# Patient Record
Sex: MALE | Race: WHITE | NOT HISPANIC OR LATINO | Employment: OTHER | URBAN - METROPOLITAN AREA
[De-identification: names, ages, dates, MRNs, and addresses within clinical notes are randomized per-mention and may not be internally consistent; named-entity substitution may affect disease eponyms.]

---

## 2017-01-04 ENCOUNTER — TELEPHONE (OUTPATIENT)
Dept: NEUROLOGY | Facility: HOSPITAL | Age: 47
End: 2017-01-04

## 2017-01-04 DIAGNOSIS — C7B.8 SECONDARY NEUROENDOCRINE TUMOR OF LIVER: Primary | ICD-10-CM

## 2017-01-04 DIAGNOSIS — C7B.8 SECONDARY NEUROENDOCRINE TUMOR OF DISTANT LYMPH NODES: ICD-10-CM

## 2017-01-04 RX ORDER — HYDROCODONE BITARTRATE AND ACETAMINOPHEN 5; 325 MG/1; MG/1
1 TABLET ORAL EVERY 6 HOURS PRN
Status: ON HOLD | COMMUNITY
End: 2017-04-12 | Stop reason: HOSPADM

## 2017-01-04 RX ORDER — MONTELUKAST SODIUM 4 MG/1
3 TABLET, CHEWABLE ORAL 2 TIMES DAILY
COMMUNITY
End: 2017-03-30

## 2017-01-04 NOTE — LETTER
January 4, 2017    Javy Thompson  [address] [facility] [address]  [address]             [facility]  Tumor Program  [address]  [address]  [place] [address]  Phone: [phone]  Fax: [phone] Dear Mr. Thompson:    Thank you for your interest in our program. It was my pleasure speaking with you today about your upcoming appointment.     You have a scheduled appointment with Dr Brandon Hadley MD on Wednesday, February 8, 2017 at 9:30 AM. Our office is located at :    [facility]  Medical Office [facility]  Neuroendocrine Tumor Clinic  [address], [address]  [place], [address]    You are scheduled for a consultation only with the physicians unless otherwise planned. Plan to be here for your visit for 2-3 hrs. If flight arrangements are made, plan to make the return flight after 6 pm if possible. The [place] 10 minutes from [facility].    In preparation for your appointment, we ask that you gather the information below and fax them to us ASAP. This will enable us to review all pertinent information at the time of your visit so that recommendations can be made and a plan of care developed for you.     Please return forms along with all paper records via fax asap.    Please fax  1. Insurance cards (front and back)-enlarged if possible  2. Drivers licenses  3. Current medicine list  4. Name, address & phone # of your physician for correspondence  5. Authorization for Release of Information-complete and return to clinic  6. Medical Records-send as soon as you have them together (see guidelines below)    Lab Work: If requested, the special lab markers do require special tubes that have to be ordered by the ordering facility. The lab work should be within 3 months.. The labs take 2-3 weeks to get results so please get labs drawn asap. The name and phone # of the send out lab that does the special labs tests is on the order. You can have the  labs drawn at [facility], [facility], a local hospital, or your doctors office (whichever works). If these lab tests need to be done, I will attach an Outpatient Order form. If you are doing your lab work at a facility other than [facility], call our office to notify us the date you have them drawn and the location and phone number of the lab for easy follow up.    Scans: Please mail copies of CD's of your last scans to the office asap. I would recommend sending them overnight with a tracking number in case of any problems. If you need updated scans, I will attach an Outpatient Order form.    Tissue Biopsy/Pathology: If you had a tissue biopsy or surgery, we will request to have your slides and/or tissue blocks sent to us to perform some special testing on them. Please provide us with a pathology report asa. This testing will be billed to your insurance company.     Operative or Procedure reports: All surgery or procedure reports related to your neuroendocrine tumor should be sent to us.    Insurance Company: You should contact your insurance company to inquire about your insurance coverage and benefits. Ask about co-payments and deductibles when seeing a specialist. Ask if this visit will be in Network or Out of Network. We may be able to work with you if this is out of network for you.    Lodging: Attached is lodging information from the [facility] and a list of local hotels. The [facility] is run by the [employer] and is free of charge. [facility], you must call my office and talk to [facility]. You will need to complete the application and send it to my office for a physician signature. We will forward it to the [facility] and they will check availability. If you wish to stay at the [facility], apply EARLY, they fill up quickly. You may contact the [facility] a week later to confirm your reservation and ask any questions regarding the facility. You  May only stay at the [facility] 24 hrs prior to  your appointment and up to 24 hrs after your appointment. (THIS CAN ONLY BE USED IF YOU LIVE MORE THAN 40 MILES FROM OUR FACILITY).    Call me if you have any questions, email is not the best way to communicate with our office.    We are looking forward to meeting and taking care of you. If you have any questions or concerns, please don't hesitate to call.     Sincerely,        Mariama Islas, RN, BSN  Nurse Manager, Neuroendocrine Tumor Program

## 2017-01-04 NOTE — TELEPHONE ENCOUNTER
----- Message from Iman Kennedy sent at 1/3/2017 10:22 AM CST -----  EAW/New patient - Who called: Patient's wife- Damaris                                     Referred by: Facebook and different Internet websites                Why do you need to be seen? Neuroendocrine of Liver and Lymph Nodes               Which doctor are you requesting? EAW.                                      You may contact patient back to schedule at: [phone]    Instructed patient to gather medical records, Cd's and medication list and fax or mail to us asap.

## 2017-01-04 NOTE — TELEPHONE ENCOUNTER
Ordered octreoscan, MRI, tumor markers and path re read per protocol. Appointment made with MD, info sent to patient

## 2017-01-05 NOTE — TELEPHONE ENCOUNTER
Went to the ED in December 2016 for abdominal pain and CT scan done. Has been having symptoms of flushing and diarrhea for about a year. 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  PET scan done 2 weeks ago and found nodes in mesentery, esophagus and diaphragm.  Wife found us on the internet and wants to see Dr Hadley, liver biopsy will be done 1/5/17

## 2017-01-10 ENCOUNTER — TELEPHONE (OUTPATIENT)
Dept: NEUROLOGY | Facility: HOSPITAL | Age: 47
End: 2017-01-10

## 2017-01-10 NOTE — TELEPHONE ENCOUNTER
Took call directly from patient and he is having difficuly having labs drawn.  Told him to continue calling around, go in with the orders and show them the number and ask them to call to get the tubes.  Informed him that some patients drive an hour to get their labs drawn.  He will keep trying.

## 2017-01-12 LAB
EXT 24 HR UR METANEPHRINE: ABNORMAL
EXT 24 HR UR NORMETANEPHRINE: ABNORMAL
EXT 24 HR UR NORMETANEPHRINE: ABNORMAL
EXT 25 HYDROXY VIT D2: ABNORMAL
EXT 25 HYDROXY VIT D3: ABNORMAL
EXT 5 HIAA 24 HR URINE: ABNORMAL
EXT 5 HIAA BLOOD: 137 NG/ML (ref 0–22)
EXT ACTH: ABNORMAL
EXT AFP: ABNORMAL
EXT ALBUMIN: ABNORMAL
EXT ALKALINE PHOSPHATASE: ABNORMAL
EXT ALT: ABNORMAL
EXT AMYLASE: ABNORMAL
EXT ANTI ISLET CELL AB: ABNORMAL
EXT ANTI PARIETAL CELL AB: ABNORMAL
EXT ANTI THYROID AB: ABNORMAL
EXT AST: ABNORMAL
EXT BILIRUBIN DIRECT: ABNORMAL MG/DL
EXT BILIRUBIN TOTAL: ABNORMAL
EXT BK VIRUS DNA QN PCR: ABNORMAL
EXT BUN: ABNORMAL
EXT C PEPTIDE: ABNORMAL
EXT CA 125: ABNORMAL
EXT CA 19-9: ABNORMAL
EXT CA 27-29: ABNORMAL
EXT CALCITONIN: ABNORMAL
EXT CALCIUM: ABNORMAL
EXT CEA: ABNORMAL
EXT CHLORIDE: ABNORMAL
EXT CHOLESTEROL: ABNORMAL
EXT CHROMOGRANIN A: 66 NG/ML (ref 0–15)
EXT CO2: ABNORMAL
EXT CREATININE UA: ABNORMAL
EXT CREATININE: ABNORMAL MG/DL
EXT CYCLOSPORONE LEVEL: ABNORMAL
EXT DOPAMINE: ABNORMAL
EXT EBV DNA BY PCR: ABNORMAL
EXT EPINEPHRINE: ABNORMAL
EXT FOLATE: ABNORMAL
EXT FREE T3: ABNORMAL
EXT FREE T4: ABNORMAL
EXT FSH: ABNORMAL
EXT GASTRIN RELEASING PEPTIDE: ABNORMAL
EXT GASTRIN RELEASING PEPTIDE: ABNORMAL
EXT GASTRIN: ABNORMAL
EXT GGT: ABNORMAL
EXT GHRELIN: ABNORMAL
EXT GLUCAGON: ABNORMAL
EXT GLUCOSE: ABNORMAL
EXT GROWTH HORMONE: ABNORMAL
EXT HCV RNA QUANT PCR: ABNORMAL
EXT HDL: ABNORMAL
EXT HEMATOCRIT: ABNORMAL
EXT HEMOGLOBIN A1C: ABNORMAL
EXT HEMOGLOBIN: ABNORMAL
EXT HISTAMINE 24 HR URINE: ABNORMAL
EXT HISTAMINE: ABNORMAL
EXT IGF-1: ABNORMAL
EXT IMMUNKNOW (NON-STIMULATED): ABNORMAL
EXT IMMUNKNOW (STIMULATED): ABNORMAL
EXT INR: ABNORMAL
EXT INSULIN: ABNORMAL
EXT LANREOTIDE LEVEL: ABNORMAL
EXT LDH, TOTAL: ABNORMAL
EXT LDL CHOLESTEROL: ABNORMAL
EXT LIPASE: ABNORMAL
EXT MAGNESIUM: ABNORMAL
EXT METANEPHRINE FREE PLASMA: ABNORMAL
EXT MOTILIN: ABNORMAL
EXT NEUROKININ A CAMB: ABNORMAL
EXT NEUROKININ A ISI: 65 PG/ML (ref 0–40)
EXT NEUROTENSIN: ABNORMAL
EXT NOREPINEPHRINE: ABNORMAL
EXT NORMETANEPHRINE: ABNORMAL
EXT NSE: ABNORMAL
EXT OCTREOTIDE LEVEL: ABNORMAL
EXT PANCREASTATIN CAMB: ABNORMAL
EXT PANCREASTATIN ISI: 3344 PG/ML (ref 10–135)
EXT PANCREATIC POLYPEPTIDE: ABNORMAL
EXT PHOSPHORUS: ABNORMAL
EXT PLATELETS: ABNORMAL
EXT POTASSIUM: ABNORMAL
EXT PROGRAF LEVEL: ABNORMAL
EXT PROLACTIN: ABNORMAL
EXT PROTEIN TOTAL: ABNORMAL
EXT PROTEIN UA: ABNORMAL
EXT PT: ABNORMAL
EXT PTH, INTACT: ABNORMAL
EXT PTT: ABNORMAL
EXT RAPAMUNE LEVEL: ABNORMAL
EXT SEROTONIN: 1290 NG/ML (ref 56–244)
EXT SODIUM: ABNORMAL MMOL/L
EXT SOMATOSTATIN: ABNORMAL
EXT SUBSTANCE P: ABNORMAL
EXT TRIGLYCERIDES: ABNORMAL
EXT TRYPTASE: ABNORMAL
EXT TSH: ABNORMAL
EXT URIC ACID: ABNORMAL
EXT URINE AMYLASE U/HR: ABNORMAL
EXT URINE AMYLASE U/L: ABNORMAL
EXT VASOACTIVE INTESTINAL POLYPEPTIDE: ABNORMAL
EXT VITAMIN B12: ABNORMAL
EXT VMA 24 HR URINE: ABNORMAL
EXT WBC: ABNORMAL
NEURON SPECIFIC ENOLASE: ABNORMAL

## 2017-01-14 LAB

## 2017-01-26 ENCOUNTER — TELEPHONE (OUTPATIENT)
Dept: NEUROLOGY | Facility: HOSPITAL | Age: 47
End: 2017-01-26

## 2017-01-26 NOTE — TELEPHONE ENCOUNTER
Took call directly from patient's wife and discussed the records we have.  Informed her that we have no pathology report of the inconclusive one or the one repeated on 1/5/17 and that it is imperative that we have that.  Checked the [facility] website for labs and nothing was there.  She said that she would email the path reports times 2 and I will check on the labs, she said that he had them drawn on 1/9/17.

## 2017-01-30 ENCOUNTER — TELEPHONE (OUTPATIENT)
Dept: NEUROLOGY | Facility: HOSPITAL | Age: 47
End: 2017-01-30

## 2017-01-30 NOTE — TELEPHONE ENCOUNTER
----- Message from Samreen Boyd sent at 1/30/2017 10:20 AM CST -----  JAMEY/CHRISTY Cervantes from Dr. Harp in [place] is returning your call about patients records. Please call Helen back at [phone]

## 2017-02-03 LAB

## 2017-02-06 ENCOUNTER — HOSPITAL ENCOUNTER (OUTPATIENT)
Dept: RADIOLOGY | Facility: HOSPITAL | Age: 47
Discharge: HOME | End: 2017-02-06
Attending: SURGERY
Payer: COMMERCIAL

## 2017-02-06 ENCOUNTER — HOSPITAL ENCOUNTER (OUTPATIENT)
Dept: CARDIOLOGY | Facility: HOSPITAL | Age: 47
Discharge: HOME | End: 2017-02-06
Attending: SURGERY
Payer: COMMERCIAL

## 2017-02-06 DIAGNOSIS — C7B.8 SECONDARY NEUROENDOCRINE TUMOR OF DISTANT LYMPH NODES: ICD-10-CM

## 2017-02-06 DIAGNOSIS — C7B.8 SECONDARY NEUROENDOCRINE TUMOR OF LIVER: ICD-10-CM

## 2017-02-06 PROCEDURE — 74183 MRI ABD W/O CNTR FLWD CNTR: CPT | Mod: TC

## 2017-02-06 PROCEDURE — 78803 RP LOCLZJ TUM SPECT 1 AREA: CPT | Mod: TC

## 2017-02-06 PROCEDURE — 74150 CT ABDOMEN W/O CONTRAST: CPT | Mod: 26,,, | Performed by: RADIOLOGY

## 2017-02-06 PROCEDURE — 78803 RP LOCLZJ TUM SPECT 1 AREA: CPT | Mod: 26,,, | Performed by: RADIOLOGY

## 2017-02-06 PROCEDURE — 78804 RP LOCLZJ TUM WHBDY 2+D IMG: CPT | Mod: 26,,, | Performed by: RADIOLOGY

## 2017-02-06 PROCEDURE — 93306 TTE W/DOPPLER COMPLETE: CPT

## 2017-02-06 PROCEDURE — A9585 GADOBUTROL INJECTION: HCPCS | Performed by: SURGERY

## 2017-02-06 PROCEDURE — 74183 MRI ABD W/O CNTR FLWD CNTR: CPT | Mod: 26,,, | Performed by: RADIOLOGY

## 2017-02-06 PROCEDURE — 93306 TTE W/DOPPLER COMPLETE: CPT | Mod: 26,,, | Performed by: INTERNAL MEDICINE

## 2017-02-06 PROCEDURE — 25500020 PHARM REV CODE 255: Performed by: SURGERY

## 2017-02-06 RX ORDER — GADOBUTROL 604.72 MG/ML
10 INJECTION INTRAVENOUS
Status: COMPLETED | OUTPATIENT
Start: 2017-02-06 | End: 2017-02-06

## 2017-02-06 RX ADMIN — GADOBUTROL 10 ML: 604.72 INJECTION INTRAVENOUS at 08:02

## 2017-02-07 ENCOUNTER — HOSPITAL ENCOUNTER (OUTPATIENT)
Dept: RADIOLOGY | Facility: HOSPITAL | Age: 47
Discharge: HOME | End: 2017-02-07
Attending: SURGERY
Payer: COMMERCIAL

## 2017-02-07 DIAGNOSIS — C7B.8 SECONDARY NEUROENDOCRINE TUMOR OF DISTANT LYMPH NODES: ICD-10-CM

## 2017-02-07 DIAGNOSIS — C7B.8 SECONDARY NEUROENDOCRINE TUMOR OF LIVER: ICD-10-CM

## 2017-02-07 LAB
DIASTOLIC DYSFUNCTION: NO
ESTIMATED PA SYSTOLIC PRESSURE: 18.52
MITRAL VALVE MOBILITY: NORMAL
RETIRED EF AND QEF - SEE NOTES: 55 (ref 55–65)
TRICUSPID VALVE REGURGITATION: NORMAL

## 2017-02-07 PROCEDURE — 78803 RP LOCLZJ TUM SPECT 1 AREA: CPT | Mod: TC

## 2017-02-07 PROCEDURE — 78803 RP LOCLZJ TUM SPECT 1 AREA: CPT | Mod: 26,,, | Performed by: RADIOLOGY

## 2017-02-07 PROCEDURE — A9572 INDIUM IN-111 PENTETREOTIDE: HCPCS

## 2017-02-08 ENCOUNTER — OFFICE VISIT (OUTPATIENT)
Dept: NEUROLOGY | Facility: HOSPITAL | Age: 47
End: 2017-02-08
Attending: SURGERY
Payer: COMMERCIAL

## 2017-02-08 ENCOUNTER — CLINICAL SUPPORT (OUTPATIENT)
Dept: NEUROLOGY | Facility: HOSPITAL | Age: 47
End: 2017-02-08
Payer: COMMERCIAL

## 2017-02-08 VITALS
TEMPERATURE: 98 F | DIASTOLIC BLOOD PRESSURE: 79 MMHG | BODY MASS INDEX: 26.68 KG/M2 | SYSTOLIC BLOOD PRESSURE: 128 MMHG | HEIGHT: 77 IN | WEIGHT: 226 LBS | HEART RATE: 83 BPM

## 2017-02-08 DIAGNOSIS — C7B.02 METASTATIC MALIGNANT CARCINOID TUMOR TO LIVER: ICD-10-CM

## 2017-02-08 DIAGNOSIS — C7B.8 SECONDARY NEUROENDOCRINE TUMOR OF LIVER: ICD-10-CM

## 2017-02-08 DIAGNOSIS — K63.89 MESENTERIC MASS: ICD-10-CM

## 2017-02-08 DIAGNOSIS — C7B.8 SECONDARY NEUROENDOCRINE TUMOR OF DISTANT LYMPH NODES: ICD-10-CM

## 2017-02-08 DIAGNOSIS — E34.0 CARCINOID SYNDROME: ICD-10-CM

## 2017-02-08 DIAGNOSIS — C7A.00 MALIGNANT CARCINOID TUMOR OF UNKNOWN PRIMARY SITE: ICD-10-CM

## 2017-02-08 DIAGNOSIS — C7A.012 MALIGNANT CARCINOID TUMOR OF ILEUM: Primary | ICD-10-CM

## 2017-02-08 PROCEDURE — 99212 OFFICE O/P EST SF 10 MIN: CPT | Mod: 27 | Performed by: SURGERY

## 2017-02-08 PROCEDURE — 99212 OFFICE O/P EST SF 10 MIN: CPT | Mod: 27

## 2017-02-08 PROCEDURE — 99215 OFFICE O/P EST HI 40 MIN: CPT | Performed by: SURGERY

## 2017-02-08 NOTE — LETTER
February 8, 2017        Syd Gonzales MD  [address] [place] [address]       NOLANETS: [facility]  A collaboration between [facility] and [facility] [address]  [address]  [place] [address]  Phone: [phone]  Fax: [phone]        YARIEL Oliva M.D., FACS  Ryan Coello M.D.  Yunior Mims M.D.   Heather Reyes M.D., FACS  DO Brandon Mcnally M.D., FACS   Patient: Javy Thompson   MR Number: [id number]   YOB: 1970   Date of Visit: 2/8/2017     Dear Dr. Gonzales    Thank you for the kind referral of Javy Thompson. We saw this patient on 2/8/2017, and a copy of our clinic note is enclosed. We certainly appreciate the opportunity to participate in your patient's care.     If you have any questions or wish to discuss your patient further, please do not hesitate to contact us at [phone].       Kindest personal regards,          Brandon Hadley MD, FACS  The [employer] Professor of Surgery & Neurosciences  [facility], Dept. Of Surgery  [address] [place] [address], [address]  [place] [address] [phone]

## 2017-02-08 NOTE — PATIENT INSTRUCTIONS
Patient Instructions       Take a Hibiclens shower twice a day for 3 days prior to surgery, including the morning of surgery.   Gargle with Listerine twice a day for 2 days prior to surgery, including the morning of surgery.      3/27/17    Appointment with Dr. Reyes    Pre [facility] for Hospital Admission - Go to 1st floor of the hospital-admitting desk. You will do paper work, get blood drawn,  get x-rays and see Anesthesia at this time.      __3/28/2017__   CLEAR LIQUIDS all day   Do not eat or drink anything after midnight.                 ____3/29/2017__   Hospital Admission for surgery.  Report to 2nd floor, Same Day Surgery desk at ___0700_____   Surgery is scheduled for ____0530_____        [facility] [address]  [place]  [address]  [phone]      INFORMATION REGARDING YOUR PROCEDURE      We look forward to serving you and your family and appreciate that you have chosen [facility] for your healthcare needs. Before, during, and after your surgery, you will be cared for by skilled medical professionals. Our surgeons, anesthesiologists, nurses,  and other healthcare professionals will work with you and your family to ensure a safe, smooth and comfortable surgery and recovery.    In order to best meet your pre-admission needs, your surgeon or ordering physicians office will contact our Scheduling Office at [phone] and schedule a Pre-Procedure Appointment.  This should preferably be done 72 hours or greater before your scheduled procedure date.     During your pre-procedure visit your insurance will be verified for your procedure. You will meet with a Registered Nurse and an Anesthesiologist or Nurse Anesthetist. If tests are required, they will be performed during your visit. Please allow about one and a half hours for this visit.      You will need to bring the following information or items with you to your Pre-Procedure Appointment:    1.   Picture Identification  2.  Insurance Card  3.  Current list of medications to include name and dosage  4.  List of allergies  5.  Orders and any other forms your doctor has given to you  6.  Copies of lab results performed at other facilities. This may include blood work, EKGs or chest xrays.   These results can be faxed to [phone].    The Pre-Op Center Registration Desk is located on the first floor of the hospital ([address] [place] [address]) in the [facility].  The Pre-Operative Center is located on the second floor of the hospital. Someone will walk you from the registration area to the Pre-Operative Center.    Free parking is located in the front of the hospital and medical office building and is easily accessed from [address] and [address]. When you arrive, please check in at the main information desk of the hospital.    Please call Outpatient Surgery at [phone] after 12:00pm on the day before your procedure for your arrival time and updated procedure time.      Pre-Op Bathing Instructions    Before surgery, you can play an important role in your own health.    Because skin is not sterile, we need to be sure that your skin is as free of germs as possible. By following the instructions below, you can reduce the number of germs on your skin before surgery.    IMPORTANT: You will need to shower with a special soap called Hibiclens*, available at any pharmacy.  If you are allergic to Chlorhexidine (the antiseptic in Hibiclens), use an antibacterial soap such as Dial Soap for your preoperative shower.  You will shower with Hibiclens both the night before your surgery and the morning of your surgery.  Do not use Hibiclens on the head, face or genitals to avoid injury to those areas.    STEP #1: THE NIGHT BEFORE YOUR SURGERY     1. Do not shave the area of your body where your surgery will be performed.  2. Shower and wash your hair and body as usual with your normal soap and  shampoo.  3. Rinse your hair and body thoroughly after you shower to remove all soap residue.  4. With your hand, apply one packet of Hibiclens soap to the surgical site.   5. Wash the site gently for five (5) minutes. Do not scrub your skin too hard.   6. Do not wash with your regular soap after Hibiclens is used.  7. Rinse your body thoroughly.  8. Pat yourself dry with a clean, soft towel.  9. Do not use lotion, cream, or powder.  10. Wear clean clothes.    STEP #2: THE MORNING OF YOUR SURGERY     1. Repeat Step #1.    * Not to be used by people allergic to Chlorhexidine.

## 2017-02-08 NOTE — MR AVS SNAPSHOT
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    Follow-up and Disposition History      [facility]     [facility] Care [facility] -  Assistance  Registered nurses in the [facility] provide clinical advisement, health education, appointment booking, and other advisory services.  Call for this free service at [phone].             Medications           Message regarding Medications     Verify the changes and/or additions to your medication regime listed below are the same as discussed with your clinician today.  If any of these changes or additions are incorrect, please notify your healthcare provider.             Verify that the below list of medications is an accurate representation of the medications you are currently taking.  If none reported, the list may be blank. If incorrect, please contact your healthcare provider. Carry this list with you in case of emergency.           Current Medications     colestipol (COLESTID) 1 gram Tab Take 3 g by mouth 2 (two) times daily.    hydrocodone-acetaminophen 5-325mg (NORCO) 5-325 mg per tablet Take 1 tablet by mouth  "every 6 (six) hours as needed for Pain.           Clinical Reference Information           Your Vitals Were     BP Pulse Temp Height Weight BMI    128/79 83 98.1 °F (36.7 °C) (Oral) 6' 5" (1.956 m) 102.5 kg (226 lb) 26.8 kg/m2      Blood Pressure          Most Recent Value    BP  128/79      Allergies as of 2/8/2017     Epinephrine      Immunizations Administered on Date of Encounter - 2/8/2017     None      Orders Placed During Today's Visit      Normal Orders This Visit    5-HIAA Plasma (Neuroendocrine)     CBC auto differential     Comprehensive metabolic panel     CT Abdomen Pelvis With Contrast - Triple Phase     Lanreotide Drug Levels     Neurokinin A     Pancreastatin     Serotonin (Neuroendo)     Future Labs/Procedures Expected by Expires    5-HIAA Plasma (Neuroendocrine)  2/8/2017 4/9/2018    CT Abdomen Pelvis With Contrast - Triple Phase  2/8/2017 2/8/2018    Lanreotide Drug Levels  2/8/2017 4/9/2018    MRI Abdomen W WO Contrast  2/8/2017 2/8/2018    Neurokinin A  2/8/2017 4/9/2018    NM Tumor Localization Multi Octreo W CT  2/8/2017 2/8/2018    NM Tumor Localization Spect (Day 1)  2/8/2017 2/8/2018    NM Tumor Localization Spect (Day 2)  2/8/2017 2/8/2018    Pancreastatin  2/8/2017 4/9/2018    Serotonin (Neuroendo)  2/8/2017 4/9/2018    CBC auto differential  8/7/2017 4/9/2018    Comprehensive metabolic panel  8/7/2017 4/9/2018      [facility] Sign-Up     Activating your [facility] account is as easy as 1-2-3!     1) Visit [email or web address], select Sign Up Now, enter this activation code and your date of birth, then select Next.  [email or web address]  Expires: 3/25/2017 10:35 AM      2) Create a username and password to use when you visit [facility] in the future and select a security question in case you lose your password and select Next.    3) Enter your e-mail address and click Sign Up!    Additional Information  If you have questions, please e-mail [email or web address] or call [phone] to talk to our " [facility] staff. Remember, [facility] is NOT to be used for urgent needs. For medical emergencies, dial 911.         Instructions    See Margarita Field, today- appointment scheduled    See Surgeon, Dr. NADEEN Reyes, today- appointment scheduled    **Start Lanreotide 120 mg subcutaneously every 28 days- to be prescribed and managed by patient's local MD**      Plan after surgery:    -Return to clinic to see Dr. Hadley 3 months after surgery for restaging    -Scans (CT, MRI, OScan) prior to returning to clinic (3 months after surgery)    -Labs (Tumor Markers) 2 months after surgery/ 1 month prior to returning to clinic         Language Assistance Services     ATTENTION: Language assistance services are available, free of charge. Please call [phone].      ATENCIÓN: Si gilsonla mera, tiene a john disposición servicios gratuitos de asistencia lingüística. Llame al [phone].     [facility] Ý: N?u b?n nói Ti?ng Vi?t, có các d?ch v? h? tr? ngôn ng? mi?n phí dành cho b?n. G?i s? [phone].         [facility] complies with applicable Federal civil rights laws and does not discriminate on the basis of race, color, national origin, age, disability, or sex.

## 2017-02-08 NOTE — MR AVS SNAPSHOT
[facility]  [address] [place] [address]  Phone: [phone]  Fax: [phone]                  Javy Thompson   2017 12:30 PM   Office Visit    Description:  Male : 1970   Provider:  Fidelia Reyes MD   Department:  [facility]           Diagnoses this Visit        Comments    Malignant carcinoid tumor of ileum    -  Primary     Secondary neuroendocrine tumor of liver         Secondary neuroendocrine tumor of distant lymph nodes         Carcinoid syndrome                To Do List           Future Appointments        Provider Department Dept Phone    2017 12:30 PM Fidelia Reyes MD [facility] [phone]    3/27/2017 1:00 PM Fidelia Reyes MD [facility] [phone]      Goals (5 Years of Data)     None      Follow-Up and Disposition     Return if symptoms worsen or fail to improve.    Follow-up and Disposition History      [facility]     [facility] -  Assistance  Registered nurses in the [facility] provide clinical advisement, health education, appointment booking, and other advisory services.  Call for this free service at [phone].             Medications           Message regarding Medications     Verify the changes and/or additions to your medication regime listed below are the same as discussed with your clinician today.  If any of these changes or additions are incorrect, please notify your healthcare provider.             Verify that the below list of medications is an accurate representation of the medications you are currently taking.  If none reported, the list may be blank. If incorrect, please contact your healthcare provider. Carry this list with you in case of emergency.           Current Medications     colestipol (COLESTID) 1 gram Tab Take 3 g by mouth 2 (two) times daily.    hydrocodone-acetaminophen 5-325mg (NORCO) 5-325  mg per tablet Take 1 tablet by mouth every 6 (six) hours as needed for Pain.           Clinical Reference Information           Allergies as of 2/8/2017     Epinephrine      Immunizations Administered on Date of Encounter - 2/8/2017     None      [facility] Sign-Up     Activating your [facility] account is as easy as 1-2-3!     1) Visit [email or web address], select Sign Up Now, enter this activation code and your date of birth, then select Next.  [email or web address]  Expires: 3/25/2017 10:35 AM      2) Create a username and password to use when you visit [facility] in the future and select a security question in case you lose your password and select Next.    3) Enter your e-mail address and click Sign Up!    Additional Information  If you have questions, please e-mail [email or web address] or call [phone] to talk to our [facility] staff. Remember, [facility] is NOT to be used for urgent needs. For medical emergencies, dial 911.         Instructions    Patient Instructions       Take a Hibiclens shower twice a day for 3 days prior to surgery, including the morning of surgery.   Gargle with Listerine twice a day for 2 days prior to surgery, including the morning of surgery.      3/27/17    Appointment with Dr. Reyes    Pre [place] for Hospital Admission - Go to 1st floor of the hospital-admitting desk. You will do paper work, get blood drawn,  get x-rays and see Anesthesia at this time.      __3/28/2017__   CLEAR LIQUIDS all day   Do not eat or drink anything after midnight.                 ____3/29/2017__   Hospital Admission for surgery.  Report to 2nd floor, Same Day Surgery desk at ___0700_____   Surgery is scheduled for ____0530_____        [facility] [address]  [id number] [phone]      INFORMATION REGARDING YOUR PROCEDURE      We look forward to serving you and your family and appreciate that you have chosen [facility] for your healthcare  needs. Before, during, and after your surgery, you will be cared for by skilled medical professionals. Our surgeons, anesthesiologists, nurses,  and other healthcare professionals will work with you and your family to ensure a safe, smooth and comfortable surgery and recovery.    In order to best meet your pre-admission needs, your surgeon or ordering physicians office will contact our Scheduling Office at [phone] and schedule a Pre-Procedure Appointment.  This should preferably be done 72 hours or greater before your scheduled procedure date.     During your pre-procedure visit your insurance will be verified for your procedure. You will meet with a Registered Nurse and an Anesthesiologist or Nurse Anesthetist. If tests are required, they will be performed during your visit. Please allow about one and a half hours for this visit.      You will need to bring the following information or items with you to your Pre-Procedure Appointment:    1.  Picture Identification  2.  Insurance Card  3.  Current list of medications to include name and dosage  4.  List of allergies  5.  Orders and any other forms your doctor has given to you  6.  Copies of lab results performed at other facilities. This may include blood work, EKGs or chest xrays.   These results can be faxed to [phone].    The Pre-Op Center Registration Desk is located on the first floor of the hospital ([address] [place] [address]) in the [facility].  The Pre-Operative Center is located on the second floor of the hospital. Someone will walk you from the registration area to the Pre-Operative Center.    Free parking is located in the front of the hospital and medical office building and is easily accessed from [address] and [address]. When you arrive, please check in at the main information desk of the hospital.    Please call Outpatient Surgery at [phone] after 12:00pm on the day before your procedure for your arrival time and  updated procedure time.      Pre-Op Bathing Instructions    Before surgery, you can play an important role in your own health.    Because skin is not sterile, we need to be sure that your skin is as free of germs as possible. By following the instructions below, you can reduce the number of germs on your skin before surgery.    IMPORTANT: You will need to shower with a special soap called Hibiclens*, available at any pharmacy.  If you are allergic to Chlorhexidine (the antiseptic in Hibiclens), use an antibacterial soap such as Dial Soap for your preoperative shower.  You will shower with Hibiclens both the night before your surgery and the morning of your surgery.  Do not use Hibiclens on the head, face or genitals to avoid injury to those areas.    STEP #1: THE NIGHT BEFORE YOUR SURGERY     1. Do not shave the area of your body where your surgery will be performed.  2. Shower and wash your hair and body as usual with your normal soap and shampoo.  3. Rinse your hair and body thoroughly after you shower to remove all soap residue.  4. With your hand, apply one packet of Hibiclens soap to the surgical site.   5. Wash the site gently for five (5) minutes. Do not scrub your skin too hard.   6. Do not wash with your regular soap after Hibiclens is used.  7. Rinse your body thoroughly.  8. Pat yourself dry with a clean, soft towel.  9. Do not use lotion, cream, or powder.  10. Wear clean clothes.    STEP #2: THE MORNING OF YOUR SURGERY     1. Repeat Step #1.    * Not to be used by people allergic to Chlorhexidine.         Language Assistance Services     ATTENTION: Language assistance services are available, free of charge. Please call [phone].      ATENCIÓN: Si gilsonla mera, tiene a john disposición servicios gratuitos de asistencia lingüística. Llame al [phone].     CASEY Ý: N?u b?n nói Ti?ng Vi?t, có các d?ch v? h? tr? ngôn ng? mi?n phí dành cho b?n. G?i s? [phone].         [facility] complies with applicable Federal civil rights laws and does not discriminate on the basis of race, color, national origin, age, disability, or sex.

## 2017-02-08 NOTE — PROGRESS NOTES
NOLANETS:  [facility]  A collaboration between [facility] and [facility]      PATIENT: Javy Thompson  MRN: [id number]  DATE: 2/8/2017    Subjective:      Chief Complaint: No chief complaint on file.      Vitals: There were no vitals filed for this visit.     Karnofsky Score:     Diagnosis: No diagnosis found.     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    Allergies:  Review of patient's allergies indicates:   Allergen Reactions    Epinephrine Other (See Comments)     Carcinoid patient       Medications:  Current Outpatient Prescriptions   Medication Sig Dispense Refill    colestipol (COLESTID) 1 gram Tab Take 3 g by mouth 2 (two) times daily.      hydrocodone-acetaminophen 5-325mg (NORCO) 5-325 mg per tablet Take 1 tablet by mouth every 6 (six) hours as needed for Pain.       No current facility-administered medications for this visit.        Review of Systems   Objective:      Physical Exam   Assessment:       No diagnosis found.    Laboratory Data:       Impression:  Ileal carcinoid tumour with román mets , liver mets with carcinoid syndrome  With abdominal pain, fatigue  Plan:       Reviewed the labs, scans    Had long discussion about surgery course of surgery, risks of surgery, hospitalization    Plan would be to do proceed with ex lap, bowel resection, lymphadenectomy and liver resection                  NADEEN Reyes MD, FACS   Associate Professor of Surgery, [facility]   Neuroendocrine Surgery, Hepatic/Pancreatic & General Surgery   [address] [place] [address]  [address]   [place] [address]   ph. [phone]; [phone]   fax. [phone]

## 2017-02-08 NOTE — PATIENT INSTRUCTIONS
See Margarita Field, today- appointment scheduled    See Surgeon, Dr. NADEEN Reyes, today- appointment scheduled    **Start Lanreotide 120 mg subcutaneously every 28 days- to be prescribed and managed by patient's local MD**      Plan after surgery:    -Return to clinic to see Dr. Hadley 3 months after surgery for restaging    -Scans (CT, MRI, OScan) prior to returning to clinic (3 months after surgery)    -Labs (Tumor Markers) 2 months after surgery/ 1 month prior to returning to clinic

## 2017-02-08 NOTE — PROGRESS NOTES
"NOLANETS:  [facility]  A collaboration between [facility] and [facility]    PATIENT: Javy Thompson  MRN: [id number]  DATE: 2/8/2017    Vitals:    02/08/17 0948   BP: 128/79   Pulse: 83   Temp: 98.1 °F (36.7 °C)   TempSrc: Oral   Weight: 102.5 kg (226 lb)   Height: 6' 5" (1.956 m)      Last 2 Weight Measurements:   Wt Readings from Last 2 Encounters:   02/08/17 102.5 kg (226 lb)     BMI: Body mass index is 26.8 kg/(m^2).    Karnofsky Score    Karnofsky Score:  90% - Able to Carry on Normal Activity: Minor Symptoms of Disease        Diagnosis:   1. Malignant carcinoid tumor of unknown primary site    2. Mesenteric mass    3. Metastatic malignant carcinoid tumor to liver      Interval History: Mr. Thompson  Is here as new patient with unknown primary and mesenteric mass and liver mets  Went to the ED in December 2016 for abdominal pain and CT scan done. Has been having symptoms of flushing and diarrhea for about a year. 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      hydrocodone-acetaminophen 5-325mg (NORCO) 5-325 mg per tablet Take 1 tablet by mouth every 6 (six) hours as needed for Pain.       No current facility-administered medications for " this visit.        Review of Systems     Number of Days per Week Number per Day   DIARRHEA 7 3   BRISTOL STOOL SCALE RATING Type 4 to type 7    FLUSHING 7 3-7--last for 30 seconds to a couple of minutes   WHEEZING 0      WEIGHT GAIN/LOSS Stable 226 today   ENERGY RATING (0-10) 10      Physical Exam deferred.     Pathology Data:      Laboratory Data:  Neuroendocrine Labs 2/8/2017 1/14/2017   EXT PANCREASTATIN COLE  3344   EXT NEUROKININ A COLE  65   Weight 226 lbs        Radiology Data    CONCLUSIONS     1 - Normal left ventricular systolic function (EF 55-60%).     2 - Normal left ventricular diastolic function.     3 - Normal right ventricular systolic function .     4 - The estimated PA systolic pressure is 19 mmHg.         Findings:    There is a confluent right hepatic lesion in segments VI and VII with heterogeneous T2 hyperintense signal, restricted diffusion, and heterogeneous solid enhancement.  Smaller lesion noted in hepatic segment VIII.  Additional small lesions are noted in the left hepatic lobe. Index measurements below.  Lesions corresponds to increased FDG uptake on outside examination.  Hepatic and portal veins are patent.  No biliary dilatation.  Note is made of hepatic steatosis.    Pancreas, spleen, adrenal glands, and kidneys are unremarkable.   There is nonspecific edema and small lymph nodes within the central mesentery.  There is a spiculated mass within the mesentery seen only on coronal sequences, likewise hypermetabolic on outside examination.  Visualized bowel loops are unremarkable.    RECIST 1.1 Index lesions measurements:      - Index lesion #1: Mesenteric mass, coronal postcontrast sequence image 42, measures 3.7 cm.    - Index lesion #2: Confluent right hepatic mass, arterial phase image 39, measures 13.4 cm.    - Index lesion #3: Hepatic segment VIII, arterial phase image 27, 5.3 cm.    - Non target lesions: Several small enhancing hepatic lesions are noted in the left lobe.    Impression    Central mesenteric mass with probable liver metastases.  Index measurements above.       History: Metastatic neuroendocrine tumor.    Comparison:  12/23/16.  2/6/17.  12/11/16.    Technique:  Following IV administration of 6 mCi of 111In-octreotide, delayed whole body images were acquired at 4 and 24 hours.      SPECT-CT images over the chest, abdomen and pelvis were acquired at 24 hours.  Non contrast CT images were used for attenuation correction and localization purposes.    Findings:  Physiologic activity is present in the liver, spleen, kidneys, bladder, colon, and gallbladder .      1 compare with prior CT, there is no interval worsening.  There is abnormal radiotracer condition within 2 large metastatic deposits within the liver, the largest one occupying the majority of the right hepatic lobe.  Additionally, there is abnormal radiotracer accumulation within a right lower quadrant mesenteric mass as well as within a large retrocrural lymph node.  Knees appear similar to prior PET/CT scan from December 2016.  There are no other abnormal foci of radiotracer accumulation.   Impression         1.  Abnormal study demonstrating activity within a right lower quadrant mesenteric mass, a retrocrural lymph node, and multiple metastatic lesions in the liver as described above.  The overall appearance is similar to prior PET/CT.               Impression:  1.  probable ileal Net with román mets at rlq and pancreatic duodenal junction and liver mets       Plan: see Eric today for cytoreduction and work with LAB to prevent post-op diarrhea   start lanreotide 120 mg every 28 days     Labs: Markers: 3 month intervals            Other: 12 month intervals    Scans: 6 month intervals    Return to Clinic:6 month intervals    Orders placed this visit: No orders of the defined types were placed in this encounter.       60 plus eaw plus eric and lab Minutes Face-to-Face; Counseling/Coordination of Care > 50  percent of Visit     Brandon Hadley MD, FACS  The [employer] Professor of Surgery, [facility]  [address], [address]  [place]  [address]  Cell [phone]  ph. [phone]; [phone]  fax. [phone]  [email or web address]

## 2017-02-09 NOTE — PATIENT INSTRUCTIONS
MyPlate Worksheet: 2,600 Calories  Your calorie needs are about 2,600 calories a day. Below are the U.S. Department of Agriculture (USDA) guidelines for your daily recommended amount of each food group.  Vegetables   3½ cups Fruits   2 cups Grains   9 ounces Dairy   3 cups Protein   6½ ounces   Eat a variety of vegetables each day.  Aim for these amounts each week:  2½ cups dark green vegetables  7 cups red or orange-colored vegetables  2½ cups dry beans and peas  7 cups starchy vegetables  5½ cups other vegetables Eat a variety of fruits each day.  Go easy on fruit juices.  Good choices of fruits include:  Berries  Bananas  Apples  Melon  Dried fruit  Frozen fruit  Canned fruit Choose whole grains whenever you can.  Aim to eat at least 4½ ounces of whole grains each day:  Bread  Cereal  Rice  Pasta  Potatoes  Tortillas Choose low-fat or fat-free milk, yogurt, or cheese each day.  Good choices include:  Low-fat or fat-free milk or chocolate milk  Low-fat or fat-free yogurt  Low-fat or fat-free cottage cheese or other reduced-fat cheeses  Calcium-fortified milk alternatives Choose low-fat or lean meats, poultry, fish and seafood each day.   Vary your protein, choose more:  Fish and other seafood  Lean low-fat meat and poultry  Eggs  Beans, peas  Tofu  Unsalted nuts and seeds  Choose less high-fat and red meat.   Source: USDA MyPlate, www.choosemyplate.gov  Know your limits on oils (fats) and sugars:  Your allowance for oils is 8 teaspoons a day (oil includes vegetable oil, mayonnaise, soft margarine, salad dressing, nuts, olives, avocados, and some fish).  Limit the extras (solid fats and sugars, also called empty calories) to 360 calories a day.  Cut back on salt (sodium). Stay under 2,300 mg sodium a day. If you have a health condition such as heart disease or high blood pressure, your doctor will likely tell you to limit sodium to no more than 1,500 mg a day.  Get moving and be active!  Aim for at least 30  minutes of physical activity most days of the week or 150 minutes of exercise a week.  MyPlate Servings Worksheet: 2,600 Calories  This worksheet tells you how many servings you should get each day from each food group, and tells you how much food makes a serving. Use this as a guide as you plan your meals throughout the day. Track your progress daily by writing in what you actually ate.  Food Group  Daily MyPlate Goal  What You Ate Today    Vegetables 7 Half-cups or 7 Servings   One serving is:  ½ cup cut-up raw or cooked vegetables  1 cup raw, leafy vegetables  ½ baked sweet potato  ½ cup vegetable juice  Note: At meals, fill half your plate with vegetables and fruit.    Fruits 4 Half-cups or 4 Servings   One serving is:  ½ cup fresh, frozen, or canned fruit  1 medium piece of fruit  1 cup of berries or melon  ½ cup dried fruit  ½ cup 100% fruit juice  Note: Make most choices fruit instead of juice.    Grains 9 Servings or 9 Ounces   One serving is:  1 slice bread  1 cup dry cereal  ½ cup cooked rice, pasta, or cereal  1 5-inch tortilla  Note: Choose whole grains for at least half of your servings each day.    Dairy 3 Servings or 3 Cups   One serving is:  1 cup milk  1½ ounces reduced-fat hard cheese  2 ounces processed cheese  1 cup low-fat yogurt  1/3 cup shredded cheese  Note: Choose low-fat or fat-free most often.    Protein 6½ Servings or 6½ Ounces   One serving is:  1 ounce cooked lean beef, pork, lamb, or ham  1 ounce cooked chicken or turkey (no skin)  1 ounce cooked fish or shellfish (not fried)  1 egg  ¼ cup egg substitute  ½ ounce nuts or seeds  1 tablespoon peanut or almond butter  ¼ cup cooked dry beans or peas  ½ cup tofu  2 tablespoons hummus    © 7482-5457 [facility], [address] [place] [address], [place] [address]. All rights reserved. This information is not intended as a substitute for professional medical care. Always follow your healthcare professional's instructionPreventing Diarrhea due to  Rapid Transit Time    *Symptoms of dumping syndrome include: feeling weak, dizzy and/or flushed within 30 minutes of eating. Cramping, pain, nausea, diarrhea and/or sweating may also occur.     Tips to avoid diarrhea related to rapid transit and dumping syndrome:   Eat 4-6 small meals throughout the day   Try to eat a source of protein at each meal (such as: poultry, red meat, eggs, tofu, milk, yogurt, or cheese)   Limit concentrated sugars like candies, cookies, soda, juice, and syrup   Drink fluids 30-60 minutes before and after meals and snacks, but not during meals   Choose foods with soluble fibers (such as: oats, quinoa, fruits and vegetables without peels, and legumes)   Avoid extreme hot or cold foods and beverages   Eat slowly and rest for 15 minutes after a meal with feet up    Food Group Foods Allowed Foods to Avoid   Beverages  6-8 cups daily (no ice) Decaffeinated coffee and tea. Sugar free beverages, water drinks without sugar, water Caffeinated coffee or tea. Beverages made with sugar, corn syrup, or honey. Fruit juices and fruit drinks. Carbonated drinks.    Starches/Grains  8-12 servings daily Complex carbohydrates (such as: white bread, oatmeal, quinoa, cereal, potatoes, barley, white rice, and pasta).   High soluble fiber Sweet rolls, donuts, pastries, and cakes. Sugar cereals. Whole wheat, rye, pumpernickel brown rice and whole grains.  Low insoluble fiber   Meat and Other Protein Foods  Limit red meat to 12 ounces weekly Tender, well-cooked meats, poultry, and fish. Egg whites (whole eggs if tolerated). Soy foods prepared without added fat. Smooth peanut butter allowed if tolerable of fats. Powdered peanut butter (PB2) Fried meat, poultry, or fish. Luncheon meats (i.e. Bologna or salami). Sausage, hot dogs, dao. Tough or chewy meats. Dried beans and peas. (i.e. [place]o or kidney beans). Seeds or nuts.   Fats  1-3 tsp per meal A small to moderate amount of fat as tolerated: MCT oil,  coconut oil, olive oil, canola, butter, margarine, cream, and cream cheese. High amounts of fat such as fried foods, avocados, olives, butter, vegetable oils, fried foods.   Fruits  3-6 servings daily Canned in fruit juice, light syrup and drained. Fresh fruit without the peel. Diluted fruit 1 part to 3 water Canned fruit in sugar or syrup. Fruit juice. Dried fruits including prunes and raisins.   Vegetables  3-6 servings daily Fresh, frozen or canned vegetables cooked to a soft consistency. Raw vegetables (unless finely chopped).   Milk or Milk Foods  2-3 servings daily Milk (buttermilk, skim, 1% fat, and soy milk with no added sugar). Plain yogurt with no added sugar. Lactose free products. Cheese. Low-fat sugar ice cream. Whole milk, chocolate milk, half and half, regular ice cream, or creamers.   Miscellaneous Any allowed foods made with artificial sweeteners including: saccharin (Sweet 'N Low), sucralose (Splenda), and acesulfame potassium (Sunette, SweetOne), Stevia. Sugar, honey, syrup, jelly. Any sugar alcohol (such as: sorbitol, isomalt, hydrogenated starch hydrosylat or mannitol or xylitol).  Foods that list sugar, honey, syrup, xylitol, or sorbitol as one of the first three ingredients on the food label.     Source: http://applications.makemoji.org/education/document.aspx?url=Patient+Education%9Mb80179.pdf

## 2017-02-09 NOTE — PROGRESS NOTES
Patient being seen for prevention of symptoms and Wellness after upcoming surgery.     patient eats a healthy diet at this time. His wife is here and she prepares most meals at home and they do eat out as well.   BMI 26   Calorie need > 2600 calories daily   Protein need > 100 grams  Fluid > 2400 cc's     Provided education and handouts for calorie needs and food sources and diet to use post op to prevent symptoms of gas and  Diarrhea.     Provided contact information   MyPlate Worksheet: 2,600 Calories  Your calorie needs are about 2,600 calories a day. Below are the U.S. Department of Agriculture (USDA) guidelines for your daily recommended amount of each food group.  Vegetables   3½ cups Fruits   2 cups Grains   9 ounces Dairy   3 cups Protein   6½ ounces   Eat a variety of vegetables each day.  Aim for these amounts each week:  2½ cups dark green vegetables  7 cups red or orange-colored vegetables  2½ cups dry beans and peas  7 cups starchy vegetables  5½ cups other vegetables Eat a variety of fruits each day.  Go easy on fruit juices.  Good choices of fruits include:  Berries  Bananas  Apples  Melon  Dried fruit  Frozen fruit  Canned fruit Choose whole grains whenever you can.  Aim to eat at least 4½ ounces of whole grains each day:  Bread  Cereal  Rice  Pasta  Potatoes  Tortillas Choose low-fat or fat-free milk, yogurt, or cheese each day.  Good choices include:  Low-fat or fat-free milk or chocolate milk  Low-fat or fat-free yogurt  Low-fat or fat-free cottage cheese or other reduced-fat cheeses  Calcium-fortified milk alternatives Choose low-fat or lean meats, poultry, fish and seafood each day.   Vary your protein, choose more:  Fish and other seafood  Lean low-fat meat and poultry  Eggs  Beans, peas  Tofu  Unsalted nuts and seeds  Choose less high-fat and red meat.   Source: USDA MyPlate, www.choosemyplate.gov  Know your limits on oils (fats) and sugars:  Your allowance for oils is 8 teaspoons a day (oil  includes vegetable oil, mayonnaise, soft margarine, salad dressing, nuts, olives, avocados, and some fish).  Limit the extras (solid fats and sugars, also called empty calories) to 360 calories a day.  Cut back on salt (sodium). Stay under 2,300 mg sodium a day. If you have a health condition such as heart disease or high blood pressure, your doctor will likely tell you to limit sodium to no more than 1,500 mg a day.  Get moving and be active!  Aim for at least 30 minutes of physical activity most days of the week or 150 minutes of exercise a week.  MyPlate Servings Worksheet: 2,600 Calories  This worksheet tells you how many servings you should get each day from each food group, and tells you how much food makes a serving. Use this as a guide as you plan your meals throughout the day. Track your progress daily by writing in what you actually ate.  Food Group  Daily MyPlate Goal  What You Ate Today    Vegetables 7 Half-cups or 7 Servings   One serving is:  ½ cup cut-up raw or cooked vegetables  1 cup raw, leafy vegetables  ½ baked sweet potato  ½ cup vegetable juice  Note: At meals, fill half your plate with vegetables and fruit.    Fruits 4 Half-cups or 4 Servings   One serving is:  ½ cup fresh, frozen, or canned fruit  1 medium piece of fruit  1 cup of berries or melon  ½ cup dried fruit  ½ cup 100% fruit juice  Note: Make most choices fruit instead of juice.    Grains 9 Servings or 9 Ounces   One serving is:  1 slice bread  1 cup dry cereal  ½ cup cooked rice, pasta, or cereal  1 5-inch tortilla  Note: Choose whole grains for at least half of your servings each day.    Dairy 3 Servings or 3 Cups   One serving is:  1 cup milk  1½ ounces reduced-fat hard cheese  2 ounces processed cheese  1 cup low-fat yogurt  1/3 cup shredded cheese  Note: Choose low-fat or fat-free most often.    Protein 6½ Servings or 6½ Ounces   One serving is:  1 ounce cooked lean beef, pork, lamb, or ham  1 ounce cooked chicken or turkey  (no skin)  1 ounce cooked fish or shellfish (not fried)  1 egg  ¼ cup egg substitute  ½ ounce nuts or seeds  1 tablespoon peanut or almond butter  ¼ cup cooked dry beans or peas  ½ cup tofu  2 tablespoons hummus    © 8666-1556 [facility], [address] [place] [address]. All rights reserved. This information is not intended as a substitute for professional medical care. Always follow your healthcare professional's instruction

## 2017-03-06 ENCOUNTER — TELEPHONE (OUTPATIENT)
Dept: NEUROLOGY | Facility: HOSPITAL | Age: 47
End: 2017-03-06

## 2017-03-06 ENCOUNTER — TELEPHONE (OUTPATIENT)
Dept: INFUSION THERAPY | Facility: HOSPITAL | Age: 47
End: 2017-03-06

## 2017-03-06 NOTE — TELEPHONE ENCOUNTER
----- Message from Natasha Kern sent at 3/6/2017  3:11 PM CST -----  Contact: Patient  EAW- Patient is calling in regards to his [employer] paperwork. [employer] faxed the paperwork to us on 2/21/17 and they have not received it back. Patient would like to know if you have the paperwork and are faxing it in. Patient states the paperwork has to be turned in before March 8. Patient can be reached at  [phone].

## 2017-03-06 NOTE — TELEPHONE ENCOUNTER
----- Message from Tayla Streeter sent at 3/6/2017  3:00 PM CST -----      ----- Message -----     From: Daija Ortez LPN     Sent: 2/8/2017   2:02 PM       To: Daija Ortez LPN, Tayla Landry,   Can we please get auth for this pt for 3/31/17?    DX: E34.0, C7A.012, C7B.8, C78.7  CPT: [id number], [id number], [id number], [id number]    Thanks!  Daija       3/6/17  Spoke with Keerthi CASTILLO At  [facility].  Surgery apprroved for 5 days starting 3/31/17. Auth # [id number].

## 2017-03-06 NOTE — TELEPHONE ENCOUNTER
----- Message from Iman Kennedy sent at 3/3/2017  2:45 PM CST -----  EAW- Patient called wanting to know the update on FMLA papers. Please call patient back at [phone]. Thanks

## 2017-03-06 NOTE — TELEPHONE ENCOUNTER
Returned pts call to inform that [facility] papers were sent this morning. Pt verbalizes understanding.

## 2017-03-13 ENCOUNTER — TELEPHONE (OUTPATIENT)
Dept: NEUROLOGY | Facility: HOSPITAL | Age: 47
End: 2017-03-13

## 2017-03-16 ENCOUNTER — TELEPHONE (OUTPATIENT)
Dept: NEUROLOGY | Facility: HOSPITAL | Age: 47
End: 2017-03-16

## 2017-03-16 NOTE — TELEPHONE ENCOUNTER
Called pt to inform that short term disability paperwork has been completed and faxed. Pt verbalizes understanding.

## 2017-03-30 ENCOUNTER — ANESTHESIA EVENT (OUTPATIENT)
Dept: SURGERY | Facility: HOSPITAL | Age: 47
DRG: 406 | End: 2017-03-30
Payer: COMMERCIAL

## 2017-03-30 ENCOUNTER — HOSPITAL ENCOUNTER (OUTPATIENT)
Dept: RADIOLOGY | Facility: HOSPITAL | Age: 47
Discharge: HOME | DRG: 406 | End: 2017-03-30
Attending: SURGERY
Payer: COMMERCIAL

## 2017-03-30 ENCOUNTER — OFFICE VISIT (OUTPATIENT)
Dept: NEUROLOGY | Facility: HOSPITAL | Age: 47
DRG: 406 | End: 2017-03-30
Attending: SURGERY
Payer: COMMERCIAL

## 2017-03-30 ENCOUNTER — HOSPITAL ENCOUNTER (OUTPATIENT)
Dept: PREADMISSION TESTING | Facility: HOSPITAL | Age: 47
Discharge: HOME | DRG: 406 | End: 2017-03-30
Attending: SURGERY
Payer: COMMERCIAL

## 2017-03-30 VITALS
HEART RATE: 63 BPM | TEMPERATURE: 99 F | SYSTOLIC BLOOD PRESSURE: 159 MMHG | HEIGHT: 76 IN | WEIGHT: 227.31 LBS | BODY MASS INDEX: 27.68 KG/M2 | RESPIRATION RATE: 20 BRPM | OXYGEN SATURATION: 98 % | DIASTOLIC BLOOD PRESSURE: 95 MMHG

## 2017-03-30 VITALS
TEMPERATURE: 98 F | WEIGHT: 228 LBS | HEIGHT: 76 IN | HEART RATE: 81 BPM | SYSTOLIC BLOOD PRESSURE: 132 MMHG | DIASTOLIC BLOOD PRESSURE: 88 MMHG | BODY MASS INDEX: 27.76 KG/M2

## 2017-03-30 DIAGNOSIS — C78.7 SECONDARY MALIGNANT NEOPLASM OF LIVER: ICD-10-CM

## 2017-03-30 DIAGNOSIS — Z01.810 PREOP CARDIOVASCULAR EXAM: ICD-10-CM

## 2017-03-30 DIAGNOSIS — C78.7 SECONDARY MALIGNANT NEOPLASM OF LIVER: Primary | ICD-10-CM

## 2017-03-30 PROCEDURE — 93010 ELECTROCARDIOGRAM REPORT: CPT | Mod: ,,, | Performed by: INTERNAL MEDICINE

## 2017-03-30 PROCEDURE — 71020 XR CHEST PA AND LATERAL: CPT | Mod: 26,,, | Performed by: RADIOLOGY

## 2017-03-30 RX ORDER — LIDOCAINE HYDROCHLORIDE 10 MG/ML
1 INJECTION, SOLUTION EPIDURAL; INFILTRATION; INTRACAUDAL; PERINEURAL ONCE
Status: CANCELLED | OUTPATIENT
Start: 2017-03-30 | End: 2017-03-30

## 2017-03-30 RX ORDER — FAMOTIDINE 10 MG/ML
20 INJECTION INTRAVENOUS
Status: CANCELLED | OUTPATIENT
Start: 2017-03-30

## 2017-03-30 RX ORDER — LANREOTIDE ACETATE 120 MG/.5ML
120 INJECTION SUBCUTANEOUS
COMMUNITY

## 2017-03-30 RX ORDER — SODIUM CHLORIDE, SODIUM LACTATE, POTASSIUM CHLORIDE, CALCIUM CHLORIDE 600; 310; 30; 20 MG/100ML; MG/100ML; MG/100ML; MG/100ML
INJECTION, SOLUTION INTRAVENOUS CONTINUOUS
Status: CANCELLED | OUTPATIENT
Start: 2017-03-30

## 2017-03-30 RX ORDER — KETOROLAC TROMETHAMINE 15 MG/ML
15 INJECTION, SOLUTION INTRAMUSCULAR; INTRAVENOUS
Status: CANCELLED | OUTPATIENT
Start: 2017-03-30 | End: 2017-04-02

## 2017-03-30 RX ORDER — ENOXAPARIN SODIUM 100 MG/ML
40 INJECTION SUBCUTANEOUS
Status: CANCELLED | OUTPATIENT
Start: 2017-03-30

## 2017-03-30 RX ORDER — PREGABALIN 75 MG/1
75 CAPSULE ORAL
Status: CANCELLED | OUTPATIENT
Start: 2017-03-30

## 2017-03-30 RX ORDER — ONDANSETRON 2 MG/ML
8 INJECTION INTRAMUSCULAR; INTRAVENOUS
Status: CANCELLED | OUTPATIENT
Start: 2017-03-30

## 2017-03-30 NOTE — DISCHARGE INSTRUCTIONS
Your surgery is scheduled for 3/31/2017    Please report to Outpatient Surgery Intake Office on the 2nd FLOOR at 6:00 a.m.          INSTRUCTIONS IMPORTANT!!!  ¨ Do not eat or drink after 12 midnight-including water. OK to brush teeth, no   gum, candy or mints!        ____  Please remove all jewelry, including piercings and leave at home.  ____  No money or valuables needed. Please leave at home.  ____  If going home the same day, arrange for a ride home. You will not be able to             drive if Anesthesia was used.  ____  Wear loose fitting clothing. Allow for dressings, bandages.  ____  Stop Aspirin, Ibuprofen, Motrin and Aleve at least 3-5 days before surgery, unless otherwise instructed by your doctor, or the nurse.            You MAY use Tylenol/acetaminophen until day of surgery.  ____ Stop taking any Fish Oil supplements or any Vitamins that contain Vitamin E at least 5 days prior to surgery.  ____  If you take diabetic medication, do not take am of surgery unless instructed by Doctor.  ____  Call MD for temperature above 101 degrees.  ____ Do Not wear your contact lenses the day of your procedure.  You may wear your glasses.        I have read or had read and explained to me, and understand the above information.  Additional comments or instructions:  For additional questions call [phone]     Take a Hibiclens shower twice a day for 3 days prior to surgery, including the morning of surgery.   Gargle with Listerine twice a day for 3 days prior to surgery, including the morning of surgery.   NO powders, creams, or lotions on your skin the day of surgery.            Anesthesia: General Anesthesia  Youre due to have surgery. During surgery, youll be given medication called anesthesia. (It is also called anesthetic.) This will keep you comfortable and pain-free. Your anesthesia provider will use general anesthesia. This sheet tells you more about it.  What is general anesthesia?     You are watched  continuously during your procedure by the anesthesia provider   General anesthesia puts you into a state like deep sleep. It goes into the bloodstream (IV anesthetics), into the lungs (gas anesthetics), or both. You feel nothing during the procedure. You will not remember it. During the procedure, the anesthesia provider monitors you continuously. He or she checks your heart rate and rhythm, blood pressure, breathing, and blood oxygen.  · IV Anesthetics. IV anesthetics are given through an IV line in your arm. Theyre often given first. This is so you are asleep before a gas anesthetic is started. Some kinds of IV anesthetics relieve pain. Others relax you. Your doctor will decide which kind is best in your case.  · Gas Anesthetics. Gas anesthetics are breathed into the lungs. They are often used to keep you asleep. They can be given through a facemask or a tube placed in your larynx or trachea (breathing tube).  ¨ If you have a facemask, your anesthesia provider will most likely place it over your nose and mouth while youre still awake. Youll breathe oxygen through the mask as your IV anesthetic is started. Gas anesthetic may be added through the mask.  ¨ If you have a tube in the larynx or trachea, it will be inserted into your throat after youre asleep.  Anesthesia tools and medications  You will likely have:  · IV anesthetics. These are put into an IV line into your bloodstream.  · Gas anesthetics. You breathe these anesthetics into your lungs, where they pass into your bloodstream.  · Pulse oximeter. This is a small clip that is attached to the end of your finger. This measures your blood oxygen level.  · Electrocardiography leads (electrodes). These are small sticky pads that are placed on your chest. They record your heart rate and rhythm.  · Blood pressure cuff. This reads your blood pressure.  Risks and possible complications  General anesthesia has some risks. These include:  · Breathing  problems  · Nausea and vomiting  · Sore throat or hoarseness (usually temporary)  · Allergic reaction to the anesthetic  · Irregular heartbeat (rare)  · Cardiac arrest (rare)   Anesthesia safety  · Follow all instructions you are given for how long not to eat or drink before your procedure.  · Be sure your doctor knows what medications and drugs you take. This includes over-the-counter medications, herbs, supplements, alcohol or other drugs. You will be asked when those were last taken.  · Have an adult family member or friend drive you home after the procedure.  · For the first 24 hours after your surgery:  ¨ Do not drive or use heavy equipment.  ¨ Have a trusted family member or spouse make important decisions or sign documents.  ¨ Avoid alcohol.  ¨ Have a responsible adult stay with you. He or she can watch for problems and help keep you safe.  Date Last Reviewed: 10/16/2014  © 1535-6866 [employer]. [address] [place] [address], [place] [address]. All rights reserved. This information is not intended as a substitute for professional medical care. Always follow your healthcare professional's instructions.

## 2017-03-30 NOTE — MR AVS SNAPSHOT
[facility]  [address] [place] [address]  Phone: [phone]  Fax: [phone]                  Javy Thompson   3/30/2017 11:00 AM   Office Visit    Description:  Male : 1970   Provider:  Fidelia Reyes MD   Department:  [facility]           Reason for Visit     Follow-up           Diagnoses this Visit        Comments    Secondary malignant neoplasm of liver    -  Primary            To Do List           Future Appointments        Provider Department Dept Phone    3/30/2017  3:00 PM PRE-ADMIT ONE, [facility] [phone]      Your Future Surgeries/Procedures     Mar 31, 2017   Surgery with Fidelia Reyes MD   [facility] ([facility])    [address] [place] [id number] [phone]              Goals (5 Years of Data)     None      Follow-Up and Disposition     Return in about 3 months (around 2017).    Follow-up and Disposition History      [facility]     [facility] Nurse Care Line -  Assistance  Unless otherwise directed by your provider, please contact [facility], our nurse care line that is available for  assistance.     Registered nurses in the [facility] provide: appointment scheduling, clinical advisement, health education, and other advisory services.  Call: [phone] (toll free)               Medications           Message regarding Medications     Verify the changes and/or additions to your medication regime listed below are the same as discussed with your clinician today.  If any of these changes or additions are incorrect, please notify your healthcare provider.        STOP taking these medications     colestipol (COLESTID) 1 gram Tab Take 3 g by mouth 2 (two) times daily.           Verify that the below list of medications is an accurate representation of the medications you are currently taking.  If none  "reported, the list may be blank. If incorrect, please contact your healthcare provider. Carry this list with you in case of emergency.           Current Medications     hydrocodone-acetaminophen 5-325mg (NORCO) 5-325 mg per tablet Take 1 tablet by mouth every 6 (six) hours as needed for Pain.    lanreotide (SOMATULINE DEPOT) 120 mg/0.5 mL Syrg Inject 120 mg into the skin every 28 days.           Clinical Reference Information           Your Vitals Were     BP Pulse Temp Height Weight BMI    132/88 81 98.2 °F (36.8 °C) (Oral) 6' 4" (1.93 m) 103.4 kg (228 lb) 27.75 kg/m2      Blood Pressure          Most Recent Value    BP  132/88      Allergies as of 3/30/2017     Epinephrine      Immunizations Administered on Date of Encounter - 3/30/2017     None      [facility] Sign-Up     Activating your [facility] account is as easy as 1-2-3!     1) Visit [email or web address], select Sign Up Now, enter this activation code and your date of birth, then select Next.  [email or web address]  Expires: 5/14/2017 11:59 AM      2) Create a username and password to use when you visit [facility] in the future and select a security question in case you lose your password and select Next.    3) Enter your e-mail address and click Sign Up!    Additional Information  If you have questions, please e-mail [email or web address] or call [phone] to talk to our [facility] staff. Remember, [facility] is NOT to be used for urgent needs. For medical emergencies, dial 911.         Instructions    Patient Instructions       Take a Hibiclens shower twice a day for 3 days prior to surgery, including the morning of surgery.   Gargle with Listerine twice a day for 2 days prior to surgery, including the morning of surgery.        Today at 3PM   Appointment with Anesthesia    Pre [place] for Hospital Admission - Go to 1st floor of the hospital-admitting desk. You will do paper work, get blood drawn,  get x-rays and see Anesthesia at this time.       Today "    CLEAR LIQUIDS all day   Do not eat or drink anything after midnight.                   Tomorrow    Hospital Admission for surgery.  Report to 2nd floor, Same Day Surgery desk at 6:00am   Surgery is scheduled for 8:00am        [facility] [address] [place] [address]  [phone]      INFORMATION REGARDING YOUR PROCEDURE      We look forward to serving you and your family and appreciate that you have chosen [facility] for your healthcare needs. Before, during, and after your surgery, you will be cared for by skilled medical professionals. Our surgeons, anesthesiologists, nurses,  and other healthcare professionals will work with you and your family to ensure a safe, smooth and comfortable surgery and recovery.    In order to best meet your pre-admission needs, your surgeon or ordering physicians office will contact our Scheduling Office at [phone] and schedule a Pre-Procedure Appointment.  This should preferably be done 72 hours or greater before your scheduled procedure date.     During your pre-procedure visit your insurance will be verified for your procedure. You will meet with a Registered Nurse and an Anesthesiologist or Nurse Anesthetist. If tests are required, they will be performed during your visit. Please allow about one and a half hours for this visit.      You will need to bring the following information or items with you to your Pre-Procedure Appointment:    1.  Picture Identification  2.  Insurance Card  3.  Current list of medications to include name and dosage  4.  List of allergies  5.  Orders and any other forms your doctor has given to you  6.  Copies of lab results performed at other facilities. This may include blood work, EKGs or chest xrays.   These results can be faxed to [phone].    The Pre-Op Center Registration Desk is located on the first floor of the hospital ([address] [place] [id number]) in the [facility].  The  Pre-Operative Center is located on the second floor of the hospital. Someone will walk you from the registration area to the Pre-Operative Center.    Free parking is located in the front of the hospital and medical office building and is easily accessed from Nancy [address] and [address]. When you arrive, please check in at the main information desk of the hospital.    Please call Outpatient Surgery at [phone] after 12:00pm on the day before your procedure for your arrival time and updated procedure time.      Pre-Op Bathing Instructions    Before surgery, you can play an important role in your own health.    Because skin is not sterile, we need to be sure that your skin is as free of germs as possible. By following the instructions below, you can reduce the number of germs on your skin before surgery.    IMPORTANT: You will need to shower with a special soap called Hibiclens*, available at any pharmacy.  If you are allergic to Chlorhexidine (the antiseptic in Hibiclens), use an antibacterial soap such as Dial Soap for your preoperative shower.  You will shower with Hibiclens both the night before your surgery and the morning of your surgery.  Do not use Hibiclens on the head, face or genitals to avoid injury to those areas.    STEP #1: THE NIGHT BEFORE YOUR SURGERY     1. Do not shave the area of your body where your surgery will be performed.  2. Shower and wash your hair and body as usual with your normal soap and shampoo.  3. Rinse your hair and body thoroughly after you shower to remove all soap residue.  4. With your hand, apply one packet of Hibiclens soap to the surgical site.   5. Wash the site gently for five (5) minutes. Do not scrub your skin too hard.   6. Do not wash with your regular soap after Hibiclens is used.  7. Rinse your body thoroughly.  8. Pat yourself dry with a clean, soft towel.  9. Do not use lotion, cream, or powder.  10. Wear clean clothes.    STEP #2: THE MORNING OF YOUR SURGERY      1. Repeat Step #1.    * Not to be used by people allergic to Chlorhexidine.       Language Assistance Services     ATTENTION: Language assistance services are available, free of charge. Please call [phone].      ATENCIÓN: Si ashley tesfaye, tiene a john disposición servicios gratuitos de asistencia lingüística. Llame al [phone].     [facility] Ý: N?u b?n nói Ti?ng Vi?t, có các d?ch v? h? tr? ngôn ng? mi?n phí dành cho b?n. G?i s? [phone].         [facility] complies with applicable Federal civil rights laws and does not discriminate on the basis of race, color, national origin, age, disability, or sex.

## 2017-03-30 NOTE — ANESTHESIA PREPROCEDURE EVALUATION
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    Past Surgical History:   Procedure Laterality Date    KNEE SURGERY      LIVER BIOPSY  12/2016, 1/2017    TONSILLECTOMY           [facility] Anesthesia Evaluation    I have reviewed the Patient Summary Reports.    I have reviewed the Nursing Notes.   I have reviewed the Medications.     Review of Systems  Anesthesia Hx:  Hx of Anesthetic complications  History of prior surgery of interest to airway management or planning: Previous anesthesia: General, MAC  colonoscopy/EGD with MAC.  Personal Hx of Anesthesia complications  Unintended Unpleasent Intraoperative Awareness (during colonoscopy) and during MAC / sedation only   Social:  Non-Smoker, No Alcohol Use    Hematology/Oncology:  Hematology Normal      Current/Recent Cancer. 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  Denies Dysrhythmias.   Denies Angina.        Pulmonary:   Denies Shortness of breath.    Renal/:  Renal/ Normal     Hepatic/GI:   GERD, well controlled Liver Disease, (mets to liver) Denies pain or nausea/vomiting   Neurological:  Neurology Normal    Endocrine:  Endocrine Normal        Physical Exam  General:  Well nourished    Airway/Jaw/Neck:  Airway Findings: Mouth Opening: Normal Tongue: Normal  General Airway Assessment: Adult  Mallampati: II  TM Distance: Normal, at least 6 cm        Eyes/Ears/Nose:  EYES/EARS/NOSE FINDINGS: Normal   Dental:  Dental Findings: Periodontal disease, Mild   Chest/Lungs:  Chest/Lungs Clear    Heart/Vascular:  Heart Findings: Normal Heart murmur: negative    Abdomen:  Abdomen Findings: Normal      Mental Status:  Mental  Status Findings: Normal      2D Echo w/CFD 2/07/2017  CONCLUSIONS     1 - Normal left ventricular systolic function (EF 55-60%).     2 - Normal left ventricular diastolic function.     3 - Normal right ventricular systolic function .     4 - The estimated PA systolic pressure is 19 mmHg.   This document has been electronically    SIGNED BY: Robert Barger MD On: 02/07/2017 06:51    Lab Results   Component Value Date    WBC 6.47 03/30/2017    HGB 15.5 03/30/2017    HCT 45.2 03/30/2017    MCV 91 03/30/2017     03/30/2017       Chemistry        Component Value Date/Time     03/30/2017 1626    K 3.8 03/30/2017 1626     03/30/2017 1626    CO2 28 03/30/2017 1626    BUN 13 03/30/2017 1626    CREATININE 1.0 03/30/2017 1626    GLU 88 03/30/2017 1626        Component Value Date/Time    CALCIUM 9.2 03/30/2017 1626    ALKPHOS 74 03/30/2017 1626    AST 28 03/30/2017 1626    ALT 17 03/30/2017 1626    BILITOT 0.8 03/30/2017 1626            Anesthesia Plan  Type of Anesthesia, risks & benefits discussed:  Anesthesia Type:  general  Patient's Preference:   Intra-op Monitoring Plan:   Intra-op Monitoring Plan Comments:   Post Op Pain Control Plan:   Post Op Pain Control Plan Comments:   Induction:   IV  Beta Blocker:  Patient is not currently on a Beta-Blocker (No further documentation required).       Informed Consent: Patient understands risks and agrees with Anesthesia plan.  Questions answered. Anesthesia consent signed with patient.  ASA Score: 3     Day of Surgery Review of History & Physical:        Anesthesia Plan Notes:           Ready For Surgery From Anesthesia Perspective.

## 2017-03-30 NOTE — PLAN OF CARE
Allergies, medical, surgical, family and psychosocial histories reviewed with patient.  Periop plan of care discussed. Preop instructions given, including medications to take and to hold. Time given for questions and pt voiced understanding.

## 2017-03-30 NOTE — IP AVS SNAPSHOT
[facility]  [address]  [place] [address]  Phone: [phone]           Patient Discharge Instructions  Our goal is to set you up for success. This packet includes information on your condition, medications, and your home care. It will help you care for yourself to prevent having to return to the hospital.     Please ask your nurse if you have any questions.      There are many details to remember when preparing for your surgery. Here is what you will need to do, please ask your nurse if there are more specific instructions and if you have any questions:    1. Before procedure Do not smoke or drink alcoholic beverages 24 hours prior to your procedure. Do not eat or drink anything 8 hours before your procedure - this includes gum, mints, and candy.     2. Day of procedure Please remove all jewelry for the procedure. If you wear contact lenses, dentures, hearing aids or glasses, bring a container to put them in during your surgery and give to a family member.  If your doctor has scheduled you for an overnight stay, bring a small overnight bag with any personal items that you need.      3. After procedure  Make arrangements in advance for transportation home by a responsible adult. It is not safe to drive a vehicle during the 24 hours following surgery.     PLEASE NOTE: You may be contacted the day before your surgery to confirm your surgery date and arrival time. The Surgery schedule has many variables which may affect the time of your surgery case. Family members should be available if your surgery time changes.               ** Verify the list of medication(s) below is accurate and up to date. Carry this with you in case of emergency. If your medications have changed, please notify your healthcare provider.             Medication List      TAKE these medications        Additional Info                      hydrocodone-acetaminophen 5-325mg 5-325 mg per tablet   Commonly known as:  NORCO    Refills:  0   Dose:  1 tablet    Instructions:  Take 1 tablet by mouth every 6 (six) hours as needed for Pain.     Begin Date    AM    Noon    PM    Bedtime       lanreotide 120 mg/0.5 mL Syrg   Commonly known as:  SOMATULINE DEPOT   Refills:  0   Dose:  120 mg    Instructions:  Inject 120 mg into the skin every 28 days.     Begin Date    AM    Noon    PM    Bedtime                  Please bring to all follow up appointments:    1. A copy of your discharge instructions.  2. All medicines you are currently taking in their original bottles.  3. Identification and insurance card.    Please arrive 15 minutes ahead of scheduled appointment time.    Please call 24 hours in advance if you must reschedule your appointment and/or time.        Your Future Surgeries/Procedures     Mar 31, 2017   Surgery with Fidelia Reyes MD   [facility] ([facility])    [address] [place] [id number]   [phone]                Discharge Instructions     Future Orders    CBC auto differential     Process Instructions:    Please collect a Lavender, EDTA tube or EDTA Microtainer.  If the patient is a known platelet clumper, please collect an additional citrate (blue top) tube.    Comprehensive metabolic panel     Type And Screen Preop         Discharge Instructions       Your surgery is scheduled for 3/31/2017    Please report to Outpatient Surgery Intake Office on the 2nd FLOOR at 6:00 a.m.          INSTRUCTIONS IMPORTANT!!!  ¨ Do not eat or drink after 12 midnight-including water. OK to brush teeth, no   gum, candy or mints!        ____  Please remove all jewelry, including piercings and leave at home.  ____  No money or valuables needed. Please leave at home.  ____  If going home the same day, arrange for a ride home. You will not be able to             drive if Anesthesia was used.  ____  Wear loose fitting clothing. Allow for dressings, bandages.  ____  Stop Aspirin, Ibuprofen,  Motrin and Aleve at least 3-5 days before surgery, unless otherwise instructed by your doctor, or the nurse.            You MAY use Tylenol/acetaminophen until day of surgery.  ____ Stop taking any Fish Oil supplements or any Vitamins that contain Vitamin E at least 5 days prior to surgery.  ____  If you take diabetic medication, do not take am of surgery unless instructed by Doctor.  ____  Call MD for temperature above 101 degrees.  ____ Do Not wear your contact lenses the day of your procedure.  You may wear your glasses.        I have read or had read and explained to me, and understand the above information.  Additional comments or instructions:  For additional questions call [phone]     Take a Hibiclens shower twice a day for 3 days prior to surgery, including the morning of surgery.   Gargle with Listerine twice a day for 3 days prior to surgery, including the morning of surgery.   NO powders, creams, or lotions on your skin the day of surgery.            Anesthesia: General Anesthesia  Youre due to have surgery. During surgery, youll be given medication called anesthesia. (It is also called anesthetic.) This will keep you comfortable and pain-free. Your anesthesia provider will use general anesthesia. This sheet tells you more about it.  What is general anesthesia?     You are watched continuously during your procedure by the anesthesia provider   General anesthesia puts you into a state like deep sleep. It goes into the bloodstream (IV anesthetics), into the lungs (gas anesthetics), or both. You feel nothing during the procedure. You will not remember it. During the procedure, the anesthesia provider monitors you continuously. He or she checks your heart rate and rhythm, blood pressure, breathing, and blood oxygen.  · IV Anesthetics. IV anesthetics are given through an IV line in your arm. Theyre often given first. This is so you are asleep before a gas anesthetic is started. Some kinds of IV  anesthetics relieve pain. Others relax you. Your doctor will decide which kind is best in your case.  · Gas Anesthetics. Gas anesthetics are breathed into the lungs. They are often used to keep you asleep. They can be given through a facemask or a tube placed in your larynx or trachea (breathing tube).  ¨ If you have a facemask, your anesthesia provider will most likely place it over your nose and mouth while youre still awake. Youll breathe oxygen through the mask as your IV anesthetic is started. Gas anesthetic may be added through the mask.  ¨ If you have a tube in the larynx or trachea, it will be inserted into your throat after youre asleep.  Anesthesia tools and medications  You will likely have:  · IV anesthetics. These are put into an IV line into your bloodstream.  · Gas anesthetics. You breathe these anesthetics into your lungs, where they pass into your bloodstream.  · Pulse oximeter. This is a small clip that is attached to the end of your finger. This measures your blood oxygen level.  · Electrocardiography leads (electrodes). These are small sticky pads that are placed on your chest. They record your heart rate and rhythm.  · Blood pressure cuff. This reads your blood pressure.  Risks and possible complications  General anesthesia has some risks. These include:  · Breathing problems  · Nausea and vomiting  · Sore throat or hoarseness (usually temporary)  · Allergic reaction to the anesthetic  · Irregular heartbeat (rare)  · Cardiac arrest (rare)   Anesthesia safety  · Follow all instructions you are given for how long not to eat or drink before your procedure.  · Be sure your doctor knows what medications and drugs you take. This includes over-the-counter medications, herbs, supplements, alcohol or other drugs. You will be asked when those were last taken.  · Have an adult family member or friend drive you home after the procedure.  · For the first 24 hours after your surgery:  ¨ Do not drive or use  "heavy equipment.  ¨ Have a trusted family member or spouse make important decisions or sign documents.  ¨ Avoid alcohol.  ¨ Have a responsible adult stay with you. He or she can watch for problems and help keep you safe.  Date Last Reviewed: 10/16/2014  © 1998-3437 [employer]. [address] [place] [address]. All rights reserved. This information is not intended as a substitute for professional medical care. Always follow your healthcare professional's instructions.              Admission Information     Date & Time Provider Department CSN    3/30/2017  3:00 PM Fidelia Reyes MD [facility] [id number]      Care Providers     Provider Role Specialty Primary office phone    Fidelia Reyes MD Attending Provider General Surgery [phone]      Your Vitals Were     BP Pulse Temp Resp Height Weight    159/95 (BP Location: Right arm, Patient Position: Sitting, BP Method: Automatic) 63 98.6 °F (37 °C) (Oral) 20 6' 4" (1.93 m) 103.1 kg (227 lb 4.7 oz)    SpO2 BMI             98% 27.67 kg/m2         Recent Lab Values     No lab values to display.      Allergies as of 3/30/2017        Reactions    Epinephrine Other (See Comments)    Carcinoid patient      [facility]     [facility] Care [facility] - 24/7 Assistance  Unless otherwise directed by your provider, please contact [facility], our nurse care line that is available for 24/7 assistance.     Registered nurses in the [facility] provide clinical advisement, health education, appointment booking, and other advisory services.  Call for this free service at [phone].        Advance Directives     An advance directive is a document which, in the event you are no longer able to make decisions for yourself, tells your healthcare team what kind of treatment you do or do not want to receive, or who you would like to make those decisions for you.  If you do not currently have an advance " directive, [facility] encourages you to create one.  For more information call:  [phone]), [phone] ([phone]),  or log on to [email or web address].        Language Assistance Services     ATTENTION: Language assistance services are available, free of charge. Please call [phone].      ATENCIÓN: Si habla español, tiene a john disposición servicios gratuitos de asistencia lingüística. Llame al [phone].     CHÚ Ý: N?u b?n nói Ti?ng Vi?t, có các d?ch v? h? tr? ngôn ng? mi?n phí dành cho b?n. G?i s? [phone].        [facility] Sign-Up     Activating your [facility] account is as easy as 1-2-3!     1) Visit [email or web address], select Sign Up Now, enter this activation code and your date of birth, then select Next.  [email or web address]  Expires: 5/14/2017 11:59 AM      2) Create a username and password to use when you visit [facility] in the future and select a security question in case you lose your password and select Next.    3) Enter your e-mail address and click Sign Up!    Additional Information  If you have questions, please e-mail [email or web address] or call [phone] to talk to our [facility] staff. Remember, [facility] is NOT to be used for urgent needs. For medical emergencies, dial 911.          [facility] complies with applicable Federal civil rights laws and does not discriminate on the basis of race, color, national origin, age, disability, or sex.

## 2017-03-30 NOTE — PROGRESS NOTES
"NOLANETS:  [facility]  A collaboration between [facility] and [facility]      PATIENT: Javy Thompson  MRN: [id number]  DATE: 3/30/2017    Subjective:      Chief Complaint: Follow-up (surgery eval/ sign consents)  is here for surgery    Doing well  Eating  Doing normal activities  No rivas diarrhea a fter somatuline    Vitals:   Vitals:    03/30/17 1103   BP: 132/88   Pulse: 81   Temp: 98.2 °F (36.8 °C)   TempSrc: Oral   Weight: 103.4 kg (228 lb)   Height: 6' 4" (1.93 m)        Karnofsky Score:     Diagnosis:   1. 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       Past Surgical History:  Past Surgical History:   Procedure Laterality Date    ANTERIOR CRUCIATE LIGAMENT REPAIR Right 1986    KNEE ARTHROSCOPY Right 1986    x2    LIVER BIOPSY  12/2016, 1/2017    TONSILLECTOMY         Family History:  Family History   Problem Relation Age of Onset    Cancer Mother        Allergies:  Review of patient's allergies indicates:   Allergen Reactions    Epinephrine Other (See Comments)     Carcinoid patient       Medications:  Current Outpatient Prescriptions   Medication Sig Dispense Refill    hydrocodone-acetaminophen 5-325mg (NORCO) 5-325 mg per tablet Take 1 tablet by mouth every 6 (six) hours as needed for Pain.      lanreotide (SOMATULINE DEPOT) 120 mg/0.5 mL Syrg Inject 120 mg into the skin every 28 days.       No current facility-administered medications for this visit.        Review of Systems   Constitutional: Negative for activity change, appetite change, chills, diaphoresis, fatigue, fever and unexpected weight change.   HENT: Negative for congestion, dental problem, drooling, ear discharge, ear " pain, facial swelling, hearing loss, mouth sores and nosebleeds.    Eyes: Negative for photophobia, pain, discharge, redness, itching and visual disturbance.   Respiratory: Negative for apnea, cough, choking, chest tightness, shortness of breath, wheezing and stridor.    Cardiovascular: Negative for chest pain, palpitations and leg swelling.   Gastrointestinal: Negative for abdominal distention, abdominal pain, anal bleeding, blood in stool, constipation, diarrhea, nausea, rectal pain and vomiting.   Endocrine: Negative.    Genitourinary: Negative.    Musculoskeletal: Negative.    Skin: Negative.    Allergic/Immunologic: Negative.    Neurological: Negative.    Hematological: Negative.       Objective:      Physical Exam   Constitutional: He is oriented to person, place, and time. He appears well-developed and well-nourished. No distress.   HENT:   Head: Normocephalic.   Eyes: EOM are normal.   Neck: Neck supple. No tracheal deviation present. No thyromegaly present.   Cardiovascular: Normal rate and regular rhythm.    Pulmonary/Chest: Effort normal and breath sounds normal.   Abdominal: Soft. Bowel sounds are normal. He exhibits no distension and no mass. There is no tenderness. There is no rebound and no guarding. No hernia.   Musculoskeletal: Normal range of motion.   Lymphadenopathy:     He has no cervical adenopathy.   Neurological: He is alert and oriented to person, place, and time.   Skin: He is not diaphoretic.      Assessment:       1. 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 Went ove the benefits of surgery and the risks involved such as bleeding infection DVT PE MI Stroke, reexploration for leaks and  bleeding    Jenifer over with him about his recovery time and long term follow up    After elaboratly going thru risks and benefits consnet obtained    Plan for ex lap in am                NADEEN Reyes MD, FACS   Associate Professor of Surgery, [facility]   Neuroendocrine Surgery, Hepatic/Pancreatic & General Surgery   [address], [address]   [place] [address]   ph. [phone]; [phone]   fax. [phone]

## 2017-03-31 ENCOUNTER — ANESTHESIA (OUTPATIENT)
Dept: SURGERY | Facility: HOSPITAL | Age: 47
DRG: 406 | End: 2017-03-31
Payer: COMMERCIAL

## 2017-03-31 ENCOUNTER — HOSPITAL ENCOUNTER (INPATIENT)
Facility: HOSPITAL | Age: 47
LOS: 12 days | Discharge: HOME | DRG: 406 | End: 2017-04-12
Attending: SURGERY | Admitting: SURGERY
Payer: COMMERCIAL

## 2017-03-31 DIAGNOSIS — C7B.02 METASTATIC MALIGNANT CARCINOID TUMOR TO LIVER: ICD-10-CM

## 2017-03-31 DIAGNOSIS — C78.7 SECONDARY MALIGNANT NEOPLASM OF LIVER: Primary | ICD-10-CM

## 2017-03-31 DIAGNOSIS — C7B.8 SECONDARY NEUROENDOCRINE TUMOR OF DISTANT LYMPH NODES: ICD-10-CM

## 2017-03-31 DIAGNOSIS — C7B.8 SECONDARY NEUROENDOCRINE TUMOR OF LIVER: ICD-10-CM

## 2017-03-31 DIAGNOSIS — K63.89 MESENTERIC MASS: ICD-10-CM

## 2017-03-31 DIAGNOSIS — C7A.012 MALIGNANT CARCINOID TUMOR OF ILEUM: ICD-10-CM

## 2017-03-31 DIAGNOSIS — E34.0 CARCINOID SYNDROME: ICD-10-CM

## 2017-03-31 DIAGNOSIS — C7A.00 MALIGNANT CARCINOID TUMOR OF UNKNOWN PRIMARY SITE: ICD-10-CM

## 2017-03-31 DIAGNOSIS — R33.8 ACUTE RETENTION OF URINE: ICD-10-CM

## 2017-03-31 LAB
ABO + RH BLD: NORMAL
ANION GAP SERPL CALC-SCNC: 13 MMOL/L
BASOPHILS # BLD AUTO: 0.01 K/UL
BASOPHILS NFR BLD: 0.1 %
BLD GP AB SCN CELLS X3 SERPL QL: NORMAL
BUN SERPL-MCNC: 12 MG/DL
CALCIUM SERPL-MCNC: 7.8 MG/DL
CHLORIDE SERPL-SCNC: 110 MMOL/L
CO2 SERPL-SCNC: 17 MMOL/L
CREAT SERPL-MCNC: 1 MG/DL
DIFFERENTIAL METHOD: ABNORMAL
EOSINOPHIL # BLD AUTO: 0 K/UL
EOSINOPHIL NFR BLD: 0.1 %
ERYTHROCYTE [DISTWIDTH] IN BLOOD BY AUTOMATED COUNT: 13.5 %
EST. GFR  (AFRICAN AMERICAN): >60 ML/MIN/1.73 M^2
EST. GFR  (NON AFRICAN AMERICAN): >60 ML/MIN/1.73 M^2
GLUCOSE SERPL-MCNC: 138 MG/DL (ref 70–110)
GLUCOSE SERPL-MCNC: 182 MG/DL (ref 70–110)
GLUCOSE SERPL-MCNC: 209 MG/DL
HCO3 UR-SCNC: 22.7 MMOL/L (ref 24–28)
HCO3 UR-SCNC: 23.5 MMOL/L (ref 24–28)
HCT VFR BLD AUTO: 18.4 %
HCT VFR BLD AUTO: 24.1 %
HCT VFR BLD AUTO: 25.3 %
HCT VFR BLD CALC: 35 %PCV (ref 36–54)
HCT VFR BLD CALC: 38 %PCV (ref 36–54)
HGB BLD-MCNC: 12 G/DL
HGB BLD-MCNC: 13 G/DL
HGB BLD-MCNC: 6.1 G/DL
HGB BLD-MCNC: 8.4 G/DL
HGB BLD-MCNC: 8.7 G/DL
LYMPHOCYTES # BLD AUTO: 1.3 K/UL
LYMPHOCYTES NFR BLD: 13.9 %
MCH RBC QN AUTO: 30 PG
MCHC RBC AUTO-ENTMCNC: 34.4 %
MCV RBC AUTO: 87 FL
MONOCYTES # BLD AUTO: 1.3 K/UL
MONOCYTES NFR BLD: 13.2 %
NEUTROPHILS # BLD AUTO: 7 K/UL
NEUTROPHILS NFR BLD: 72.3 %
PCO2 BLDA: 35.4 MMHG (ref 35–45)
PCO2 BLDA: 39.4 MMHG (ref 35–45)
PH SMN: 7.37 [PH] (ref 7.35–7.45)
PH SMN: 7.43 [PH] (ref 7.35–7.45)
PLATELET # BLD AUTO: 124 K/UL
PMV BLD AUTO: 11.1 FL
PO2 BLDA: 111 MMHG (ref 80–100)
PO2 BLDA: 191 MMHG (ref 80–100)
POC BE: -1 MMOL/L
POC BE: -3 MMOL/L
POC IONIZED CALCIUM: 0.98 MMOL/L (ref 1.06–1.42)
POC IONIZED CALCIUM: 1.04 MMOL/L (ref 1.06–1.42)
POC SATURATED O2: 100 % (ref 95–100)
POC SATURATED O2: 99 % (ref 95–100)
POC TCO2: 24 MMOL/L (ref 23–27)
POC TCO2: 25 MMOL/L (ref 23–27)
POTASSIUM BLD-SCNC: 3.6 MMOL/L (ref 3.5–5.1)
POTASSIUM BLD-SCNC: 3.7 MMOL/L (ref 3.5–5.1)
POTASSIUM SERPL-SCNC: 4.5 MMOL/L
RBC # BLD AUTO: 2.9 M/UL
SAMPLE: ABNORMAL
SAMPLE: ABNORMAL
SODIUM BLD-SCNC: 135 MMOL/L (ref 136–145)
SODIUM BLD-SCNC: 138 MMOL/L (ref 136–145)
SODIUM SERPL-SCNC: 140 MMOL/L
WBC # BLD AUTO: 9.62 K/UL

## 2017-03-31 PROCEDURE — 88342 IMHCHEM/IMCYTCHM 1ST ANTB: CPT | Mod: 26,59,, | Performed by: PATHOLOGY

## 2017-03-31 PROCEDURE — 37000009 HC ANESTHESIA EA ADD 15 MINS: Performed by: SURGERY

## 2017-03-31 PROCEDURE — 25000003 PHARM REV CODE 250: Performed by: NURSE ANESTHETIST, CERTIFIED REGISTERED

## 2017-03-31 PROCEDURE — 85014 HEMATOCRIT: CPT | Mod: 91

## 2017-03-31 PROCEDURE — 0F520ZZ DESTRUCTION OF LEFT LOBE LIVER, OPEN APPROACH: ICD-10-PCS | Performed by: SURGERY

## 2017-03-31 PROCEDURE — 86901 BLOOD TYPING SEROLOGIC RH(D): CPT

## 2017-03-31 PROCEDURE — 07BB0ZZ EXCISION OF MESENTERIC LYMPHATIC, OPEN APPROACH: ICD-10-PCS | Performed by: SURGERY

## 2017-03-31 PROCEDURE — P9045 ALBUMIN (HUMAN), 5%, 250 ML: HCPCS | Performed by: NURSE ANESTHETIST, CERTIFIED REGISTERED

## 2017-03-31 PROCEDURE — 63600175 PHARM REV CODE 636 W HCPCS

## 2017-03-31 PROCEDURE — 0FB10ZZ EXCISION OF RIGHT LOBE LIVER, OPEN APPROACH: ICD-10-PCS | Performed by: SURGERY

## 2017-03-31 PROCEDURE — P9045 ALBUMIN (HUMAN), 5%, 250 ML: HCPCS

## 2017-03-31 PROCEDURE — 86900 BLOOD TYPING SEROLOGIC ABO: CPT

## 2017-03-31 PROCEDURE — C1751 CATH, INF, PER/CENT/MIDLINE: HCPCS | Performed by: NURSE ANESTHETIST, CERTIFIED REGISTERED

## 2017-03-31 PROCEDURE — 37000008 HC ANESTHESIA 1ST 15 MINUTES: Performed by: SURGERY

## 2017-03-31 PROCEDURE — 0DTJ0ZZ RESECTION OF APPENDIX, OPEN APPROACH: ICD-10-PCS | Performed by: SURGERY

## 2017-03-31 PROCEDURE — 88360 TUMOR IMMUNOHISTOCHEM/MANUAL: CPT | Mod: 26,,, | Performed by: PATHOLOGY

## 2017-03-31 PROCEDURE — 88307 TISSUE EXAM BY PATHOLOGIST: CPT | Mod: 26,,, | Performed by: PATHOLOGY

## 2017-03-31 PROCEDURE — 86920 COMPATIBILITY TEST SPIN: CPT

## 2017-03-31 PROCEDURE — 36000710: Performed by: SURGERY

## 2017-03-31 PROCEDURE — 88309 TISSUE EXAM BY PATHOLOGIST: CPT | Mod: 26,,, | Performed by: PATHOLOGY

## 2017-03-31 PROCEDURE — 0FT40ZZ RESECTION OF GALLBLADDER, OPEN APPROACH: ICD-10-PCS | Performed by: SURGERY

## 2017-03-31 PROCEDURE — 63600175 PHARM REV CODE 636 W HCPCS: Performed by: SURGERY

## 2017-03-31 PROCEDURE — 36000711: Performed by: SURGERY

## 2017-03-31 PROCEDURE — 85025 COMPLETE CBC W/AUTO DIFF WBC: CPT

## 2017-03-31 PROCEDURE — 85018 HEMOGLOBIN: CPT | Mod: 91

## 2017-03-31 PROCEDURE — 88304 TISSUE EXAM BY PATHOLOGIST: CPT | Mod: 26,,, | Performed by: PATHOLOGY

## 2017-03-31 PROCEDURE — 25000003 PHARM REV CODE 250

## 2017-03-31 PROCEDURE — 63600175 PHARM REV CODE 636 W HCPCS: Performed by: NURSE ANESTHETIST, CERTIFIED REGISTERED

## 2017-03-31 PROCEDURE — 0DRT07Z: ICD-10-PCS | Performed by: SURGERY

## 2017-03-31 PROCEDURE — 88305 TISSUE EXAM BY PATHOLOGIST: CPT | Performed by: PATHOLOGY

## 2017-03-31 PROCEDURE — 88360 TUMOR IMMUNOHISTOCHEM/MANUAL: CPT | Mod: 26,59,, | Performed by: PATHOLOGY

## 2017-03-31 PROCEDURE — 36415 COLL VENOUS BLD VENIPUNCTURE: CPT

## 2017-03-31 PROCEDURE — 0F510ZZ DESTRUCTION OF RIGHT LOBE LIVER, OPEN APPROACH: ICD-10-PCS | Performed by: SURGERY

## 2017-03-31 PROCEDURE — 25000003 PHARM REV CODE 250: Performed by: SURGERY

## 2017-03-31 PROCEDURE — 25000003 PHARM REV CODE 250: Performed by: NURSE PRACTITIONER

## 2017-03-31 PROCEDURE — 0DBB0ZZ EXCISION OF ILEUM, OPEN APPROACH: ICD-10-PCS | Performed by: SURGERY

## 2017-03-31 PROCEDURE — 20000000 HC ICU ROOM

## 2017-03-31 PROCEDURE — 27200676 HC TRANSDUCER MONITOR KIT DOUBLE: Performed by: NURSE ANESTHETIST, CERTIFIED REGISTERED

## 2017-03-31 PROCEDURE — 80048 BASIC METABOLIC PNL TOTAL CA: CPT

## 2017-03-31 PROCEDURE — 88305 TISSUE EXAM BY PATHOLOGIST: CPT | Mod: 26,,, | Performed by: PATHOLOGY

## 2017-03-31 PROCEDURE — 0BBR0ZZ: ICD-10-PCS | Performed by: SURGERY

## 2017-03-31 PROCEDURE — 27201423 OPTIME MED/SURG SUP & DEVICES STERILE SUPPLY: Performed by: SURGERY

## 2017-03-31 RX ORDER — ESMOLOL HYDROCHLORIDE 10 MG/ML
INJECTION INTRAVENOUS
Status: DISCONTINUED | OUTPATIENT
Start: 2017-03-31 | End: 2017-03-31

## 2017-03-31 RX ORDER — BACITRACIN 50000 [IU]/1
INJECTION, POWDER, FOR SOLUTION INTRAMUSCULAR
Status: DISCONTINUED | OUTPATIENT
Start: 2017-03-31 | End: 2017-03-31 | Stop reason: HOSPADM

## 2017-03-31 RX ORDER — GLYCOPYRROLATE 0.2 MG/ML
INJECTION INTRAMUSCULAR; INTRAVENOUS
Status: DISCONTINUED | OUTPATIENT
Start: 2017-03-31 | End: 2017-03-31

## 2017-03-31 RX ORDER — KETOROLAC TROMETHAMINE 30 MG/ML
15 INJECTION, SOLUTION INTRAMUSCULAR; INTRAVENOUS
Status: DISCONTINUED | OUTPATIENT
Start: 2017-03-31 | End: 2017-03-31 | Stop reason: HOSPADM

## 2017-03-31 RX ORDER — NALOXONE HCL 0.4 MG/ML
0.02 VIAL (ML) INJECTION
Status: DISCONTINUED | OUTPATIENT
Start: 2017-03-31 | End: 2017-04-12 | Stop reason: HOSPADM

## 2017-03-31 RX ORDER — DEXTROSE MONOHYDRATE, SODIUM CHLORIDE, AND POTASSIUM CHLORIDE 50; 1.49; 4.5 G/1000ML; G/1000ML; G/1000ML
INJECTION, SOLUTION INTRAVENOUS CONTINUOUS
Status: DISCONTINUED | OUTPATIENT
Start: 2017-03-31 | End: 2017-04-03

## 2017-03-31 RX ORDER — HYDROCODONE BITARTRATE AND ACETAMINOPHEN 500; 5 MG/1; MG/1
TABLET ORAL
Status: DISCONTINUED | OUTPATIENT
Start: 2017-03-31 | End: 2017-03-31

## 2017-03-31 RX ORDER — OCTREOTIDE ACETATE 100 UG/ML
INJECTION, SOLUTION INTRAVENOUS; SUBCUTANEOUS
Status: DISCONTINUED | OUTPATIENT
Start: 2017-03-31 | End: 2017-03-31

## 2017-03-31 RX ORDER — ISOSULFAN BLUE 50 MG/5ML
INJECTION, SOLUTION SUBCUTANEOUS
Status: DISCONTINUED | OUTPATIENT
Start: 2017-03-31 | End: 2017-03-31 | Stop reason: HOSPADM

## 2017-03-31 RX ORDER — LABETALOL HYDROCHLORIDE 5 MG/ML
5 INJECTION, SOLUTION INTRAVENOUS
Status: DISCONTINUED | OUTPATIENT
Start: 2017-03-31 | End: 2017-04-12 | Stop reason: HOSPADM

## 2017-03-31 RX ORDER — LIDOCAINE HYDROCHLORIDE 10 MG/ML
1 INJECTION, SOLUTION EPIDURAL; INFILTRATION; INTRACAUDAL; PERINEURAL ONCE
Status: DISCONTINUED | OUTPATIENT
Start: 2017-03-31 | End: 2017-03-31 | Stop reason: HOSPADM

## 2017-03-31 RX ORDER — MAGNESIUM SULFATE HEPTAHYDRATE 40 MG/ML
2 INJECTION, SOLUTION INTRAVENOUS ONCE
Status: COMPLETED | OUTPATIENT
Start: 2017-03-31 | End: 2017-03-31

## 2017-03-31 RX ORDER — ONDANSETRON 2 MG/ML
INJECTION INTRAMUSCULAR; INTRAVENOUS
Status: DISCONTINUED | OUTPATIENT
Start: 2017-03-31 | End: 2017-03-31

## 2017-03-31 RX ORDER — PREGABALIN 75 MG/1
75 CAPSULE ORAL
Status: DISCONTINUED | OUTPATIENT
Start: 2017-03-31 | End: 2017-03-31 | Stop reason: HOSPADM

## 2017-03-31 RX ORDER — FAMOTIDINE 10 MG/ML
20 INJECTION INTRAVENOUS
Status: COMPLETED | OUTPATIENT
Start: 2017-03-31 | End: 2017-03-31

## 2017-03-31 RX ORDER — LIDOCAINE HYDROCHLORIDE 20 MG/ML
INJECTION, SOLUTION EPIDURAL; INFILTRATION; INTRACAUDAL; PERINEURAL
Status: DISCONTINUED | OUTPATIENT
Start: 2017-03-31 | End: 2017-03-31

## 2017-03-31 RX ORDER — ENOXAPARIN SODIUM 100 MG/ML
40 INJECTION SUBCUTANEOUS
Status: DISCONTINUED | OUTPATIENT
Start: 2017-03-31 | End: 2017-03-31 | Stop reason: HOSPADM

## 2017-03-31 RX ORDER — ONDANSETRON 2 MG/ML
4 INJECTION INTRAMUSCULAR; INTRAVENOUS EVERY 6 HOURS PRN
Status: DISCONTINUED | OUTPATIENT
Start: 2017-03-31 | End: 2017-04-12 | Stop reason: HOSPADM

## 2017-03-31 RX ORDER — DEXAMETHASONE SODIUM PHOSPHATE 4 MG/ML
INJECTION, SOLUTION INTRA-ARTICULAR; INTRALESIONAL; INTRAMUSCULAR; INTRAVENOUS; SOFT TISSUE
Status: DISCONTINUED | OUTPATIENT
Start: 2017-03-31 | End: 2017-03-31

## 2017-03-31 RX ORDER — FENTANYL CITRATE 50 UG/ML
INJECTION, SOLUTION INTRAMUSCULAR; INTRAVENOUS
Status: DISCONTINUED | OUTPATIENT
Start: 2017-03-31 | End: 2017-03-31

## 2017-03-31 RX ORDER — ALBUMIN HUMAN 50 G/1000ML
SOLUTION INTRAVENOUS CONTINUOUS PRN
Status: DISCONTINUED | OUTPATIENT
Start: 2017-03-31 | End: 2017-03-31

## 2017-03-31 RX ORDER — SODIUM CHLORIDE, SODIUM LACTATE, POTASSIUM CHLORIDE, CALCIUM CHLORIDE 600; 310; 30; 20 MG/100ML; MG/100ML; MG/100ML; MG/100ML
INJECTION, SOLUTION INTRAVENOUS CONTINUOUS
Status: DISCONTINUED | OUTPATIENT
Start: 2017-03-31 | End: 2017-03-31

## 2017-03-31 RX ORDER — INDOMETHACIN 25 MG/1
CAPSULE ORAL
Status: DISCONTINUED | OUTPATIENT
Start: 2017-03-31 | End: 2017-03-31

## 2017-03-31 RX ORDER — DIPHENHYDRAMINE HYDROCHLORIDE 50 MG/ML
12.5 INJECTION INTRAMUSCULAR; INTRAVENOUS EVERY 4 HOURS PRN
Status: DISCONTINUED | OUTPATIENT
Start: 2017-03-31 | End: 2017-04-12 | Stop reason: HOSPADM

## 2017-03-31 RX ORDER — PROPOFOL 10 MG/ML
VIAL (ML) INTRAVENOUS
Status: DISCONTINUED | OUTPATIENT
Start: 2017-03-31 | End: 2017-03-31

## 2017-03-31 RX ORDER — ALBUTEROL SULFATE 0.83 MG/ML
2.5 SOLUTION RESPIRATORY (INHALATION) EVERY 4 HOURS PRN
Status: DISCONTINUED | OUTPATIENT
Start: 2017-03-31 | End: 2017-04-12 | Stop reason: HOSPADM

## 2017-03-31 RX ORDER — HYDRALAZINE HYDROCHLORIDE 20 MG/ML
5 INJECTION INTRAMUSCULAR; INTRAVENOUS
Status: DISCONTINUED | OUTPATIENT
Start: 2017-03-31 | End: 2017-04-12 | Stop reason: HOSPADM

## 2017-03-31 RX ORDER — HYDROMORPHONE HCL IN 0.9% NACL 6 MG/30 ML
PATIENT CONTROLLED ANALGESIA SYRINGE INTRAVENOUS CONTINUOUS
Status: DISCONTINUED | OUTPATIENT
Start: 2017-03-31 | End: 2017-04-05

## 2017-03-31 RX ORDER — KETOROLAC TROMETHAMINE 30 MG/ML
10 INJECTION, SOLUTION INTRAMUSCULAR; INTRAVENOUS EVERY 6 HOURS
Status: DISCONTINUED | OUTPATIENT
Start: 2017-03-31 | End: 2017-04-01

## 2017-03-31 RX ORDER — ROCURONIUM BROMIDE 10 MG/ML
INJECTION, SOLUTION INTRAVENOUS
Status: DISCONTINUED | OUTPATIENT
Start: 2017-03-31 | End: 2017-03-31

## 2017-03-31 RX ORDER — VASOPRESSIN 20 [USP'U]/ML
INJECTION, SOLUTION INTRAMUSCULAR; SUBCUTANEOUS
Status: DISCONTINUED | OUTPATIENT
Start: 2017-03-31 | End: 2017-03-31

## 2017-03-31 RX ORDER — SODIUM CHLORIDE 9 MG/ML
INJECTION, SOLUTION INTRAVENOUS CONTINUOUS PRN
Status: DISCONTINUED | OUTPATIENT
Start: 2017-03-31 | End: 2017-03-31

## 2017-03-31 RX ORDER — NEOSTIGMINE METHYLSULFATE 1 MG/ML
INJECTION, SOLUTION INTRAVENOUS
Status: DISCONTINUED | OUTPATIENT
Start: 2017-03-31 | End: 2017-03-31

## 2017-03-31 RX ORDER — HYDROMORPHONE HYDROCHLORIDE 1 MG/ML
0.5 INJECTION, SOLUTION INTRAMUSCULAR; INTRAVENOUS; SUBCUTANEOUS ONCE
Status: COMPLETED | OUTPATIENT
Start: 2017-03-31 | End: 2017-03-31

## 2017-03-31 RX ORDER — ONDANSETRON 2 MG/ML
8 INJECTION INTRAMUSCULAR; INTRAVENOUS
Status: COMPLETED | OUTPATIENT
Start: 2017-03-31 | End: 2017-03-31

## 2017-03-31 RX ORDER — BUPIVACAINE HYDROCHLORIDE 2.5 MG/ML
INJECTION, SOLUTION EPIDURAL; INFILTRATION; INTRACAUDAL
Status: DISCONTINUED | OUTPATIENT
Start: 2017-03-31 | End: 2017-03-31 | Stop reason: HOSPADM

## 2017-03-31 RX ORDER — PHENYLEPHRINE HYDROCHLORIDE 10 MG/ML
INJECTION INTRAVENOUS
Status: DISCONTINUED | OUTPATIENT
Start: 2017-03-31 | End: 2017-03-31

## 2017-03-31 RX ORDER — MANNITOL 250 MG/ML
INJECTION, SOLUTION INTRAVENOUS
Status: DISCONTINUED | OUTPATIENT
Start: 2017-03-31 | End: 2017-03-31

## 2017-03-31 RX ORDER — BUMETANIDE 0.25 MG/ML
INJECTION INTRAMUSCULAR; INTRAVENOUS
Status: DISCONTINUED | OUTPATIENT
Start: 2017-03-31 | End: 2017-03-31

## 2017-03-31 RX ORDER — MIDAZOLAM HYDROCHLORIDE 1 MG/ML
INJECTION, SOLUTION INTRAMUSCULAR; INTRAVENOUS
Status: DISCONTINUED | OUTPATIENT
Start: 2017-03-31 | End: 2017-03-31

## 2017-03-31 RX ADMIN — CALCIUM CHLORIDE 1 G: 100 INJECTION, SOLUTION INTRAVENOUS at 12:03

## 2017-03-31 RX ADMIN — KETOROLAC TROMETHAMINE 10 MG: 30 INJECTION, SOLUTION INTRAMUSCULAR at 06:03

## 2017-03-31 RX ADMIN — OCTREOTIDE ACETATE 500 MCG: 100 INJECTION, SOLUTION INTRAVENOUS; SUBCUTANEOUS at 01:03

## 2017-03-31 RX ADMIN — PHENYLEPHRINE HYDROCHLORIDE 100 MCG: 10 INJECTION INTRAVENOUS at 01:03

## 2017-03-31 RX ADMIN — PHENYLEPHRINE HYDROCHLORIDE 100 MCG: 10 INJECTION INTRAVENOUS at 12:03

## 2017-03-31 RX ADMIN — NEOSTIGMINE METHYLSULFATE 5 MG: 1 INJECTION INTRAVENOUS at 03:03

## 2017-03-31 RX ADMIN — GLYCOPYRROLATE 0.6 MG: 0.2 INJECTION, SOLUTION INTRAMUSCULAR; INTRAVENOUS at 03:03

## 2017-03-31 RX ADMIN — CALCIUM GLUCONATE 1 G: 94 INJECTION, SOLUTION INTRAVENOUS at 06:03

## 2017-03-31 RX ADMIN — SODIUM CHLORIDE: 0.9 INJECTION, SOLUTION INTRAVENOUS at 08:03

## 2017-03-31 RX ADMIN — MANNITOL 12.5 G: 12.5 INJECTION, SOLUTION INTRAVENOUS at 11:03

## 2017-03-31 RX ADMIN — VASOPRESSIN 2 UNITS: 20 INJECTION, SOLUTION INTRAMUSCULAR; SUBCUTANEOUS at 02:03

## 2017-03-31 RX ADMIN — DEXTROSE MONOHYDRATE, SODIUM CHLORIDE, AND POTASSIUM CHLORIDE: 50; 4.5; 1.49 INJECTION, SOLUTION INTRAVENOUS at 05:03

## 2017-03-31 RX ADMIN — Medication: at 05:03

## 2017-03-31 RX ADMIN — FENTANYL CITRATE 100 MCG: 50 INJECTION, SOLUTION INTRAMUSCULAR; INTRAVENOUS at 09:03

## 2017-03-31 RX ADMIN — OCTREOTIDE ACETATE 500 MCG: 100 INJECTION, SOLUTION INTRAVENOUS; SUBCUTANEOUS at 12:03

## 2017-03-31 RX ADMIN — AMPICILLIN SODIUM AND SULBACTAM SODIUM 3 G: 2; 1 INJECTION, POWDER, FOR SOLUTION INTRAMUSCULAR; INTRAVENOUS at 08:03

## 2017-03-31 RX ADMIN — CALCIUM GLUCONATE 1 G: 94 INJECTION, SOLUTION INTRAVENOUS at 04:03

## 2017-03-31 RX ADMIN — LIDOCAINE HYDROCHLORIDE 100 MG: 20 INJECTION, SOLUTION EPIDURAL; INFILTRATION; INTRACAUDAL; PERINEURAL at 08:03

## 2017-03-31 RX ADMIN — ONDANSETRON 8 MG: 2 INJECTION INTRAMUSCULAR; INTRAVENOUS at 06:03

## 2017-03-31 RX ADMIN — ALBUMIN HUMAN: 0.05 INJECTION, SOLUTION INTRAVENOUS at 11:03

## 2017-03-31 RX ADMIN — FAMOTIDINE 20 MG: 10 INJECTION INTRAVENOUS at 06:03

## 2017-03-31 RX ADMIN — PHENYLEPHRINE HYDROCHLORIDE 100 MCG: 10 INJECTION INTRAVENOUS at 10:03

## 2017-03-31 RX ADMIN — SODIUM CHLORIDE, SODIUM LACTATE, POTASSIUM CHLORIDE, AND CALCIUM CHLORIDE: .6; .31; .03; .02 INJECTION, SOLUTION INTRAVENOUS at 06:03

## 2017-03-31 RX ADMIN — ESMOLOL HYDROCHLORIDE 20 MG: 10 INJECTION, SOLUTION INTRAVENOUS at 02:03

## 2017-03-31 RX ADMIN — BUMETANIDE 1 MG: 0.25 INJECTION, SOLUTION INTRAMUSCULAR; INTRAVENOUS at 11:03

## 2017-03-31 RX ADMIN — FENTANYL CITRATE 250 MCG: 50 INJECTION, SOLUTION INTRAMUSCULAR; INTRAVENOUS at 08:03

## 2017-03-31 RX ADMIN — ENOXAPARIN SODIUM 40 MG: 100 INJECTION SUBCUTANEOUS at 06:03

## 2017-03-31 RX ADMIN — OCTREOTIDE ACETATE: 500 INJECTION, SOLUTION INTRAVENOUS; SUBCUTANEOUS at 06:03

## 2017-03-31 RX ADMIN — PROPOFOL 200 MG: 10 INJECTION, EMULSION INTRAVENOUS at 08:03

## 2017-03-31 RX ADMIN — PHENYLEPHRINE HYDROCHLORIDE 100 MCG: 10 INJECTION INTRAVENOUS at 04:03

## 2017-03-31 RX ADMIN — MAGNESIUM SULFATE IN WATER 2 G: 40 INJECTION, SOLUTION INTRAVENOUS at 05:03

## 2017-03-31 RX ADMIN — ONDANSETRON 8 MG: 2 INJECTION, SOLUTION INTRAMUSCULAR; INTRAVENOUS at 03:03

## 2017-03-31 RX ADMIN — PHENYLEPHRINE HYDROCHLORIDE 100 MCG: 10 INJECTION INTRAVENOUS at 11:03

## 2017-03-31 RX ADMIN — GLYCOPYRROLATE 0.2 MG: 0.2 INJECTION, SOLUTION INTRAMUSCULAR; INTRAVENOUS at 08:03

## 2017-03-31 RX ADMIN — HYDROMORPHONE HYDROCHLORIDE 0.5 MG: 1 INJECTION, SOLUTION INTRAMUSCULAR; INTRAVENOUS; SUBCUTANEOUS at 05:03

## 2017-03-31 RX ADMIN — ESMOLOL HYDROCHLORIDE 20 MG: 10 INJECTION, SOLUTION INTRAVENOUS at 03:03

## 2017-03-31 RX ADMIN — ONDANSETRON 4 MG: 2 INJECTION INTRAMUSCULAR; INTRAVENOUS at 06:03

## 2017-03-31 RX ADMIN — OCTREOTIDE ACETATE 500 MCG/HR: 1000 INJECTION, SOLUTION INTRAVENOUS; SUBCUTANEOUS at 05:03

## 2017-03-31 RX ADMIN — ALBUMIN HUMAN: 0.05 INJECTION, SOLUTION INTRAVENOUS at 12:03

## 2017-03-31 RX ADMIN — DEXAMETHASONE SODIUM PHOSPHATE 8 MG: 4 INJECTION, SOLUTION INTRAMUSCULAR; INTRAVENOUS at 08:03

## 2017-03-31 RX ADMIN — SODIUM BICARBONATE 100 MEQ: 84 INJECTION, SOLUTION INTRAVENOUS at 12:03

## 2017-03-31 RX ADMIN — PREGABALIN 75 MG: 75 CAPSULE ORAL at 06:03

## 2017-03-31 RX ADMIN — AMPICILLIN SODIUM AND SULBACTAM SODIUM 3 G: 2; 1 INJECTION, POWDER, FOR SOLUTION INTRAMUSCULAR; INTRAVENOUS at 11:03

## 2017-03-31 RX ADMIN — ALBUMIN HUMAN: 0.05 INJECTION, SOLUTION INTRAVENOUS at 01:03

## 2017-03-31 RX ADMIN — HYDROCORTISONE SODIUM SUCCINATE 50 MG: 100 INJECTION, POWDER, FOR SOLUTION INTRAMUSCULAR; INTRAVENOUS at 06:03

## 2017-03-31 RX ADMIN — AMPICILLIN SODIUM AND SULBACTAM SODIUM 3 G: 2; 1 INJECTION, POWDER, FOR SOLUTION INTRAMUSCULAR; INTRAVENOUS at 02:03

## 2017-03-31 RX ADMIN — VASOPRESSIN 1 UNITS: 20 INJECTION, SOLUTION INTRAMUSCULAR; SUBCUTANEOUS at 01:03

## 2017-03-31 RX ADMIN — MIDAZOLAM 2 MG: 1 INJECTION INTRAMUSCULAR; INTRAVENOUS at 08:03

## 2017-03-31 RX ADMIN — FENTANYL CITRATE 100 MCG: 50 INJECTION, SOLUTION INTRAMUSCULAR; INTRAVENOUS at 10:03

## 2017-03-31 RX ADMIN — PHENYLEPHRINE HYDROCHLORIDE 100 MCG: 10 INJECTION INTRAVENOUS at 08:03

## 2017-03-31 RX ADMIN — OCTREOTIDE ACETATE 250 MCG/HR: 1000 INJECTION, SOLUTION INTRAVENOUS; SUBCUTANEOUS at 06:03

## 2017-03-31 RX ADMIN — OCTREOTIDE ACETATE: 1000 INJECTION, SOLUTION INTRAVENOUS; SUBCUTANEOUS at 03:03

## 2017-03-31 RX ADMIN — PHENYLEPHRINE HYDROCHLORIDE 100 MCG: 10 INJECTION INTRAVENOUS at 02:03

## 2017-03-31 RX ADMIN — CALCIUM CHLORIDE 1 G: 100 INJECTION, SOLUTION INTRAVENOUS at 11:03

## 2017-03-31 RX ADMIN — ROCURONIUM BROMIDE 50 MG: 10 INJECTION, SOLUTION INTRAVENOUS at 08:03

## 2017-03-31 RX ADMIN — KETOROLAC TROMETHAMINE 15 MG: 30 INJECTION, SOLUTION INTRAMUSCULAR at 06:03

## 2017-03-31 RX ADMIN — ROCURONIUM BROMIDE 25 MG: 10 INJECTION, SOLUTION INTRAVENOUS at 02:03

## 2017-03-31 RX ADMIN — OCTREOTIDE ACETATE 250 MCG/HR: 1000 INJECTION, SOLUTION INTRAVENOUS; SUBCUTANEOUS at 07:03

## 2017-03-31 RX ADMIN — FENTANYL CITRATE 50 MCG: 50 INJECTION, SOLUTION INTRAMUSCULAR; INTRAVENOUS at 04:03

## 2017-03-31 NOTE — IP AVS SNAPSHOT
[facility]  [address]  [place] [address]  Phone: [phone]           Patient Discharge Instructions   Our goal is to set you up for success. This packet includes information on your condition, medications, and your home care.  It will help you care for yourself to prevent having to return to the hospital.     Please ask your nurse if you have any questions.      There are many details to remember when preparing to leave the hospital. Here is what you will need to do:    1. Take your medicine. If you are prescribed medications, review your Medication List on the following pages. You may have new medications to  at the pharmacy and others that you'll need to stop taking. Review the instructions for how and when to take your medications. Talk with your doctor or nurses if you are unsure of what to do.     2. Go to your follow-up appointments. Specific follow-up information is listed in the following pages. Your may be contacted by a nurse or clinical provider about future appointments. Be sure we have all of the phone numbers to reach you. Please contact your provider's office if you are unable to make an appointment.     3. Watch for warning signs. Your doctor or nurse will give you detailed warning signs to watch for and when to call for assistance. These instructions may also include educational information about your condition. If you experience any of warning signs to your health, call your doctor.               ** Verify the list of medication(s) below is accurate and up to date. Carry this with you in case of emergency. If your medications have changed, please notify your healthcare provider.             Medication List      START taking these medications        Additional Info                      ertapenem 1 gram injection   Commonly known as:  INVANZ   Quantity:  5 g   Refills:  0   Dose:  1 g   Indications:  Intra-abdominal    Last time this was given:  1 g on 4/12/2017   1:23 PM   Instructions:  Inject 1 g into the vein once daily.     Begin Date    AM    Noon    PM    Bedtime       senna-docusate 8.6-50 mg 8.6-50 mg per tablet   Commonly known as:  PERICOLACE   Refills:  0   Dose:  1 tablet    Instructions:  Take 1 tablet by mouth daily as needed for Constipation.     Begin Date    AM    Noon    PM    Bedtime       tramadol 50 mg tablet   Commonly known as:  ULTRAM   Quantity:  50 tablet   Refills:  0   Dose:  100 mg    Last time this was given:  100 mg on 4/12/2017 12:13 PM   Instructions:  Take 2 tablets (100 mg total) by mouth every 4 (four) hours as needed for Pain.     Begin Date    AM    Noon    PM    Bedtime         CONTINUE taking these medications        Additional Info                      lanreotide 120 mg/0.5 mL Syrg   Commonly known as:  SOMATULINE DEPOT   Refills:  0   Dose:  120 mg    Instructions:  Inject 120 mg into the skin every 28 days.     Begin Date    AM    Noon    PM    Bedtime         STOP taking these medications     hydrocodone-acetaminophen 5-325mg 5-325 mg per tablet   Commonly known as:  NORCO            Where to Get Your Medications      You can get these medications from any pharmacy     Bring a paper prescription for each of these medications     ertapenem 1 gram injection    tramadol 50 mg tablet       You don't need a prescription for these medications     senna-docusate 8.6-50 mg 8.6-50 mg per tablet                  Please bring to all follow up appointments:    1. A copy of your discharge instructions.  2. All medicines you are currently taking in their original bottles.  3. Identification and insurance card.    Please arrive 15 minutes ahead of scheduled appointment time.    Please call 24 hours in advance if you must reschedule your appointment and/or time.        Your Scheduled Appointments     Apr 17, 2017  1:00 PM CDT   Established Patient Visit with Fidelia Reyes MD   [facility] ([facility]    [address] [place] [address]   [phone]              Follow-up Information     Follow up with Fidelia Reyes MD On 4/17/2017.    Specialty:  General Surgery    Why:  1:00pm . PLEASE CALL THE OFFICE AT 8:30 AM 4/17 -- MD WILL MAKE ARRANGEMENTS FOR YOU TO GET ABD CT BEFORE YOUR 1PM APT.   Please report to the !st floor registration desk prior to appointment (12N) -- you must have labwork drawn  before the appointment     Contact information:    [address]  [address]  [place] [address]  [phone]          Follow up with [facility].    Specialties:  Pharmacist, DME Provider, IV Infusion    Why:  Infusion    Contact information:    [address]  [facility] [address] [phone]          Discharge Instructions     Future Orders    CT Abdomen Pelvis With Contrast     Process Instructions:    No food or drink 4 hours prior to exam.    Questions:    Oral/Rectal Contrast instructions:  Routine Oral Contrast    Special CT ABD Protocol Request?:  Routine    Reason for Exam:  Rule out acute intraabdominal process    Is the patient allergic to iodine or contrast? Has a steroid / antihistamine prep been administered?:  No    Is the patient on ANY Metformin drug such as Glugophage/Glucovance?           Should be off drug 48 hours after contrast. Check renal function before restart.:  No    Age > 60 years?:  No    History of Kidney Disease - including: decreased kidney function, dialysis, kidney transplay, single kidney, kidney cancer, kidney surgery?:  None    Does the patient have high blood pressure requiring medical treatment?:  No    Diabetes?:  No    May the Radiologist modify the order per protocol to meet the clinical needs of the patient?:  Yes    Recist criteria?:  No    Will this service be billed to a Worker's Comp policy?:      Activity as tolerated     Call MD for:  difficulty breathing or increased cough     Call MD for:  increased confusion or weakness     Call MD  "for:  persistent dizziness, light-headedness, or visual disturbances     Call MD for:  persistent nausea and vomiting or diarrhea     Call MD for:  redness, tenderness, or signs of infection (pain, swelling, redness, odor or green/yellow discharge around incision site)     Call MD for:  severe persistent headache     Call MD for:  severe uncontrolled pain     Call MD for:  temperature >100.4     Call MD for:  worsening rash     Diet general     Questions:    Total calories:      Fat restriction, if any:      Protein restriction, if any:      Na restriction, if any:      Fluid restriction:      Additional restrictions:      Lifting restrictions     Comments:    No lifting >10 lbs x6 weeks    Other restrictions (specify):     Comments:    Ok to shower, no soaking/swimming/bathtubs x2 weeks      Discharge References/Attachments     ABDOMINAL INCISION, DISCHARGE INSTRUCTIONS: CARING FOR YOUR (ENGLISH)    ANEMIA (ENGLISH)    ERTAPENEM INJECTION (ENGLISH)    TRAMADOL EXTENDED RELEASE TABLETS OR CAPSULES (ENGLISH)    INFUSION THERAPY, HOME (ENGLISH)        Primary Diagnosis     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    3/31/2017  5:52 AM Fidelia Reyes MD [facility] [id number]      Care Providers     Provider Role Specialty Primary office phone    Fidelia Reyes MD Attending Provider General Surgery [phone]    Fidelia Reyes MD Surgeon  General Surgery [phone]    Anuel Fox MD Consulting Physician  Radiology [phone]    Syd Khan MD Consulting Physician  Urology [phone]      Your Vitals Were     BP Pulse Temp Resp Height Weight    120/75 (Patient Position: Sitting, BP Method: Automatic) 97 98.3 °F (36.8 °C) (Oral) 18 6' 4" (1.93 m) 95.7 kg (211 lb)    SpO2 BMI             93% 25.68 kg/m2         Recent Lab Values     No lab values to display.      Pending " Labs     Order Current Status    Specimen to Pathology - Surgery In process    AFB Culture & Smear Preliminary result    AFB Culture & Smear Preliminary result    AFB Culture & Smear Preliminary result    AFB Culture & Smear Preliminary result    Aerobic culture Preliminary result    Bilirubin, Body Fluid (Reference Lab) Abdominal Fluid Preliminary result    Bilirubin, Body Fluid (Reference Lab) Abdominal Fluid Preliminary result    Blood culture Preliminary result    Culture, Anaerobic Preliminary result    Culture, Anaerobic Preliminary result    Fungus culture Preliminary result    Fungus culture Preliminary result    Fungus culture Preliminary result    Fungus culture Preliminary result    Prepare RBC 2 Units; slava-operative Preliminary result      Allergies as of 4/12/2017        Reactions    Epinephrine Other (See Comments)    Carcinoid patient      [facility]     [facility] Nurse Care Line - 24/7 Assistance  Unless otherwise directed by your provider, please contact [facility], our nurse care line that is available for 24/7 assistance.     Registered nurses in the [facility] provide clinical advisement, health education, appointment booking, and other advisory services.  Call for this free service at [phone].        Advance Directives     An advance directive is a document which, in the event you are no longer able to make decisions for yourself, tells your healthcare team what kind of treatment you do or do not want to receive, or who you would like to make those decisions for you.  If you do not currently have an advance directive, [facility] encourages you to create one.  For more information call:  [phone]), [phone] ([phone]),  or log on to [email or web address].        Language Assistance Services     ATTENTION: Language assistance services are available, free of charge. Please call [phone].      ATENCIÓN: merry Coffman  disposición servicios gratuitos de asistencia lingüística. Sanya carbone [phone].     [facility] Ý: N?u b?n nói Ti?ng Vi?t, có các d?ch v? h? tr? ngôn ng? mi?n phí dành cho b?n. G?i s? [phone].        Blood Transfusion Reaction Signs and Symptoms     The blood you have received has been matched for you as carefully as possible. Most patients who receive a blood transfusion do not experience problems. However, there can be a delayed reaction that happens a few weeks after your blood transfusion. Contact your physician immediately if you experience any NEW SYMPTOMS listed below:     Fever greater than 100.4 degrees    Chills   Yellow color to your skin or eyes(Jaundice)   Back pain, chest pain, or pain at the infusion site   Weakness (more than usual)   Discomfort or uneasiness more than usual (Malaise)   Nausea or vomiting   Shortness of breath, wheezing, or coughing   Higher or lower blood pressure than normal   Skin rash, itching, skin redness, or localized swelling (example: hands or feet)   Urinating less than normal   Urine appears reddish or orange and is darker than normal      Remember that some these signs may already exist for you--such as having chronic back pain or high blood pressure. You only need to look for and report to your doctor any new occurrences since your blood transfusion that are of concern.        [facility] Sign-Up     Activating your [facility] account is as easy as 1-2-3!     1) Visit [email or web address], select Sign Up Now, enter this activation code and your date of birth, then select Next.  [email or web address]  Expires: 5/14/2017 11:59 AM      2) Create a username and password to use when you visit [facility] in the future and select a security question in case you lose your password and select Next.    3) Enter your e-mail address and click Sign Up!    Additional Information  If you have questions, please e-mail [email or web address] or call [phone] to talk to our [facility]  staff. Remember, [facility] is NOT to be used for urgent needs. For medical emergencies, dial 911.          [facility] complies with applicable Federal civil rights laws and does not discriminate on the basis of race, color, national origin, age, disability, or sex.

## 2017-03-31 NOTE — TRANSFER OF CARE
"Anesthesia Transfer of Care Note    Patient: Javy Thompson    Procedure(s) Performed: Procedure(s) (LRB):  EXPLORATORY-LAPAROTOMY (N/A)  LOBECTOMY-LIVER (N/A)  DISSECTION-LYMPH NODE (N/A)  CHOLECYSTECTOMY  RESECTION - SMALL BOWEL  APPENDECTOMY  MAPPING-SENTINEL NODE  BIOPSY LIVER AND ULTRASOUND    Patient location: ICU    Anesthesia Type: general    Transport from OR: Transported from OR on 6-10 L/min O2 by face mask with adequate spontaneous ventilation. Continuous ECG monitoring in transport. Continuous SpO2 monitoring in transport. Continuos invasive BP monitoring in transport    Post pain: adequate analgesia    Post assessment: tolerated procedure well and no apparent anesthetic complications    Post vital signs: stable    Level of consciousness: awake, alert and oriented    Nausea/Vomiting: no nausea/vomiting    Complications: none          Last vitals:   Visit Vitals    /85 (BP Location: Right arm, Patient Position: Lying, BP Method: Automatic)    Pulse 69    Temp 36.7 °C (98 °F) (Oral)    Resp 18    Ht 6' 4" (1.93 m)    Wt 103 kg (227 lb)    SpO2 98%    BMI 27.63 kg/m2     "

## 2017-03-31 NOTE — OP NOTE
Preop:  Carcinoid tumour ileum, mesenteric lymphadenopathy and lier disease    Postop:  1. Multiple primary ileal tumours more than 10 distal ileum  About 60cms from ileocecal vlave, all tumours concentrated in 60 cms segment of ileum  2. Mesenteric lymph román mass  3. Large liver lesion  postr segemnt, liver lesion segemnt 8, 3,  4, diaphragmatic implant rt side  5. Chronic cholecystitis    Procedure:  1. Ex lap  2. [place] lymph román mapping  3. Appendectomy  4. cholcystectomy  5. Terminal ileal resection with reanastamosis  6. Mesenteric lymphadnectomy  7. Liver resection multiple segemnys using habib RF probe  8. Liver US  9. diphrgmatic resection with complex repair  10. Intralesional 5 FU  11. Unlisted abdominal procedure    GETA  [id number]  Eric./ Martell/ Aime  EBL: [id number]      Operation Form for [facility] Database    Name __ _Javy Thompson                    Date of Birth __1970 _____    Patient Admission Date to hospital:   3/31/2017    Surgeon(s):  MD Yaritza Murray MD Thiagarajan Ramcharan, MD Thiagarajan Ramcharan, MD                     Other ___________       Length of Operation: 6 hours 50 mts    Type of Operation (check all that apply)     Procedure(s):  EXPLORATORY-LAPAROTOMY  LOBECTOMY-LIVER  DISSECTION-LYMPH NODE  CHOLECYSTECTOMY  RESECTION - SMALL BOWEL  APPENDECTOMY     Gastric Resection                   x Small bowel resection                  Colon resection   x Hepatic resection                     Pancreatic resection                    Whipple                  x Lymph node resection            x Cholecystectomy                          Adrenalectomy       Lung resection                        x  Mesenteric dissection                   Nephrectomy         Thyroidectomy                         RFA                                             Peritoneal stripping   x Explorative                               Lysis of Adhesions             x Diaphramatic  Resection   Other  _____________________________________        Type of vascular encasements (check all that apply)     x SMA        Renal        Aorta/Cava     Iliac       Germaine  Germaine Hepatis    Celiac      Was tumor collected?           xYes                 No          If yes, tumor form must be completed by Data staff.                   Neoprobe Used          Yes          x No    Was it helpful in finding the tumor?      Yes        No     Operative Findings   Vascular Encasement                                       Mesenteric Extension    x Bowel Obstruction - complete or partial    Bowel Ischemia                                                 Carcinomatosis     Location of Primary Tumor  Terminal ileum                                    Primary Resected     xYes      No   How many primary sites? Multiple, more than 10 in a segement of 60cms        % of Tumor Debulked  100%                  %of Mesenteric Tumor Debulked__100%_______  Was sentinel lymph node done?     x Yes       No    Number of Silt LN Sent ______         Number of Silt LN Positive   __        Evidence of lymph obstruction:    xYes      No    Mets to other organs:       xYes          No                                            If yes, Nodes and Metastases form must be completed  Number of tumors found: _multiple_____________   Was tumor left behind? ___no___________   How much small bowel removed (cm)?  _60cms________    Seprafilm used:    Yes      x No  Visible ovarian masses:             Yes       x No   Visible retroperitoneal nodes:   Yes        x No    Preop Sandostatin used:          x Yes        No    Intraop chemotherapy used:     x Yes         No      Gallstones present :  x Yes          No     N/A  Visible liver mets:      x Yes         No      Blood transfusion needed:   x Yes         No 2 units  Complications_________________________________________    Nodes & Metastases Form    Nodes    Resected   Mets  Tissue  Resected?      Solid organ/soft     Cervical   Yes No                     x Liver   xYes No     Supraclavicular  Yes No   Bone   Yes No     Hilar           Yes No Brain   Yes No        Mediastinal  Yes No  Lung    Yes No     Auxiliary     Yes No Colon   Yes No  x   Mesenteric xYes No Pancreas  Yes No      Periportal Yes No xAppendix  xYes No     Retroperitoneal Yes No  Thyroid     Yes No     Celiac Yes No Chest wall  Yes No     Inguinal Yes No  Kidney       Yes   No     Iliac Yes No Breast       Yes   No     Other Yes No Ovary        Yes No   ___________________ Pelvic Floor   Yes   No   Uterus         Yes No   Ureter         Yes   No   Seminal Vesicle YesNo      Spleen     Yes No   L Diaphram   Yes No   xR Diaphram   xYes No    Other             Yes No   Large liver lesions resected using Habib RF probe____________________    Radiofrequency Ablation Form      Mode: Site:      Open    Liver- R Lobe                        Percutaneous   Liver- L Lobe     Laproscopic    Kidney        Bone        Pelvis         Other  ____________________                                      #of Lesions      Tumor Sizes  ________________ _______________  ________________ _______________  ________________ _______________  ________________ _______________    Complications?   YES  NO  UNKNOWN    Complications____________________________________________________________________________________________________________________________________      Jannie Knife Form      Indication: ________________________________________________________________________________________________________________   LIVER   R lobe size   L lobe size          Hepatic christina tumor sizes  _______       _______      _______   _______      Left tumor size_______      _______       _______      _______      _______      Right tumor size_______       _______       _______      _______  _______      Middle tumor size ______     Single probe                  2 Probe            3 Probes            4 Probes   Overlaping ablation       1        2         3        4     HILUM   Central duct-- tumor sizes ________     ________     ________   R duct--     tumor sizes _______       ________      ________   L duct--  tumor sizes _______      ________       ________       MESENTERIC ROOT NODES--tumor sizes _____________      URETER-- tumor sizes _____________     PERIAORTIC-- tumor sizes _______     ________    ________     PELVIC TUMOR  Obturator node         tumor sizes ________     ________     ________   Pres  tumor sizes ________     ________     _______   Other  tumor size _____________      PANCREAS--  tumor size ______                              Head     tumor size _______                            Body      tumor size _______                                                         Tail        tumor size _______     KIDNEY-- tumor size ________   Right        Left       OTHER SITES   _________________        size_____      _________________         size_____     _____ ____________        size_____    Ablation time___________        Complications?        Yes      No   Unknown    Complications/Comments:__________________________________________________________________________________________________________________________

## 2017-03-31 NOTE — PLAN OF CARE
Problem: Patient Care Overview  Goal: Plan of Care Review  Outcome: Ongoing (interventions implemented as appropriate)  Plan of care reviewed with patient. Alert and oriented; intermittent drowsiness s/p surgical procedure. Easy to arouse; no distress noted. No SOB reported. On 2L NC  Vital signs stable; CVP 4-5 Pain managed adequately with PCA. H/H increased to 8.7/25.3 after receiving 2 units RBCs in OR. UOP adequate. MD updated on current labs and patient status. Safety measures maintained.

## 2017-03-31 NOTE — NURSING TRANSFER
Nursing Transfer Note      3/31/2017     Transfer To: 253    Transfer via bed    Transfer with simple face mask to O2 with continuous cardiac and O2  monitoring    Transported by CRNA X2    Medicines sent: octreotide infusion    Chart send with patient: Yes    Notified:     Patient reassessed at: report received prior to patient arrival from OR to receiving RN. Upon arrival patient immediately connected to continuous ECG and O2 monitoring. No signs of repiratory distress noted. Pt easy to arouse; opens eyes to voice and following commands. Awaiting orders and labs sent.     Upon arrival to floor: cardiac monitor applied, patient oriented to room, call bell in reach and bed in lowest position    Skin intact on buttocks, heels, and bony prominences. POC reviewed with patient. Safety measures maintained.

## 2017-03-31 NOTE — ANESTHESIA PROCEDURE NOTES
Arterial    Diagnosis: carcinoid  Doctor requesting consult: Eric    Patient location during procedure: done in OR  Procedure start time: 3/31/2017 8:20 AM  Timeout: 3/31/2017 8:20 AM  Procedure end time: 3/31/2017 8:25 AM  Staffing  Anesthesiologist: PHIL RABAGO  Performed by: anesthesiologist   Anesthesiologist was present at the time of the procedure.  Preanesthetic Checklist  Completed: patient identified, site marked, surgical consent, pre-op evaluation, timeout performed, IV checked, risks and benefits discussed, monitors and equipment checked and anesthesia consent given  Arterial Line  Skin Prep: chlorhexidine gluconate  Local Infiltration: none  Orientation: left  Location: radial  Catheter Size: 20 G{OHS ANESTHESIA BLOCK ART PLACEMENTInsertion Attempts: 1  Assessment  Dressing: secured with tape and tegaderm  Patient: Tolerated well

## 2017-03-31 NOTE — ANESTHESIA PROCEDURE NOTES
Central Line    Diagnosis: carcinoid  Patient location during procedure: done in OR  Procedure start time: 3/31/2017 8:25 AM  Timeout: 3/31/2017 8:20 AM  Procedure end time: 3/31/2017 8:40 AM  Staffing  Anesthesiologist: PHIL RABAGO  Resident/CRNA: JENNY EDGAR  Performed by: resident/CRNA   Anesthesiologist was present at the time of the procedure.  Preanesthetic Checklist  Completed: patient identified, site marked, surgical consent, pre-op evaluation, timeout performed, IV checked, risks and benefits discussed, monitors and equipment checked and anesthesia consent given  Indication  Indication: hemodynamic monitoring, vascular access, med administration     Anesthesia   general anesthesia    Central Line  Skin Prep: skin prepped with ChloraPrep, skin prep agent completely dried prior to procedure  maximum sterile barriers used during central venous catheter insertion  hand hygiene performed prior to central venous catheter insertion  Location: right internal jugular,   Catheter type: triple lumen  Catheter Size: 7 Fr  Inserted Catheter Length: 15 cm  Ultrasound: vascular probe with ultrasound  Vessel Caliber: medium, patent  Vascular Doppler:  not done, compressibility normal  Needle advanced into vessel with real time Ultrasound guidance.  Guidewire confirmed in vessel.  Sterile sheath used.  Manometry: none  Insertion Attempts: 1   Securement:line sutured, chlorhexidine patch, sterile dressing applied and blood return through all ports     Post-Procedure  Post-procedure assessment: to be completed post-op.  Adverse Events:none

## 2017-03-31 NOTE — H&P
[facility]  History & Physical    Subjective:      Chief Complaint/Reason for Admission: for surgery    Javy Thompson is a 47 y.o. male.    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     Past Surgical History:   Procedure Laterality Date    ANTERIOR CRUCIATE LIGAMENT REPAIR Right 1986    KNEE ARTHROSCOPY Right 1986    x2    LIVER BIOPSY  12/2016, 1/2017    TONSILLECTOMY       Family History   Problem Relation Age of Onset    Cancer Mother      Social History   Substance Use Topics    Smoking status: Never Smoker    Smokeless tobacco: Not on file    Alcohol use 1.2 oz/week     2 Glasses of wine per week       PTA Medications   Medication Sig    hydrocodone-acetaminophen 5-325mg (NORCO) 5-325 mg per tablet Take 1 tablet by mouth every 6 (six) hours as needed for Pain.    lanreotide (SOMATULINE DEPOT) 120 mg/0.5 mL Syrg Inject 120 mg into the skin every 28 days.     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       Plan:    For surgery  Risks explained  Labs ok

## 2017-04-01 LAB
ALBUMIN SERPL BCP-MCNC: 3.5 G/DL
ALP SERPL-CCNC: 37 U/L
ALT SERPL W/O P-5'-P-CCNC: 488 U/L
ANION GAP SERPL CALC-SCNC: 7 MMOL/L
AST SERPL-CCNC: 1040 U/L
BASOPHILS # BLD AUTO: 0.01 K/UL
BASOPHILS NFR BLD: 0.1 %
BILIRUB SERPL-MCNC: 1.7 MG/DL
BUN SERPL-MCNC: 17 MG/DL
CALCIUM SERPL-MCNC: 8.2 MG/DL
CHLORIDE SERPL-SCNC: 108 MMOL/L
CO2 SERPL-SCNC: 25 MMOL/L
CREAT SERPL-MCNC: 1.2 MG/DL
DIFFERENTIAL METHOD: ABNORMAL
EOSINOPHIL # BLD AUTO: 0 K/UL
EOSINOPHIL NFR BLD: 0 %
ERYTHROCYTE [DISTWIDTH] IN BLOOD BY AUTOMATED COUNT: 14.6 %
EST. GFR  (AFRICAN AMERICAN): >60 ML/MIN/1.73 M^2
EST. GFR  (NON AFRICAN AMERICAN): >60 ML/MIN/1.73 M^2
GLUCOSE SERPL-MCNC: 161 MG/DL
HCT VFR BLD AUTO: 24.5 %
HGB BLD-MCNC: 8.5 G/DL
LYMPHOCYTES # BLD AUTO: 1.5 K/UL
LYMPHOCYTES NFR BLD: 13.8 %
MAGNESIUM SERPL-MCNC: 2.1 MG/DL
MCH RBC QN AUTO: 30.1 PG
MCHC RBC AUTO-ENTMCNC: 34.7 %
MCV RBC AUTO: 87 FL
MONOCYTES # BLD AUTO: 1 K/UL
MONOCYTES NFR BLD: 9.7 %
NEUTROPHILS # BLD AUTO: 8.1 K/UL
NEUTROPHILS NFR BLD: 76.1 %
PHOSPHATE SERPL-MCNC: 3.8 MG/DL
PLATELET # BLD AUTO: 137 K/UL
PMV BLD AUTO: 10.7 FL
POTASSIUM SERPL-SCNC: 4.7 MMOL/L
PROT SERPL-MCNC: 4.8 G/DL
RBC # BLD AUTO: 2.82 M/UL
SODIUM SERPL-SCNC: 140 MMOL/L
WBC # BLD AUTO: 10.59 K/UL

## 2017-04-01 PROCEDURE — 25000003 PHARM REV CODE 250: Performed by: SURGERY

## 2017-04-01 PROCEDURE — 83735 ASSAY OF MAGNESIUM: CPT

## 2017-04-01 PROCEDURE — 94770 HC EXHALED C02 TEST: CPT

## 2017-04-01 PROCEDURE — 85025 COMPLETE CBC W/AUTO DIFF WBC: CPT

## 2017-04-01 PROCEDURE — 20000000 HC ICU ROOM

## 2017-04-01 PROCEDURE — 80053 COMPREHEN METABOLIC PANEL: CPT

## 2017-04-01 PROCEDURE — 63600175 PHARM REV CODE 636 W HCPCS: Performed by: SURGERY

## 2017-04-01 PROCEDURE — 27000221 HC OXYGEN, UP TO 24 HOURS

## 2017-04-01 PROCEDURE — 84100 ASSAY OF PHOSPHORUS: CPT

## 2017-04-01 RX ADMIN — KETOROLAC TROMETHAMINE 10 MG: 30 INJECTION, SOLUTION INTRAMUSCULAR at 12:04

## 2017-04-01 RX ADMIN — DEXTROSE MONOHYDRATE, SODIUM CHLORIDE, AND POTASSIUM CHLORIDE: 50; 4.5; 1.49 INJECTION, SOLUTION INTRAVENOUS at 08:04

## 2017-04-01 RX ADMIN — DEXTROSE MONOHYDRATE, SODIUM CHLORIDE, AND POTASSIUM CHLORIDE: 50; 4.5; 1.49 INJECTION, SOLUTION INTRAVENOUS at 01:04

## 2017-04-01 RX ADMIN — DIPHENHYDRAMINE HYDROCHLORIDE 12.5 MG: 50 INJECTION, SOLUTION INTRAMUSCULAR; INTRAVENOUS at 12:04

## 2017-04-01 RX ADMIN — Medication: at 08:04

## 2017-04-01 RX ADMIN — OCTREOTIDE ACETATE 250 MCG/HR: 1000 INJECTION, SOLUTION INTRAVENOUS; SUBCUTANEOUS at 01:04

## 2017-04-01 RX ADMIN — AMPICILLIN SODIUM AND SULBACTAM SODIUM 3 G: 2; 1 INJECTION, POWDER, FOR SOLUTION INTRAMUSCULAR; INTRAVENOUS at 07:04

## 2017-04-01 RX ADMIN — IRON SUCROSE 100 MG: 20 INJECTION, SOLUTION INTRAVENOUS at 08:04

## 2017-04-01 RX ADMIN — AMPICILLIN SODIUM AND SULBACTAM SODIUM 3 G: 2; 1 INJECTION, POWDER, FOR SOLUTION INTRAMUSCULAR; INTRAVENOUS at 01:04

## 2017-04-01 RX ADMIN — OCTREOTIDE ACETATE 500 MCG/HR: 1000 INJECTION, SOLUTION INTRAVENOUS; SUBCUTANEOUS at 03:04

## 2017-04-01 RX ADMIN — KETOROLAC TROMETHAMINE 10 MG: 30 INJECTION, SOLUTION INTRAMUSCULAR at 05:04

## 2017-04-01 RX ADMIN — Medication: at 07:04

## 2017-04-01 NOTE — PLAN OF CARE
Problem: Patient Care Overview  Goal: Plan of Care Review  Outcome: Outcome(s) achieved Date Met:  04/01/17  Mr. Thompson is AAOX4, VSS, no distress noted.  Pt states he is having adequate pain control. His wife is at the bedside and is involved in his care. He is able to shift his weight frequently to prevent skin breakdown and assistance is provided whenever needed. He is aware of plan for today being mobility, and IS. Care is ongoing, will continue to monitor.

## 2017-04-01 NOTE — PLAN OF CARE
Problem: Patient Care Overview  Goal: Plan of Care Review  Outcome: Ongoing (interventions implemented as appropriate)  Pt's SpO2 95% on 2 lpm NC. No respiratory distress noted. Will continue to monitor SpO2.

## 2017-04-01 NOTE — ANESTHESIA POSTPROCEDURE EVALUATION
"Anesthesia Post Evaluation    Patient: Javy Thompson    Procedure(s) Performed: Procedure(s) (LRB):  EXPLORATORY-LAPAROTOMY (N/A)  LOBECTOMY-LIVER (N/A)  DISSECTION-LYMPH NODE (N/A)  CHOLECYSTECTOMY  RESECTION - SMALL BOWEL  APPENDECTOMY  MAPPING-SENTINEL NODE  BIOPSY LIVER AND ULTRASOUND    Final Anesthesia Type: general  Patient location during evaluation: ICU  Patient participation: Yes- Able to Participate  Level of consciousness: awake and alert  Post-procedure vital signs: reviewed and stable  Pain management: adequate  Airway patency: patent  PONV status at discharge: No PONV  Anesthetic complications: no      Cardiovascular status: hemodynamically stable  Respiratory status: spontaneous ventilation  Hydration status: euvolemic  Follow-up not needed.        Visit Vitals    /76    Pulse 108    Temp 36.6 °C (97.8 °F) (Oral)    Resp (!) 7    Ht 6' 4" (1.93 m)    Wt 106.2 kg (234 lb 2.1 oz)    SpO2 96%    BMI 28.5 kg/m2       Pain/Hilda Score: Pain Assessment Performed: Yes (4/1/2017 11:03 AM)  Presence of Pain: denies (4/1/2017 11:03 AM)  Pain Rating Prior to Med Admin: 4 (4/1/2017  7:34 AM)  Pain Rating Post Med Admin: 0 (3/31/2017  5:47 PM)      "

## 2017-04-01 NOTE — PROGRESS NOTES
Progress Note  Surgical Intensive Care      Admit Date: 3/31/2017  Post-operative Day: 1 Day Post-Op  Hospital Day: 2    SUBJECTIVE:     Follow-up For:  Procedure(s) (LRB):  EXPLORATORY-LAPAROTOMY (N/A)  LOBECTOMY-LIVER (N/A)  DISSECTION-LYMPH NODE (N/A)  CHOLECYSTECTOMY  RESECTION - SMALL BOWEL  APPENDECTOMY  MAPPING-SENTINEL NODE  BIOPSY LIVER AND ULTRASOUND    POD1 s/p exlap, R hepatectomy, SBR, cholecystectomy, mesenteric lymphadenectomy.     Doing well in ICU. ELISSA. Pain controlled. No N/V. -Flatus/BM. Voiding per Cabrera, UOP adequate.       Review of patient's allergies indicates:   Allergen Reactions    Epinephrine Other (See Comments)     Carcinoid patient       OBJECTIVE:     Vital Signs (Most Recent)  Temp: 98.5 °F (36.9 °C) (04/01/17 0715)  Pulse: 104 (04/01/17 1004)  Resp: (!) 9 (04/01/17 1004)  BP: 98/68 (04/01/17 1004)  SpO2: 95 % (04/01/17 1007)    Vital Signs Range (Last 24H):  Temp:  [98.3 °F (36.8 °C)-99.3 °F (37.4 °C)]   Pulse:  []   Resp:  [7-37]   BP: ()/(56-73)   SpO2:  [94 %-100 %]   Arterial Line BP: ()/(48-67)     I & O (Last 24H):  Intake/Output Summary (Last 24 hours) at 04/01/17 1025  Last data filed at 04/01/17 0957   Gross per 24 hour   Intake          6225.51 ml   Output             4150 ml   Net          2075.51 ml     Physical Exam:  NAD  NGT in place, collecting ~100mL thin gastric contents  Comfortable on 2LNC  Reg rate  Abd s/nd, aTTP, incision c/d/i  Cabrera in place, collecting pale green urine    Wound/Incision:  clean, dry, intact    Laboratory (Last 24H):  CBC:    Recent Labs  Lab 04/01/17  0435   WBC 10.59   HGB 8.5*   HCT 24.5*   *     CMP:    Recent Labs  Lab 04/01/17  0435   CALCIUM 8.2*   ALBUMIN 3.5   PROT 4.8*      K 4.7   CO2 25      BUN 17   CREATININE 1.2   ALKPHOS 37*   *   AST 1040*   BILITOT 1.7*     BMP:   Recent Labs  Lab 04/01/17  0435   *      K 4.7      CO2 25   BUN 17   CREATININE 1.2   CALCIUM  8.2*   MG 2.1       Chest X-Ray: I personally reviewed the films and findings are:, NGT in place. No PTX. CVC in place.     Diagnostic Results:  No Further    ASSESSMENT/PLAN:     POD1 s/p exlap, R hepatectomy, SBR, cholecystectomy, mesenteric lymphadenectomy.     Neuro: Cont PCA  CV: Stable, monitor  Resp: Wean nasal cannula as able  FEN/GI: NPO. Trend labs, continue Octreotide gtt. Lyte repletion PRN.  : Stable, monitor  Heme/ID: Stable, trend H/H. Cont Unasyn  MSK: Encourage OOB  Ppx: Will discuss with staff re: DVT ppx.    Nick English MD  4/1/2017  10:33 AM

## 2017-04-01 NOTE — PLAN OF CARE
The Sw met with the pt,his wife had just stepped out for lunch. The pt states at d/c his wife will be driving him home. The pt has a very supportive wife.        04/01/17 1135   Discharge Assessment   Assessment Type Discharge Planning Assessment   Confirmed/corrected address and phone number on facesheet? Yes   Assessment information obtained from? Patient   Prior to hospitilization cognitive status: Alert/Oriented   Prior to hospitalization functional status: Independent   Current cognitive status: Alert/Oriented   Current Functional Status: Independent   Arrived From home or self-care   Lives With spouse   Able to Return to Prior Arrangements yes   Is patient able to care for self after discharge? Yes   How many people do you have in your home that can help with your care after discharge? 1   Who are your caregiver(s) and their phone number(s)? Damaris Thompson(wife)[phone]   Patient's perception of discharge disposition home or selfcare   Readmission Within The Last 30 Days no previous admission in last 30 days   Patient currently being followed by outpatient case management? No   Patient currently receives home health services? No   Does the patient currently use HME? No   Patient currently receives private duty nursing? No   Patient currently receives any other outside agency services? No   Equipment Currently Used at Home none   Do you have any problems affording any of your prescribed medications? No   Is the patient taking medications as prescribed? yes   Do you have any financial concerns preventing you from receiving the healthcare you need? No   Does the patient have transportation to healthcare appointments? Yes   Transportation Available family or friend will provide;car   On Dialysis? No   Does the patient receive services at the Coumadin Clinic? No   Are there any open cases? No   Discharge Plan A Home with family   Discharge Plan B Home Health   Patient/Family In Agreement With Plan yes

## 2017-04-01 NOTE — PLAN OF CARE
Problem: Patient Care Overview  Goal: Plan of Care Review  Outcome: Ongoing (interventions implemented as appropriate)  No distress noted; will cont to monitor.

## 2017-04-02 LAB
ALBUMIN SERPL BCP-MCNC: 2.9 G/DL
ALP SERPL-CCNC: 56 U/L
ALT SERPL W/O P-5'-P-CCNC: 375 U/L
ANION GAP SERPL CALC-SCNC: 5 MMOL/L
AST SERPL-CCNC: 416 U/L
BASOPHILS # BLD AUTO: 0.02 K/UL
BASOPHILS NFR BLD: 0.2 %
BILIRUB SERPL-MCNC: 1.1 MG/DL
BUN SERPL-MCNC: 19 MG/DL
CALCIUM SERPL-MCNC: 8 MG/DL
CHLORIDE SERPL-SCNC: 106 MMOL/L
CO2 SERPL-SCNC: 28 MMOL/L
CREAT SERPL-MCNC: 0.9 MG/DL
DIFFERENTIAL METHOD: ABNORMAL
EOSINOPHIL # BLD AUTO: 0 K/UL
EOSINOPHIL NFR BLD: 0.1 %
ERYTHROCYTE [DISTWIDTH] IN BLOOD BY AUTOMATED COUNT: 14.8 %
EST. GFR  (AFRICAN AMERICAN): >60 ML/MIN/1.73 M^2
EST. GFR  (NON AFRICAN AMERICAN): >60 ML/MIN/1.73 M^2
GLUCOSE SERPL-MCNC: 170 MG/DL
HCT VFR BLD AUTO: 20.8 %
HGB BLD-MCNC: 7.1 G/DL
HYPOCHROMIA BLD QL SMEAR: ABNORMAL
LYMPHOCYTES # BLD AUTO: 1.4 K/UL
LYMPHOCYTES NFR BLD: 14.4 %
MAGNESIUM SERPL-MCNC: 2 MG/DL
MCH RBC QN AUTO: 30.3 PG
MCHC RBC AUTO-ENTMCNC: 34.1 %
MCV RBC AUTO: 89 FL
MONOCYTES # BLD AUTO: 1 K/UL
MONOCYTES NFR BLD: 10.2 %
NEUTROPHILS # BLD AUTO: 7.3 K/UL
NEUTROPHILS NFR BLD: 75.1 %
PHOSPHATE SERPL-MCNC: 1.4 MG/DL
PLATELET # BLD AUTO: 114 K/UL
PLATELET BLD QL SMEAR: ABNORMAL
PMV BLD AUTO: 10 FL
POLYCHROMASIA BLD QL SMEAR: ABNORMAL
POTASSIUM SERPL-SCNC: 4.2 MMOL/L
PROT SERPL-MCNC: 4.8 G/DL
RBC # BLD AUTO: 2.34 M/UL
SODIUM SERPL-SCNC: 139 MMOL/L
WBC # BLD AUTO: 9.79 K/UL

## 2017-04-02 PROCEDURE — 25000003 PHARM REV CODE 250: Performed by: SURGERY

## 2017-04-02 PROCEDURE — 63600175 PHARM REV CODE 636 W HCPCS: Performed by: SURGERY

## 2017-04-02 PROCEDURE — P9047 ALBUMIN (HUMAN), 25%, 50ML: HCPCS | Performed by: SURGERY

## 2017-04-02 PROCEDURE — 25000003 PHARM REV CODE 250: Performed by: STUDENT IN AN ORGANIZED HEALTH CARE EDUCATION/TRAINING PROGRAM

## 2017-04-02 PROCEDURE — 20000000 HC ICU ROOM

## 2017-04-02 PROCEDURE — 94770 HC EXHALED C02 TEST: CPT

## 2017-04-02 PROCEDURE — 63600175 PHARM REV CODE 636 W HCPCS: Performed by: STUDENT IN AN ORGANIZED HEALTH CARE EDUCATION/TRAINING PROGRAM

## 2017-04-02 PROCEDURE — 97161 PT EVAL LOW COMPLEX 20 MIN: CPT

## 2017-04-02 PROCEDURE — 27000221 HC OXYGEN, UP TO 24 HOURS

## 2017-04-02 PROCEDURE — 97165 OT EVAL LOW COMPLEX 30 MIN: CPT

## 2017-04-02 PROCEDURE — 94761 N-INVAS EAR/PLS OXIMETRY MLT: CPT

## 2017-04-02 PROCEDURE — 80053 COMPREHEN METABOLIC PANEL: CPT

## 2017-04-02 PROCEDURE — 83735 ASSAY OF MAGNESIUM: CPT

## 2017-04-02 PROCEDURE — 93010 ELECTROCARDIOGRAM REPORT: CPT | Mod: ,,, | Performed by: INTERNAL MEDICINE

## 2017-04-02 PROCEDURE — C9113 INJ PANTOPRAZOLE SODIUM, VIA: HCPCS | Performed by: STUDENT IN AN ORGANIZED HEALTH CARE EDUCATION/TRAINING PROGRAM

## 2017-04-02 PROCEDURE — 93005 ELECTROCARDIOGRAM TRACING: CPT

## 2017-04-02 PROCEDURE — 84100 ASSAY OF PHOSPHORUS: CPT

## 2017-04-02 PROCEDURE — 85025 COMPLETE CBC W/AUTO DIFF WBC: CPT

## 2017-04-02 RX ORDER — ALBUMIN HUMAN 250 G/1000ML
12.5 SOLUTION INTRAVENOUS CONTINUOUS
Status: DISCONTINUED | OUTPATIENT
Start: 2017-04-02 | End: 2017-04-05

## 2017-04-02 RX ORDER — PANTOPRAZOLE SODIUM 40 MG/10ML
40 INJECTION, POWDER, LYOPHILIZED, FOR SOLUTION INTRAVENOUS DAILY
Status: DISCONTINUED | OUTPATIENT
Start: 2017-04-02 | End: 2017-04-07

## 2017-04-02 RX ORDER — CYANOCOBALAMIN 1000 UG/ML
1000 INJECTION, SOLUTION INTRAMUSCULAR; SUBCUTANEOUS DAILY
Status: DISCONTINUED | OUTPATIENT
Start: 2017-04-02 | End: 2017-04-12 | Stop reason: HOSPADM

## 2017-04-02 RX ADMIN — ALBUMIN (HUMAN) 12.5 G: 12.5 SOLUTION INTRAVENOUS at 03:04

## 2017-04-02 RX ADMIN — ALBUMIN (HUMAN) 12.5 G: 12.5 SOLUTION INTRAVENOUS at 11:04

## 2017-04-02 RX ADMIN — ALBUMIN (HUMAN) 12.5 G: 12.5 SOLUTION INTRAVENOUS at 10:04

## 2017-04-02 RX ADMIN — CYANOCOBALAMIN 1000 MCG: 1000 INJECTION, SOLUTION INTRAMUSCULAR; SUBCUTANEOUS at 10:04

## 2017-04-02 RX ADMIN — SODIUM CHLORIDE 500 ML: 0.9 INJECTION, SOLUTION INTRAVENOUS at 04:04

## 2017-04-02 RX ADMIN — DEXTROSE MONOHYDRATE, SODIUM CHLORIDE, AND POTASSIUM CHLORIDE: 50; 4.5; 1.49 INJECTION, SOLUTION INTRAVENOUS at 07:04

## 2017-04-02 RX ADMIN — OCTREOTIDE ACETATE 250 MCG/HR: 1000 INJECTION, SOLUTION INTRAVENOUS; SUBCUTANEOUS at 07:04

## 2017-04-02 RX ADMIN — ALBUMIN (HUMAN) 12.5 G: 12.5 SOLUTION INTRAVENOUS at 06:04

## 2017-04-02 RX ADMIN — PANTOPRAZOLE SODIUM 40 MG: 40 INJECTION, POWDER, FOR SOLUTION INTRAVENOUS at 10:04

## 2017-04-02 RX ADMIN — Medication: at 10:04

## 2017-04-02 RX ADMIN — IRON SUCROSE 100 MG: 20 INJECTION, SOLUTION INTRAVENOUS at 07:04

## 2017-04-02 RX ADMIN — ALBUMIN (HUMAN) 12.5 G: 12.5 SOLUTION INTRAVENOUS at 02:04

## 2017-04-02 NOTE — PLAN OF CARE
Notified physician of   Vtach run and EKG results  Patient's inability to void, bladder scan >100mls  HR remains >100.     Bolus and new orders received. Will monitor closely.

## 2017-04-02 NOTE — NURSING
Patient had a run of V tach, patient stated that he feels his heart racing, patient stated that he was trying to get back in bed when heart rate increased, Latha from [facility] team called but no answer, will notify nurse Angela SZYMANSKI, will try to contact Latha again, will continue to monitor.

## 2017-04-02 NOTE — PLAN OF CARE
Problem: Occupational Therapy Goal  Goal: Occupational Therapy Goal  Goals to be met by: 5/2/17     Patient will increase functional independence with ADLs by performing:    LE Dressing with Modified [place].  Grooming while standing with Modified [place].  Toileting from toilet with Modified [place] for hygiene and clothing management.   Supine to sit with Modified [place].  Stand pivot transfers with Modified [place].  Toilet transfer to toilet with Modified [place].  Upper extremity exercise program x10 reps per handout, with independence.  Outcome: Ongoing (interventions implemented as appropriate)  Pt would benefit from cont OT services in order to maximize functional independence. D/c recs ongoing at this time, however, most likely home with no needs. Will continue to further assess.

## 2017-04-02 NOTE — PROGRESS NOTES
[facility] - Neuroendocrine Surgery Service  ICU Progress Note     Admission Summary  In brief, Javy Thompson is a 47 y.o. male admitted on 3/31/2017 following exlap, R hepatectomy, SBR, cholecystectomy, mesenteric lymphadenectomy for neuroendocrine tumor.     HD#2  POD#2 Days Post-Op     Interval HPI  ELISSA. Feeling well. Pain controlled on PCA. -flatus/BM. Voiding via [place].    Medications  Scheduled Meds:   iron sucrose (VENOFER) IVPB  100 mg Intravenous Daily     Continuous Infusions:   dextrose 5 % and 0.45 % NaCl with KCl 20 mEq 100 mL/hr at 17 0756    hydromorphone in 0.9 % NaCl 6 mg/30 ml      octreotide infusion (50 mcg/mL) 250 mcg/hr (17 0759)     PRN Meds:albuterol sulfate, diphenhydrAMINE, hydrALAZINE, labetalol, naloxone, ondansetron     Neuro  GCS: [id number]  Exam: Follows commands  Pain/Sedation: dPCA     Pulmonary  Vitals: Resp  Av.6  Min: 7  Max: 35  SpO2  Av.4 %  Min: 89 %  Max: 99 %  Exam: Comfortable on 2LNC  CXR: -  Ventilator/oxygen settings: -  ABG (most recent): -*  Incentive spirometry / RT treatments: IS  Chest tube: -     Cardiovascular  Vitals: BP  Min: 98/68  Max: 135/71  Pulse  Av  Min: 104  Max: 127  Exam: Regular rhythm, tachycardic, 2+ distal pulses.  Vasoactive agents: -     Renal  Ins Outs   PO - Urine 1145  mL, 0.5 cc/kg/hr   TF - Stool -   IVF 1511    Blood products - Drain: -    Dialysis -   Net I/Os (24hr): -233  Net I/O (admission): +1064    Recent Labs      17   1626  17   1626  17   0435  17   0450   BUN  13  12  17  19     FEN/GI  Abdominal Exam: soft, ND, aTTP, incision healing well  Diet: Diet NPO Except for: Medication  C[place]rera: Collecting clear yellow urine  Last BM: -     Recent Labs      17   1626  17   1626  17   0435  17   0450   NA  140  140  140  139   K  3.8  4.5  4.7  4.2   CL  103  110  108  106   CO2  28  17*  25  28   CALCIUM  9.2  7.8*  8.2*  8.0*   MG   --    --   2.1  2.0   PHOS    --    --   3.8  1.4*   PROT  7.7   --   4.8*  4.8*   ALBUMIN  4.0   --   3.5  2.9*   BILITOT  0.8   --   1.7*  1.1*   AST  28   --   1040*  416*   ALKPHOS  74   --   37*  56   ALT  17   --   488*  375*     Heme  Transfusions: -  Recent Labs      17   1626   17   1330  17   1626  17   0435  17   0450   HGB  15.5   < >  6.1*  8.7*  8.5*  7.1*   HCT  45.2   < >  18.4*  25.3*  24.5*  20.8*   PLT  183   --    --   124*  137*  114*    < > = values in this interval not displayed.     ID  Temp  Av.6 °F (37 °C)  Min: 97.8 °F (36.6 °C)  Max: 99 °F (37.2 °C)   Recent Labs      17   16217   0435  17   0450   WBC  6.47  9.62  10.59  9.79     Antibiotics: - day-/-  Cultures: -     Endocrine  Recent Labs      17   16217   0435  17   0450   GLU  88  209*  161*  170*     Insulin treatment: -  Octreotide treatment: 250mcg/hr     Musculoskeletal/Wounds  Extremity/wound exam: CAMERON  Weight bearing status: WBAT     Prophylaxis  DVT: SCDs  GI: Protonix     Lines  JULIEN Kaplan RLC, NGT, PIVs     Restraints  Face to face evaluation of need for restraints on rounds today:  Currently restrained? No   Needs restraints? No      ASSESSMENT AND PLAN  Neuro  -Stable, monitor  Pulm  -Wean O2 as able  CV  -Stable, monitor.   Renal  -Adequate UOP  -Will DC kaplan today, f/u void  FEN/GI  -NPO, mIVF  -Will clamp NGT today  Heme  -H/H downtrending, will reassess in AM and consider need for transfusion.   ID  -Stable, monitor  Endo  -Continue octreotide gtt  MSK  -Stable  -Encourage OOB, IS  -Consult PT/OT  Dispo  -Continue ICU, likely floor tomorrow.      Nick English MD  2017 9:01 AM

## 2017-04-02 NOTE — PT/OT/SLP EVAL
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      Past Surgical History:   Procedure Laterality Date    ANTERIOR CRUCIATE LIGAMENT REPAIR Right 1986    KNEE ARTHROSCOPY Right 1986    x2    LIVER BIOPSY  12/2016, 1/2017    TONSILLECTOMY         Referring physician: NADEEN Reyes   Date referred to PT: 4/2/17    General Precautions: Standard,  (universal)  Orthopedic Precautions: N/A   Braces:  (NA)       Do you have any cultural, spiritual, Mormon conflicts, given your current situation?: No    Patient History:  Lives With: spouse, child(critsine), dependent  Living Arrangements: house  Home Accessibility: stairs within home, stairs to enter home  Home Layout: Bedroom on 2nd floor, Bathroom on 2nd floor (1/2 bath present on 1st floor)  Number of Stairs to Enter Home: 1 (TH)  Number of Stairs Within Home: 16  Stair Railings at Home: inside, present at both sides  Transportation Available: car  Living Environment Comment: Pt lives with his wife and children in a 2SH house, TH to enter; full bed/bath on 2nd floor, 1/2 bath present on 1st floor. Pt was (I) with all functional mobility/ADL, did not use an AD for ambulaiton, was driving, and working full time in finance. Pt has a walk in shower, no seat.   Equipment Currently Used at Home: none  DME owned (not currently used): crutches    Previous Level of Function:  Ambulation Skills: independent  Transfer  Skills: independent  ADL Skills: independent  Work/Leisure Activity: independent    Subjective:  Communicated with RN prior to session.    Chief Complaint: pain throughout abdomen 2/2 s/p surgery  Patient goals: to get better and to go home    Pain Rating: 3/10   Location - Side: Bilateral  Location - Orientation: generalized  Location: abdomen  Pain Addressed: Pre-medicate for activity, Reposition, Distraction  Pain Rating Post-Intervention: 3/10    Objective:   Patient found with: NG tube, peripheral IV, PICC line, pulse ox (continuous), telemetry, SCD, PCA     Cognitive Exam:  Oriented to: Person, Place, Time and Situation    Follows Commands/attention: Follows multistep  commands  Communication: clear/fluent  Safety awareness/insight to disability: intact    Physical Exam:  Postural examination/scapula alignment: Rounded shoulder and Head forward    Skin integrity: Visible skin intact  Edema: None noted     Sensation:   Intact    Upper Extremity Range of Motion:    Lower Extremity Range of Motion:  Right Lower Extremity: WFL  Left Lower Extremity: WFL    Lower Extremity Strength:  Right Lower Extremity: 4/5 to 4+/5  Left Lower Extremity: 4/5 to 4+/5       Functional Mobility:  Bed Mobility:  Rolling/Turning Right: Stand by assistance (VCs for log roll)  Scooting/Bridging: Stand by Assistance  Supine to Sit: Minimum Assistance    Transfers:  Sit <> Stand Assistance: Contact Guard Assistance  Sit <> Stand Assistive Device: No Assistive Device  Bed <> Chair Technique: Stand Pivot  Bed <> Chair Assistance: Contact Guard Assistance  Bed <> Chair Assistive Device: No Assistive Device    Gait:   Gait Distance: 2-3 steps to bed side chair from bed  Assistance 1: Contact Guard Assistance  Gait Assistive Device: No device  Gait Pattern: reciprocal  Gait Deviation(s): decreased sh[place]ana, increased time in double stance, decreased velocity of limb motion, decreased step length, decreased stride length    Stairs: not  tested      Balance:   Static Sit: FAIR: Maintains without assist, but unable to take any challenges   Dynamic Sit: FAIR: Cannot move trunk without losing balance  Static Stand: FAIR: Maintains without assist but unable to take challenges  Dynamic stand: FAIR: Needs CONTACT GUARD during gait    Therapeutic Activities and Exercises:  See above    AM-PAC 6 CLICK MOBILITY  How much help from another person does this patient currently need?   1 = Unable, Total/Dependent Assistance  2 = A lot, Maximum/Moderate Assistance  3 = A little, Minimum/Contact Guard/Supervision  4 = None, Modified [place]/Independent    Turning over in bed (including adjusting bedclothes, sheets and blankets)?: 4  Sitting down on and standing up from a chair with arms (e.g., wheelchair, bedside commode, etc.): 4  Moving from lying on back to sitting on the side of the bed?: 3  Moving to and from a bed to a chair (including a wheelchair)?: 3  Need to walk in hospital room?: 3  Climbing 3-5 steps with a railing?: 3  Total Score: 20     AM-PAC Raw Score CMS G-Code Modifier Level of Impairment Assistance   6 % Total / Unable   7 - 9 CM 80 - 100% Maximal Assist   10 - 14 CL 60 - 80% Moderate Assist   15 - 19 CK 40 - 60% Moderate Assist   20 - 22 CJ 20 - 40% Minimal Assist   23 CI 1-20% SBA / CGA   24 CH 0% Independent/ Mod I     Patient left up in chair with all lines intact, call button in reach and RN present.    Assessment:   Javy Thompson is a 47 y.o. male with a medical diagnosis of Malignant carcinoid tumor of ileum. Currently, pt requires Min A to CGA for functional mobility. At this time, mobility limited by decreased endurance, decreased strength, and pain. Pt to benefit from continued PT intervention to improve independence with functional mobility/ADL. Recommend d/c home c/ family. Further d/c recs pending progress c/ therapy.    Rehab identified problem list/impairments: Rehab identified problem list/impairments: weakness,  impaired functional mobilty, pain    Rehab potential is excellent.    Activity tolerance: Good    Discharge recommendations: Discharge Facility/Level Of Care Needs: home (TBD pending progress c/ therapy but no further PT needs anticipated upon d/c)     Barriers to discharge:  none    Equipment recommendations: Equipment Needed After Discharge: shower chair     GOALS:   Physical Therapy Goals        Problem: Physical Therapy Goal    Goal Priority Disciplines Outcome Goal Variances Interventions   Physical Therapy Goal     PT/OT, PT Ongoing (interventions implemented as appropriate)     Description:  Goals to be met by: 17     Patient will increase functional independence with mobility by performin. Supine to sit with Modified [place]  2. Sit to supine with Modified [place]  3. Sit to stand transfer with Modified [place]  4. Bed to chair transfer with Modified [place] using appropriate AD if needed  5. Gait  x 150 feet with Modified [place] using appropriate AD if needed.  6. Ascend/descend 16 stairs with bilateral Handrails Supervision       Recommendations: Pt to benefit from continued PT intervention to improve independence with functional mobility/ADL. Recommend d/c home c/ family. Further d/c recs pending progress c/ therapy.                  PLAN:    Patient to be seen 6 x/week to address the above listed problems via gait training, therapeutic activities, therapeutic exercises, neuromuscular re-education  Plan of Care expires: 17  Plan of Care reviewed with: patient          Ashley Cantu, PT  2017

## 2017-04-02 NOTE — PLAN OF CARE
Problem: Patient Care Overview  Goal: Plan of Care Review  Outcome: Ongoing (interventions implemented as appropriate)  VSS, AAOX4, no distress noted. Wife at the bedside, pt repositions self Q2h, no SS og new skin breakdown present. Pt aware of plan to get out of bed today and understands plan of care. Care ongoing

## 2017-04-02 NOTE — PT/OT/SLP EVAL
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      Past Surgical History:   Procedure Laterality Date    ANTERIOR CRUCIATE LIGAMENT REPAIR Right 1986    KNEE ARTHROSCOPY Right 1986    x2    LIVER BIOPSY  12/2016, 1/2017    TONSILLECTOMY             General Precautions: Standard, fall  Orthopedic Precautions: N/A  Braces:            Patient History:  Living Environment  Lives With: child(cristine), dependent, spouse  Living Arrangements: house  Home Accessibility: stairs within home  Home Layout: Bathroom on 2nd floor, Bedroom on 2nd floor  Number of Stairs Within Home: 16  Stair Railings at Home: inside, present at both sides  Living Environment Comment: bed room and walk in shower upstairs. (I) as PLOF, works, drives, does not own/use DME  Equipment Currently Used at Home: none    Prior level of function:   Bed Mobility/Transfers: independent  Grooming: independent  Bathing: independent  Upper Body Dressing: independent  Lower Body Dressing: independent  Toileting: independent  Home Management Skills: independent  Driving License: Yes  Mode of Transportation: Car  Occupation: Full time employment  Type of Occupation: Financial work      Dominant hand: right    Subjective:  Communicated with nsg prior to session.    Chief Complaint: pain  Patient/Family stated goals: return to PLOF     Pain  Rating: 3/10        Location: abdomen          Objective:       Cognitive Exam:  Oriented to: Person, Place, Time and Situation  Follows Commands/attention: Follows multistep  commands  Communication: clear/fluent  Memory:  No Deficits noted  Safety awareness/insight to disability: intact  Coping skills/emotional control: Appropriate to situation    Visual/perceptual:  Intact    Physical Exam:  Postural examination/scapula alignment: Rounded shoulder  Skin integrity: Visible skin intact  Edema: None noted     Sensation:   Intact    Upper Extremity Range of Motion:  Right Upper Extremity: WFL  Left Upper Extremity: WFL    Upper Extremity Strength:  Right Upper Extremity: appears WFL based on function  Left Upper Extremity: appears WFL based on function   Strength: good    Fine motor coordination:   Intact    Gross motor coordination: WFL    Functional Mobility:  Bed Mobility:  Supine to Sit: Minimum Assistance    Transfers:  Sit <> Stand Assistance: Contact Guard Assistance  Sit <> Stand Assistive Device: No Assistive Device  Bed <> Chair Technique: Stand Pivot  Bed <> Chair Transfer Assistance: Contact Guard Assistance    Functional Ambulation: CGA few steps bed-->b/s chair     Activities of Daily Living:       LE Dressing Level of Assistance: Total assistance      Balance:   Static Sit: GOOD: Takes MODERATE challenges from all directions  Dynamic Sit: GOOD: Maintains balance through MODERATE excursions of active trunk movement  Static Stand: FAIR: Maintains without assist but unable to take challenges  Dynamic stand: FAIR: Needs CONTACT GUARD during gait        AM-PAC 6 CLICK ADL  How much help from another person does this patient currently need?  1 = Unable, Total/Dependent Assistance  2 = A lot, Maximum/Moderate Assistance  3 = A little, Minimum/Contact Guard/Supervision  4 = None, Modified [place]/Independent    Putting on and taking off regular lower body clothing? : 2  Bathing (including washing,  "rinsing, drying)?: 3  Toileting, which includes using toilet, bedpan, or urinal? : 3  Putting on and taking off regular upper body clothing?: 4  Taking care of personal grooming such as brushing teeth?: 3  Eating meals?: 4  Total Score: 19    AM-PAC Raw Score CMS "G-Code Modifier Level of Impairment Assistance   6 % Total / Unable   7 - 9 CM 80 - 100% Maximal Assist   10 - 14 CL 60 - 80% Moderate Assist   15 - 19 CK 40 - 60% Moderate Assist   20 - 22 CJ 20 - 40% Minimal Assist   23 CI 1-20% SBA / CGA   24 CH 0% Independent/ Mod I       Patient left up in chair with all lines intact, call button in reach and nsg notified/present     Assessment:  Javy Thompson is a 47 y.o. male with a medical diagnosis of Malignant carcinoid tumor of ileum and presents with s/p exlap/abdominal surgery. Pt would benefit from cont OT services in order to maximize functional independence. D/c recs ongoing at this time, however, most likely home no needs.       Rehab identified problem list/impairments: Rehab identified problem list/impairments: weakness, gait instability, impaired endurance, impaired self care skills, impaired functional mobilty, pain, impaired balance    Rehab potential is good.    Activity tolerance: Good    Discharge recommendations: Discharge Facility/Level Of Care Needs: home     Barriers to discharge: Barriers to Discharge: Inaccessible home environment    Equipment recommendations: shower chair     GOALS:   Occupational Therapy Goals        Problem: Occupational Therapy Goal    Goal Priority Disciplines Outcome Interventions   Occupational Therapy Goal     OT, PT/OT Ongoing (interventions implemented as appropriate)    Description:  Goals to be met by: 5/2/17     Patient will increase functional independence with ADLs by performing:    LE Dressing with Modified [place].  Grooming while standing with Modified [place].  Toileting from toilet with Modified [place] for hygiene and clothing " management.   Supine to sit with Modified [place].  Stand pivot transfers with Modified [place].  Toilet transfer to toilet with Modified [place].  Upper extremity exercise program x10 reps per handout, with independence.                PLAN:  Patient to be seen 5 x/week to address the above listed problems via self-care/home management, therapeutic activities, therapeutic exercises  Plan of Care expires: 05/02/17  Plan of Care reviewed with: patient         Sharla Fair, OT  04/02/2017

## 2017-04-02 NOTE — PLAN OF CARE
Problem: Patient Care Overview  Goal: Plan of Care Review  Outcome: Ongoing (interventions implemented as appropriate)  Pt's SpO2 97% on 2 lpm NC. Weaned to 1 lpm NC. No respiratory distress noted. Will continue to monitor SpO2.

## 2017-04-02 NOTE — PLAN OF CARE
Problem: Patient Care Overview  Goal: Plan of Care Review  Outcome: Ongoing (interventions implemented as appropriate)  Pain well controlled with PCA dilaudid. Repositioning self frequently, has been up to chair today. Remains free of falls this shift, needs 1 assist while oob. Monitoring closely for s/s of infection. Has not voided since kaplan removal at 8am, dr. Coles. Wife at bedside.

## 2017-04-02 NOTE — PLAN OF CARE
Problem: Physical Therapy Goal  Goal: Physical Therapy Goal  Goals to be met by: 17     Patient will increase functional independence with mobility by performin. Supine to sit with Modified [place]  2. Sit to supine with Modified [place]  3. Sit to stand transfer with Modified [place]  4. Bed to chair transfer with Modified [place] using appropriate AD if needed  5. Gait x 150 feet with Modified [place] using appropriate AD if needed.  6. Ascend/descend 16 stairs with bilateral Handrails Supervision       Recommendations: Pt to benefit from continued PT intervention to improve independence with functional mobility/ADL. Recommend d/c home c/ family. Further d/c recs pending progress c/ therapy.   Outcome: Ongoing (interventions implemented as appropriate)     Recommendations: Currently, pt requires Min A to CGA for functional mobility. At this time, mobility limited by decreased endurance, decreased strength, and pain. Pt to benefit from continued PT intervention to improve independence with functional mobility/ADL. Recommend d/c home c/ family. Further d/c recs pending progress c/ therapy.

## 2017-04-02 NOTE — PLAN OF CARE
Notified physician of abdominal distention and firmness    Returned to suction per order. 350 mls thin light green-clear liquid suctioned. Will monitor closely.

## 2017-04-02 NOTE — PLAN OF CARE
Problem: Patient Care Overview  Goal: Plan of Care Review  Outcome: Ongoing (interventions implemented as appropriate)  Received pt on PCA; 2L NC. No distress noted. Will cont to monitor.

## 2017-04-03 LAB
ALBUMIN SERPL BCP-MCNC: 3.1 G/DL
ALP SERPL-CCNC: 59 U/L
ALT SERPL W/O P-5'-P-CCNC: 208 U/L
ANION GAP SERPL CALC-SCNC: 4 MMOL/L
ANISOCYTOSIS BLD QL SMEAR: SLIGHT
APPEARANCE FLD: NORMAL
AST SERPL-CCNC: 161 U/L
BASOPHILS # BLD AUTO: 0.02 K/UL
BASOPHILS NFR BLD: 0.3 %
BILIRUB SERPL-MCNC: 1.1 MG/DL
BLD PROD TYP BPU: NORMAL
BLOOD UNIT EXPIRATION DATE: NORMAL
BLOOD UNIT TYPE CODE: 5100
BLOOD UNIT TYPE CODE: 5100
BLOOD UNIT TYPE CODE: 9500
BLOOD UNIT TYPE: NORMAL
BODY FLD TYPE: NORMAL
BUN SERPL-MCNC: 13 MG/DL
CALCIUM SERPL-MCNC: 8 MG/DL
CHLORIDE SERPL-SCNC: 105 MMOL/L
CO2 SERPL-SCNC: 31 MMOL/L
CODING SYSTEM: NORMAL
COLOR FLD: NORMAL
CREAT SERPL-MCNC: 0.8 MG/DL
DIFFERENTIAL METHOD: ABNORMAL
DISPENSE STATUS: NORMAL
EOSINOPHIL # BLD AUTO: 0.1 K/UL
EOSINOPHIL NFR BLD: 0.7 %
ERYTHROCYTE [DISTWIDTH] IN BLOOD BY AUTOMATED COUNT: 14.8 %
EST. GFR  (AFRICAN AMERICAN): >60 ML/MIN/1.73 M^2
EST. GFR  (NON AFRICAN AMERICAN): >60 ML/MIN/1.73 M^2
GLUCOSE SERPL-MCNC: 145 MG/DL
HCT VFR BLD AUTO: 17.1 %
HGB BLD-MCNC: 5.6 G/DL
HYPOCHROMIA BLD QL SMEAR: ABNORMAL
INR PPP: 1.1
LYMPHOCYTES # BLD AUTO: 1.4 K/UL
LYMPHOCYTES NFR BLD: 19.3 %
LYMPHOCYTES NFR FLD MANUAL: 1 %
MAGNESIUM SERPL-MCNC: 2.2 MG/DL
MCH RBC QN AUTO: 29.8 PG
MCHC RBC AUTO-ENTMCNC: 32.7 %
MCV RBC AUTO: 91 FL
MONOCYTES # BLD AUTO: 0.7 K/UL
MONOCYTES NFR BLD: 9.2 %
MONOS+MACROS NFR FLD MANUAL: 28 %
NEUTROPHILS # BLD AUTO: 5 K/UL
NEUTROPHILS NFR BLD: 70.1 %
NEUTROPHILS NFR FLD MANUAL: 71 %
PHOSPHATE SERPL-MCNC: 1.1 MG/DL
PLATELET # BLD AUTO: 105 K/UL
PLATELET BLD QL SMEAR: ABNORMAL
PMV BLD AUTO: 10.2 FL
POLYCHROMASIA BLD QL SMEAR: ABNORMAL
POTASSIUM SERPL-SCNC: 3.8 MMOL/L
PROT SERPL-MCNC: 5.1 G/DL
PROTHROMBIN TIME: 11.2 SEC
RBC # BLD AUTO: 1.88 M/UL
SODIUM SERPL-SCNC: 140 MMOL/L
TRANS ERYTHROCYTES VOL PATIENT: NORMAL ML
WBC # BLD AUTO: 7.1 K/UL
WBC # FLD: 8473 /CU MM

## 2017-04-03 PROCEDURE — 85025 COMPLETE CBC W/AUTO DIFF WBC: CPT

## 2017-04-03 PROCEDURE — 89051 BODY FLUID CELL COUNT: CPT

## 2017-04-03 PROCEDURE — 80053 COMPREHEN METABOLIC PANEL: CPT

## 2017-04-03 PROCEDURE — 63600175 PHARM REV CODE 636 W HCPCS: Performed by: SURGERY

## 2017-04-03 PROCEDURE — 20000000 HC ICU ROOM

## 2017-04-03 PROCEDURE — 94799 UNLISTED PULMONARY SVC/PX: CPT

## 2017-04-03 PROCEDURE — 87116 MYCOBACTERIA CULTURE: CPT

## 2017-04-03 PROCEDURE — 27000221 HC OXYGEN, UP TO 24 HOURS

## 2017-04-03 PROCEDURE — 87102 FUNGUS ISOLATION CULTURE: CPT

## 2017-04-03 PROCEDURE — 87205 SMEAR GRAM STAIN: CPT

## 2017-04-03 PROCEDURE — C9113 INJ PANTOPRAZOLE SODIUM, VIA: HCPCS | Performed by: STUDENT IN AN ORGANIZED HEALTH CARE EDUCATION/TRAINING PROGRAM

## 2017-04-03 PROCEDURE — 36430 TRANSFUSION BLD/BLD COMPNT: CPT

## 2017-04-03 PROCEDURE — 83735 ASSAY OF MAGNESIUM: CPT

## 2017-04-03 PROCEDURE — 94761 N-INVAS EAR/PLS OXIMETRY MLT: CPT

## 2017-04-03 PROCEDURE — 84100 ASSAY OF PHOSPHORUS: CPT

## 2017-04-03 PROCEDURE — 25000003 PHARM REV CODE 250: Performed by: SURGERY

## 2017-04-03 PROCEDURE — 85610 PROTHROMBIN TIME: CPT

## 2017-04-03 PROCEDURE — 87075 CULTR BACTERIA EXCEPT BLOOD: CPT

## 2017-04-03 PROCEDURE — 25000003 PHARM REV CODE 250: Performed by: COLON & RECTAL SURGERY

## 2017-04-03 PROCEDURE — 0W9930Z DRAINAGE OF RIGHT PLEURAL CAVITY WITH DRAINAGE DEVICE, PERCUTANEOUS APPROACH: ICD-10-PCS | Performed by: RADIOLOGY

## 2017-04-03 PROCEDURE — P9021 RED BLOOD CELLS UNIT: HCPCS

## 2017-04-03 PROCEDURE — 94770 HC EXHALED C02 TEST: CPT

## 2017-04-03 PROCEDURE — 87070 CULTURE OTHR SPECIMN AEROBIC: CPT

## 2017-04-03 PROCEDURE — P9047 ALBUMIN (HUMAN), 25%, 50ML: HCPCS | Performed by: SURGERY

## 2017-04-03 PROCEDURE — 87015 SPECIMEN INFECT AGNT CONCNTJ: CPT

## 2017-04-03 PROCEDURE — 97530 THERAPEUTIC ACTIVITIES: CPT

## 2017-04-03 PROCEDURE — 63600175 PHARM REV CODE 636 W HCPCS: Performed by: STUDENT IN AN ORGANIZED HEALTH CARE EDUCATION/TRAINING PROGRAM

## 2017-04-03 RX ORDER — TAMSULOSIN HYDROCHLORIDE 0.4 MG/1
0.4 CAPSULE ORAL DAILY
Status: DISCONTINUED | OUTPATIENT
Start: 2017-04-03 | End: 2017-04-12 | Stop reason: HOSPADM

## 2017-04-03 RX ORDER — HYDROCODONE BITARTRATE AND ACETAMINOPHEN 500; 5 MG/1; MG/1
TABLET ORAL
Status: DISCONTINUED | OUTPATIENT
Start: 2017-04-03 | End: 2017-04-12 | Stop reason: HOSPADM

## 2017-04-03 RX ADMIN — IRON SUCROSE 100 MG: 20 INJECTION, SOLUTION INTRAVENOUS at 09:04

## 2017-04-03 RX ADMIN — Medication: at 09:04

## 2017-04-03 RX ADMIN — ASCORBIC ACID, VITAMIN A PALMITATE, CHOLECALCIFEROL, THIAMINE HYDROCHLORIDE, RIBOFLAVIN-5 PHOSPHATE SODIUM, PYRIDOXINE HYDROCHLORIDE, NIACINAMIDE, DEXPANTHENOL, ALPHA-TOCOPHEROL ACETATE, VITAMIN K1, FOLIC ACID, BIOTIN, CYANOCOBALAMIN: 200; 3300; 200; 6; 3.6; 6; 40; 15; 10; 150; 600; 60; 5 INJECTION, SOLUTION INTRAVENOUS at 05:04

## 2017-04-03 RX ADMIN — ALBUMIN (HUMAN) 12.5 G: 12.5 SOLUTION INTRAVENOUS at 04:04

## 2017-04-03 RX ADMIN — POTASSIUM PHOSPHATE, MONOBASIC AND POTASSIUM PHOSPHATE, DIBASIC 30 MMOL: 224; 236 INJECTION, SOLUTION, CONCENTRATE INTRAVENOUS at 01:04

## 2017-04-03 RX ADMIN — Medication: at 02:04

## 2017-04-03 RX ADMIN — DEXTROSE MONOHYDRATE, SODIUM CHLORIDE, AND POTASSIUM CHLORIDE: 50; 4.5; 1.49 INJECTION, SOLUTION INTRAVENOUS at 07:04

## 2017-04-03 RX ADMIN — DEXTROSE MONOHYDRATE, SODIUM CHLORIDE, AND POTASSIUM CHLORIDE: 50; 4.5; 1.49 INJECTION, SOLUTION INTRAVENOUS at 08:04

## 2017-04-03 RX ADMIN — TAMSULOSIN HYDROCHLORIDE 0.4 MG: 0.4 CAPSULE ORAL at 01:04

## 2017-04-03 RX ADMIN — CYANOCOBALAMIN 1000 MCG: 1000 INJECTION, SOLUTION INTRAMUSCULAR; SUBCUTANEOUS at 09:04

## 2017-04-03 RX ADMIN — ALBUMIN (HUMAN) 12.5 G: 12.5 SOLUTION INTRAVENOUS at 09:04

## 2017-04-03 RX ADMIN — PANTOPRAZOLE SODIUM 40 MG: 40 INJECTION, POWDER, FOR SOLUTION INTRAVENOUS at 09:04

## 2017-04-03 RX ADMIN — OCTREOTIDE ACETATE 250 MCG/HR: 1000 INJECTION, SOLUTION INTRAVENOUS; SUBCUTANEOUS at 02:04

## 2017-04-03 RX ADMIN — ERYTHROPOIETIN 10000 UNITS: 10000 INJECTION, SOLUTION INTRAVENOUS; SUBCUTANEOUS at 09:04

## 2017-04-03 RX ADMIN — I.V. FAT EMULSION 250 ML: 20 EMULSION INTRAVENOUS at 09:04

## 2017-04-03 RX ADMIN — ALBUMIN (HUMAN) 12.5 G: 12.5 SOLUTION INTRAVENOUS at 07:04

## 2017-04-03 NOTE — PROGRESS NOTES
POD 3 Exlap, R hepatectomy, SBR, joaquin, mesenteric lymphadenectomy    Doing OK this morning.  COmplained of right chest pain overnight.  CXR revealed right pleural effusion.  H/H low this AM, blood ordered.    NAD  CTAB  RRR  Midline incision clean and dry, healing well    A/P:  - IR drainage of right thoracic effusion  - To floor following  - Clamp NGT following procedure  - Continue pain control  - OOB/IS    Inpatient Data      Vitals:   Temp:  [98.3 °F (36.8 °C)-99.4 °F (37.4 °C)]   Pulse:  [101-139]   Resp:  [8-31]   BP: (106-155)/(66-83)   SpO2:  [79 %-99 %]     Diet NPO Except for: Medication   Intake/Output Summary (Last 24 hours) at 04/03/17 0751  Last data filed at 04/03/17 0600   Gross per 24 hour   Intake           2854.5 ml   Output             1860 ml   Net            994.5 ml          Recent Labs      04/01/17   0435  04/02/17   0450  04/03/17   0500   WBC  10.59  9.79  7.10   HGB  8.5*  7.1*  5.6*   HCT  24.5*  20.8*  17.1*   PLT  137*  114*  105*   NA  140  139  140   K  4.7  4.2  3.8   CL  108  106  105   CO2  25  28  31*   BUN  17  19  13   CREATININE  1.2  0.9  0.8   BILITOT  1.7*  1.1*  1.1*   AST  1040*  416*  161*   ALT  488*  375*  208*   ALKPHOS  37*  56  59   CALCIUM  8.2*  8.0*  8.0*   ALBUMIN  3.5  2.9*  3.1*   PROT  4.8*  4.8*  5.1*   MG  2.1  2.0  2.2   PHOS  3.8  1.4*  1.1*     Scheduled Meds:   cyanocobalamin 1,000 mcg Daily   epoetin eduarda (PROCRIT) injection 10,000 Units Every Mon, Wed, Fri   iron sucrose (VENOFER) IVPB 100 mg Daily   pantoprazole 40 mg Daily      albumin human 25% 12.5 g (04/03/17 6626)    dextrose 5 % and 0.45 % NaCl with KCl 20 mEq 75 mL/hr at 04/03/17 0728    hydromorphone in 0.9 % NaCl 6 mg/30 ml

## 2017-04-03 NOTE — PLAN OF CARE
Problem: Physical Therapy Goal  Goal: Physical Therapy Goal  Goals to be met by: 17     Patient will increase functional independence with mobility by performin. Supine to sit with Modified [place]  2. Sit to supine with Modified [place]  3. Sit to stand transfer with Modified [place]  4. Bed to chair transfer with Modified [place] using appropriate AD if needed  5. Gait x 150 feet with Modified [place] using appropriate AD if needed.  6. Ascend/descend 16 stairs with bilateral Handrails Supervision       Recommendations: Pt to benefit from continued PT intervention to improve independence with functional mobility/ADL. Recommend d/c home c/ family. Further d/c recs pending progress c/ therapy.    Outcome: Ongoing (interventions implemented as appropriate)  Continue working toward goals.

## 2017-04-03 NOTE — PHYSICIAN QUERY
"PT Name: Javy Thompson  MR #: [id number]    Physician Query Form - Hematology Clarification      CDS/: Stephany King               Contact information: [email or web address]    This form is a permanent document in the medical record.      Query Date: April 3, 2017    By submitting this query, we are merely seeking further clarification of documentation. Please utilize your independent clinical judgment when addressing the question(s) below.    The Medical record contains the following:   Indicators  Supporting Clinical Findings Location in Medical Record    "Anemia" documented     x H & H = H/H low this AM, blood ordered.     H/H = 8.4/24.1 > 5.6/17.1 PN 04/03    Labs 03/31 > 04/03    BP =                     HR=      "GI bleeding" documented      Acute bleeding (Non GI site)     x Transfusion(s) 2 units prbc Physician Orders    Treatment:     x Other:  POD 3 Exlap, R hepatectomy, SBR, joaquin, mesenteric lymphadenectomy     EBL: 100oml  PN 04/03      Op Note 03/31     Provider, please specify diagnosis or diagnoses associated with above clinical findings.    [xxx ] Acute blood loss anemia expected post-operatively  [  ] Acute blood loss anemia    [  ] Anemia of chronic disease ( Specify chronic disease)        [  ] Neoplastic disease      [  ] Other (Specify) _______________________________     [  ] Clinically Undetermined     [  ] Other Hematological Diagnosis (please specify): _________________________________    [  ] Clinically Undetermined       Please document in your progress notes daily for the duration of treatment, until resolved, and include in your discharge summary.                                                                                                      "

## 2017-04-03 NOTE — PLAN OF CARE
Problem: Patient Care Overview  Goal: Plan of Care Review  Pt received on nasal cannula at  2 lpm. SPO2  94 %. Pt doing IS on his own. Pt in no apparent respiratory distress.  Will continue to monitor.

## 2017-04-03 NOTE — PLAN OF CARE
Problem: Patient Care Overview  Goal: Plan of Care Review  Outcome: Ongoing (interventions implemented as appropriate)  Received pt on 2L NC; will cont to monitor.

## 2017-04-03 NOTE — PROGRESS NOTES
MD Azul called  And informed him on the Impression of the Chest XRAY. He states, he will inform the day team.

## 2017-04-03 NOTE — DISCHARGE SUMMARY
Radiology Post-Procedure Note    Pre Op Diagnosis: Rt pleural effusion    Post Op Diagnosis: same    Procedure: Thoracentesis    Procedure performed by: Caitlin Waters MD    Written Informed Consent Obtained: Yes    Specimen Removed: YES pleural fluid    Estimated Blood Loss: Minimal    Findings: Local anesthesia and moderate sedation were used.    A right posterior approach was used to insert a 5-Malay  all-purpose drainage catheter into the pleural space using US guidance in ICU bed Lt decubitus position.  730 mL serosanguinous fluid was removed and sent to the lab for further analysis.  The tube was secured  Removed.  Postprocedural imaging demonstrates decrease in size of the collection and no significant pneumothorax.    The patient tolerated the procedure well and there were no complications.  Please see Imaging report for further details.    Caitlin Waters MD  Staff Radiologist  Department of Radiology  Pager: [phone]

## 2017-04-03 NOTE — PROGRESS NOTES
"Patient with c/o having right sided mid sternum pain, verbalized it is sharp and intermittent when he takes a breath. Patient informed to let me know if it resolve once the kaplan is placed. Verbalized ok.  2115 Kaplan cath replaced per MD Azul orders, pt tolerated well. Noted nato-tinged urine noted upon entry.  2130 Patient states, "he believe that has helped it a little.  2330 Patient rounds in progress, he is with the same complaint of feeling the same sharp pain only it has worsen, and he is unable to perform his IS exercise. He was made aware that I will call the Doctor and get a Chest Xray order.  2200 MD Azul called, he was informed on the patient complaint and new orders given for chest Xray.  "

## 2017-04-03 NOTE — PROGRESS NOTES
MD Reyes called and informed him on the status change in the patient resp and O2 Sats. Informed him on the need for the chest Xray and the Impression. He informed me  To inform the patient that he will need  That fluid drain this morning to inprove his breathing and yohana he will come and talk to the patient this am.

## 2017-04-03 NOTE — PT/OT/SLP PROGRESS
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  Nsg hold in a.m.  Nsg reports in p.m. That Dr. Reyes requested pt get up in b/s chair this afternoon. Pt stated "I had a bad morning."    Pain Rating:  (grimaced with movement, but did not rate pain.)           Pain Addressed: Reposition, Nurse notified  Pain Rating Post-Intervention:  (as above)    Objective:   Patient found with: blood pressure cuff, kaplan catheter, oxygen, peripheral IV (full ICU monitoring)    Functional Mobility:  Bed Mobility:   Supine to Sit: Contact Guard Assistance    Transfers:  Sit <> Stand Assistance: Contact Guard Assistance  Sit <> Stand Assistive Device: Rolling Walker  Bed <> Chair Technique:  (3 small steps with RW and close CGA and assist for multiple  lines.)  Bed <> Chair Assistance: Contact Guard Assistance  Bed <> Chair Assistive Device: Rolling Walker    Gait:   Gait Distance: 3 small steps to t/f EOB > b/s chair with RW and close CGA and assist for multiple lines.  Assistance 1: Contact Guard Assistance  Gait Assistive Device: Rolling walker  Gait Pattern: reciprocal  Gait Deviation(s): decreased shoshana, decreased step length, decreased stride length, decreased toe-to-floor clearance    Stairs:      Balance:   Static Sit: FAIR+: Able to take MINIMAL challenges from all directions  Dynamic Sit: FAIR+: Maintains balance through MINIMAL excursions of active trunk motion  Static Stand: FAIR: " "Maintains without assist but unable to take challenges with RW  Dynamic stand: FAIR: Needs CONTACT GUARD during gait with RW    Therapeutic Activities and Exercises:  Nsg hold lifted in pm.  Pt had a thoracentensis earlier today. Pt sat EOB x ~5 mins with S prior to t/fing to b/s chair.  Pt t/f'd using a RW this afternoon for increased stability. Static standing with RW x 2 mins and SBA/CGA.  Seated BLE therex:  Ap, LAQ, hip flexion/ABD/ADD x 10 to 15 reps.  Pt left up in b/s chair; call button in reach.    AM-PAC 6 CLICK MOBILITY  How much help from another person does this patient currently need?   1 = Unable, Total/Dependent Assistance  2 = A lot, Maximum/Moderate Assistance  3 = A little, Minimum/Contact Guard/Supervision  4 = None, Modified Ragland/Independent    Turning over in bed (including adjusting bedclothes, sheets and blankets)?: 4  Sitting down on and standing up from a chair with arms (e.g., wheelchair, bedside commode, etc.): 3  Moving from lying on back to sitting on the side of the bed?: 4  Moving to and from a bed to a chair (including a wheelchair)?: 3  Need to walk in hospital room?: 3  Climbing 3-5 steps with a railing?: 3  Total Score: 20    AM-PAC Raw Score CMS G-Code Modifier Level of Impairment Assistance   6 % Total / Unable   7 - 9 CM 80 - 100% Maximal Assist   10 - 14 CL 60 - 80% Moderate Assist   15 - 19 CK 40 - 60% Moderate Assist   20 - 22 CJ 20 - 40% Minimal Assist   23 CI 1-20% SBA / CGA   24 CH 0% Independent/ Mod I     Patient left up in chair with all lines intact, call button in reach and RN notified.    Assessment:  Javy Thompson is a 47 y.o. male with a medical diagnosis of Malignant carcinoid tumor of ileum and presents with decreased strength, endurance, and mobility.  Pt stated "I had a bad morning."  Pt would continue to benefit from P.T. To address impairments listed below.    Rehab identified problem list/impairments: Rehab identified problem list/impairments: " weakness, impaired endurance, impaired self care skills, impaired functional mobilty, gait instability    Rehab potential is good.    Activity tolerance: Fair    Discharge recommendations: Discharge Facility/Level Of Care Needs: home     Barriers to discharge: Barriers to Discharge: Inaccessible home environment    Equipment recommendations: Equipment Needed After Discharge: shower chair     GOALS:   Physical Therapy Goals        Problem: Physical Therapy Goal    Goal Priority Disciplines Outcome Goal Variances Interventions   Physical Therapy Goal     PT/OT, PT Ongoing (interventions implemented as appropriate)     Description:  Goals to be met by: 17     Patient will increase functional independence with mobility by performin. Supine to sit with Modified Stark  2. Sit to supine with Modified Stark  3. Sit to stand transfer with Modified Stark  4. Bed to chair transfer with Modified Stark using appropriate AD if needed  5. Gait  x 150 feet with Modified Stark using appropriate AD if needed.  6. Ascend/descend 16 stairs with bilateral Handrails Supervision       Recommendations: Pt to benefit from continued PT intervention to improve independence with functional mobility/ADL. Recommend d/c home c/ family. Further d/c recs pending progress c/ therapy.                  PLAN:    Patient to be seen 6 x/week  to address the above listed problems via gait training, therapeutic activities, therapeutic exercises, neuromuscular re-education  Plan of Care expires: 17  Plan of Care reviewed with: patient         Dee Dee Aaron, PTA  2017

## 2017-04-03 NOTE — OP NOTE
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    POSTOPERATIVE DIAGNOSES:  1.  Multiple primary ileal tumors more than 10 in the distal ileum about 60 cm   proximal to the ileocecal valve.  All tumors concentrated in about 60 cm length   of ileum.  2.  Mesenteric lymph román mass.  3.  Large liver lesion posterior segment, multiple liver lesions in segment 2,   segment 3, segment 4, segment 8, segment 6 and 7.  4.  Diaphragmatic implant, right side.  5.  Chronic cholecystitis.    PROCEDURES DONE:  1.  Ex-lap.  2.  [place] lymph node mapping.  3.  Appendectomy.  4.  Cholecystectomy.  5.  Segmental ileal resection with reanastomosis using stapler.  6.  Mesenteric lymphadenectomy.  7.  Liver ultrasound.  8.  Liver resection, multiple segments using Habib radiofrequency probe.  9.  Diaphragmatic resection with complex repair.  10.  Intralesional chemo with 5-FU.    ANESTHESIA:  General endotracheal anesthesia.    SURGEON:  Joseph Reyes M.D.    SURGERY RESIDENT:  Dr. Yaritza Scruggs.    SENIOR RESIDENT:  Dr. Faustina Moura.    HISTORY AND INDICATION:  This is a 47-year-old gentleman who was diagnosed with   a carcinoid disease, was evaluated here last month for treatment.  The patient   lives in [place] and during the workup was found to have a carcinoid   disease.  The biopsy of the liver lesion was done, which confirmed the disease.    The patient was seen by Dr. Hadley and after complete evaluation, I saw him   about a month back.  After going over all his records, I talked to him about the   surgery.  He would benefit from a segmental bowel resection with mesenteric   lymphadenectomy in the liver lesion.  The patient had a large load of liver   disease, especially in the right lobe and the posterior segment.  The patient   was healthy and was pretty active and after  evaluation, I found him to be an   eligible candidate for a debulking surgery.  I went over with the risk and   benefits and the patient understood all the risk and benefits of the procedure   and planned for surgery today.  I saw him yesterday in the clinic.  I again went   over with him about the whole aspect of surgery, the need for surgery, the   advantages, the options for him, conservative management that is not doing   anything, continue medications or do debulking surgery where the goal is not eradication,   but debulking.  I went over with the risks of the surgery such as bleeding,   infection, DVT, PE, MI, stroke, re-exploration for bleeding, bile duct leaks,   intestinal leak requiring re-exploration.  After going over all the risk and   benefits, consent was obtained.    FINDINGS:  The patient had a distal ileal tumor about 60 cm proximal to the   ileocecal valve.  He had multiple primaries all focusing in segment of 60 cm of   the bowel.  He had a large mesenteric disease.  However, the base of the   mesentery did not have significant disease.  He did not have any pelvic disease.    There was a sizable diaphragmatic implant, which was completely resected.  He   had a significant disease in the liver.  The whole posterior segment of the   liver was replaced by tumor and he had implants in segment 8, segment 4 and   segment 2.    PROCEDURE IN DETAIL:  The patient was brought to the Operating Room with the   Sandostatin on board.  He was placed in a supine position.  He was then sedated   and intubated.  A central line and arterial line was placed by Anesthesia staff.    A Cabrera catheter was placed in the bladder.  The patient's abdomen was prepped   and draped in the usual sterile manner.  A time-out was given and the ID of the   patient and the procedure was verified.  A midline laparotomy from xiphisternum   to the pubis was made.  The abdominal cavity was entered.  After taking down   the falciform  ligament using LigaSure, the abdominal wall was retracted.  The   wound protector was used to retract the abdominal wall.  The benefit of the   wound retractor used to decrease the chance of wound infection.  The abdominal   wall was appropriately retracted.  The small bowel was run all the way from   ligament of Treitz to the terminal ileum.  The entire small bowel was run and at   about 60 cm proximal to the ileocecal valve, there is a segment of small bowel,   about 60 cm, which had multiple primaries with the mesenteric román disease.    The Lymphazurin dye was taken and subserosal injection was given proximal and   distal to the tumor location and this area was packed with laps.  The small   bowel was run few more times and there was no other disease that was there in   the small bowel.  The appendix was found to be stuck to the right   retroperitoneum.  It was found to be thick with a thick mesentery; and because   of its unusual appearance, I decided to proceed with appendectomy.  The   mesoappendix was taken down with the LigaSure and the base of the appendix was   dissected out.  The 2-0 ties were used x2 and the base of the appendix was   transected and taken out as a specimen.  Following this, the gallbladder was   mobilized from the liver bed.  Meticulous dissection was done.  I then   identified the cystic duct and cystic artery and cystic artery was clipped   initially x2 and the gallbladder was taken out as a specimen after ligating the   cystic artery.  Meticulous hemostasis was then accomplished.    Attention was then paid to the ileal segment.  The lymphatic mapping was done in the usual way. The lymphazurin dye was injected subserosally proximally and distally. The point of transection of the ileum proximally and distally was chosen  At this point, the small bowel was transected   using a stapler.  Using the LigaSure, the mesentery was taken down.  It was done   in such a way that the mesenteric  román bulk would be included along with the   resection.  The 60 cm of the small bowel, which was just 60 cm proximal to the   ileocecal valve was resected along with the bulky román mass.  Following this,   the vascular pedicle was clamped and run with 2-0 Prolene stitch in a running   fashion x2.  Following this, the base of the mesentery was exposed.  There was   no significant lymphadenopathy; however, the lymphatic structure along with the   peritoneum, along with the perivascular tissues anterior to the superior   mesenteric vein and artery was resected.  A complete mesenteric lymphadenectomy   was accomplished.  Meticulous hemostasis was accomplished.  This area was packed   with Surgiflo, Vitagel and some Surgicel.  Attention was then paid to the   pelvis.  There was no implant in the pelvis.  I checked the right   retroperitoneum.  I checked the rectum and the bladder area.  There was found to   be no more implants.  Also, I checked the right and left diaphragm and the left   and right hepatocolic area.  There was found to be no other evidence of any   disease elsewhere.    At this point, attention was then focused to the liver.  Most of the disease,   mainly the bulk of disease was confined to the right lobe especially in the   posterior segment.  There was also a large disease deposit over the segment 8   and then there were couple of lesions over segment 4 and segment 3.  Because of   the location, I decided to use Habib probe for transection.  The vessel loop was   taken around the portal structures in case a Lipscomb maneuver is necessary.    The right lobe of the liver was mobilized.  There was a thick inflammation and   adherence to the right retroperitoneum.  The peritoneum from the inferior   surface of the liver was taken down using cautery.  The diaphragm was released   from the liver and the right lobe was meticulously mobilized.  There were thick   adhesions, in spite of difficulty, the  mobilization was accomplished.  The whole   right lobe was then mobilized.  Following this, the posterior section was then   exposed properly.  Laparotomy pad was placed in the right retroperitoneum.    Habib probe was used to transect the parenchyma.  Radiofrequency ablation was   done along the edges of the tumor in the posterior segment.  This tumor was   really vascular.  The patient was given adequate volume prior to surgery.  He   was given 1 mg of Bumex and 12.5 of mannitol to ensure adequate urine output.    The patient's hemodynamics were completely stable.  Using Habib probe, the liver   parenchyma was ablated and transection was done gradually.  This liver tumor   was giant in size and it took a while for me to go along doing the ablation and   resection.  During this process, the patient because of the vascular nature of   the tumor had lost some blood and he required some fluid resuscitation during   that time.  He responded well to fluids.  With meticulous dissection, I was able   to finally transect most of the posterior section using a combination of Habib   probe and LigaSure.  Following the resection of the segment, this was taken out   and liver was packed with laparotomy pads.  Same way, the rest of the liver   tumors from segment 8 was also resected and the area was packed using Surgiflo,   Vitagel, Surgicel and laparotomy pads.  The lesion in the segment 4 as well as   the segment 2 was also resected completely and meticulous hemostasis was   accomplished everywhere.  An ultrasound was then run on the liver.  There was   found to be no more other lesions.  Also on palpation, there were no more   tumors.  After completely resecting those tumors, I examined the liver one more   time, there was found to be no residual tumor.  At this point, I used the Habib   probe and ablated the cut surface as much as possible.  There was found to be no   bile leak.  Meticulous hemostasis was accomplished.    By  this time, the patient required some fluid resuscitation and also because of   the vascular nature, he lost some blood from the tumor as well as the   parenchyma.  He required 2 units of blood by end of the resection.  After this,   meticulous hemostasis was accomplished everywhere.  I checked the surface of the   liver.  There was found to be no more bleeding and after the whole abdomen was   then irrigated with lots of antibiotic solution and then sterile water and was   completely drained.  The cut surface of the liver was hemostatic and then it was   packed with some Surgiflo, Vitagel and then the Gelfoam soaked with 5-FU with   some identification metallic clips and then the segments were approximated using   2-0 Prolene stitch.  Meticulous hemostasis was accomplished.  The liver   transection segment remained completely hemostatic.  Following this, I placed   the liver back in the right retroperitoneum.  However, during palpation of the   right retroperitoneum, there was a thick big segment of tumor in the right   diaphragm.  The diaphragm was resected along with the tumor and the tumor was   taken out.  The right thoracic cavity was entered.  Therefore, the right   diaphragm was closed in 2 layers after ensuring that the whole thoracic cavity   was emptied by placing a negative suction under water.  The diaphragm was closed   in a complex fashion in 2 layers using 2-0 Prolene stitch.  Meticulous   hemostasis was accomplished.  This area was also packed with Gelfoam soaked with   5-FU.  Following this, the liver was once again examined.  It was found to be   hemostatic.  There was found to be no other problem.    I tried to remove the clip from the cystic duct and wanted to place a biliary   internal stent, so as to decrease the chance of bile leak; however, the cystic   duct entrance into the bile duct was very small, it would not allow any stent to   pass through, I was able to advance the guidewire but  could not place the   stent.  Therefore, after trying multiple times, I aborted the idea of placing   the stent.  The cystic duct was then closed in 2 layers using a running 4-0 PDS   stitch and a clip was applied over the cystic duct stump.  Meticulous hemostasis   was accomplished everywhere.  The abdomen was again checked 1 more time.  There   was found to be completely hemostatic.    The small bowel segments were examined and it was found to be pink.  The stapled   ends were brought together and a side-to-side anastomosis was accomplished.    The mesentery was closed in 2 layers with a sandwich of Gelfoam with 5-FU and   marker clip placed.  The small bowel was run 1 more time from proximal to   distal.  There was found to be no more evidence of tumor.  Omentoplasty was done   by mobilizing the omentum and placing it in the right upper quadrant close to   the cut surface of the liver.    At this point, the patient was hemodynamically stable, pulse pressure was also   stable.  After ensuring adequate hemostasis, sponge counts were done, counts   were normal.  KUB did not show any evidence of foreign body.  Marcaine and   Exparel was injected at the fascia.  Fascia was closed using interrupted #1   Ethibond stitch.  Skin was closed using 4-0 Monocryl.  The patient was stable   and was taken to the ICU in a stable condition.      MELANIA  dd: 03/31/2017 18:53:51 (CDT)  td: 04/01/2017 01:10:55 (CDT)  Doc ID   #[id number]  Job ID #[id number]    CC:

## 2017-04-03 NOTE — PROGRESS NOTES
MD Reyes called with the correct am lab results of the patient H/H, new orders noted for 2 Units of PRBC's

## 2017-04-03 NOTE — PROGRESS NOTES
Occupational Therapy   Visit Attempt    Javy Thompson   MRN: [id number]       Pt currently receiving blood and MDs present in room for procedure. Will attempt again as available.       1350: Pt continues to receive blood. Will attempt again tomorrow's date.     Sharla Fair, OT  4/3/2017

## 2017-04-03 NOTE — H&P
Radiology Consult    Javy Thompson is a 47 y.o. male with a history of Rt pleural effusion.  Post op carcinoid surgery..  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 Waters.     Procedure: Rt thoracentesis    Scheduled Meds:    cyanocobalamin  1,000 mcg Subcutaneous Daily    epoetin eduarda (PROCRIT) injection  10,000 Units Subcutaneous Every Mon, Wed, Fri    iron sucrose (VENOFER) IVPB  100 mg Intravenous Daily    pantoprazole  40 mg Intravenous Daily    potassium phosphate IVPB  30 mmol Intravenous Once    tamsulosin  0.4 mg Oral Daily     Continuous Infusions:    albumin human 25% 12.5 g (04/03/17 0922)    dextrose 5 % and 0.45 % NaCl with KCl 20 mEq 75 mL/hr at 04/03/17 0811    hydromorphone in 0.9 % NaCl 6 mg/30 ml       PRN Meds:sodium chloride, albuterol sulfate, diphenhydrAMINE, hydrALAZINE, labetalol, naloxone, ondansetron    Allergies:   Review of patient's allergies indicates:   Allergen Reactions    Epinephrine Other (See Comments)     Carcinoid patient       Labs:    Recent Labs  Lab 04/03/17  0812   INR 1.1       Recent Labs  Lab 04/03/17  0500   WBC 7.10   HGB 5.6*   HCT 17.1*   MCV 91   *      Recent Labs  Lab 04/03/17  0500   *      K 3.8      CO2 31*   BUN 13   CREATININE 0.8   CALCIUM 8.0*   MG 2.2   *   *   ALBUMIN 3.1*   BILITOT 1.1*         Vitals (Most Recent):  Temp: 98.5 °F (36.9 °C) (04/03/17 1217)  Pulse: 108 (04/03/17 1217)  Resp: 20 (04/03/17  1217)  BP: (!) 146/85 (04/03/17 1217)  SpO2: (!) 94 % (04/03/17 1217)    Plan:   1. Local anesthesia  2. Hold anticoagulants.  3.  scheduled for thoracentesis.    Caitlin Waters MD  Staff Radiologist  Department of Radiology  Pager: [phone]

## 2017-04-04 LAB
ALBUMIN SERPL BCP-MCNC: 3.4 G/DL
ALP SERPL-CCNC: 69 U/L
ALT SERPL W/O P-5'-P-CCNC: 121 U/L
ANION GAP SERPL CALC-SCNC: 7 MMOL/L
ANISOCYTOSIS BLD QL SMEAR: SLIGHT
AST SERPL-CCNC: 70 U/L
BASOPHILS # BLD AUTO: ABNORMAL K/UL
BASOPHILS NFR BLD: 0 %
BILIRUB SERPL-MCNC: 1.3 MG/DL
BUN SERPL-MCNC: 9 MG/DL
CALCIUM SERPL-MCNC: 8.1 MG/DL
CHLORIDE SERPL-SCNC: 101 MMOL/L
CO2 SERPL-SCNC: 30 MMOL/L
CREAT SERPL-MCNC: 0.7 MG/DL
DIFFERENTIAL METHOD: ABNORMAL
EOSINOPHIL # BLD AUTO: ABNORMAL K/UL
EOSINOPHIL NFR BLD: 3 %
ERYTHROCYTE [DISTWIDTH] IN BLOOD BY AUTOMATED COUNT: 16.6 %
EST. GFR  (AFRICAN AMERICAN): >60 ML/MIN/1.73 M^2
EST. GFR  (NON AFRICAN AMERICAN): >60 ML/MIN/1.73 M^2
GLUCOSE SERPL-MCNC: 154 MG/DL
GRAM STN SPEC: NORMAL
GRAM STN SPEC: NORMAL
HCT VFR BLD AUTO: 21 %
HGB BLD-MCNC: 7 G/DL
HYPOCHROMIA BLD QL SMEAR: ABNORMAL
LYMPHOCYTES # BLD AUTO: ABNORMAL K/UL
LYMPHOCYTES NFR BLD: 15 %
MAGNESIUM SERPL-MCNC: 2.3 MG/DL
MCH RBC QN AUTO: 29.5 PG
MCHC RBC AUTO-ENTMCNC: 33.3 %
MCV RBC AUTO: 89 FL
MONOCYTES # BLD AUTO: ABNORMAL K/UL
MONOCYTES NFR BLD: 6 %
NEUTROPHILS NFR BLD: 66 %
NEUTS BAND NFR BLD MANUAL: 10 %
PHOSPHATE SERPL-MCNC: 1.7 MG/DL
PLATELET # BLD AUTO: 133 K/UL
PLATELET BLD QL SMEAR: ABNORMAL
PMV BLD AUTO: 9.8 FL
POLYCHROMASIA BLD QL SMEAR: ABNORMAL
POTASSIUM SERPL-SCNC: 3.5 MMOL/L
PROT SERPL-MCNC: 5.4 G/DL
RBC # BLD AUTO: 2.37 M/UL
SODIUM SERPL-SCNC: 138 MMOL/L
WBC # BLD AUTO: 8.43 K/UL

## 2017-04-04 PROCEDURE — 97110 THERAPEUTIC EXERCISES: CPT

## 2017-04-04 PROCEDURE — 97530 THERAPEUTIC ACTIVITIES: CPT

## 2017-04-04 PROCEDURE — 83735 ASSAY OF MAGNESIUM: CPT

## 2017-04-04 PROCEDURE — 85007 BL SMEAR W/DIFF WBC COUNT: CPT

## 2017-04-04 PROCEDURE — 94770 HC EXHALED C02 TEST: CPT

## 2017-04-04 PROCEDURE — P9047 ALBUMIN (HUMAN), 25%, 50ML: HCPCS | Performed by: SURGERY

## 2017-04-04 PROCEDURE — C9113 INJ PANTOPRAZOLE SODIUM, VIA: HCPCS | Performed by: STUDENT IN AN ORGANIZED HEALTH CARE EDUCATION/TRAINING PROGRAM

## 2017-04-04 PROCEDURE — 63600175 PHARM REV CODE 636 W HCPCS: Performed by: SURGERY

## 2017-04-04 PROCEDURE — 84100 ASSAY OF PHOSPHORUS: CPT

## 2017-04-04 PROCEDURE — 85027 COMPLETE CBC AUTOMATED: CPT

## 2017-04-04 PROCEDURE — 27100171 HC OXYGEN HIGH FLOW UP TO 24 HOURS

## 2017-04-04 PROCEDURE — 25000003 PHARM REV CODE 250: Performed by: STUDENT IN AN ORGANIZED HEALTH CARE EDUCATION/TRAINING PROGRAM

## 2017-04-04 PROCEDURE — 97802 MEDICAL NUTRITION INDIV IN: CPT

## 2017-04-04 PROCEDURE — 63600175 PHARM REV CODE 636 W HCPCS: Performed by: STUDENT IN AN ORGANIZED HEALTH CARE EDUCATION/TRAINING PROGRAM

## 2017-04-04 PROCEDURE — 97116 GAIT TRAINING THERAPY: CPT

## 2017-04-04 PROCEDURE — 20000000 HC ICU ROOM

## 2017-04-04 PROCEDURE — 25000003 PHARM REV CODE 250: Performed by: SURGERY

## 2017-04-04 PROCEDURE — 94761 N-INVAS EAR/PLS OXIMETRY MLT: CPT

## 2017-04-04 PROCEDURE — 80053 COMPREHEN METABOLIC PANEL: CPT

## 2017-04-04 RX ORDER — SIMETHICONE 80 MG
150 TABLET,CHEWABLE ORAL EVERY 6 HOURS PRN
Status: DISCONTINUED | OUTPATIENT
Start: 2017-04-04 | End: 2017-04-12 | Stop reason: HOSPADM

## 2017-04-04 RX ADMIN — SODIUM PHOSPHATE, MONOBASIC, MONOHYDRATE AND SODIUM PHOSPHATE, DIBASIC, ANHYDROUS 20.01 MMOL: 276; 142 INJECTION, SOLUTION INTRAVENOUS at 08:04

## 2017-04-04 RX ADMIN — ASCORBIC ACID, VITAMIN A PALMITATE, CHOLECALCIFEROL, THIAMINE HYDROCHLORIDE, RIBOFLAVIN-5 PHOSPHATE SODIUM, PYRIDOXINE HYDROCHLORIDE, NIACINAMIDE, DEXPANTHENOL, ALPHA-TOCOPHEROL ACETATE, VITAMIN K1, FOLIC ACID, BIOTIN, CYANOCOBALAMIN: 200; 3300; 200; 6; 3.6; 6; 40; 15; 10; 150; 600; 60; 5 INJECTION, SOLUTION INTRAVENOUS at 10:04

## 2017-04-04 RX ADMIN — ALBUMIN (HUMAN) 12.5 G: 12.5 SOLUTION INTRAVENOUS at 01:04

## 2017-04-04 RX ADMIN — ALBUMIN (HUMAN) 12.5 G: 12.5 SOLUTION INTRAVENOUS at 11:04

## 2017-04-04 RX ADMIN — TAMSULOSIN HYDROCHLORIDE 0.4 MG: 0.4 CAPSULE ORAL at 08:04

## 2017-04-04 RX ADMIN — ALBUMIN (HUMAN) 12.5 G: 12.5 SOLUTION INTRAVENOUS at 05:04

## 2017-04-04 RX ADMIN — ALBUMIN (HUMAN) 12.5 G: 12.5 SOLUTION INTRAVENOUS at 08:04

## 2017-04-04 RX ADMIN — ALBUMIN (HUMAN) 12.5 G: 12.5 SOLUTION INTRAVENOUS at 03:04

## 2017-04-04 RX ADMIN — SIMETHICONE 160 MG: 80 TABLET, CHEWABLE ORAL at 11:04

## 2017-04-04 RX ADMIN — CYANOCOBALAMIN 1000 MCG: 1000 INJECTION, SOLUTION INTRAMUSCULAR; SUBCUTANEOUS at 08:04

## 2017-04-04 RX ADMIN — Medication: at 06:04

## 2017-04-04 RX ADMIN — PANTOPRAZOLE SODIUM 40 MG: 40 INJECTION, POWDER, FOR SOLUTION INTRAVENOUS at 08:04

## 2017-04-04 RX ADMIN — IRON SUCROSE 100 MG: 20 INJECTION, SOLUTION INTRAVENOUS at 08:04

## 2017-04-04 RX ADMIN — I.V. FAT EMULSION 250 ML: 20 EMULSION INTRAVENOUS at 10:04

## 2017-04-04 NOTE — PT/OT/SLP PROGRESS
"Occupational Therapy  Treatment    Javy Thompson   MRN: [id number]   Admitting Diagnosis: Malignant carcinoid tumor of ileum    OT Date of Treatment: 17   OT Start Time: 1250  OT Stop Time: 1314  OT Total Time (min): 24 min    Billable Minutes:  Therapeutic Activity 10 and Therapeutic Exercise 14    General Precautions: Standard, fall  Orthopedic Precautions: N/A  Braces:           Subjective:  Communicated with RN prior to session.  "Yes, I am ready to get out of bed."    Pain Ratin/10  Location - Side: Bilateral  Location - Orientation: generalized  Location: abdomen  Pain Addressed: Reposition, Cessation of Activity  Pain Rating Post-Intervention: 3/10    Objective:  Patient found with:  (multiple ICU lines and monitors)     Functional Mobility:  Bed Mobility:  Rolling/Turning Right: Stand by assistance  Scooting/Bridging: Stand by Assistance  Supine to Sit: Stand by Assistance    Transfers:   Sit <> Stand Assistance: Contact Guard Assistance  Sit <> Stand Assistive Device: No Assistive Device  Bed <> Chair Technique: Stand Pivot  Bed <> Chair Transfer Assistance: Contact Guard Assistance  Bed <> Chair Assistive Device: No Assistive Device    Functional Ambulation: Pt took 4 steps to chair with HHA.                          Balance:   Static Sit: GOOD: Takes MODERATE challenges from all directions  Dynamic Sit: GOOD: Maintains balance through MODERATE excursions of active trunk movement  Static Stand: FAIR: Maintains without assist but unable to take challenges  Dynamic stand: FAIR: Needs CONTACT GUARD during gait    Therapeutic Activities and Exercises:  Pt performed BUE AROM ex 2 x 10 reps all jts/planes with short rest breaks between sets. Progress to Tband next session and trunk rotations/strecthes if tolerated.     AM-PAC 6 CLICK ADL   How much help from another person does this patient currently need?   1 = Unable, Total/Dependent Assistance  2 = A lot, Maximum/Moderate Assistance  3 = A little, " Minimum/Contact Guard/Supervision  4 = None, Modified [place]/Independent    Putting on and taking off regular lower body clothing? : 3  Bathing (including washing, rinsing, drying)?: 3  Toileting, which includes using toilet, bedpan, or urinal? : 3  Putting on and taking off regular upper body clothing?: 4  Taking care of personal grooming such as brushing teeth?: 3  Eating meals?: 4  Total Score: 20     AM-PAC Raw Score CMS G-Code Modifier Level of Impairment Assistance   6 % Total / Unable   7 - 9 CM 80 - 100% Maximal Assist   10 - 14 CL 60 - 80% Moderate Assist   15 - 19 CK 40 - 60% Moderate Assist   20 - 22 CJ 20 - 40% Minimal Assist   23 CI 1-20% SBA / CGA   24 CH 0% Independent/ Mod I     Patient left up in chair with all lines intact, call button in reach, RN notified and spouse present    ASSESSMENT:  Javy Thompson is a 47 y.o. male with a medical diagnosis of Malignant carcinoid tumor of ileum and presents with pain and decreased overall strength, endurance and [place] with ADL's and fx mobility. Pt progressing towards goals. Continue OT services to address functional goals    .    Rehab identified problem list/impairments: Rehab identified problem list/impairments: weakness, impaired endurance, impaired self care skills, impaired functional mobilty, pain    Rehab potential is good.    Activity tolerance: Good    Discharge recommendations:       Barriers to discharge:      Equipment recommendations:       GOALS:   Occupational Therapy Goals        Problem: Occupational Therapy Goal    Goal Priority Disciplines Outcome Interventions   Occupational Therapy Goal     OT, PT/OT Ongoing (interventions implemented as appropriate)    Description:  Goals to be met by: 5/2/17     Patient will increase functional independence with ADLs by performing:    LE Dressing with Modified [place].  Grooming while standing with Modified [place].  Toileting from toilet with Modified [place] for  hygiene and clothing management.   Supine to sit with Modified [place].  Stand pivot transfers with Modified [place].  Toilet transfer to toilet with Modified [place].  Upper extremity exercise program x10 reps per handout, with independence.                Plan:  Patient to be seen 5 x/week to address the above listed problems via self-care/home management, therapeutic activities, therapeutic exercises  Plan of Care expires: 05/02/17  Plan of Care reviewed with: patient, spouse         RUSS Allan  04/04/2017

## 2017-04-04 NOTE — PROGRESS NOTES
Received report from off going nurse Servine. Bedside report given, patient sitting up in the chair connected to all monitors. IVF's infusing as ordered. Cabrera cath draining nato-colored urine, without any sediments. All detailed assessments per flow sheet.  2045 Patient requested to return back to bed. Assistance  Given, patient tolerated well. All monitors readjusted.   2110 Patient wife here for visiting, patient desires a bed bath and his wife is here to given it to him.  2120 His wife gave him a complete bed bath   2230 Patient resting at present and have repositioned himself on left side, pillow placed for comfort.

## 2017-04-04 NOTE — PLAN OF CARE
Problem: Patient Care Overview  Goal: Plan of Care Review  Outcome: Ongoing (interventions implemented as appropriate)  Pt afebrile AAOX4, free of falls and ambulate to bedside chair today with physical therapy, PCA  Pump for pain TPN for nutrition, NG tube clamped bed rails

## 2017-04-04 NOTE — PROGRESS NOTES
[facility] - Neuroendocrine Surgery Service  ICU Progress Note     Admission Summary  In brief, Javy Thompson is a 47 y.o. male admitted on 3/31/2017 following exlap, R hepatectomy, SBR, cholecystectomy, mesenteric lymphadenectomy for neuroendocrine tumor.     HD#4  POD#4 Days Post-Op     Interval HPI  ELISSA. Feeling ok. Reports discomfort with evacuation of pleural effusion yesterday, but subsequent improvement in breathing. Pain controlled on PCA. -N/V. -Flatus/BM. Up to chair with PT.  Transfused 2u PRBCs due to low H/H    Medications  Scheduled Meds:   cyanocobalamin  1,000 mcg Subcutaneous Daily    epoetin eduarda (PROCRIT) injection  10,000 Units Subcutaneous Every Mon, Wed, Fri    fat emulsion 20%  250 mL Intravenous Daily    iron sucrose (VENOFER) IVPB  100 mg Intravenous Daily    pantoprazole  40 mg Intravenous Daily    sodium phosphate IVPB  20.01 mmol Intravenous Once    tamsulosin  0.4 mg Oral Daily     Continuous Infusions:   albumin human 25% 12.5 g (17 0513)    Amino acid 2.75% - dextrose 10% (CLINIMIX-E) solution with additives ( 1L provides 27.5 gm AA, 100 gm CHO (340 kcal/L dextrose), Na 35, K 30, Mg 5, Ca 4.5, Acetate 51, Cl 39, Phos 15) 75 mL/hr at 17 1932    hydromorphone in 0.9 % NaCl 6 mg/30 ml       PRN Meds:sodium chloride, albuterol sulfate, diphenhydrAMINE, hydrALAZINE, labetalol, naloxone, ondansetron     Neuro  GCS: [id number]  Exam: AAOx3  Pain/Sedation: dPCA     Pulmonary  Vitals: Resp  Av.2  Min: 1  Max: 32  SpO2  Av.9 %  Min: 90 %  Max: 100 %  Exam: Comfortable on 1LNC  CXR: -  Ventilator/oxygen settings: Nasal canula, 1L  ABG (most recent): -  Incentive spirometry / RT treatments: IS  Chest tube: -     Cardiovascular  Vitals: BP  Min: 115/76  Max: 161/81  Pulse  Av.9  Min: 88  Max: 132  Exam: RRR, HR 90s  Vasoactive agents: -     Renal  Ins Outs   PO 80 Urine 2170  mL, 0.9 cc/kg/hr   TF - Stool -   IVF 1240    Blood products 700 Drain: -    Dialysis  -   Net I/Os (24hr): -370  Net I/O (admission): 1600    Recent Labs      17   0500  17   05   BUN  19  13  9   CREATININE  0.9  0.8  0.7     FEN/GI  Abdominal Exam: soft, nd, aTTP at incision, healing well without evidence of infection  Diet: Diet NPO Except for: Medication  Amino acid 2.75% - dextrose 10% (CLINIMIX-E) solution with additives ( 1L provides 27.5 gm AA, 100 gm CHO (340 kcal/L dextrose), Na 35, K 30, Mg 5, Ca 4.5, Acetate 51, Cl 39, Phos 15)  Cabrera: Collecting clear yellow urine  Last BM: -  Supplementation: Venofer    Recent Labs      17   05017   NA  139  140  138   K  4.2  3.8  3.5   CL  106  105  101   CO2  28  31*  30*   CALCIUM  8.0*  8.0*  8.1*   MG  2.0  2.2  2.3   PHOS  1.4*  1.1*  1.7*   PROT  4.8*  5.1*  5.4*   ALBUMIN  2.9*  3.1*  3.4*   BILITOT  1.1*  1.1*  1.3*   AST  416*  161*  70*   ALKPHOS  56  59  69   ALT  375*  208*  121*     Heme  Transfusions: 2u PRBCs  Supplementation: Procrit  Recent Labs      17   0500  17   0812  17   0514   HGB  7.1*  5.6*   --   7.0*   HCT  20.8*  17.1*   --   21.0*   PLT  114*  105*   --   133*   INR   --    --   1.1   --      ID  Temp  Av.9 °F (37.2 °C)  Min: 97.4 °F (36.3 °C)  Max: 99.8 °F (37.7 °C)   Recent Labs      17   0450  17   0500  17   05   WBC  9.79  7.10  8.43     Antibiotics: - day -/-  Cultures:   -R lung fluid 4/3: NGTD      Endocrine  Recent Labs      17   0450  17   0500  17   0514   GLU  170*  145*  154*     Insulin treatment: -  Octreotide treatment: -     Musculoskeletal/Wounds  Extremity/wound exam: CAMERON  Weight bearing status: WBAT     Prophylaxis  DVT: Lovenox  GI: Protonix     Lines  PIVs, RIJ TLC, Cabrera, NGT.     Restraints  Face to face evaluation of need for restraints on rounds today:  Currently restrained? No   Needs restraints? No      ASSESSMENT AND PLAN  Neuro  -Continue  dPCA  Pulm  -Wean O2 as tolerated, IS, nebs as needed  CV  -Stable, monitor  Renal  -Adequate UOP  -DC kaplan  FEN/GI  -Continue NPO, NGT  -Continue TPN, Iron supplementation  -Prealbumin, CRP qM/TH while on TPN  Heme  -Appropriate response following transfusion.   -Trend H/H, transfuse for <7/21  ID  -Afebrile, no leukocytosis  -Follow pleural fluid cultures  Endo  -Continue current care  MSK  -Continue PT/OT  -Encourage OOB, ambulation  Dispo  -Will discuss floor transfer with staff    Nick English MD  4/4/2017 8:36 AM

## 2017-04-04 NOTE — PLAN OF CARE
Problem: Occupational Therapy Goal  Goal: Occupational Therapy Goal  Goals to be met by: 5/2/17     Patient will increase functional independence with ADLs by performing:    LE Dressing with Modified [place].  Grooming while standing with Modified [place].  Toileting from toilet with Modified [place] for hygiene and clothing management.   Supine to sit with Modified [place].  Stand pivot transfers with Modified [place].  Toilet transfer to toilet with Modified [place].  Upper extremity exercise program x10 reps per handout, with independence.   Outcome: Ongoing (interventions implemented as appropriate)  Javy Thompson is a 47 y.o. male with a medical diagnosis of Malignant carcinoid tumor of ileum and presents with pain and decreased overall strength, endurance and [place] with ADL's and fx mobility. Pt progressing towards goals. Continue OT services to address functional goals  PHOEBE Allan/EVELYN

## 2017-04-04 NOTE — PROGRESS NOTES
[facility]  Adult Nutrition  Consult Note    SUMMARY     Recommendations    Recommendation/Intervention:   1. Advance diet as medically able to GI Soft with supplements tid   2. Current PPN providing 50% EEN If patient to continue on PPN >3 days:      - Rec change to 4.25/10 @ 100 ml hr + IVFE Daily to better meet EEN       -To provide: 1774 kcal, 102 g pro, 2400 ml fluid (75% EEN)  GIR: 1.7     - Monitor TG weekly with lipid infusions  3. RD to monitor    Goals: Patient to meet >75% EEN  Nutrition Goal Status: new  Communication of RD Recs: other (comment) (sticky note)    Continuum of Care Plan    Referral to Outpatient Services:  (D/C planning: To soon to determine)    Reason for Assessment    Reason for Assessment: new TPN  Diagnosis:  (NET of Ileum)  Relevent Medical History: liver, lymph NET   Interdisciplinary Rounds: did not attend     General Information Comments: NGT to suction. PPN initiated. Patient reports weight stable PTA. No history of chewing/swallowing issues. No issues with n/v. Last po intake 3/31- was following low fiber diet at home.    Nutrition Prescription Ordered    Current Diet Order: NPO     Current Nutrition Support Formula Ordered:  (Clinimex 2.75/10)  Current Nutrition Support Rate Ordered: 75 (ml)  Current Nutrition Support Frequency Ordered: ml/hr  Oral Nutrition Supplement: IVFE Daily     Evaluation of Received Nutrients/Fluid Intake     Parenteral Calories (kcal): 1310  Parenteral Protein (gm): 50  Parenteral Fluid (mL): 1800     % Kcal Needs: 50%     Protein Required: not meeting needs  % Protein Needs: 45%       Total Fluid Intake (mL/kg):  (per MD)  Fluid Required: not meeting needs     Tolerance:  (GIR: 1.3)     Nutrition Risk Screen     Nutrition Risk Screen: no indicators present    Nutrition/Diet History     Food Preferences: Denies cultural, Quaker, ethnic food preferences   Factors Affecting Nutritional Intake: altered gastrointestinal function, NPO      Labs/Tests/Procedures/Meds     Pertinent Labs Reviewed: reviewed  Pertinent Medications Reviewed: reviewed     Physical Findings    Overall Physical Appearance:  (WDL)  Tubes: nasogastric tube (200 ml output (brown))  Oral/Mouth Cavity: WDL  Skin:  (incision; Favio of 18)    Anthropometrics     Height (inches): 75.98 in  Weight Method: Bed Scale  Weight (kg): 95.3 kg  Ideal Body Weight (IBW), Male: 201.88 lb     % Ideal Body Weight, Male (lb): 104.07 lb     BMI (kg/m2): 25.58  BMI Grade: 25 - 29.9 - overweight        Estimated/Assessed Needs    Weight Used For Calorie Calculations: 95.3 kg (210 lb 1.6 oz)   Height (cm): 193 cm     Energy Need Method: [place]-St Jeor (6850-9089 kcal)     RMR ([place]-St. Jeor Equation): 1927.14      Weight Used For Protein Calculations: 95.3 kg (210 lb 1.6 oz)  Protein Requirements: 115 g    Fluid Need Method: RDA Method     Nutrition Diagnosis    Problem: Altered GI Function  Etiology: NET  As Evidenced by: s/pSBR; PPN; NPO  Nutrition Diagnosis Status: New     Monitor and Evaluation    Food and Nutrient Intake: energy intake, parenteral nutrition intake  Food and Nutrient Adminstration: diet order, enteral and parenteral nutrition administration  Anthropometric Measurements: weight, weight change  Biochemical Data, Medical Tests and Procedures: electrolyte and renal panel, gastrointestinal profile, lipid profile  Nutrition-Focused Physical Findings: overall appearance    Nutrition Risk    Level of Risk:  (2 x week)    Nutrition Follow-Up    RD Follow-up?: Yes    Assessment and Plan    No new Assessment & Plan notes have been filed under this hospital service since the last note was generated.  Service: Nutrition

## 2017-04-04 NOTE — PLAN OF CARE
Problem: Physical Therapy Goal  Goal: Physical Therapy Goal  Goals to be met by: 17     Patient will increase functional independence with mobility by performin. Supine to sit with Modified [place]  2. Sit to supine with Modified [place]  3. Sit to stand transfer with Modified [place]  4. Bed to chair transfer with Modified [place] using appropriate AD if needed  5. Gait x 150 feet with Modified [place] using appropriate AD if needed.  6. Ascend/descend 16 stairs with bilateral Handrails Supervision       Recommendations: Pt to benefit from continued PT intervention to improve independence with functional mobility/ADL. Recommend d/c home c/ family. Further d/c recs pending progress c/ therapy.    Outcome: Ongoing (interventions implemented as appropriate)  Continue working toward goals.

## 2017-04-04 NOTE — PLAN OF CARE
Problem: Patient Care Overview  Goal: Plan of Care Review  Received pt on 2L NC; Sats 97%; decreased to 1L NC; Sats 97%; tolerating well; IS completed without incident; will continue to monitor.

## 2017-04-04 NOTE — PT/OT/SLP PROGRESS
Physical Therapy  Treatment    Javy Thompson   MRN: [id number]   Admitting Diagnosis: Malignant carcinoid tumor of ileum    PT Received On: 04/04/17  PT Start Time: 1630     PT Stop Time: 1658    PT Total Time (min): 28 min       Billable Minutes:  Therapeutic Activity 18 and Therapeutic Exercise 10    Treatment Type: Treatment  PT/PTA: PTA     PTA Visit Number: 2       General Precautions: Standard,  (universal)  Orthopedic Precautions: N/A   Braces:      Do you have any cultural, spiritual, Mormon conflicts, given your current situation?: No    Subjective:  Communicated with RN (Manjula) prior to session.  Pt agreed to tx.  Pt sitting up in b/s chair upon entering the room.    Pain Rating: 3/10  Location - Side: Bilateral  Location - Orientation: generalized  Location: abdomen  Pain Addressed: Reposition, Nurse notified, Pre-medicate for activity  Pain Rating Post-Intervention: 3/10    Objective:   Patient found with: oxygen, telemetry, central line (full ICU monitoring)    Functional Mobility:  Bed Mobility:        Transfers:  Sit <> Stand Assistance: Contact Guard Assistance (x 4 reps)  Sit <> Stand Assistive Device: No Assistive Device    Gait:   Gait Distance: 4 small steps forward and back x 6 reps without A.D. HHA(CGA/[place]) for balance. Distance limited by multiple lines.  Assistance 1: Minimum assistance, Contact Guard Assistance  Gait Assistive Device: No device  Gait Pattern: reciprocal  Gait Deviation(s): decreased shoshana, decreased velocity of limb motion, decreased step length, decreased stride length, decreased toe-to-floor clearance    Stairs:      Balance:   Static Sit: GOOD: Takes MODERATE challenges from all directions  Dynamic Sit: GOOD: Maintains balance through MODERATE excursions of active trunk movement  Static Stand: FAIR+: Takes MINIMAL challenges from all directions with CGA  Dynamic stand: FAIR: Needs CONTACT GUARD during gait     Therapeutic Activities and Exercises:  Nsg reports that pt  had his catheter removed at 1200 today.  Pt was trying to urinate and was having difficulty.  Nsg asked pt if he would be willing to work with P.T. And then try again after moving around some.  Pt agreed.  Seated BLE therex: AP, LAQ, hip flexion/ABD/ADD, and glut sets x 15 reps with one brief rest break secondary to HR 139bpm after last exercise.  HR decreased to 105bpm after rest of approx 4 mins.  Standing exercises: marching in place x 15 reps.    Static standing x 1 min with SBA/CGA.  AMB: as above with one bout of unsteadiness without LOB.    AM-PAC 6 CLICK MOBILITY  How much help from another person does this patient currently need?   1 = Unable, Total/Dependent Assistance  2 = A lot, Maximum/Moderate Assistance  3 = A little, Minimum/Contact Guard/Supervision  4 = None, Modified [place]/Independent    Turning over in bed (including adjusting bedclothes, sheets and blankets)?: 4  Sitting down on and standing up from a chair with arms (e.g., wheelchair, bedside commode, etc.): 3  Moving from lying on back to sitting on the side of the bed?: 4  Moving to and from a bed to a chair (including a wheelchair)?: 3  Need to walk in hospital room?: 3  Climbing 3-5 steps with a railing?: 3  Total Score: 20    AM-PAC Raw Score CMS G-Code Modifier Level of Impairment Assistance   6 % Total / Unable   7 - 9 CM 80 - 100% Maximal Assist   10 - 14 CL 60 - 80% Moderate Assist   15 - 19 CK 40 - 60% Moderate Assist   20 - 22 CJ 20 - 40% Minimal Assist   23 CI 1-20% SBA / CGA   24 CH 0% Independent/ Mod I     Patient left up in chair with all lines intact, call button in reach and RN notified.    Assessment:  Javy Thompson is a 47 y.o. male with a medical diagnosis of Malignant carcinoid tumor of ileum and presents with decreased strength, endurance, and balance.  Pt stated he felt better than yesterday and demonstrated increased mobility.  Pt would continue to benefit from P.T. To assist pt's return back to  PLOF.    Rehab identified problem list/impairments: Rehab identified problem list/impairments: weakness, impaired endurance, impaired functional mobilty, gait instability, pain    Rehab potential is good.    Activity tolerance: Good    Discharge recommendations: Discharge Facility/Level Of Care Needs: home     Barriers to discharge: Barriers to Discharge: Inaccessible home environment    Equipment recommendations: Equipment Needed After Discharge: shower chair     GOALS:   Physical Therapy Goals        Problem: Physical Therapy Goal    Goal Priority Disciplines Outcome Goal Variances Interventions   Physical Therapy Goal     PT/OT, PT Ongoing (interventions implemented as appropriate)     Description:  Goals to be met by: 17     Patient will increase functional independence with mobility by performin. Supine to sit with Modified [place]  2. Sit to supine with Modified [place]  3. Sit to stand transfer with Modified [place]  4. Bed to chair transfer with Modified [place] using appropriate AD if needed  5. Gait  x 150 feet with Modified [place] using appropriate AD if needed.  6. Ascend/descend 16 stairs with bilateral Handrails Supervision       Recommendations: Pt to benefit from continued PT intervention to improve independence with functional mobility/ADL. Recommend d/c home c/ family. Further d/c recs pending progress c/ therapy.                  PLAN:    Patient to be seen 6 x/week  to address the above listed problems via gait training, therapeutic activities, therapeutic exercises, neuromuscular re-education  Plan of Care expires: 17  Plan of Care reviewed with: patient         Dee Dee Aaron, NEERU  2017

## 2017-04-04 NOTE — PLAN OF CARE
Recommendations     Recommendation/Intervention:   1. Advance diet as medically able to GI Soft with supplements tid   2. Current PPN providing 50% EEN If patient to continue on PPN >3 days:   - Rec change to 4.25/10 @ 100 ml hr + IVFE Daily to better meet EEN   -To provide: 1774 kcal, 102 g pro, 2400 ml fluid (75% EEN) GIR: 1.7  - Monitor TG weekly with lipid infusions  3. RD to monitor     Goals: Patient to meet >75% EEN  Nutrition Goal Status: new  Communication of RD Recs: other (comment) (sticky note)     Continuum of Care Plan     Referral to Outpatient Services: (D/C planning: To soon to determine)

## 2017-04-04 NOTE — PLAN OF CARE
Problem: Patient Care Overview  Goal: Plan of Care Review  Pt received on nasal cannula at 1  lpm. SPO2  97 %. Pt doing IS on his own. Pt in no apparent respiratory distress.  Will continue to monitor.

## 2017-04-05 PROBLEM — R33.8 ACUTE RETENTION OF URINE: Status: ACTIVE | Noted: 2017-04-05

## 2017-04-05 LAB
ALBUMIN SERPL BCP-MCNC: 3.5 G/DL
ALP SERPL-CCNC: 76 U/L
ALT SERPL W/O P-5'-P-CCNC: 74 U/L
ANION GAP SERPL CALC-SCNC: 8 MMOL/L
APPEARANCE FLD: NORMAL
AST SERPL-CCNC: 38 U/L
BACTERIA #/AREA URNS HPF: ABNORMAL /HPF
BASOPHILS # BLD AUTO: 0.03 K/UL
BASOPHILS NFR BLD: 0.2 %
BILIRUB FLD-MCNC: 4.3 MG/DL
BILIRUB SERPL-MCNC: 2 MG/DL
BILIRUB UR QL STRIP: ABNORMAL
BODY FLD TYPE: NORMAL
BODY FLUID COMMENTS: NORMAL
BODY FLUID SOURCE, BILIRUBIN: NORMAL
BUN SERPL-MCNC: 10 MG/DL
CALCIUM SERPL-MCNC: 8.2 MG/DL
CHLORIDE SERPL-SCNC: 98 MMOL/L
CLARITY UR: ABNORMAL
CO2 SERPL-SCNC: 30 MMOL/L
COLOR FLD: NORMAL
COLOR UR: ABNORMAL
CREAT SERPL-MCNC: 0.8 MG/DL
DIFFERENTIAL METHOD: ABNORMAL
EOSINOPHIL # BLD AUTO: 0.1 K/UL
EOSINOPHIL NFR BLD: 0.9 %
ERYTHROCYTE [DISTWIDTH] IN BLOOD BY AUTOMATED COUNT: 16 %
EST. GFR  (AFRICAN AMERICAN): >60 ML/MIN/1.73 M^2
EST. GFR  (NON AFRICAN AMERICAN): >60 ML/MIN/1.73 M^2
GLUCOSE SERPL-MCNC: 173 MG/DL
GLUCOSE UR QL STRIP: ABNORMAL
HCT VFR BLD AUTO: 23.5 %
HGB BLD-MCNC: 7.6 G/DL
HGB UR QL STRIP: ABNORMAL
HYALINE CASTS #/AREA URNS LPF: 1 /LPF
KETONES UR QL STRIP: NEGATIVE
LEUKOCYTE ESTERASE UR QL STRIP: NEGATIVE
LYMPHOCYTES # BLD AUTO: 1.2 K/UL
LYMPHOCYTES NFR BLD: 10 %
LYMPHOCYTES NFR FLD MANUAL: 10 %
MAGNESIUM SERPL-MCNC: 2.2 MG/DL
MCH RBC QN AUTO: 28.7 PG
MCHC RBC AUTO-ENTMCNC: 32.3 %
MCV RBC AUTO: 89 FL
MESOTHL CELL NFR FLD MANUAL: 41 %
MICROSCOPIC COMMENT: ABNORMAL
MONOCYTES # BLD AUTO: 1.1 K/UL
MONOCYTES NFR BLD: 9.1 %
MONOS+MACROS NFR FLD MANUAL: 6 %
NEUTROPHILS # BLD AUTO: 9.5 K/UL
NEUTROPHILS NFR BLD: 79.8 %
NEUTROPHILS NFR FLD MANUAL: 43 %
NITRITE UR QL STRIP: POSITIVE
PH UR STRIP: 6 [PH] (ref 5–8)
PHOSPHATE SERPL-MCNC: 2.6 MG/DL
PLATELET # BLD AUTO: 181 K/UL
PMV BLD AUTO: 9.2 FL
POTASSIUM SERPL-SCNC: 3.4 MMOL/L
PROT SERPL-MCNC: 5.7 G/DL
PROT UR QL STRIP: ABNORMAL
RBC # BLD AUTO: 2.65 M/UL
RBC #/AREA URNS HPF: 5 /HPF (ref 0–4)
SODIUM SERPL-SCNC: 136 MMOL/L
SP GR UR STRIP: 1.02 (ref 1–1.03)
URN SPEC COLLECT METH UR: ABNORMAL
UROBILINOGEN UR STRIP-ACNC: ABNORMAL EU/DL
WBC # BLD AUTO: 12.11 K/UL
WBC # FLD: 3450 /CU MM
WBC #/AREA URNS HPF: 3 /HPF (ref 0–5)
WBC CLUMPS URNS QL MICRO: ABNORMAL

## 2017-04-05 PROCEDURE — 87070 CULTURE OTHR SPECIMN AEROBIC: CPT

## 2017-04-05 PROCEDURE — 27000221 HC OXYGEN, UP TO 24 HOURS

## 2017-04-05 PROCEDURE — 83735 ASSAY OF MAGNESIUM: CPT

## 2017-04-05 PROCEDURE — 63600175 PHARM REV CODE 636 W HCPCS: Performed by: STUDENT IN AN ORGANIZED HEALTH CARE EDUCATION/TRAINING PROGRAM

## 2017-04-05 PROCEDURE — 87086 URINE CULTURE/COLONY COUNT: CPT

## 2017-04-05 PROCEDURE — 89051 BODY FLUID CELL COUNT: CPT

## 2017-04-05 PROCEDURE — 94770 HC EXHALED C02 TEST: CPT

## 2017-04-05 PROCEDURE — 82247 BILIRUBIN TOTAL: CPT

## 2017-04-05 PROCEDURE — 84100 ASSAY OF PHOSPHORUS: CPT

## 2017-04-05 PROCEDURE — C9113 INJ PANTOPRAZOLE SODIUM, VIA: HCPCS | Performed by: STUDENT IN AN ORGANIZED HEALTH CARE EDUCATION/TRAINING PROGRAM

## 2017-04-05 PROCEDURE — 63600175 PHARM REV CODE 636 W HCPCS: Performed by: SURGERY

## 2017-04-05 PROCEDURE — 25500020 PHARM REV CODE 255: Performed by: SURGERY

## 2017-04-05 PROCEDURE — 0W9930Z DRAINAGE OF RIGHT PLEURAL CAVITY WITH DRAINAGE DEVICE, PERCUTANEOUS APPROACH: ICD-10-PCS | Performed by: RADIOLOGY

## 2017-04-05 PROCEDURE — 87075 CULTR BACTERIA EXCEPT BLOOD: CPT

## 2017-04-05 PROCEDURE — 94761 N-INVAS EAR/PLS OXIMETRY MLT: CPT

## 2017-04-05 PROCEDURE — 25000003 PHARM REV CODE 250: Performed by: SURGERY

## 2017-04-05 PROCEDURE — 11000001 HC ACUTE MED/SURG PRIVATE ROOM

## 2017-04-05 PROCEDURE — 87102 FUNGUS ISOLATION CULTURE: CPT

## 2017-04-05 PROCEDURE — 80053 COMPREHEN METABOLIC PANEL: CPT

## 2017-04-05 PROCEDURE — P9047 ALBUMIN (HUMAN), 25%, 50ML: HCPCS | Performed by: SURGERY

## 2017-04-05 PROCEDURE — 85025 COMPLETE CBC W/AUTO DIFF WBC: CPT

## 2017-04-05 PROCEDURE — 87116 MYCOBACTERIA CULTURE: CPT

## 2017-04-05 PROCEDURE — 97535 SELF CARE MNGMENT TRAINING: CPT

## 2017-04-05 PROCEDURE — 25000003 PHARM REV CODE 250: Performed by: STUDENT IN AN ORGANIZED HEALTH CARE EDUCATION/TRAINING PROGRAM

## 2017-04-05 PROCEDURE — 63600175 PHARM REV CODE 636 W HCPCS: Performed by: RADIOLOGY

## 2017-04-05 PROCEDURE — 81000 URINALYSIS NONAUTO W/SCOPE: CPT

## 2017-04-05 PROCEDURE — 87205 SMEAR GRAM STAIN: CPT

## 2017-04-05 RX ORDER — ACETAMINOPHEN 325 MG/1
650 TABLET ORAL EVERY 6 HOURS PRN
Status: DISCONTINUED | OUTPATIENT
Start: 2017-04-05 | End: 2017-04-12 | Stop reason: HOSPADM

## 2017-04-05 RX ORDER — AMOXICILLIN 250 MG
1 CAPSULE ORAL DAILY PRN
Status: DISCONTINUED | OUTPATIENT
Start: 2017-04-05 | End: 2017-04-12 | Stop reason: HOSPADM

## 2017-04-05 RX ORDER — ACETAMINOPHEN 325 MG/1
650 TABLET ORAL
Status: DISCONTINUED | OUTPATIENT
Start: 2017-04-05 | End: 2017-04-12 | Stop reason: HOSPADM

## 2017-04-05 RX ORDER — ENOXAPARIN SODIUM 100 MG/ML
40 INJECTION SUBCUTANEOUS EVERY 24 HOURS
Status: DISCONTINUED | OUTPATIENT
Start: 2017-04-05 | End: 2017-04-08

## 2017-04-05 RX ORDER — KETOROLAC TROMETHAMINE 30 MG/ML
30 INJECTION, SOLUTION INTRAMUSCULAR; INTRAVENOUS EVERY 6 HOURS
Status: COMPLETED | OUTPATIENT
Start: 2017-04-05 | End: 2017-04-08

## 2017-04-05 RX ORDER — FENTANYL CITRATE 50 UG/ML
INJECTION, SOLUTION INTRAMUSCULAR; INTRAVENOUS CODE/TRAUMA/SEDATION MEDICATION
Status: COMPLETED | OUTPATIENT
Start: 2017-04-05 | End: 2017-04-05

## 2017-04-05 RX ORDER — TRAMADOL HYDROCHLORIDE 50 MG/1
50 TABLET ORAL EVERY 6 HOURS PRN
Status: DISCONTINUED | OUTPATIENT
Start: 2017-04-05 | End: 2017-04-08

## 2017-04-05 RX ORDER — MIDAZOLAM HYDROCHLORIDE 1 MG/ML
INJECTION, SOLUTION INTRAMUSCULAR; INTRAVENOUS CODE/TRAUMA/SEDATION MEDICATION
Status: COMPLETED | OUTPATIENT
Start: 2017-04-05 | End: 2017-04-05

## 2017-04-05 RX ADMIN — PANTOPRAZOLE SODIUM 40 MG: 40 INJECTION, POWDER, FOR SOLUTION INTRAVENOUS at 08:04

## 2017-04-05 RX ADMIN — ENOXAPARIN SODIUM 40 MG: 100 INJECTION SUBCUTANEOUS at 05:04

## 2017-04-05 RX ADMIN — TRAMADOL HYDROCHLORIDE 50 MG: 50 TABLET, COATED ORAL at 09:04

## 2017-04-05 RX ADMIN — ERYTHROPOIETIN 10000 UNITS: 10000 INJECTION, SOLUTION INTRAVENOUS; SUBCUTANEOUS at 08:04

## 2017-04-05 RX ADMIN — TRAMADOL HYDROCHLORIDE 50 MG: 50 TABLET, COATED ORAL at 03:04

## 2017-04-05 RX ADMIN — TAMSULOSIN HYDROCHLORIDE 0.4 MG: 0.4 CAPSULE ORAL at 08:04

## 2017-04-05 RX ADMIN — PIPERACILLIN SODIUM AND TAZOBACTAM SODIUM 4.5 G: 4; .5 INJECTION, POWDER, FOR SOLUTION INTRAVENOUS at 12:04

## 2017-04-05 RX ADMIN — CYANOCOBALAMIN 1000 MCG: 1000 INJECTION, SOLUTION INTRAMUSCULAR; SUBCUTANEOUS at 08:04

## 2017-04-05 RX ADMIN — IOHEXOL 100 ML: 350 INJECTION, SOLUTION INTRAVENOUS at 02:04

## 2017-04-05 RX ADMIN — ALBUMIN (HUMAN) 12.5 G: 12.5 SOLUTION INTRAVENOUS at 01:04

## 2017-04-05 RX ADMIN — MIDAZOLAM 1 MG: 1 INJECTION INTRAMUSCULAR; INTRAVENOUS at 02:04

## 2017-04-05 RX ADMIN — KETOROLAC TROMETHAMINE 30 MG: 30 INJECTION, SOLUTION INTRAMUSCULAR at 11:04

## 2017-04-05 RX ADMIN — ALBUMIN (HUMAN) 12.5 G: 12.5 SOLUTION INTRAVENOUS at 06:04

## 2017-04-05 RX ADMIN — KETOROLAC TROMETHAMINE 30 MG: 30 INJECTION, SOLUTION INTRAMUSCULAR at 06:04

## 2017-04-05 RX ADMIN — ALBUMIN (HUMAN) 12.5 G: 12.5 SOLUTION INTRAVENOUS at 10:04

## 2017-04-05 RX ADMIN — SOYBEAN OIL 250 ML: 20 INJECTION, SOLUTION INTRAVENOUS at 09:04

## 2017-04-05 RX ADMIN — SIMETHICONE 160 MG: 80 TABLET, CHEWABLE ORAL at 06:04

## 2017-04-05 RX ADMIN — ACETAMINOPHEN 650 MG: 325 TABLET ORAL at 11:04

## 2017-04-05 RX ADMIN — ASCORBIC ACID, VITAMIN A PALMITATE, CHOLECALCIFEROL, THIAMINE HYDROCHLORIDE, RIBOFLAVIN-5 PHOSPHATE SODIUM, PYRIDOXINE HYDROCHLORIDE, NIACINAMIDE, DEXPANTHENOL, ALPHA-TOCOPHEROL ACETATE, VITAMIN K1, FOLIC ACID, BIOTIN, CYANOCOBALAMIN: 200; 3300; 200; 6; 3.6; 6; 40; 15; 10; 150; 600; 60; 5 INJECTION, SOLUTION INTRAVENOUS at 09:04

## 2017-04-05 RX ADMIN — FENTANYL CITRATE 50 MCG: 50 INJECTION, SOLUTION INTRAMUSCULAR; INTRAVENOUS at 02:04

## 2017-04-05 RX ADMIN — Medication: at 03:04

## 2017-04-05 RX ADMIN — IRON SUCROSE 100 MG: 20 INJECTION, SOLUTION INTRAVENOUS at 08:04

## 2017-04-05 NOTE — PROGRESS NOTES
Report received from off going nurse, Manjula. Patient is sitting up in the chair, without any complaints noted.

## 2017-04-05 NOTE — PROGRESS NOTES
"MD Azul called and discuss everything the patient had been trying to do to help him void. Flomax was given earlier on yesterday. Bladder was scanned noted 400cc of urine was noted. New orders given to replace the kaplan. MD informed on this is the 3rd Kaplan the patient has had since his hospital stay. He states, "he will inform MD Reyes  In the am about my concerns".   Patient and wife will be informed .  0115 Kaplan cath performed, pt tolerated well, noted positive urine return 400 cc plus.  "

## 2017-04-05 NOTE — PROGRESS NOTES
[facility] - Neuroendocrine Surgery Service  ICU Progress Note     Admission Summary  In brief, Javy Thompson is a 47 y.o. male admitted on 3/31/2017 following exlap, R hepatectomy, SBR, cholecystectomy, mesenteric lymphadenectomy for neuroendocrine tumor.     HD#5  POD#4 Days Post-Op     Interval HPI  ELISSA. Feeling ok. -N/V. -Flatus/BM. Up to chair with PT.  Carbera reinserted overnight secondary to retention.    Medications  Scheduled Meds:   cyanocobalamin  1,000 mcg Subcutaneous Daily    epoetin eduarda (PROCRIT) injection  10,000 Units Subcutaneous Every Mon, Wed, Fri    fat emulsion 20%  250 mL Intravenous Daily    iron sucrose (VENOFER) IVPB  100 mg Intravenous Daily    pantoprazole  40 mg Intravenous Daily    tamsulosin  0.4 mg Oral Daily     Continuous Infusions:   albumin human 25% 12.5 g (17 0609)    Amino acid 2.75% - dextrose 10% (CLINIMIX-E) solution with additives ( 1L provides 27.5 gm AA, 100 gm CHO (340 kcal/L dextrose), Na 35, K 30, Mg 5, Ca 4.5, Acetate 51, Cl 39, Phos 15) 75 mL/hr at 17 2218    hydromorphone in 0.9 % NaCl 6 mg/30 ml       PRN Meds:sodium chloride, albuterol sulfate, diphenhydrAMINE, hydrALAZINE, labetalol, naloxone, ondansetron, simethicone     Neuro  GCS: [id number]  Exam: AAOx3  Pain/Sedation: dPCA     Pulmonary  Vitals: Resp  Av.3  Min: 10  Max: 37  SpO2  Av.6 %  Min: 92 %  Max: 99 %  Exam: Comfortable on 1LNC  CXR: Stable  Ventilator/oxygen settings: Nasal canula, 1L  ABG (most recent): -  Incentive spirometry / RT treatments: IS  Chest tube: -     Cardiovascular  Vitals: BP  Min: 117/76  Max: 152/83  Pulse  Av.2  Min: 89  Max: 134  Exam: RRR, HR 90s  Vasoactive agents: -     Renal  Ins Outs   PO  Urine 1595, 0.7 cc/kg/hr   TF - Stool -   IVF 1240 NGT -   Blood products 700 Drain: -    Dialysis -   Net I/Os (24hr): -1595  Net I/O (admission): 34    Recent Labs      04/03/17   0500  17   0514  17   0530   BUN  13  9  10   CREATININE  0.8   0.7  0.8     FEN/GI  Abdominal Exam: soft, nd, aTTP at incision, healing well without evidence of infection  Diet: Diet NPO Except for: Medication  Amino acid 2.75% - dextrose 10% (CLINIMIX-E) solution with additives ( 1L provides 27.5 gm AA, 100 gm CHO (340 kcal/L dextrose), Na 35, K 30, Mg 5, Ca 4.5, Acetate 51, Cl 39, Phos 15)  Kaplan: Collecting clear yellow urine  Last BM: -  Supplementation: Venofer    Recent Labs      17   0500  17   0530   NA  140  138  136   K  3.8  3.5  3.4*   CL  105  101  98   CO2  31*  30*  30*   CALCIUM  8.0*  8.1*  8.2*   MG  2.2  2.3  2.2   PHOS  1.1*  1.7*  2.6*   PROT  5.1*  5.4*  5.7*   ALBUMIN  3.1*  3.4*  3.5   BILITOT  1.1*  1.3*  2.0*   AST  161*  70*  38   ALKPHOS  59  69  76   ALT  208*  121*  74*     Heme  Transfusions: 2u PRBCs  Supplementation: Procrit  Recent Labs      17   0500  17   0812  17   0530   HGB  5.6*   --   7.0*  7.6*   HCT  17.1*   --   21.0*  23.5*   PLT  105*   --   133*   --    INR   --   1.1   --    --      ID  Temp  Av.9 °F (37.2 °C)  Min: 98.2 °F (36.8 °C)  Max: 99.3 °F (37.4 °C)   Recent Labs      17   0500  17   0514  17   0530   WBC  7.10  8.43  12.11     Antibiotics: - day -/-  Cultures:   -R lung fluid 4/3: NGTD      Endocrine  Recent Labs      17   0500  1714  17   0530   GLU  145*  154*  173*     Insulin treatment: -  Octreotide treatment: -     Musculoskeletal/Wounds  Extremity/wound exam: CAMERON  Weight bearing status: WBAT     Prophylaxis  DVT: Lovenox  GI: Protonix     Lines  PIVs, RIJ TLC, Kaplan, NGT.     Restraints  Face to face evaluation of need for restraints on rounds today:  Currently restrained? No   Needs restraints? No      ASSESSMENT AND PLAN  Neuro  -Continue dPCA  Pulm  -Wean O2 as tolerated, IS, nebs as needed  CV  -Stable, monitor  Renal  -Adequate UOP  -DC kaplan  FEN/GI  -Continue NPO, NGT  -Continue TPN, Iron  supplementation  -Prealbumin, CRP qM/TH while on TPN  Heme  -Appropriate response following transfusion.   -Trend H/H, transfuse for <7/21  ID  -Afebrile, no leukocytosis  -Follow pleural fluid cultures  Endo  -Continue current care  MSK  -Continue PT/OT  -Encourage OOB, ambulation  Dispo  -Floor    Foster La MD  4/5/2017 8:36 AM

## 2017-04-05 NOTE — PT/OT/SLP PROGRESS
Physical Therapy      Javy Thompson  MRN: [id number]    Patient not seen today secondary to  (1300, pt  in shower at this time.  Will try again later this afternoon.). Will follow-up 4/5/17  .    Dee Dee Aaron, PTA

## 2017-04-05 NOTE — PROGRESS NOTES
Pt arrived to unit via wheelchair, accompanied by ICU nurse STEPHON Minaya.  NAD noted, denies pain, vitals stable, 2L nasal cannula 95%. and documented in chart.  Cabrera intact with adequate drainage, TPN restarted at ordered rate, Abdominal incision clean, dry, intact, no drainage.  Safety maintained, bed in lowest position, wheels locked, call light in reach, family at bedside, will continue to monitor and reassess.

## 2017-04-05 NOTE — NURSING
Patient transferred to 509, Iman RN, received patient and received bedside report about patient temperature, Eric notified, no signs of distress, will continue to monitor. Wife a bedside in 509.

## 2017-04-05 NOTE — NURSING
Dr. Reyes notified about patient temperature of 101.3, verbal read back order to give patient acephaminphen, will give before transferring to the floor, report given to Lyn SZYMANSKI, no signs of distress, will continue to monitor.

## 2017-04-05 NOTE — CONSULTS
Inpatient Radiology Pre-procedure Note    History of Present Illness:  Javy Thompson is a 47 y.o. male who presents for right chest tube.  Admission H&P reviewed.  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 Authorizing Provider   hydrocodone-acetaminophen 5-325mg (NORCO) 5-325 mg per tablet Take 1 tablet by mouth every 6 (six) hours as needed for Pain.   Yes Historical Provider, MD   lanreotide (SOMATULINE DEPOT) 120 mg/0.5 mL Syrg Inject 120 mg into the skin every 28 days.   Yes Historical Provider, MD     Scheduled Meds:    acetaminophen  650 mg Oral 1 time in Clinic/HOD    cyanocobalamin  1,000 mcg Subcutaneous Daily    enoxaparin  40 mg Subcutaneous Daily    epoetin eduarda (PROCRIT) injection  10,000 Units Subcutaneous Every Mon, Wed, Fri    fat emulsion 20%  250 mL Intravenous Daily    iron sucrose (VENOFER) IVPB  100 mg Intravenous Daily    ketorolac  30 mg Intravenous Q6H    pantoprazole  40 mg Intravenous Daily    piperacillin-tazobactam 4.5 g in dextrose 5 % 100 mL IVPB (ready to mix system)  4.5 g Intravenous 1 time in Clinic/HOD    tamsulosin  0.4 mg Oral Daily     Continuous Infusions:    Amino acid 2.75% - dextrose 10% (CLINIMIX-E) solution with additives ( 1L provides 27.5 gm AA, 100 gm CHO (340 kcal/L dextrose), Na 35, K 30, Mg 5, Ca 4.5, Acetate 51, Cl 39, Phos 15)      Amino acid 2.75% - dextrose 10%  (CLINIMIX-E) solution with additives ( 1L provides 27.5 gm AA, 100 gm CHO (340 kcal/L dextrose), Na 35, K 30, Mg 5, Ca 4.5, Acetate 51, Cl 39, Phos 15) 75 mL/hr at 04/04/17 2218     PRN Meds:sodium chloride, acetaminophen, albuterol sulfate, diphenhydrAMINE, hydrALAZINE, labetalol, naloxone, ondansetron, senna-docusate 8.6-50 mg, simethicone, tramadol  Anticoagulants/Antiplatelets: no anticoagulation    Allergies:   Review of patient's allergies indicates:   Allergen Reactions    Epinephrine Other (See Comments)     Carcinoid patient     Sedation Hx: have not been any systemic reactions    Labs:    Recent Labs  Lab 04/03/17  0812   INR 1.1       Recent Labs  Lab 04/05/17  0530   WBC 12.11   HGB 7.6*   HCT 23.5*   MCV 89         Recent Labs  Lab 04/05/17  0530   *      K 3.4*   CL 98   CO2 30*   BUN 10   CREATININE 0.8   CALCIUM 8.2*   MG 2.2   ALT 74*   AST 38   ALBUMIN 3.5   BILITOT 2.0*         Vitals:  Temp: 99.6 °F (37.6 °C) (04/05/17 1222)  Pulse: 108 (04/05/17 1222)  Resp: 17 (04/05/17 1222)  BP: 115/70 (04/05/17 1222)  SpO2: 95 % (04/05/17 1222)     Physical Exam:  ASA: 2  Mallampati: 2    General: no acute distress  Mental Status: alert and oriented to person, place and time  HEENT: normocephalic, atraumatic  Chest: unlabored breathing  Heart: regular heart rate  Abdomen: nondistended  Extremity: moves all extremities    Plan: right chest tube  Sedation Plan: moderate    Lyle Weber MD  Staff Radiologist  Department of Radiology  Pager: [phone]

## 2017-04-05 NOTE — PT/OT/SLP PROGRESS
"Occupational Therapy  Treatment    Javy Thompson   MRN: [id number]   Admitting Diagnosis: Secondary malignant neoplasm of liver    OT Date of Treatment: 04/05/17   OT Start Time: 1250  OT Stop Time: 1320  OT Total Time (min): 30 min    Billable Minutes:  Self Care/Home Management 30    General Precautions: Standard, fall  Orthopedic Precautions: N/A  Braces:           Subjective:  Communicated with RN prior to session.  "I'm getting ready to shower."  RN present and cleared pt for bath.     Pain Rating: 3/10  Location - Side: Bilateral  Location - Orientation: generalized  Location: abdomen     Pain Rating Post-Intervention: 3/10    Objective:        Functional Mobility:  Bed Mobility:  Rolling/Turning to Left: Stand by assistance  Rolling/Turning Right: Stand by assistance  Scooting/Bridging: Stand by Assistance  Supine to Sit: Stand by Assistance  Sit to Supine: Stand by Assistance    Transfers:   Sit <> Stand Assistance: Stand By Assistance  Sit <> Stand Assistive Device: No Assistive Device  Toilet Transfer Technique: Stand Pivot  Toilet Transfer Assistance: Stand By Assistance  Toilet Transfer Assistive Device: No Assistive Device  Tub Transfer Technique: Stand Pivot  Tub Bench Transfer Assistance: Minimum Assistance (to lift LE's over tub)  Tub Transfer Assistive Device: Other (see comments) (BSC placed in tub with 2 legs on outside of tub/2legs inside of tub)    Functional Ambulation: Pt ambulated around room with SBA.    Activities of Daily Living:        UE Dressing Level of Assistance: Minimal assistance  LE Dressing Level of Assistance: Total assistance (manish/doff socks)  Grooming Position: other (bed level 2/2 fatigue from shower)  Grooming Level of Assistance: Modified independent  Toileting Where Assessed: Toilet  Toileting Level of Assistance: Stand by assistance  Bathing Where Assessed: Shower Chair/bench  Bathing Level of Assistance: Moderate assistance (wife assisting pt with Supervision for " safety)    Balance:   Static Sit: GOOD: Takes MODERATE challenges from all directions  Dynamic Sit: GOOD: Maintains balance through MODERATE excursions of active trunk movement  Static Stand: FAIR+: Takes MINIMAL challenges from all directions  Dynamic stand: FAIR+: Needs CLOSE SUPERVISION during gait and is able to right self with minor LOB      AM-PAC 6 CLICK ADL   How much help from another person does this patient currently need?   1 = Unable, Total/Dependent Assistance  2 = A lot, Maximum/Moderate Assistance  3 = A little, Minimum/Contact Guard/Supervision  4 = None, Modified [place]/Independent    Putting on and taking off regular lower body clothing? : 2  Bathing (including washing, rinsing, drying)?: 2  Toileting, which includes using toilet, bedpan, or urinal? : 3  Putting on and taking off regular upper body clothing?: 4  Taking care of personal grooming such as brushing teeth?: 3  Eating meals?: 4  Total Score: 18     AM-PAC Raw Score CMS G-Code Modifier Level of Impairment Assistance   6 % Total / Unable   7 - 9 CM 80 - 100% Maximal Assist   10 - 14 CL 60 - 80% Moderate Assist   15 - 19 CK 40 - 60% Moderate Assist   20 - 22 CJ 20 - 40% Minimal Assist   23 CI 1-20% SBA / CGA   24 CH 0% Independent/ Mod I     Patient left supine with all lines intact, call button in reach and spouse present    ASSESSMENT:  Javy Thompson is a 47 y.o. male with a medical diagnosis of Secondary malignant neoplasm of liver and presents with  decreased overall strength, endurance and [place] with ADL's and fx mobility. Pt progressing well. Continue OT services to address functional goals    .    Rehab identified problem list/impairments: Rehab identified problem list/impairments: weakness, impaired endurance, impaired self care skills, gait instability, impaired functional mobilty, pain    Rehab potential is good.    Activity tolerance: Good    Discharge recommendations:       Barriers to discharge:       Equipment recommendations:       GOALS:   Occupational Therapy Goals        Problem: Occupational Therapy Goal    Goal Priority Disciplines Outcome Interventions   Occupational Therapy Goal     OT, PT/OT Ongoing (interventions implemented as appropriate)    Description:  Goals to be met by: 5/2/17     Patient will increase functional independence with ADLs by performing:    LE Dressing with Modified [place].  Grooming while standing with Modified [place].  Toileting from toilet with Modified [place] for hygiene and clothing management.   Supine to sit with Modified [place].  Stand pivot transfers with Modified [place].  Toilet transfer to toilet with Modified [place].  Upper extremity exercise program x10 reps per handout, with independence.                Plan:  Patient to be seen 5 x/week to address the above listed problems via self-care/home management, therapeutic activities, therapeutic exercises  Plan of Care expires: 05/02/17  Plan of Care reviewed with: patient         RUSS Allan  04/05/2017

## 2017-04-05 NOTE — PT/OT/SLP PROGRESS
Physical Therapy      Javy Thompson  MRN: [id number]    Patient not seen today secondary to  (1429, Pt off unit having a CT scan.  1615, Pt declined tx at this time secondary to fatigue from increased activity today. ). Will follow-up 4/6/17.    Dee Dee Aaron, PTA

## 2017-04-05 NOTE — PLAN OF CARE
Problem: Occupational Therapy Goal  Goal: Occupational Therapy Goal  Goals to be met by: 5/2/17     Patient will increase functional independence with ADLs by performing:    LE Dressing with Modified [place].  Grooming while standing with Modified [place].  Toileting from toilet with Modified [place] for hygiene and clothing management.   Supine to sit with Modified [place].  Stand pivot transfers with Modified [place].  Toilet transfer to toilet with Modified [place].  Upper extremity exercise program x10 reps per handout, with independence.   Outcome: Ongoing (interventions implemented as appropriate)  Javy Thompson is a 47 y.o. male with a medical diagnosis of Secondary malignant neoplasm of liver and presents with  decreased overall strength, endurance and [place] with ADL's and fx mobility. Pt progressing well. Continue OT services to address functional goals  PHOEBE Allan/EVELYN

## 2017-04-06 LAB
ALBUMIN SERPL BCP-MCNC: 3.1 G/DL
ALP SERPL-CCNC: 92 U/L
ALT SERPL W/O P-5'-P-CCNC: 52 U/L
ANION GAP SERPL CALC-SCNC: 8 MMOL/L
AST SERPL-CCNC: 33 U/L
BACTERIA SPEC AEROBE CULT: NO GROWTH
BACTERIA UR CULT: NO GROWTH
BASOPHILS # BLD AUTO: 0.02 K/UL
BASOPHILS NFR BLD: 0.2 %
BILIRUB SERPL-MCNC: 2.1 MG/DL
BUN SERPL-MCNC: 16 MG/DL
CALCIUM SERPL-MCNC: 8.1 MG/DL
CHLORIDE SERPL-SCNC: 99 MMOL/L
CO2 SERPL-SCNC: 27 MMOL/L
CREAT SERPL-MCNC: 0.8 MG/DL
DIFFERENTIAL METHOD: ABNORMAL
EOSINOPHIL # BLD AUTO: 0.3 K/UL
EOSINOPHIL NFR BLD: 2.7 %
ERYTHROCYTE [DISTWIDTH] IN BLOOD BY AUTOMATED COUNT: 16.1 %
EST. GFR  (AFRICAN AMERICAN): >60 ML/MIN/1.73 M^2
EST. GFR  (NON AFRICAN AMERICAN): >60 ML/MIN/1.73 M^2
GLUCOSE SERPL-MCNC: 154 MG/DL
GRAM STN SPEC: NORMAL
GRAM STN SPEC: NORMAL
HCT VFR BLD AUTO: 23.4 %
HGB BLD-MCNC: 7.7 G/DL
LYMPHOCYTES # BLD AUTO: 1.2 K/UL
LYMPHOCYTES NFR BLD: 9.9 %
MAGNESIUM SERPL-MCNC: 2.5 MG/DL
MCH RBC QN AUTO: 29.4 PG
MCHC RBC AUTO-ENTMCNC: 32.9 %
MCV RBC AUTO: 89 FL
MONOCYTES # BLD AUTO: 1.2 K/UL
MONOCYTES NFR BLD: 10.1 %
NEUTROPHILS # BLD AUTO: 8.9 K/UL
NEUTROPHILS NFR BLD: 75.2 %
PHOSPHATE SERPL-MCNC: 2.7 MG/DL
PLATELET # BLD AUTO: 201 K/UL
PMV BLD AUTO: 10.3 FL
POCT GLUCOSE: 145 MG/DL (ref 70–110)
POCT GLUCOSE: 167 MG/DL (ref 70–110)
POCT GLUCOSE: 189 MG/DL (ref 70–110)
POTASSIUM SERPL-SCNC: 3.6 MMOL/L
PROT SERPL-MCNC: 5.7 G/DL
RBC # BLD AUTO: 2.62 M/UL
SODIUM SERPL-SCNC: 134 MMOL/L
WBC # BLD AUTO: 11.78 K/UL

## 2017-04-06 PROCEDURE — 25000003 PHARM REV CODE 250: Performed by: SURGERY

## 2017-04-06 PROCEDURE — 94761 N-INVAS EAR/PLS OXIMETRY MLT: CPT

## 2017-04-06 PROCEDURE — 27000221 HC OXYGEN, UP TO 24 HOURS

## 2017-04-06 PROCEDURE — 84100 ASSAY OF PHOSPHORUS: CPT

## 2017-04-06 PROCEDURE — 63600175 PHARM REV CODE 636 W HCPCS: Performed by: SURGERY

## 2017-04-06 PROCEDURE — 97530 THERAPEUTIC ACTIVITIES: CPT

## 2017-04-06 PROCEDURE — 83735 ASSAY OF MAGNESIUM: CPT

## 2017-04-06 PROCEDURE — 63600175 PHARM REV CODE 636 W HCPCS: Performed by: STUDENT IN AN ORGANIZED HEALTH CARE EDUCATION/TRAINING PROGRAM

## 2017-04-06 PROCEDURE — 11000001 HC ACUTE MED/SURG PRIVATE ROOM

## 2017-04-06 PROCEDURE — 97535 SELF CARE MNGMENT TRAINING: CPT

## 2017-04-06 PROCEDURE — 97110 THERAPEUTIC EXERCISES: CPT

## 2017-04-06 PROCEDURE — 63600175 PHARM REV CODE 636 W HCPCS: Performed by: COLON & RECTAL SURGERY

## 2017-04-06 PROCEDURE — C9113 INJ PANTOPRAZOLE SODIUM, VIA: HCPCS | Performed by: STUDENT IN AN ORGANIZED HEALTH CARE EDUCATION/TRAINING PROGRAM

## 2017-04-06 PROCEDURE — 97116 GAIT TRAINING THERAPY: CPT

## 2017-04-06 PROCEDURE — 80053 COMPREHEN METABOLIC PANEL: CPT

## 2017-04-06 PROCEDURE — 25000003 PHARM REV CODE 250: Performed by: STUDENT IN AN ORGANIZED HEALTH CARE EDUCATION/TRAINING PROGRAM

## 2017-04-06 PROCEDURE — 85025 COMPLETE CBC W/AUTO DIFF WBC: CPT

## 2017-04-06 RX ORDER — MORPHINE SULFATE 2 MG/ML
2 INJECTION, SOLUTION INTRAMUSCULAR; INTRAVENOUS ONCE AS NEEDED
Status: ACTIVE | OUTPATIENT
Start: 2017-04-06 | End: 2017-04-06

## 2017-04-06 RX ORDER — CIPROFLOXACIN 2 MG/ML
400 INJECTION, SOLUTION INTRAVENOUS
Status: COMPLETED | OUTPATIENT
Start: 2017-04-06 | End: 2017-04-11

## 2017-04-06 RX ORDER — LORAZEPAM 2 MG/ML
1 INJECTION INTRAMUSCULAR ONCE
Status: COMPLETED | OUTPATIENT
Start: 2017-04-06 | End: 2017-04-06

## 2017-04-06 RX ADMIN — CIPROFLOXACIN 400 MG: 2 INJECTION, SOLUTION INTRAVENOUS at 04:04

## 2017-04-06 RX ADMIN — ASCORBIC ACID, VITAMIN A PALMITATE, CHOLECALCIFEROL, THIAMINE HYDROCHLORIDE, RIBOFLAVIN-5 PHOSPHATE SODIUM, PYRIDOXINE HYDROCHLORIDE, NIACINAMIDE, DEXPANTHENOL, ALPHA-TOCOPHEROL ACETATE, VITAMIN K1, FOLIC ACID, BIOTIN, CYANOCOBALAMIN: 200; 3300; 200; 6; 3.6; 6; 40; 15; 10; 150; 600; 60; 5 INJECTION, SOLUTION INTRAVENOUS at 09:04

## 2017-04-06 RX ADMIN — KETOROLAC TROMETHAMINE 30 MG: 30 INJECTION, SOLUTION INTRAMUSCULAR at 12:04

## 2017-04-06 RX ADMIN — KETOROLAC TROMETHAMINE 30 MG: 30 INJECTION, SOLUTION INTRAMUSCULAR at 05:04

## 2017-04-06 RX ADMIN — PANTOPRAZOLE SODIUM 40 MG: 40 INJECTION, POWDER, FOR SOLUTION INTRAVENOUS at 09:04

## 2017-04-06 RX ADMIN — KETOROLAC TROMETHAMINE 30 MG: 30 INJECTION, SOLUTION INTRAMUSCULAR at 11:04

## 2017-04-06 RX ADMIN — ENOXAPARIN SODIUM 40 MG: 100 INJECTION SUBCUTANEOUS at 05:04

## 2017-04-06 RX ADMIN — TAMSULOSIN HYDROCHLORIDE 0.4 MG: 0.4 CAPSULE ORAL at 09:04

## 2017-04-06 RX ADMIN — LORAZEPAM 1 MG: 2 INJECTION, SOLUTION INTRAMUSCULAR; INTRAVENOUS at 02:04

## 2017-04-06 RX ADMIN — IRON SUCROSE 100 MG: 20 INJECTION, SOLUTION INTRAVENOUS at 10:04

## 2017-04-06 RX ADMIN — TRAMADOL HYDROCHLORIDE 50 MG: 50 TABLET, COATED ORAL at 09:04

## 2017-04-06 RX ADMIN — TRAMADOL HYDROCHLORIDE 50 MG: 50 TABLET, COATED ORAL at 10:04

## 2017-04-06 RX ADMIN — CYANOCOBALAMIN 1000 MCG: 1000 INJECTION, SOLUTION INTRAMUSCULAR; SUBCUTANEOUS at 09:04

## 2017-04-06 NOTE — PROGRESS NOTES
.Pharmacy New Medication Education    Patient accepted medication education.    Pharmacy educated patient on the following medications, using the teach-back method.   Apap  Albuterol  Benadryl  Lovenox  Epoetin  Fat emulsion  Hydralazine  Venofer  Toradol  Labetalol  Morphine  Zofran  Pantoprazole  Senokot  Simethicone  Flomax  Tramadol    Learners of pharmacy medication education included:  patient    Patient +/- learner response:  verbalize understanding

## 2017-04-06 NOTE — PT/OT/SLP PROGRESS
Physical Therapy  Treatment    Javy Thompson   MRN: [id number]   Admitting Diagnosis: Secondary malignant neoplasm of liver    PT Received On: 04/06/17  PT Start Time: 1330     PT Stop Time: 1402    PT Total Time (min): 32 min       Billable Minutes:  Gait [id number], Therapeutic Activity 8, Therapeutic Exercise 8.    Treatment Type: Treatment  PT/PTA: PTA     PTA Visit Number: 3       General Precautions: Standard,  (universal)  Orthopedic Precautions: N/A   Braces:      Do you have any cultural, spiritual, [place] conflicts, given your current situation?: No    Subjective:  Communicated with Rn (Brittaney) prior to session.  Pt was sitting up in b/s recliner and agreed to tx.    Pain Rating: 3/10  Location - Side: Bilateral  Location - Orientation: generalized  Location: abdomen  Pain Addressed: Reposition, Nurse notified  Pain Rating Post-Intervention: 3/10    Objective:   Patient found with: central line, chest tube, kaplan catheter    Functional Mobility:  Bed Mobility:        Transfers:  Sit <> Stand Assistance: Contact Guard Assistance, Stand By Assistance x 3 reps.  Sit <> Stand Assistive Device: No Assistive Device    Gait:   Gait Distance: 90ft x 2 with RW and CGA/SBA with assist for IV pole, chest tube, and kaplan.  Standing rest between bouts for approximately 2 to 3 mins.  Assistance 1: Contact Guard Assistance, Stand by Assistance  Gait Assistive Device: Rolling walker  Gait Pattern: reciprocal  Gait Deviation(s): decreased shoshana, decreased velocity of limb motion, decreased step length, decreased toe-to-floor clearance, decreased weight-shifting ability    Stairs:      Balance:   Static Sit: FAIR+: Able to take MINIMAL challenges from all directions  Dynamic Sit: FAIR+: Maintains balance through MINIMAL excursions of active trunk motion  Static Stand: FAIR+: Takes MINIMAL challenges from all directions  Dynamic stand: FAIR: Needs CONTACT GUARD during gait RW.    Therapeutic Activities and  Exercises:  Seated BLE therex: AP, LAQ, hip ABd/ADD x 15 reps.  Standing BLE therex: marching in place x 15 reps with HHA(CGA/Roshan)  AMB: as above. Very slow shoshana with vc's for upright posture as able.   No LOB.  Static standing with and without A.D. With SBA. Pt stood for approximately 3 mins with SBA for prior to gait to manish gait belt and for adjustment of RW for proper height.    AM-PAC 6 CLICK MOBILITY  How much help from another person does this patient currently need?   1 = Unable, Total/Dependent Assistance  2 = A lot, Maximum/Moderate Assistance  3 = A little, Minimum/Contact Guard/Supervision  4 = None, Modified [place]/Independent    Turning over in bed (including adjusting bedclothes, sheets and blankets)?: 4  Sitting down on and standing up from a chair with arms (e.g., wheelchair, bedside commode, etc.): 3  Moving from lying on back to sitting on the side of the bed?: 4  Moving to and from a bed to a chair (including a wheelchair)?: 3  Need to walk in hospital room?: 3  Climbing 3-5 steps with a railing?: 3  Total Score: 20    AM-PAC Raw Score CMS G-Code Modifier Level of Impairment Assistance   6 % Total / Unable   7 - 9 CM 80 - 100% Maximal Assist   10 - 14 CL 60 - 80% Moderate Assist   15 - 19 CK 40 - 60% Moderate Assist   20 - 22 CJ 20 - 40% Minimal Assist   23 CI 1-20% SBA / CGA   24 CH 0% Independent/ Mod I     Patient left up in chair with all lines intact, call button in reach, and nursing notified.    Assessment:  Javy Thompson is a 47 y.o. male with a medical diagnosis of Secondary malignant neoplasm of liver and presents with decreased strength and endurance.  Pt demonstrated an increase in ambulation distance today with very slow gait.  No LOB, but one bout of unsteadiness noted.  Pt would continue to benefit from P.T. To address impairments listed below.    Rehab identified problem list/impairments: Rehab identified problem list/impairments: weakness, impaired endurance,  impaired self care skills, impaired functional mobilty, gait instability, pain    Rehab potential is good.    Activity tolerance: Good    Discharge recommendations: Discharge Facility/Level Of Care Needs: home     Barriers to discharge: Barriers to Discharge: Inaccessible home environment    Equipment recommendations: Equipment Needed After Discharge: shower chair     GOALS:   Physical Therapy Goals        Problem: Physical Therapy Goal    Goal Priority Disciplines Outcome Goal Variances Interventions   Physical Therapy Goal     PT/OT, PT Ongoing (interventions implemented as appropriate)     Description:  Goals to be met by: 17     Patient will increase functional independence with mobility by performin. Supine to sit with Modified [place]  2. Sit to supine with Modified [place]  3. Sit to stand transfer with Modified [place]  4. Bed to chair transfer with Modified [place] using appropriate AD if needed  5. Gait  x 150 feet with Modified [place] using appropriate AD if needed.  6. Ascend/descend 16 stairs with bilateral Handrails Supervision       Recommendations: Pt to benefit from continued PT intervention to improve independence with functional mobility/ADL. Recommend d/c home c/ family. Further d/c recs pending progress c/ therapy.                  PLAN:    Patient to be seen 6 x/week  to address the above listed problems via gait training, therapeutic activities, therapeutic exercises, neuromuscular re-education  Plan of Care expires: 17  Plan of Care reviewed with: patient, spouse         Dee Dee Aaron, PTA  2017

## 2017-04-06 NOTE — PROGRESS NOTES
Patient complaining of a lot of pain still after administration of scheduled toradol and PRN tramadol. Attempted to relieve pain with repositioning and pillows, only helped a little per patient. Have been attempting to contact on call resident Dr. Levine for over an hour and a half, 2 messages left, no call back received. Attempted to call Dr. Reyes, no answer. Spoke with patient, states he is still in a lot of pain and very uncomfortable, but it has not gotten any worse in the last hour. Patient vital signs are stable. Notified house supervisor. Will continue to call resident and monitor patient.

## 2017-04-06 NOTE — PLAN OF CARE
Problem: Patient Care Overview  Goal: Plan of Care Review  Outcome: Ongoing (interventions implemented as appropriate)  Received pt on 2L; SAT 97%. Will cont to monitor.

## 2017-04-06 NOTE — PLAN OF CARE
Problem: Physical Therapy Goal  Goal: Physical Therapy Goal  Goals to be met by: 17     Patient will increase functional independence with mobility by performin. Supine to sit with Modified [place]  2. Sit to supine with Modified [place]  3. Sit to stand transfer with Modified [place]  4. Bed to chair transfer with Modified [place] using appropriate AD if needed  5. Gait x 150 feet with Modified [place] using appropriate AD if needed.  6. Ascend/descend 16 stairs with bilateral Handrails Supervision       Recommendations: Pt to benefit from continued PT intervention to improve independence with functional mobility/ADL. Recommend d/c home c/ family. Further d/c recs pending progress c/ therapy.    Outcome: Ongoing (interventions implemented as appropriate)  Continue working toward goals.

## 2017-04-06 NOTE — PLAN OF CARE
Problem: Patient Care Overview  Goal: Plan of Care Review  Outcome: Ongoing (interventions implemented as appropriate)  Patient resting in bed, AAOx4. Family at the bedside. TPN with lipids infusing as ordered. Medication administered as ordered. Patient complained of pain overnight, PRN medications administered. Cabrera in place draining dark yellow urine. Chest tube in place with bloody drainage. Assisted patient in turning overnight. Midline incision CDI. Encouraged to call with needs or concerns. Will continue to monitor.

## 2017-04-06 NOTE — PROGRESS NOTES
Dr. Levine called back and was updated on patient's pain. He ordered a one time dose 1 mg of ativan, and if patient does not have relief a one time dose of 2mg of morphine IM.

## 2017-04-06 NOTE — PLAN OF CARE
Problem: Occupational Therapy Goal  Goal: Occupational Therapy Goal  Goals to be met by: 5/2/17     Patient will increase functional independence with ADLs by performing:    LE Dressing with Modified [place].  Grooming while standing with Modified [place].  Toileting from toilet with Modified [place] for hygiene and clothing management.   Supine to sit with Modified [place].  Stand pivot transfers with Modified [place].  Toilet transfer to toilet with Modified [place].  Upper extremity exercise program x10 reps per handout, with independence.   Outcome: Ongoing (interventions implemented as appropriate)  Javy Thompson is a 47 y.o. male with a medical diagnosis of Secondary malignant neoplasm of liver and presents with pain and with decreased overall strength, endurance and [place] with ADL's and fx mobility. Pt easily fatigues requires rest breaks throughout tx. Continue OT services to address functional goals        PHOEBE Allan/EVELYN

## 2017-04-06 NOTE — PLAN OF CARE
Problem: Patient Care Overview  Goal: Plan of Care Review  Outcome: Ongoing (interventions implemented as appropriate)  Pt AAOx4 without nausea. Family present at bedside  Pt with complaints of left lower flank pain with relief from tramadol and Toradol. Pt received all medications and ABX as prescribed by MD. TPN@ 75. Pt ambulated with assistance to chair and sitting up for most of the day. Midline incision CDI. Chest tub to right lower flank with 140ML  of serosanguinous drainage. Cabrera in place draining dark nato urine. Safety maintained. Pt encouraged to call for assistance. Will continue to monitor.

## 2017-04-06 NOTE — PROGRESS NOTES
47M hx NET now s/p exlap, R hepatectomy, SBR, cholecystectomy, mesenteric lymphadenectomy. Postop day 6. Transferred to floor yesterday.  PLan:  CT thoracentesis yesterday, returned 60cc serous fluid.  -Cont PO pain control. Limit narcotics.   -Cont IVF while NPO   -Cont NPO, monitor return of bowel function  -OOB, IS, Ambulate. PT/OT consulted.     pt has very supportive wife, and plan to d/c home with family once medically stable.

## 2017-04-06 NOTE — PT/OT/SLP PROGRESS
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  "I think it's time for my pain medicine."    Pain Rating: 3/10  Location - Side: Bilateral  Location - Orientation: generalized  Location: abdomen  Pain Addressed: Nurse notified, Pre-medicate for activity  Pain Rating Post-Intervention: 3/10    Objective:  Patient found with: oxygen, chest tube, central line     Functional Mobility:  Bed Mobility:  Rolling/Turning Right: Stand by assistance  Scooting/Bridging: Stand by Assistance  Supine to Sit: Stand by Assistance, WIth side rail    Transfers:   Sit <> Stand Assistance: Stand By Assistance  Sit <> Stand Assistive Device: No Assistive Device  Bed <> Chair Technique: Stand Pivot  Bed <> Chair Transfer Assistance: Stand By Assistance  Bed <> Chair Assistive Device: No Assistive Device    Functional Ambulation: Pt took 4 steps to chair with SBA without AE.    Activities of Daily Living:     UE Dressing Level of Assistance: Moderate assistance (manish gown behind back)  LE Dressing Level of Assistance: Stand by assistance (manish socks, EOB, crossing legs over knees)  Grooming Position: Seated, bedside chair  Grooming Level of Assistance: Modified independent                  Balance:   Static Sit: NORMAL: No deviations seen in posture held statically  Dynamic Sit: GOOD: Maintains balance through MODERATE excursions of active trunk movement  Static Stand: FAIR: Maintains without assist but unable to take challenges  Dynamic stand: FAIR: Needs CONTACT GUARD during gait    Therapeutic Activities and Exercises:  Pt performed BUE AROM ex " x 10 reps all major jts/planes. Short rest breaks secondary to muscle fatigue and SOB.    AM-PAC 6 CLICK ADL   How much help from another person does this patient currently need?   1 = Unable, Total/Dependent Assistance  2 = A lot, Maximum/Moderate Assistance  3 = A little, Minimum/Contact Guard/Supervision  4 = None, Modified [place]/Independent    Putting on and taking off regular lower body clothing? : 3  Bathing (including washing, rinsing, drying)?: 2  Toileting, which includes using toilet, bedpan, or urinal? : 3  Putting on and taking off regular upper body clothing?: 3  Taking care of personal grooming such as brushing teeth?: 3  Eating meals?: 4  Total Score: 18     AM-PAC Raw Score CMS G-Code Modifier Level of Impairment Assistance   6 % Total / Unable   7 - 9 CM 80 - 100% Maximal Assist   10 - 14 CL 60 - 80% Moderate Assist   15 - 19 CK 40 - 60% Moderate Assist   20 - 22 CJ 20 - 40% Minimal Assist   23 CI 1-20% SBA / CGA   24 CH 0% Independent/ Mod I     Patient left up in chair with all lines intact, call button in reach and RN notified    ASSESSMENT:  Javy Thompson is a 47 y.o. male with a medical diagnosis of Secondary malignant neoplasm of liver and presents with pain and with decreased overall strength, endurance and [place] with ADL's and fx mobility. Pt easily fatigues requires rest breaks throughout tx. Continue OT services to address functional goals        Rehab identified problem list/impairments: Rehab identified problem list/impairments: weakness, impaired endurance, impaired self care skills, impaired functional mobilty, gait instability, impaired balance, pain    Rehab potential is good.    Activity tolerance: Fair    Discharge recommendations:       Barriers to discharge:      Equipment recommendations:       GOALS:   Occupational Therapy Goals        Problem: Occupational Therapy Goal    Goal Priority Disciplines Outcome Interventions   Occupational Therapy Goal     OT,  PT/OT Ongoing (interventions implemented as appropriate)    Description:  Goals to be met by: 5/2/17     Patient will increase functional independence with ADLs by performing:    LE Dressing with Modified [place].  Grooming while standing with Modified [place].  Toileting from toilet with Modified [place] for hygiene and clothing management.   Supine to sit with Modified [place].  Stand pivot transfers with Modified [place].  Toilet transfer to toilet with Modified [place].  Upper extremity exercise program x10 reps per handout, with independence.                Plan:  Patient to be seen 5 x/week to address the above listed problems via self-care/home management, therapeutic activities, therapeutic exercises  Plan of Care expires: 05/02/17  Plan of Care reviewed with: patient         RUSS Allan  04/06/2017

## 2017-04-06 NOTE — PROGRESS NOTES
[facility] - Neuroendocrine Surgery Service  Daily Progress Note     HD#6  POD#6 Days Post-Op    Subjective  ELISSA. Transferred to floors yesterday. Tolerating few sips/chips. No nausea/vomiting. No flatus/BM yet. Ambulating, working with PT. Cabrera remains.   CT thoracentesis yesterday, returned 60cc serous fluid.     Scheduled Meds:   acetaminophen  650 mg Oral 1 time in Clinic/HOD    cyanocobalamin  1,000 mcg Subcutaneous Daily    enoxaparin  40 mg Subcutaneous Daily    epoetin eduarda (PROCRIT) injection  10,000 Units Subcutaneous Every Mon, Wed, Fri    iron sucrose (VENOFER) IVPB  100 mg Intravenous Daily    ketorolac  30 mg Intravenous Q6H    pantoprazole  40 mg Intravenous Daily    tamsulosin  0.4 mg Oral Daily       Continuous Infusions:   Amino acid 2.75% - dextrose 10% (CLINIMIX-E) solution with additives ( 1L provides 27.5 gm AA, 100 gm CHO (340 kcal/L dextrose), Na 35, K 30, Mg 5, Ca 4.5, Acetate 51, Cl 39, Phos 15) 75 mL/hr at 04/05/17 2142       PRN Meds:sodium chloride, acetaminophen, albuterol sulfate, diphenhydrAMINE, hydrALAZINE, labetalol, morphine, naloxone, ondansetron, senna-docusate 8.6-50 mg, simethicone, tramadol     Objective  Temp:  [97.4 °F (36.3 °C)-101.2 °F (38.4 °C)] 99.1 °F (37.3 °C)  Pulse:  [100-121] 103  Resp:  [14-40] 17  SpO2:  [92 %-100 %] 98 %  BP: (106-132)/(59-80) 117/72     I/O last 3 completed shifts:  In: 3300 [IV Piggyback:200]  Out: 2485 [Urine:1495; Chest Tube:990]        GEN: NAD  RESP: MAKSIM  CV: Reg rate  ABD: s, aTTP, moderately distended, -timpani, -rebound/guarding. Incision healing well   : Cabrera collecting nato urine     Labs  Recent Labs      04/04/17   0514  04/05/17   0530  04/06/17   0500   WBC  8.43  12.11  11.78   HGB  7.0*  7.6*  7.7*   HCT  21.0*  23.5*  23.4*   PLT  133*  181  201     Recent Labs      04/04/17   0514  04/05/17   0530  04/06/17   0500   NA  138  136  134*   K  3.5  3.4*  3.6   CL  101  98  99   CO2  30*  30*  27   BUN  9  10  16    CREATININE  0.7  0.8  0.8   GLU  154*  173*  154*   CALCIUM  8.1*  8.2*  8.1*   MG  2.3  2.2  2.5   PHOS  1.7*  2.6*  2.7   PROT  5.4*  5.7*  5.7*   ALBUMIN  3.4*  3.5  3.1*   BILITOT  1.3*  2.0*  2.1*   AST  70*  38  33   ALKPHOS  69  76  92   ALT  121*  74*  52*       Imaging  CT abd/pelvis: Postoperative changes. 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  Multiple small, partially loculated fluid collections.     Assessment/Plan  47M hx NET now s/p exlap, R hepatectomy, SBR, cholecystectomy, mesenteric lymphadenectomy. Postop day 6  -Cont PO pain control. Limit narcotics.   -Cont IVF while NPO  -Cont NPO, monitor return of bowel function  -OOB, IS, Ambulate. PT/OT consulted.     Nick Hackett MD  [facility], PGY2  c [phone]  4/6/2017  9:52 AM

## 2017-04-07 LAB
ALBUMIN SERPL BCP-MCNC: 2.8 G/DL
ALP SERPL-CCNC: 96 U/L
ALT SERPL W/O P-5'-P-CCNC: 44 U/L
ANION GAP SERPL CALC-SCNC: 8 MMOL/L
ANISOCYTOSIS BLD QL SMEAR: SLIGHT
AST SERPL-CCNC: 36 U/L
BASOPHILS # BLD AUTO: 0.04 K/UL
BASOPHILS NFR BLD: 0.3 %
BILIRUB SERPL-MCNC: 1.6 MG/DL
BUN SERPL-MCNC: 21 MG/DL
CALCIUM SERPL-MCNC: 8.1 MG/DL
CHLORIDE SERPL-SCNC: 96 MMOL/L
CO2 SERPL-SCNC: 28 MMOL/L
CREAT SERPL-MCNC: 0.9 MG/DL
DIFFERENTIAL METHOD: ABNORMAL
EOSINOPHIL # BLD AUTO: 0.4 K/UL
EOSINOPHIL NFR BLD: 3 %
ERYTHROCYTE [DISTWIDTH] IN BLOOD BY AUTOMATED COUNT: 16.3 %
EST. GFR  (AFRICAN AMERICAN): >60 ML/MIN/1.73 M^2
EST. GFR  (NON AFRICAN AMERICAN): >60 ML/MIN/1.73 M^2
GLUCOSE SERPL-MCNC: 163 MG/DL
HCT VFR BLD AUTO: 23.7 %
HGB BLD-MCNC: 7.7 G/DL
HYPOCHROMIA BLD QL SMEAR: ABNORMAL
LYMPHOCYTES # BLD AUTO: 1.3 K/UL
LYMPHOCYTES NFR BLD: 10.9 %
MAGNESIUM SERPL-MCNC: 2.5 MG/DL
MCH RBC QN AUTO: 28.9 PG
MCHC RBC AUTO-ENTMCNC: 32.5 %
MCV RBC AUTO: 89 FL
MONOCYTES # BLD AUTO: 1.3 K/UL
MONOCYTES NFR BLD: 10.5 %
NEUTROPHILS # BLD AUTO: 9 K/UL
NEUTROPHILS NFR BLD: 72.9 %
PHOSPHATE SERPL-MCNC: 3.7 MG/DL
PLATELET # BLD AUTO: 237 K/UL
PLATELET BLD QL SMEAR: ABNORMAL
PMV BLD AUTO: 9.9 FL
POCT GLUCOSE: 159 MG/DL (ref 70–110)
POCT GLUCOSE: 176 MG/DL (ref 70–110)
POCT GLUCOSE: 177 MG/DL (ref 70–110)
POCT GLUCOSE: 188 MG/DL (ref 70–110)
POLYCHROMASIA BLD QL SMEAR: ABNORMAL
POTASSIUM SERPL-SCNC: 3.4 MMOL/L
PROT SERPL-MCNC: 5.7 G/DL
RBC # BLD AUTO: 2.66 M/UL
SODIUM SERPL-SCNC: 132 MMOL/L
WBC # BLD AUTO: 12.34 K/UL

## 2017-04-07 PROCEDURE — 25000003 PHARM REV CODE 250: Performed by: COLON & RECTAL SURGERY

## 2017-04-07 PROCEDURE — 97535 SELF CARE MNGMENT TRAINING: CPT

## 2017-04-07 PROCEDURE — 11000001 HC ACUTE MED/SURG PRIVATE ROOM

## 2017-04-07 PROCEDURE — 94761 N-INVAS EAR/PLS OXIMETRY MLT: CPT

## 2017-04-07 PROCEDURE — 97116 GAIT TRAINING THERAPY: CPT

## 2017-04-07 PROCEDURE — 63600175 PHARM REV CODE 636 W HCPCS: Performed by: STUDENT IN AN ORGANIZED HEALTH CARE EDUCATION/TRAINING PROGRAM

## 2017-04-07 PROCEDURE — 85027 COMPLETE CBC AUTOMATED: CPT

## 2017-04-07 PROCEDURE — C9113 INJ PANTOPRAZOLE SODIUM, VIA: HCPCS | Performed by: STUDENT IN AN ORGANIZED HEALTH CARE EDUCATION/TRAINING PROGRAM

## 2017-04-07 PROCEDURE — 80053 COMPREHEN METABOLIC PANEL: CPT

## 2017-04-07 PROCEDURE — 85007 BL SMEAR W/DIFF WBC COUNT: CPT

## 2017-04-07 PROCEDURE — 25000003 PHARM REV CODE 250: Performed by: SURGERY

## 2017-04-07 PROCEDURE — 84100 ASSAY OF PHOSPHORUS: CPT

## 2017-04-07 PROCEDURE — 25000003 PHARM REV CODE 250: Performed by: STUDENT IN AN ORGANIZED HEALTH CARE EDUCATION/TRAINING PROGRAM

## 2017-04-07 PROCEDURE — 97110 THERAPEUTIC EXERCISES: CPT

## 2017-04-07 PROCEDURE — 97530 THERAPEUTIC ACTIVITIES: CPT

## 2017-04-07 PROCEDURE — 63600175 PHARM REV CODE 636 W HCPCS: Performed by: SURGERY

## 2017-04-07 PROCEDURE — 97803 MED NUTRITION INDIV SUBSEQ: CPT

## 2017-04-07 PROCEDURE — 83735 ASSAY OF MAGNESIUM: CPT

## 2017-04-07 RX ORDER — FAMOTIDINE 20 MG/1
20 TABLET, FILM COATED ORAL 2 TIMES DAILY
Status: DISCONTINUED | OUTPATIENT
Start: 2017-04-07 | End: 2017-04-12 | Stop reason: HOSPADM

## 2017-04-07 RX ORDER — BETHANECHOL CHLORIDE 10 MG/1
10 TABLET ORAL 3 TIMES DAILY
Status: DISCONTINUED | OUTPATIENT
Start: 2017-04-07 | End: 2017-04-12 | Stop reason: HOSPADM

## 2017-04-07 RX ORDER — POTASSIUM CHLORIDE 7.45 MG/ML
10 INJECTION INTRAVENOUS
Status: COMPLETED | OUTPATIENT
Start: 2017-04-07 | End: 2017-04-07

## 2017-04-07 RX ADMIN — KETOROLAC TROMETHAMINE 30 MG: 30 INJECTION, SOLUTION INTRAMUSCULAR at 06:04

## 2017-04-07 RX ADMIN — KETOROLAC TROMETHAMINE 30 MG: 30 INJECTION, SOLUTION INTRAMUSCULAR at 11:04

## 2017-04-07 RX ADMIN — IRON SUCROSE 100 MG: 20 INJECTION, SOLUTION INTRAVENOUS at 09:04

## 2017-04-07 RX ADMIN — POTASSIUM CHLORIDE 10 MEQ: 10 INJECTION, SOLUTION INTRAVENOUS at 10:04

## 2017-04-07 RX ADMIN — ENOXAPARIN SODIUM 40 MG: 100 INJECTION SUBCUTANEOUS at 04:04

## 2017-04-07 RX ADMIN — CYANOCOBALAMIN 1000 MCG: 1000 INJECTION, SOLUTION INTRAMUSCULAR; SUBCUTANEOUS at 09:04

## 2017-04-07 RX ADMIN — BETHANECHOL CHLORIDE 10 MG: 10 TABLET ORAL at 02:04

## 2017-04-07 RX ADMIN — FAMOTIDINE 20 MG: 20 TABLET ORAL at 08:04

## 2017-04-07 RX ADMIN — POTASSIUM CHLORIDE 10 MEQ: 10 INJECTION, SOLUTION INTRAVENOUS at 11:04

## 2017-04-07 RX ADMIN — BETHANECHOL CHLORIDE 10 MG: 10 TABLET ORAL at 09:04

## 2017-04-07 RX ADMIN — POTASSIUM CHLORIDE 10 MEQ: 10 INJECTION, SOLUTION INTRAVENOUS at 02:04

## 2017-04-07 RX ADMIN — POTASSIUM CHLORIDE 10 MEQ: 10 INJECTION, SOLUTION INTRAVENOUS at 01:04

## 2017-04-07 RX ADMIN — CIPROFLOXACIN 400 MG: 2 INJECTION, SOLUTION INTRAVENOUS at 03:04

## 2017-04-07 RX ADMIN — TRAMADOL HYDROCHLORIDE 50 MG: 50 TABLET, COATED ORAL at 09:04

## 2017-04-07 RX ADMIN — ASCORBIC ACID, VITAMIN A PALMITATE, CHOLECALCIFEROL, THIAMINE HYDROCHLORIDE, RIBOFLAVIN-5 PHOSPHATE SODIUM, PYRIDOXINE HYDROCHLORIDE, NIACINAMIDE, DEXPANTHENOL, ALPHA-TOCOPHEROL ACETATE, VITAMIN K1, FOLIC ACID, BIOTIN, CYANOCOBALAMIN: 200; 3300; 200; 6; 3.6; 6; 40; 15; 10; 150; 600; 60; 5 INJECTION, SOLUTION INTRAVENOUS at 09:04

## 2017-04-07 RX ADMIN — CIPROFLOXACIN 400 MG: 2 INJECTION, SOLUTION INTRAVENOUS at 04:04

## 2017-04-07 RX ADMIN — PANTOPRAZOLE SODIUM 40 MG: 40 INJECTION, POWDER, FOR SOLUTION INTRAVENOUS at 09:04

## 2017-04-07 RX ADMIN — TAMSULOSIN HYDROCHLORIDE 0.4 MG: 0.4 CAPSULE ORAL at 09:04

## 2017-04-07 RX ADMIN — ERYTHROPOIETIN 10000 UNITS: 10000 INJECTION, SOLUTION INTRAVENOUS; SUBCUTANEOUS at 09:04

## 2017-04-07 RX ADMIN — KETOROLAC TROMETHAMINE 30 MG: 30 INJECTION, SOLUTION INTRAMUSCULAR at 05:04

## 2017-04-07 NOTE — PT/OT/SLP PROGRESS
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    Pain Rating: 3/10  Location - Side: Bilateral  Location - Orientation: generalized  Location: abdomen  Pain Addressed: Reposition, Cessation of Activity, Nurse notified       Objective:  Patient found with: central line, chest tube, kaplan catheter     Functional Mobility:  Bed Mobility: N/A - patient in bedside chair       Transfers:   Sit <> Stand Assistance: Stand By Assistance  Sit <> Stand Assistive Device: No Assistive Device  Bed <> Chair Transfer Assistance: Stand By Assistance  Bed <> Chair Assistive Device: No Assistive Device  Toilet Transfer Assistance: Contact Guard Assistance  Toilet Transfer Assistive Device: No Assistive Device    Functional Ambulation: In room with HHA and slow gait    Activities of Daily Living:  Feeding Level of Assistance: Set-up Assistance  Toileting Where Assessed: Toilet  Toileting Level of Assistance: Set-up Assistance    Balance:   Static Sit: GOOD: Takes MODERATE challenges from all directions  Dynamic Sit: GOOD: Maintains balance through MODERATE excursions of active trunk movement  Static Stand: FAIR+: Takes MINIMAL challenges from all directions  Dynamic stand: FAIR: Needs CONTACT GUARD during gait    Therapeutic Activities and Exercises:  Patient and spouse initially requesting RW for room use, but deemed unsafe 2* multi lines (2 IV poles, kaplan, chest tube drain). Patient required SBA and increased time to stand from chair. Ambulated HHA (and assist of 2 for line mgmt) to bathroom and t/f to toilet CGA 2* low toilet  height. Patient with successful BM and completed hygiene with set-up (A) -- though stated hygiene was somewhat difficult. HHA to ambulate back to bedside chair. Patient set-up for lunch tray and BLEs elevated. Increased fatigue noted after toileting task.     AM-PAC 6 CLICK ADL   How much help from another person does this patient currently need?   1 = Unable, Total/Dependent Assistance  2 = A lot, Maximum/Moderate Assistance  3 = A little, Minimum/Contact Guard/Supervision  4 = None, Modified [place]/Independent    Putting on and taking off regular lower body clothing? : 3  Bathing (including washing, rinsing, drying)?: 3  Toileting, which includes using toilet, bedpan, or urinal? : 3  Putting on and taking off regular upper body clothing?: 3  Taking care of personal grooming such as brushing teeth?: 4  Eating meals?: 4  Total Score: 20     AM-PAC Raw Score CMS G-Code Modifier Level of Impairment Assistance   6 % Total / Unable   7 - 9 CM 80 - 100% Maximal Assist   10 - 14 CL 60 - 80% Moderate Assist   15 - 19 CK 40 - 60% Moderate Assist   20 - 22 CJ 20 - 40% Minimal Assist   23 CI 1-20% SBA / CGA   24 CH 0% Independent/ Mod I     Patient left up in chair with all lines intact, call button in reach, spouse notified and Abdias present    ASSESSMENT:  Javy Thompson is a 47 y.o. male with a medical diagnosis of Secondary malignant neoplasm of liver   Patient improving with ax tolerance and mobility. Successful BM today. Will benefit from continued OT during acute hospital stay.    Rehab identified problem list/impairments: Rehab identified problem list/impairments: weakness, impaired endurance, pain, impaired self care skills, impaired functional mobilty, gait instability    Rehab potential is excellent.    Activity tolerance: Good    Discharge recommendations: Discharge Facility/Level Of Care Needs: home     Barriers to discharge: Barriers to Discharge: Inaccessible home environment    Equipment  recommendations: shower chair     GOALS:   Occupational Therapy Goals        Problem: Occupational Therapy Goal    Goal Priority Disciplines Outcome Interventions   Occupational Therapy Goal     OT, PT/OT Ongoing (interventions implemented as appropriate)    Description:  Goals to be met by: 5/2/17     Patient will increase functional independence with ADLs by performing:    LE Dressing with Modified [place].  Grooming while standing with Modified [place].  Toileting from toilet with Modified [place] for hygiene and clothing management.   Supine to sit with Modified [place].  Stand pivot transfers with Modified [place].  Toilet transfer to toilet with Modified [place].  Upper extremity exercise program x10 reps per handout, with independence.                Plan:  Patient to be seen 5 x/week to address the above listed problems via self-care/home management, therapeutic activities, therapeutic exercises  Plan of Care expires: 05/02/17  Plan of Care reviewed with: patient, spouse         RUSS Pate  04/07/2017

## 2017-04-07 NOTE — PLAN OF CARE
Problem: Nutrition, Parenteral (Adult)  Goal: Signs and Symptoms of Listed Potential Problems Will be Absent, Minimized or Managed (Nutrition, Parenteral)  Signs and symptoms of listed potential problems will be absent, minimized or managed by discharge/transition of care (reference Nutrition, Parenteral (Adult) CPG).  Outcome: Ongoing (interventions implemented as appropriate)  Recommendation/Intervention:   1. Monitor tolerance to Clear Liquid diet.   2. Advance diet to low fiber/GI soft when medically acceptable.   3. Continue current PPN until tolerating regular diet.     Goals:   Patient to meet >75% EEN  Nutrition Goal Status: progressing towards goal  Communication of RD Recs: reviewed with RN (Abdias)

## 2017-04-07 NOTE — PLAN OF CARE
Problem: Patient Care Overview  Goal: Plan of Care Review  Outcome: Ongoing (interventions implemented as appropriate)  Patient resting in bed, AAOx4. Family at the bedside. TPN infusing as ordered. Medications administered as ordered. Patient complained of slight pain overnight relieved by medication. Cabrera in place draining dark yellow urine. Chest tube in place with small amount of bloody drainage. Patient asleep for majority of night, prefers to be in the chair over the bed. Encouraged to call with needs or concerns. Will continue to monitor.

## 2017-04-07 NOTE — PROGRESS NOTES
Nurse  with [facility] called to offer discharge assistance.  Her contact info is as follows: (P) [phone]  (F): [phone].

## 2017-04-07 NOTE — PROGRESS NOTES
[facility] - Neuroendocrine Surgery Service  Daily Progress Note     HD#7  POD#6 Days Post-Op    Subjective  ELISSA. Doing well. Ambulating. Tolerating CLD. +flatus. -BM. -N/V.      Scheduled Meds:   acetaminophen  650 mg Oral 1 time in Clinic/HOD    ciprofloxacin  400 mg Intravenous Q12H    cyanocobalamin  1,000 mcg Subcutaneous Daily    enoxaparin  40 mg Subcutaneous Daily    epoetin eduarda (PROCRIT) injection  10,000 Units Subcutaneous Every Mon, Wed, Fri    iron sucrose (VENOFER) IVPB  100 mg Intravenous Daily    ketorolac  30 mg Intravenous Q6H    pantoprazole  40 mg Intravenous Daily    tamsulosin  0.4 mg Oral Daily       Continuous Infusions:   Amino acid 2.75% - dextrose 10% (CLINIMIX-E) solution with additives ( 1L provides 27.5 gm AA, 100 gm CHO (340 kcal/L dextrose), Na 35, K 30, Mg 5, Ca 4.5, Acetate 51, Cl 39, Phos 15) 75 mL/hr at 04/06/17 2147       PRN Meds:sodium chloride, acetaminophen, albuterol sulfate, diphenhydrAMINE, hydrALAZINE, labetalol, naloxone, ondansetron, senna-docusate 8.6-50 mg, simethicone, tramadol     Objective  Temp:  [98.7 °F (37.1 °C)-99.5 °F (37.5 °C)] 99.5 °F (37.5 °C)  Pulse:  [105-109] 105  Resp:  [17-19] 17  SpO2:  [94 %] 94 %  BP: (114-126)/(66-69) 116/66     I/O last 3 completed shifts:  In: 5545 [P.O.:350; IV Piggyback:500]  Out: 1930 [Urine:1200; Chest Tube:730]        GEN: NAD  RESP: MAKSIM  CV: Reg rate  ABD: s, aTTP, moderately distended, -timpani, -rebound/guarding. Incision healing well   : Cabrera collecting nato urine     Labs  Recent Labs      04/05/17   0530  04/06/17   0500  04/07/17   0534   WBC  12.11  11.78  12.34   HGB  7.6*  7.7*  7.7*   HCT  23.5*  23.4*  23.7*   PLT  181  201  237     Recent Labs      04/05/17   0530  04/06/17   0500  04/07/17   0534   NA  136  134*  132*   K  3.4*  3.6  3.4*   CL  98  99  96   CO2  30*  27  28   BUN  10  16  21*   CREATININE  0.8  0.8  0.9   GLU  173*  154*  163*   CALCIUM  8.2*  8.1*  8.1*   MG  2.2  2.5  2.5    PHOS  2.6*  2.7  3.7   PROT  5.7*  5.7*  5.7*   ALBUMIN  3.5  3.1*  2.8*   BILITOT  2.0*  2.1*  1.6*   AST  38  33  36   ALKPHOS  76  92  96   ALT  74*  52*  44       Imaging  CT abd/pelvis: Postoperative changes. 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  Multiple small, partially loculated fluid collections.     Assessment/Plan  47M hx NET now s/p exlap, R hepatectomy, SBR, cholecystectomy, mesenteric lymphadenectomy. Postop day 6  -Cont PO pain control. Limit narcotics.   -Cont TPN while PO intake low  -Replete lytes  -OOB, IS, Ambulate. PT/OT consulted.     Nick Hackett MD  [facility], PGY2  c [phone]  4/7/2017  9:52 AM

## 2017-04-07 NOTE — PT/OT/SLP PROGRESS
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  Attempted to see pt at 1212, but pt asked PTA to return secondary to fatigue from having a BM.  Pt agreed to tx on second attempt.    Pain Rating: 3/10  Location - Side: Bilateral  Location - Orientation: generalized  Location: abdomen  Pain Addressed: Pre-medicate for activity, Reposition, Nurse notified  Pain Rating Post-Intervention: 3/10    Objective:   Patient found with: central line, kaplan catheter, chest tube    Functional Mobility:  Bed Mobility:        Transfers:  Sit <> Stand Assistance: Contact Guard Assistance  Sit <> Stand Assistive Device: No Assistive Device    Gait:   Gait Distance: 90ft x 2 with Rw and CGA/SBA with assist for IV pole.  Standing rest between bouts secondary to decreased strength and endurance.  Assistance 1: Contact Guard Assistance, Stand by Assistance  Gait Assistive Device: Rolling walker  Gait Pattern: reciprocal  Gait Deviation(s): decreased shoshana, decreased step length, decreased velocity of limb motion    Stairs:  Balance:   Static Sit: GOOD: Takes MODERATE challenges from all directions  Dynamic Sit: GOOD: Maintains balance through MODERATE excursions of active trunk movement  Static Stand: FAIR+: Takes MINIMAL challenges from all directions  Dynamic stand: FAIR: Needs CONTACT GUARD during gait /SBA with RW    Therapeutic Activities and Exercises:  Static standing with RW and  SBA to adjust gowns and lines.  Seated BLE therex: AP, LAQ, hip flexion, ABD/ADD, and glut sets x 15 reps.  Brief rest breaks secondary to pain/decreased endurance.  AMB: No LOB, but very slow shoshana and guarded with gait.       AM-PAC 6 CLICK MOBILITY  How much help from another person does this patient currently need?   1 = Unable, Total/Dependent Assistance  2 = A lot, Maximum/Moderate Assistance  3 = A little, Minimum/Contact Guard/Supervision  4 = None, Modified [place]/Independent    Turning over in bed (including adjusting bedclothes, sheets and blankets)?: 4  Sitting down on and standing up from a chair with arms (e.g., wheelchair, bedside commode, etc.): 3  Moving from lying on back to sitting on the side of the bed?: 4  Moving to and from a bed to a chair (including a wheelchair)?: 3  Need to walk in hospital room?: 3  Climbing 3-5 steps with a railing?: 3  Total Score: 20    AM-PAC Raw Score CMS G-Code Modifier Level of Impairment Assistance   6 % Total / Unable   7 - 9 CM 80 - 100% Maximal Assist   10 - 14 CL 60 - 80% Moderate Assist   15 - 19 CK 40 - 60% Moderate Assist   20 - 22 CJ 20 - 40% Minimal Assist   23 CI 1-20% SBA / CGA   24 CH 0% Independent/ Mod I     Patient left up in chair with all lines intact, call button in reach and RN notified.    Assessment:  Javy Thompson is a 47 y.o. male with a medical diagnosis of Secondary malignant neoplasm of liver and presents with decreased strength and endurance.  Pt reports having pain with movement, but less than yesterday with gait.  Pt would continue to benefit from P.T. To address impairments listed below.    Rehab identified problem list/impairments: Rehab identified problem list/impairments: gait instability, weakness, impaired endurance, impaired functional mobilty, impaired self care skills, pain    Rehab potential is good.    Activity tolerance: Good    Discharge recommendations: Discharge Facility/Level Of Care Needs: home      Barriers to discharge: Barriers to Discharge: Inaccessible home environment    Equipment recommendations: Equipment Needed After Discharge: shower chair (possible Rw)     GOALS:   Physical Therapy Goals        Problem: Physical Therapy Goal    Goal Priority Disciplines Outcome Goal Variances Interventions   Physical Therapy Goal     PT/OT, PT Ongoing (interventions implemented as appropriate)     Description:  Goals to be met by: 17     Patient will increase functional independence with mobility by performin. Supine to sit with Modified [place]  2. Sit to supine with Modified [place]  3. Sit to stand transfer with Modified [place]  4. Bed to chair transfer with Modified [place] using appropriate AD if needed  5. Gait  x 150 feet with Modified [place] using appropriate AD if needed.  6. Ascend/descend 16 stairs with bilateral Handrails Supervision       Recommendations: Pt to benefit from continued PT intervention to improve independence with functional mobility/ADL. Recommend d/c home c/ family. Further d/c recs pending progress c/ therapy.                  PLAN:    Patient to be seen 6 x/week  to address the above listed problems via gait training, therapeutic activities, therapeutic exercises, neuromuscular re-education  Plan of Care expires: 17  Plan of Care reviewed with: patient, spouse         Dee Dee Aaron, PTA  2017

## 2017-04-07 NOTE — PROGRESS NOTES
[facility]  Adult Nutrition  Progress Note    SUMMARY     Recommendations    Recommendation/Intervention:   1. Monitor tolerance to Clear Liquid diet.   2. Advance diet to low fiber/GI soft when medically acceptable.   3. Continue current PPN until tolerating regular diet.    Goals:   Patient to meet >75% EEN  Nutrition Goal Status: progressing towards goal  Communication of RD Recs: reviewed with RN (Abdias)    Continuum of Care Plan  Referral to Outpatient Services:  (D/C planning: To soon to determine)    Reason for Assessment  Reason for Assessment: RD follow-up  Diagnosis:  (NET of Ileum)  Relevent Medical History: liver, lymph NET   Interdisciplinary Rounds: did not attend  General Information Comments: Pt started on Clear Liquid diet. Toelrating wothout N/V. He reports taking it slow. Pt also receiving Clinimix 2.75/10 at 75ml/hr. Pt with altered GI function related to NET as evidenced by s/p SBR/PP/NPO-improving    Nutrition Prescription Ordered  Current Diet Order: Clear Liquid  Current Nutrition Support Formula Ordered:  (Clinimex 2.75/10)  Current Nutrition Support Rate Ordered: 75 (ml)  Current Nutrition Support Frequency Ordered: ml/hr  Oral Nutrition Supplement: IVFE Daily     Evaluation of Received Nutrients/Fluid Intake  Parenteral Calories (kcal): 1310  Parenteral Protein (gm): 50  Parenteral Fluid (mL): 1800  % Kcal Needs: 50%  Protein Required: not meeting needs  % Protein Needs: 45%  Total Fluid Intake (mL/kg):  (per MD)  Fluid Required: not meeting needs  Tolerance:  (GIR: 1.3)     Nutrition Risk Screen  Nutrition Risk Screen: no indicators present    Nutrition/Diet History  Food Preferences: Denies cultural, Druze, ethnic food preferences  Factors Affecting Nutritional Intake: altered gastrointestinal function    Labs/Tests/Procedures/Meds  Pertinent Labs Reviewed: reviewed  Pertinent Labs Comments: Na 1332L, K 3.4L, BUN 21H, Glu 163H, Ca 8.1L, Alb 2.8L  Pertinent  Medications Reviewed: reviewed  Pertinent Medications Comments: cipro, pantoprazole    Physical Findings  Overall Physical Appearance:  (WDL)  Tubes:  (chest tube)  Oral/Mouth Cavity: WDL  Skin:  (Favoi 19-abd incision)    Anthropometrics  Height (inches): 75.98 in  Weight Method: Bed Scale  Weight (kg): 95.3 kg  Ideal Body Weight (IBW), Male: 201.88 lb  % Ideal Body Weight, Male (lb): 104.07 lb  BMI (kg/m2): 25.58  BMI Grade: 25 - 29.9 - overweight    Estimated/Assessed Needs  Weight Used For Calorie Calculations: 95.3 kg (210 lb 1.6 oz)   Height (cm): 193 cm  Energy Need Method: [place]-St Jeor (7850-9173 kcal)  RMR ([place]-St. Jeor Equation): 1927.14  Weight Used For Protein Calculations: 95.3 kg (210 lb 1.6 oz)  Protein Requirements: 115 g  Fluid Need Method: RDA Method    Monitor and Evaluation  Food and Nutrient Intake: energy intake, parenteral nutrition intake  Food and Nutrient Adminstration: diet order, enteral and parenteral nutrition administration  Anthropometric Measurements: weight, weight change  Biochemical Data, Medical Tests and Procedures: electrolyte and renal panel, gastrointestinal profile, lipid profile  Nutrition-Focused Physical Findings: overall appearance    Nutrition Risk  Level of Risk:  (2 x week)    Nutrition Follow-Up  RD Follow-up?: Yes    Assessment and Plan  No new Assessment & Plan notes have been filed under this hospital service since the last note was generated.  Service: Nutrition

## 2017-04-08 LAB
ALBUMIN SERPL BCP-MCNC: 2.5 G/DL
ALP SERPL-CCNC: 97 U/L
ALT SERPL W/O P-5'-P-CCNC: 36 U/L
ANION GAP SERPL CALC-SCNC: 7 MMOL/L
AST SERPL-CCNC: 38 U/L
BACTERIA SPEC AEROBE CULT: NO GROWTH
BASOPHILS # BLD AUTO: 0.04 K/UL
BASOPHILS NFR BLD: 0.3 %
BILIRUB SERPL-MCNC: 1.5 MG/DL
BUN SERPL-MCNC: 20 MG/DL
CALCIUM SERPL-MCNC: 7.9 MG/DL
CHLORIDE SERPL-SCNC: 98 MMOL/L
CO2 SERPL-SCNC: 28 MMOL/L
CREAT SERPL-MCNC: 0.9 MG/DL
DIFFERENTIAL METHOD: ABNORMAL
EOSINOPHIL # BLD AUTO: 0.3 K/UL
EOSINOPHIL NFR BLD: 2.4 %
ERYTHROCYTE [DISTWIDTH] IN BLOOD BY AUTOMATED COUNT: 16.3 %
EST. GFR  (AFRICAN AMERICAN): >60 ML/MIN/1.73 M^2
EST. GFR  (NON AFRICAN AMERICAN): >60 ML/MIN/1.73 M^2
GLUCOSE SERPL-MCNC: 173 MG/DL
HCT VFR BLD AUTO: 20.5 %
HGB BLD-MCNC: 7 G/DL
LYMPHOCYTES # BLD AUTO: 1.4 K/UL
LYMPHOCYTES NFR BLD: 11.2 %
MAGNESIUM SERPL-MCNC: 2.4 MG/DL
MCH RBC QN AUTO: 29.8 PG
MCHC RBC AUTO-ENTMCNC: 34.1 %
MCV RBC AUTO: 87 FL
MONOCYTES # BLD AUTO: 1.2 K/UL
MONOCYTES NFR BLD: 9.5 %
NEUTROPHILS # BLD AUTO: 9.5 K/UL
NEUTROPHILS NFR BLD: 74.7 %
PHOSPHATE SERPL-MCNC: 3.6 MG/DL
PLATELET # BLD AUTO: 237 K/UL
PMV BLD AUTO: 9.3 FL
POCT GLUCOSE: 159 MG/DL (ref 70–110)
POTASSIUM SERPL-SCNC: 3.6 MMOL/L
PROT SERPL-MCNC: 5.5 G/DL
RBC # BLD AUTO: 2.35 M/UL
SODIUM SERPL-SCNC: 133 MMOL/L
WBC # BLD AUTO: 12.68 K/UL

## 2017-04-08 PROCEDURE — 85025 COMPLETE CBC W/AUTO DIFF WBC: CPT

## 2017-04-08 PROCEDURE — 63600175 PHARM REV CODE 636 W HCPCS: Performed by: COLON & RECTAL SURGERY

## 2017-04-08 PROCEDURE — 63600175 PHARM REV CODE 636 W HCPCS: Performed by: STUDENT IN AN ORGANIZED HEALTH CARE EDUCATION/TRAINING PROGRAM

## 2017-04-08 PROCEDURE — 84100 ASSAY OF PHOSPHORUS: CPT

## 2017-04-08 PROCEDURE — 94761 N-INVAS EAR/PLS OXIMETRY MLT: CPT

## 2017-04-08 PROCEDURE — 83735 ASSAY OF MAGNESIUM: CPT

## 2017-04-08 PROCEDURE — 97116 GAIT TRAINING THERAPY: CPT

## 2017-04-08 PROCEDURE — 11000001 HC ACUTE MED/SURG PRIVATE ROOM

## 2017-04-08 PROCEDURE — 63600175 PHARM REV CODE 636 W HCPCS: Performed by: SURGERY

## 2017-04-08 PROCEDURE — 25000003 PHARM REV CODE 250: Performed by: SURGERY

## 2017-04-08 PROCEDURE — 25000003 PHARM REV CODE 250: Performed by: COLON & RECTAL SURGERY

## 2017-04-08 PROCEDURE — 80053 COMPREHEN METABOLIC PANEL: CPT

## 2017-04-08 PROCEDURE — 99222 1ST HOSP IP/OBS MODERATE 55: CPT | Mod: ,,, | Performed by: UROLOGY

## 2017-04-08 PROCEDURE — 25000003 PHARM REV CODE 250: Performed by: STUDENT IN AN ORGANIZED HEALTH CARE EDUCATION/TRAINING PROGRAM

## 2017-04-08 RX ORDER — TRAMADOL HYDROCHLORIDE 50 MG/1
100 TABLET ORAL EVERY 4 HOURS PRN
Status: DISCONTINUED | OUTPATIENT
Start: 2017-04-08 | End: 2017-04-12 | Stop reason: HOSPADM

## 2017-04-08 RX ORDER — TRAMADOL HYDROCHLORIDE 50 MG/1
50 TABLET ORAL EVERY 4 HOURS PRN
Status: DISCONTINUED | OUTPATIENT
Start: 2017-04-08 | End: 2017-04-08

## 2017-04-08 RX ORDER — ACETAMINOPHEN 10 MG/ML
1000 INJECTION, SOLUTION INTRAVENOUS EVERY 8 HOURS
Status: COMPLETED | OUTPATIENT
Start: 2017-04-08 | End: 2017-04-09

## 2017-04-08 RX ADMIN — DIPHENHYDRAMINE HYDROCHLORIDE 12.5 MG: 50 INJECTION, SOLUTION INTRAMUSCULAR; INTRAVENOUS at 03:04

## 2017-04-08 RX ADMIN — TAMSULOSIN HYDROCHLORIDE 0.4 MG: 0.4 CAPSULE ORAL at 09:04

## 2017-04-08 RX ADMIN — TRAMADOL HYDROCHLORIDE 50 MG: 50 TABLET, COATED ORAL at 03:04

## 2017-04-08 RX ADMIN — BETHANECHOL CHLORIDE 10 MG: 10 TABLET ORAL at 05:04

## 2017-04-08 RX ADMIN — TRAMADOL HYDROCHLORIDE 50 MG: 50 TABLET, COATED ORAL at 10:04

## 2017-04-08 RX ADMIN — CIPROFLOXACIN 400 MG: 2 INJECTION, SOLUTION INTRAVENOUS at 04:04

## 2017-04-08 RX ADMIN — TRAMADOL HYDROCHLORIDE 100 MG: 50 TABLET, COATED ORAL at 08:04

## 2017-04-08 RX ADMIN — CYANOCOBALAMIN 1000 MCG: 1000 INJECTION, SOLUTION INTRAMUSCULAR; SUBCUTANEOUS at 09:04

## 2017-04-08 RX ADMIN — TRAMADOL HYDROCHLORIDE 100 MG: 50 TABLET, COATED ORAL at 04:04

## 2017-04-08 RX ADMIN — ACETAMINOPHEN 1000 MG: 10 INJECTION, SOLUTION INTRAVENOUS at 09:04

## 2017-04-08 RX ADMIN — CIPROFLOXACIN 400 MG: 2 INJECTION, SOLUTION INTRAVENOUS at 03:04

## 2017-04-08 RX ADMIN — FAMOTIDINE 20 MG: 20 TABLET ORAL at 08:04

## 2017-04-08 RX ADMIN — RETINOL, ERGOCALCIFEROL, .ALPHA.-TOCOPHEROL ACETATE, DL-, PHYTONADIONE, ASCORBIC ACID, NIACINAMIDE, RIBOFLAVIN 5-PHOSPHATE SODIUM, THIAMINE HYDROCHLORIDE, PYRIDOXINE HYDROCHLORIDE, DEXPANTHENOL, BIOTIN, FOLIC ACID, AND CYANOCOBALAMIN: KIT at 08:04

## 2017-04-08 RX ADMIN — KETOROLAC TROMETHAMINE 30 MG: 30 INJECTION, SOLUTION INTRAMUSCULAR at 05:04

## 2017-04-08 RX ADMIN — BETHANECHOL CHLORIDE 10 MG: 10 TABLET ORAL at 02:04

## 2017-04-08 RX ADMIN — FAMOTIDINE 20 MG: 20 TABLET ORAL at 09:04

## 2017-04-08 RX ADMIN — BETHANECHOL CHLORIDE 10 MG: 10 TABLET ORAL at 09:04

## 2017-04-08 RX ADMIN — IRON SUCROSE 100 MG: 20 INJECTION, SOLUTION INTRAVENOUS at 09:04

## 2017-04-08 NOTE — PROGRESS NOTES
Notified Dr. La that TPN order is due to  at 2200. MD verbalized understanding. Verbal telephone order received to reorder.

## 2017-04-08 NOTE — PLAN OF CARE
Problem: Patient Care Overview  Goal: Plan of Care Review  Outcome: Ongoing (interventions implemented as appropriate)  Pt on RA with sats of  97. Will continue to monitor.

## 2017-04-08 NOTE — CONSULTS
[facility]  Urology  Consult Note    Patient Name: Javy Thompson  MRN: [id number]  Admission Date: 3/31/2017  Hospital Length of Stay: 8   Code Status: No Order   Attending Provider: Fidelia Reyes MD   Consulting Provider: Syd Lawrence MD  Primary Care Physician: Syd Gonzales MD  Principal Problem:Secondary malignant neoplasm of liver    Inpatient consult to Urology  Consult performed by: SYD LAWRENCE  Consult ordered by: SUNSHINE CRAIG  Reason for consult: urinary retention  Assessment/Recommendations: Void trial           Subjective:     HPI:  Patient is a 48 yo male with a history of metastatic carcinoid tumor who underwent extensive surgery on 3/31/17 for excision/treatment of multiple sites of carcinoid tumor.  The patient at baseline has minimal voiding dysfunction.  Has occasional nocturia 1-2x/night.  Good force of stream.  Urinary frequency only if drinking lots of fluid.  Has the sensation of complete bladder emptying.  No prostate medications or surgeries.  He underwent multiple void trials in the ICU.  According to the patient most recently was 3-4 days ago.  He was able to void but retained some urine requiring kaplan replacement.  He is on flomax.   Serum cr reviewed and normal.     He just had some return of bowel function with one bm since surgery.  He is on oral and IV narcotics.  He feels most of his pain is from back spasms which he has at baseline.  He is on tpn and clear liquids.  No nausea/vomiting.        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       Past Surgical History:   Procedure Laterality Date    ANTERIOR CRUCIATE LIGAMENT REPAIR Right 1986    APPENDECTOMY  3/31/2017    Procedure: APPENDECTOMY;  Surgeon: Fidelia Reyes MD;  Location: [facility] OR;  Service: General;;    KNEE ARTHROSCOPY Right 1986    x2     LIVER BIOPSY  12/2016, 1/2017    TONSILLECTOMY         Review of patient's allergies indicates:   Allergen Reactions    Epinephrine Other (See Comments)     Carcinoid patient       Family History     Problem Relation (Age of Onset)    Cancer Mother          Social History Main Topics    Smoking status: Never Smoker    Smokeless tobacco: Not on file    Alcohol use 1.2 oz/week     2 Glasses of wine per week    Drug use: Not on file    Sexual activity: Not on file       Review of Systems   All other systems reviewed and negative except pertinent positives noted in HPI.      Objective:     Temp:  [98.1 °F (36.7 °C)-99.7 °F (37.6 °C)] 99.7 °F (37.6 °C)  Pulse:  [104-106] 104  Resp:  [17-18] 17  SpO2:  [94 %-97 %] 97 %  BP: (110-120)/(61-72) 110/61     Body mass index is 25.57 kg/(m^2).            Drains     Drain                 Urethral Catheter 04/05/17 0115 3 days         Chest Tube 04/05/17 1300 1 Right Pleural 2 days                Physical Exam   Nursing note and vitals reviewed.  Constitutional: He is oriented to person, place, and time. Vital signs are normal. He appears well-developed and well-nourished. He is cooperative.   HENT:   Head: Normocephalic.   Neck: No tracheal deviation present.   Cardiovascular: Normal rate and intact distal pulses.    Pulmonary/Chest: Effort normal. No accessory muscle usage. No tachypnea. No respiratory distress.   Abdominal: Soft. Normal appearance. He exhibits distension (minimal, soft). He exhibits no fluid wave, no ascites and no mass. There is no tenderness. There is no rebound and no CVA tenderness. No hernia. Hernia confirmed negative in the right inguinal area and confirmed negative in the left inguinal area.       Liver/spleen non-palpable.   Genitourinary: Prostate normal and penis normal. Rectal exam shows no external hemorrhoid, no mass, no tenderness and anal tone normal. Prostate is not tender. Right testis shows swelling. Right testis shows no mass and no  tenderness. Right testis is descended. Left testis shows swelling. Left testis shows no mass and no tenderness. Left testis is descended. Circumcised.   Genitourinary Comments: Scrotum showed no rashes or lesions.  Some significant scrotal edema  Anus/perineum without lesion.  Epididymis showed no masses or tenderness. No meatal stenosis, meatus in normal location. No penile discharge/plaques, kaplan draining yellow urine. Prostate smooth, no nodules, 30 grams.  Seminal vesicles non-palpable.  Normal sphincter tone.        Musculoskeletal: Normal range of motion.   Lymphadenopathy: No inguinal adenopathy noted on the right or left side.        Right: No inguinal adenopathy present.        Left: No inguinal adenopathy present.   Neurological: He is alert and oriented to person, place, and time. He has normal strength.   Skin: No bruising and no rash noted.     Psychiatric: He has a normal mood and affect. His speech is normal and behavior is normal. Thought content normal.       Significant Labs:    BMP:    Recent Labs  Lab 04/06/17  0500 04/07/17  0534 04/08/17  0500   * 132* 133*   K 3.6 3.4* 3.6   CL 99 96 98   CO2 27 28 28   BUN 16 21* 20   CREATININE 0.8 0.9 0.9   CALCIUM 8.1* 8.1* 7.9*       CBC:    Recent Labs  Lab 04/06/17  0500 04/07/17  0534 04/08/17  0500   WBC 11.78 12.34 12.68   HGB 7.7* 7.7* 7.0*   HCT 23.4* 23.7* 20.5*    237 237       Urine Culture:   Recent Labs  Lab 04/05/17  1033   LABURIN No growth     Urine Studies:   Recent Labs  Lab 04/05/17  1033   COLORU Orange*   APPEARANCEUA Cloudy*   PHUR 6.0   SPECGRAV 1.025   PROTEINUA 1+*   GLUCUA Trace*   KETONESU Negative   BILIRUBINUA 1+*   OCCULTUA 1+*   NITRITE Positive*   UROBILINOGEN 4.0-6.0*   LEUKOCYTESUR Negative   RBCUA 5*   WBCUA 3   BACTERIA Many*   HYALINECASTS 1       Significant Imaging:  All pertinent imaging results/findings from the past 24 hours have been reviewed.                    Assessment and Plan:     Malignant  carcinoid tumor of ileum  -post-op status after major surgery likely primary  of urinary retention.   -management per primary team.    Acute retention of urine  -ok for repeat void trial.  -continue flomax  -scrotal support/elevation for edema  -would be as ambulatory as possible with limiting of narcotics as tolerated.  Patient with normal renal function. Consider toradol and wean IV pain meds as tolerated.  -my card was given but as the patient is a resident of [facility], he will likely f/u as need at home.   -call with any further questions.         VTE Risk Mitigation         Ordered     enoxaparin injection 40 mg  Daily     Route:  Subcutaneous        04/05/17 0946     Place sequential compression device  Until discontinued      04/05/17 0947     Medium Risk of VTE  Once      03/31/17 1626          Thank you for your consult. I will sign off. Please contact us if you have any additional questions.    Syd Khna MD  Urology  [facility]

## 2017-04-08 NOTE — ASSESSMENT & PLAN NOTE
-post-op status after major surgery likely primary  of urinary retention.   -management per primary team.

## 2017-04-08 NOTE — PT/OT/SLP PROGRESS
Physical Therapy  Treatment    Javy Thompson   MRN: [id number]   Admitting Diagnosis: Secondary malignant neoplasm of liver    PT Received On: 04/08/17  PT Start Time: 1332     PT Stop Time: 1355    PT Total Time (min): 23 min       Billable Minutes:  Gait [id number]    Treatment Type: Treatment  PT/PTA: PTA     PTA Visit Number: 5       General Precautions: Standard, fall  Orthopedic Precautions: N/A   Braces: N/A    Do you have any cultural, spiritual, Oriental orthodox conflicts, given your current situation?: No    Subjective:  Communicated with nursing prior to session. Pt required some encouragement for treatment. Stated he had a bad night and did not sleep well. Also, expressed he was very tired.      Pain Rating:  (did not rate pain but expressed he had pain in back. He said he could not tell if it was from where the chest tube was removed or from old back pain he has had for some time.)     Location - Orientation: generalized  Location: back          Objective:   Patient found with: central line, kaplan catheter    Functional Mobility:  Bed Mobility:   Rolling/Turning Right: Supervision  Scooting/Bridging: Supervision  Supine to Sit: Supervision  Sit to Supine: Supervision    Transfers:  Sit <> Stand Assistance: Contact Guard Assistance  Sit <> Stand Assistive Device: Rolling Walker    Gait:   Gait Distance: ~230 ft   Assistance 1: Contact Guard Assistance  Gait Assistive Device: Rolling walker  Gait Deviation(s): decreased shoshana, decreased step length, decreased stride length        Balance:   Static Sit: GOOD: Takes MODERATE challenges from all directions  Dynamic Sit: GOOD: Maintains balance through MODERATE excursions of active trunk movement  Static Stand: FAIR+: Takes MINIMAL challenges from all directions  Dynamic stand: FAIR: Needs CONTACT GUARD during gait     Therapeutic Activities and Exercises:  All transfers with assistive as documented above. Pt performed ambulation training ~230 ft with RW and CGA. Slow  with all transfers and slow ambulation shoshana speed as well. Declined to perform therapeutic exercises, stating he was tired and wanted to return to bed.     AM-PAC 6 CLICK MOBILITY  How much help from another person does this patient currently need?   1 = Unable, Total/Dependent Assistance  2 = A lot, Maximum/Moderate Assistance  3 = A little, Minimum/Contact Guard/Supervision  4 = None, Modified [place]/Independent    Turning over in bed (including adjusting bedclothes, sheets and blankets)?: 4  Sitting down on and standing up from a chair with arms (e.g., wheelchair, bedside commode, etc.): 3  Moving from lying on back to sitting on the side of the bed?: 4  Moving to and from a bed to a chair (including a wheelchair)?: 3  Need to walk in hospital room?: 3  Climbing 3-5 steps with a railing?: 3  Total Score: 20    Patient left supine with all lines intact, call button in reach and nursing notified.    Assessment:  Javy Thompson is a 47 y.o. male with a medical diagnosis of Secondary malignant neoplasm of liver. Pt able to increase ambulation distance. However, declined any further therapeutic exercises stating he had a bad night and was very tired. Will continue PT to achieve all established goals. Also, will continue to encourage pt to increase therapeutic exercise and activity.    Rehab identified problem list/impairments: Rehab identified problem list/impairments: weakness, impaired endurance, impaired self care skills, gait instability, impaired functional mobilty, pain    Rehab potential is good.    Activity tolerance: Fair    Discharge recommendations:       Barriers to discharge:      Equipment recommendations:       GOALS:   Physical Therapy Goals        Problem: Physical Therapy Goal    Goal Priority Disciplines Outcome Goal Variances Interventions   Physical Therapy Goal     PT/OT, PT Ongoing (interventions implemented as appropriate)     Description:  Goals to be met by: 4/20/17     Patient will  increase functional independence with mobility by performin. Supine to sit with Modified [place]  2. Sit to supine with Modified [place]  3. Sit to stand transfer with Modified [place]  4. Bed to chair transfer with Modified [place] using appropriate AD if needed  5. Gait  x 150 feet with Modified [place] using appropriate AD if needed.  6. Ascend/descend 16 stairs with bilateral Handrails Supervision       Recommendations: Pt to benefit from continued PT intervention to improve independence with functional mobility/ADL. Recommend d/c home c/ family. Further d/c recs pending progress c/ therapy.                  PLAN:    Patient to be seen 6 x/week  to address the above listed problems via gait training, therapeutic activities, therapeutic exercises, neuromuscular re-education  Plan of Care expires: 17  Plan of Care reviewed with: patient         Angela Lopez, PTA  2017

## 2017-04-08 NOTE — PLAN OF CARE
Problem: Patient Care Overview  Goal: Plan of Care Review  Outcome: Ongoing (interventions implemented as appropriate)                 Pt. AA&Ox4. Most time spent sitting in reclining chair at bedside. Spouse continually at bedside and interacting in pt's care/safety. Bed in low position, side rails up x2, call light within reach, patient safety maintained. Educated patient to call for any needs or assistance. Patient verbalized understanding.  Chest tube in place; water seal intact; draining small amount of serosanguinous fluid. Pain management discussed and maintained. Complains of chest tube insertion site pain and RUQ abdominal/lower chest pain with mild relief reported after pain med administrations. Labs to be drawn as ordered from right IJ TLC at 0500.  Midline incision with edges approximated and without drainage. Cabrera in place draining dark nato urine. TPN infusing as ordered. I&O monitored and recorded. Will continue to monitor.

## 2017-04-08 NOTE — PLAN OF CARE
Problem: Physical Therapy Goal  Goal: Physical Therapy Goal  Goals to be met by: 17     Patient will increase functional independence with mobility by performin. Supine to sit with Modified [place]  2. Sit to supine with Modified [place]  3. Sit to stand transfer with Modified [place]  4. Bed to chair transfer with Modified [place] using appropriate AD if needed  5. Gait x 150 feet with Modified [place] using appropriate AD if needed.  6. Ascend/descend 16 stairs with bilateral Handrails Supervision       Recommendations: Pt to benefit from continued PT intervention to improve independence with functional mobility/ADL. Recommend d/c home c/ family. Further d/c recs pending progress c/ therapy.    Outcome: Ongoing (interventions implemented as appropriate)     Pt progressing toward established goals.

## 2017-04-08 NOTE — PROGRESS NOTES
[facility] - Neuroendocrine Surgery Service  Daily Progress Note     HD#8  POD#6 Days Post-Op    Subjective  ELISSA. Doing well. Ambulating. Tolerating CLD. +flatus +BMs. -N/V.      Scheduled Meds:   acetaminophen  650 mg Oral 1 time in Clinic/HOD    bethanechol  10 mg Oral TID    ciprofloxacin  400 mg Intravenous Q12H    cyanocobalamin  1,000 mcg Subcutaneous Daily    enoxaparin  40 mg Subcutaneous Daily    epoetin eduarda (PROCRIT) injection  10,000 Units Subcutaneous Every Mon, Wed, Fri    famotidine  20 mg Oral BID    iron sucrose (VENOFER) IVPB  100 mg Intravenous Daily    tamsulosin  0.4 mg Oral Daily       Continuous Infusions:   Amino acid 2.75% - dextrose 10% (CLINIMIX-E) solution with additives ( 1L provides 27.5 gm AA, 100 gm CHO (340 kcal/L dextrose), Na 35, K 30, Mg 5, Ca 4.5, Acetate 51, Cl 39, Phos 15) 75 mL/hr at 04/07/17 2107       PRN Meds:sodium chloride, acetaminophen, albuterol sulfate, diphenhydrAMINE, hydrALAZINE, labetalol, naloxone, ondansetron, senna-docusate 8.6-50 mg, simethicone, tramadol     Objective  Temp:  [98.1 °F (36.7 °C)-99.7 °F (37.6 °C)] 99.2 °F (37.3 °C)  Pulse:  [] 89  Resp:  [17-19] 19  SpO2:  [92 %-97 %] 92 %  BP: (108-120)/(55-72) 117/69     I/O last 3 completed shifts:  In: 2136.3 [P.O.:1320; IV Piggyback:200]  Out: 2100 [Urine:2050; Chest Tube:50]        GEN: NAD  RESP: Right chest tube 50cc. --> All cultures from 4/5 negative  CV: Reg rate  ABD: s, aTTP, moderately distended, -timpani, -rebound/guarding. Incision healing well   : Cabrera collecting nato urine     Labs  Recent Labs      04/06/17   0500  04/07/17   0534  04/08/17   0500   WBC  11.78  12.34  12.68   HGB  7.7*  7.7*  7.0*   HCT  23.4*  23.7*  20.5*   PLT  201  237  237     Recent Labs      04/06/17   0500  04/07/17   0534  04/08/17   0500   NA  134*  132*  133*   K  3.6  3.4*  3.6   CL  99  96  98   CO2  27  28  28   BUN  16  21*  20   CREATININE  0.8  0.9  0.9   GLU  154*  163*  173*   CALCIUM   8.1*  8.1*  7.9*   MG  2.5  2.5  2.4   PHOS  2.7  3.7  3.6   PROT  5.7*  5.7*  5.5*   ALBUMIN  3.1*  2.8*  2.5*   BILITOT  2.1*  1.6*  1.5*   AST  33  36  38   ALKPHOS  92  96  97   ALT  52*  44  36       Imaging  CT abd/pelvis: Postoperative changes. 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  Multiple small, partially loculated fluid collections.     Assessment/Plan  47M hx NET now s/p exlap, R hepatectomy, SBR, cholecystectomy, mesenteric lymphadenectomy. Postop day 7  -Cont PO pain control. Limit narcotics.   -Cont TPN while PO intake low  -Replete lytes as needed  -Will monitory H/H.  Patient not symptomatic.  - Advance to regular diet  - DC right chest tube  -OOB, IS, Ambulate. PT/OT consulted.     Foster La MD

## 2017-04-08 NOTE — ASSESSMENT & PLAN NOTE
-ok for repeat void trial.  -continue flomax  -scrotal support/elevation for edema  -would be as ambulatory as possible with limiting of narcotics as tolerated.  Patient with normal renal function. Consider toradol and wean IV pain meds as tolerated.  -my card was given but as the patient is a resident of [facility], he will likely f/u as need at home.   -call with any further questions.

## 2017-04-08 NOTE — PROGRESS NOTES
Discharge planing update, pt not ready to discharge today. Treatment to continue. TN to follow up.      plan to d/c home with family on discharge. PT on case,  pt may need DME on discharge. TN to follow up.

## 2017-04-08 NOTE — SUBJECTIVE & OBJECTIVE
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       Past Surgical History:   Procedure Laterality Date    ANTERIOR CRUCIATE LIGAMENT REPAIR Right 1986    APPENDECTOMY  3/31/2017    Procedure: APPENDECTOMY;  Surgeon: Fidelia Reyes MD;  Location: [facility];  Service: General;;    KNEE ARTHROSCOPY Right 1986    x2    LIVER BIOPSY  12/2016, 1/2017    TONSILLECTOMY         Review of patient's allergies indicates:   Allergen Reactions    Epinephrine Other (See Comments)     Carcinoid patient       Family History     Problem Relation (Age of Onset)    Cancer Mother          Social History Main Topics    Smoking status: Never Smoker    Smokeless tobacco: Not on file    Alcohol use 1.2 oz/week     2 Glasses of wine per week    Drug use: Not on file    Sexual activity: Not on file       Review of Systems   All other systems reviewed and negative except pertinent positives noted in HPI.      Objective:     Temp:  [98.1 °F (36.7 °C)-99.7 °F (37.6 °C)] 99.7 °F (37.6 °C)  Pulse:  [104-106] 104  Resp:  [17-18] 17  SpO2:  [94 %-97 %] 97 %  BP: (110-120)/(61-72) 110/61     Body mass index is 25.57 kg/(m^2).            Drains     Drain                 Urethral Catheter 04/05/17 0115 3 days         Chest Tube 04/05/17 1300 1 Right Pleural 2 days                Physical Exam   Nursing note and vitals reviewed.  Constitutional: He is oriented to person, place, and time. Vital signs are normal. He appears well-developed and well-nourished. He is cooperative.   HENT:   Head: Normocephalic.   Neck: No tracheal deviation present.   Cardiovascular: Normal rate and intact distal pulses.    Pulmonary/Chest: Effort normal. No accessory muscle usage. No tachypnea. No respiratory distress.   Abdominal: Soft. Normal appearance. He exhibits distension (minimal, soft). He exhibits no fluid wave, no ascites and no  mass. There is no tenderness. There is no rebound and no CVA tenderness. No hernia. Hernia confirmed negative in the right inguinal area and confirmed negative in the left inguinal area.       Liver/spleen non-palpable.   Genitourinary: Prostate normal and penis normal. Rectal exam shows no external hemorrhoid, no mass, no tenderness and anal tone normal. Prostate is not tender. Right testis shows swelling. Right testis shows no mass and no tenderness. Right testis is descended. Left testis shows swelling. Left testis shows no mass and no tenderness. Left testis is descended. Circumcised.   Genitourinary Comments: Scrotum showed no rashes or lesions.  Some significant scrotal edema  Anus/perineum without lesion.  Epididymis showed no masses or tenderness. No meatal stenosis, meatus in normal location. No penile discharge/plaques, kaplan draining yellow urine. Prostate smooth, no nodules, 30 grams.  Seminal vesicles non-palpable.  Normal sphincter tone.        Musculoskeletal: Normal range of motion.   Lymphadenopathy: No inguinal adenopathy noted on the right or left side.        Right: No inguinal adenopathy present.        Left: No inguinal adenopathy present.   Neurological: He is alert and oriented to person, place, and time. He has normal strength.   Skin: No bruising and no rash noted.     Psychiatric: He has a normal mood and affect. His speech is normal and behavior is normal. Thought content normal.       Significant Labs:    BMP:    Recent Labs  Lab 04/06/17  0500 04/07/17  0534 04/08/17  0500   * 132* 133*   K 3.6 3.4* 3.6   CL 99 96 98   CO2 27 28 28   BUN 16 21* 20   CREATININE 0.8 0.9 0.9   CALCIUM 8.1* 8.1* 7.9*       CBC:    Recent Labs  Lab 04/06/17  0500 04/07/17  0534 04/08/17  0500   WBC 11.78 12.34 12.68   HGB 7.7* 7.7* 7.0*   HCT 23.4* 23.7* 20.5*    237 237       Urine Culture:   Recent Labs  Lab 04/05/17  1033   LABURIN No growth     Urine Studies:   Recent Labs  Lab  04/05/17  1033   COLORU Orange*   APPEARANCEUA Cloudy*   PHUR 6.0   SPECGRAV 1.025   PROTEINUA 1+*   GLUCUA Trace*   KETONESU Negative   BILIRUBINUA 1+*   OCCULTUA 1+*   NITRITE Positive*   UROBILINOGEN 4.0-6.0*   LEUKOCYTESUR Negative   RBCUA 5*   WBCUA 3   BACTERIA Many*   HYALINECASTS 1       Significant Imaging:  All pertinent imaging results/findings from the past 24 hours have been reviewed.

## 2017-04-09 LAB
ALBUMIN SERPL BCP-MCNC: 2.6 G/DL
ALP SERPL-CCNC: 142 U/L
ALT SERPL W/O P-5'-P-CCNC: 47 U/L
ANION GAP SERPL CALC-SCNC: 10 MMOL/L
ANISOCYTOSIS BLD QL SMEAR: SLIGHT
AST SERPL-CCNC: 63 U/L
BASOPHILS # BLD AUTO: 0.05 K/UL
BASOPHILS NFR BLD: 0.3 %
BILIRUB SERPL-MCNC: 1.6 MG/DL
BUN SERPL-MCNC: 15 MG/DL
CALCIUM SERPL-MCNC: 8 MG/DL
CHLORIDE SERPL-SCNC: 99 MMOL/L
CO2 SERPL-SCNC: 24 MMOL/L
CREAT SERPL-MCNC: 0.8 MG/DL
DIFFERENTIAL METHOD: ABNORMAL
EOSINOPHIL # BLD AUTO: 0.3 K/UL
EOSINOPHIL NFR BLD: 1.7 %
ERYTHROCYTE [DISTWIDTH] IN BLOOD BY AUTOMATED COUNT: 16.5 %
EST. GFR  (AFRICAN AMERICAN): >60 ML/MIN/1.73 M^2
EST. GFR  (NON AFRICAN AMERICAN): >60 ML/MIN/1.73 M^2
GLUCOSE SERPL-MCNC: 137 MG/DL
HCT VFR BLD AUTO: 23.6 %
HGB BLD-MCNC: 7.6 G/DL
LYMPHOCYTES # BLD AUTO: 1.9 K/UL
LYMPHOCYTES NFR BLD: 9.7 %
MAGNESIUM SERPL-MCNC: 2.3 MG/DL
MCH RBC QN AUTO: 28.5 PG
MCHC RBC AUTO-ENTMCNC: 32.2 %
MCV RBC AUTO: 88 FL
MONOCYTES # BLD AUTO: 1.7 K/UL
MONOCYTES NFR BLD: 8.6 %
NEUTROPHILS # BLD AUTO: 15.3 K/UL
NEUTROPHILS NFR BLD: 79.7 %
PHOSPHATE SERPL-MCNC: 3.5 MG/DL
PLATELET # BLD AUTO: 352 K/UL
PLATELET BLD QL SMEAR: ABNORMAL
PMV BLD AUTO: 9.4 FL
POTASSIUM SERPL-SCNC: 3.9 MMOL/L
PROT SERPL-MCNC: 6.3 G/DL
RBC # BLD AUTO: 2.67 M/UL
SODIUM SERPL-SCNC: 133 MMOL/L
WBC # BLD AUTO: 19.41 K/UL

## 2017-04-09 PROCEDURE — 25000003 PHARM REV CODE 250: Performed by: COLON & RECTAL SURGERY

## 2017-04-09 PROCEDURE — 63600175 PHARM REV CODE 636 W HCPCS: Performed by: STUDENT IN AN ORGANIZED HEALTH CARE EDUCATION/TRAINING PROGRAM

## 2017-04-09 PROCEDURE — 94761 N-INVAS EAR/PLS OXIMETRY MLT: CPT

## 2017-04-09 PROCEDURE — 36415 COLL VENOUS BLD VENIPUNCTURE: CPT

## 2017-04-09 PROCEDURE — 87040 BLOOD CULTURE FOR BACTERIA: CPT

## 2017-04-09 PROCEDURE — 11000001 HC ACUTE MED/SURG PRIVATE ROOM

## 2017-04-09 PROCEDURE — 85025 COMPLETE CBC W/AUTO DIFF WBC: CPT

## 2017-04-09 PROCEDURE — 87070 CULTURE OTHR SPECIMN AEROBIC: CPT

## 2017-04-09 PROCEDURE — 80053 COMPREHEN METABOLIC PANEL: CPT

## 2017-04-09 PROCEDURE — 84100 ASSAY OF PHOSPHORUS: CPT

## 2017-04-09 PROCEDURE — 25000003 PHARM REV CODE 250: Performed by: SURGERY

## 2017-04-09 PROCEDURE — 63600175 PHARM REV CODE 636 W HCPCS: Performed by: SURGERY

## 2017-04-09 PROCEDURE — 63600175 PHARM REV CODE 636 W HCPCS: Performed by: COLON & RECTAL SURGERY

## 2017-04-09 PROCEDURE — 83735 ASSAY OF MAGNESIUM: CPT

## 2017-04-09 RX ORDER — DIPHENHYDRAMINE HYDROCHLORIDE 50 MG/ML
50 INJECTION INTRAMUSCULAR; INTRAVENOUS ONCE
Status: COMPLETED | OUTPATIENT
Start: 2017-04-09 | End: 2017-04-09

## 2017-04-09 RX ORDER — ENOXAPARIN SODIUM 100 MG/ML
40 INJECTION SUBCUTANEOUS EVERY 24 HOURS
Status: DISCONTINUED | OUTPATIENT
Start: 2017-04-09 | End: 2017-04-12 | Stop reason: HOSPADM

## 2017-04-09 RX ORDER — ENOXAPARIN SODIUM 100 MG/ML
40 INJECTION SUBCUTANEOUS EVERY 24 HOURS
Status: DISCONTINUED | OUTPATIENT
Start: 2017-04-09 | End: 2017-04-09

## 2017-04-09 RX ADMIN — BETHANECHOL CHLORIDE 10 MG: 10 TABLET ORAL at 02:04

## 2017-04-09 RX ADMIN — CYANOCOBALAMIN 1000 MCG: 1000 INJECTION, SOLUTION INTRAMUSCULAR; SUBCUTANEOUS at 09:04

## 2017-04-09 RX ADMIN — DIPHENHYDRAMINE HYDROCHLORIDE 50 MG: 50 INJECTION, SOLUTION INTRAMUSCULAR; INTRAVENOUS at 04:04

## 2017-04-09 RX ADMIN — IRON SUCROSE 100 MG: 20 INJECTION, SOLUTION INTRAVENOUS at 09:04

## 2017-04-09 RX ADMIN — VANCOMYCIN HYDROCHLORIDE 1000 MG: 1 INJECTION, POWDER, LYOPHILIZED, FOR SOLUTION INTRAVENOUS at 02:04

## 2017-04-09 RX ADMIN — BETHANECHOL CHLORIDE 10 MG: 10 TABLET ORAL at 09:04

## 2017-04-09 RX ADMIN — CIPROFLOXACIN 400 MG: 2 INJECTION, SOLUTION INTRAVENOUS at 04:04

## 2017-04-09 RX ADMIN — TAMSULOSIN HYDROCHLORIDE 0.4 MG: 0.4 CAPSULE ORAL at 09:04

## 2017-04-09 RX ADMIN — RETINOL, ERGOCALCIFEROL, .ALPHA.-TOCOPHEROL ACETATE, DL-, PHYTONADIONE, ASCORBIC ACID, NIACINAMIDE, RIBOFLAVIN 5-PHOSPHATE SODIUM, THIAMINE HYDROCHLORIDE, PYRIDOXINE HYDROCHLORIDE, DEXPANTHENOL, BIOTIN, FOLIC ACID, AND CYANOCOBALAMIN: KIT at 08:04

## 2017-04-09 RX ADMIN — CIPROFLOXACIN 400 MG: 2 INJECTION, SOLUTION INTRAVENOUS at 03:04

## 2017-04-09 RX ADMIN — TRAMADOL HYDROCHLORIDE 100 MG: 50 TABLET, COATED ORAL at 09:04

## 2017-04-09 RX ADMIN — FAMOTIDINE 20 MG: 20 TABLET ORAL at 09:04

## 2017-04-09 RX ADMIN — ACETAMINOPHEN 1000 MG: 10 INJECTION, SOLUTION INTRAVENOUS at 06:04

## 2017-04-09 RX ADMIN — BETHANECHOL CHLORIDE 10 MG: 10 TABLET ORAL at 06:04

## 2017-04-09 RX ADMIN — TRAMADOL HYDROCHLORIDE 100 MG: 50 TABLET, COATED ORAL at 02:04

## 2017-04-09 RX ADMIN — ENOXAPARIN SODIUM 40 MG: 100 INJECTION SUBCUTANEOUS at 09:04

## 2017-04-09 RX ADMIN — ACETAMINOPHEN 1000 MG: 10 INJECTION, SOLUTION INTRAVENOUS at 03:04

## 2017-04-09 NOTE — PROGRESS NOTES
[facility] - Neuroendocrine Surgery Service  Daily Progress Note     HD#9  POD#6 Days Post-Op    Subjective  ELISSA. Doing well. Ambulating. Tolerating Diet. +flatus +BMs. -N/V.      Scheduled Meds:   acetaminophen  1,000 mg Intravenous Q8H    acetaminophen  650 mg Oral 1 time in Clinic/HOD    bethanechol  10 mg Oral TID    ciprofloxacin  400 mg Intravenous Q12H    cyanocobalamin  1,000 mcg Subcutaneous Daily    enoxaparin  40 mg Subcutaneous Daily    epoetin eduarda (PROCRIT) injection  10,000 Units Subcutaneous Every Mon, Wed, Fri    famotidine  20 mg Oral BID    iron sucrose (VENOFER) IVPB  100 mg Intravenous Daily    tamsulosin  0.4 mg Oral Daily       Continuous Infusions:   Amino acid 2.75% - dextrose 10% (CLINIMIX-E) solution with additives ( 1L provides 27.5 gm AA, 100 gm CHO (340 kcal/L dextrose), Na 35, K 30, Mg 5, Ca 4.5, Acetate 51, Cl 39, Phos 15) 75 mL/hr at 04/08/17 2029       PRN Meds:sodium chloride, acetaminophen, albuterol sulfate, diphenhydrAMINE, hydrALAZINE, labetalol, naloxone, ondansetron, senna-docusate 8.6-50 mg, simethicone, tramadol     Objective  Temp:  [98.9 °F (37.2 °C)-100.6 °F (38.1 °C)] 99 °F (37.2 °C)  Pulse:  [101-110] 103  Resp:  [18] 18  SpO2:  [93 %] 93 %  BP: (102-126)/(57-72) 111/68     I/O last 3 completed shifts:  In: 3328.8 [P.O.:940; IV Piggyback:700]  Out: 2550 [Urine:2500; Chest Tube:50]        GEN: NAD  RESP: Right chest tube 50cc. --> All cultures from 4/5 negative  CV: Reg rate  ABD: s, aTTP, moderately distended, -timpani, -rebound/guarding. Incision healing well   : Cabrera out     CXR stable    Labs  Recent Labs      04/07/17   0534  04/08/17   0500  04/09/17   0711   WBC  12.34  12.68  19.41*   HGB  7.7*  7.0*  7.6*   HCT  23.7*  20.5*  23.6*   PLT  237  237  352*     Recent Labs      04/07/17   0534  04/08/17   0500  04/09/17   0711   NA  132*  133*  133*   K  3.4*  3.6  3.9   CL  96  98  99   CO2  28  28  24   BUN  21*  20  15   CREATININE  0.9  0.9  0.8    GLU  163*  173*  137*   CALCIUM  8.1*  7.9*  8.0*   MG  2.5  2.4  2.3   PHOS  3.7  3.6  3.5   PROT  5.7*  5.5*  6.3   ALBUMIN  2.8*  2.5*  2.6*   BILITOT  1.6*  1.5*  1.6*   AST  36  38  63*   ALKPHOS  96  97  142*   ALT  44  36  47*       Imaging  CT abd/pelvis: Postoperative changes. 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  Multiple small, partially loculated fluid collections.     Assessment/Plan  47M hx NET now s/p exlap, R hepatectomy, SBR, cholecystectomy, mesenteric lymphadenectomy. Postop day 8  -Cont PO pain control. Limit narcotics.   -Cont TPN while PO intake low  -Replete lytes as needed  -Will monitory H/H.  Patient not symptomatic.  -OOB, IS, Ambulate. PT/OT consulted.  - Leukocytosis --> Possible CT scan today, blood cultures sent.     Foster aL MD

## 2017-04-09 NOTE — PROGRESS NOTES
Dr. Levine notified of hives developing on patient's forehead.  VSS.  Benadryl IV ordered.  Will continue to monitor.

## 2017-04-09 NOTE — PLAN OF CARE
Problem: Patient Care Overview  Goal: Plan of Care Review  Outcome: Ongoing (interventions implemented as appropriate)  Patient resting in bed, AAOx4. Family at the bedside. TPN infusing as ordered. Medications administered as ordered. Complaints of pain early in shift, relieved by PRN medication. Patient asleep for majority of shift. Encouraged to call with needs or concerns. Will continue to monitor.

## 2017-04-10 LAB
ALBUMIN SERPL BCP-MCNC: 2.4 G/DL
ALP SERPL-CCNC: 130 U/L
ALT SERPL W/O P-5'-P-CCNC: 37 U/L
ANION GAP SERPL CALC-SCNC: 6 MMOL/L
ANISOCYTOSIS BLD QL SMEAR: SLIGHT
APTT BLDCRRT: 33.9 SEC
AST SERPL-CCNC: 44 U/L
BACTERIA SPEC ANAEROBE CULT: NORMAL
BASOPHILS # BLD AUTO: 0.06 K/UL
BASOPHILS NFR BLD: 0.3 %
BILIRUB SERPL-MCNC: 1.1 MG/DL
BILIRUB UR QL STRIP: NEGATIVE
BUN SERPL-MCNC: 12 MG/DL
CALCIUM SERPL-MCNC: 8.1 MG/DL
CHLORIDE SERPL-SCNC: 100 MMOL/L
CLARITY UR: CLEAR
CO2 SERPL-SCNC: 27 MMOL/L
COLOR UR: YELLOW
CREAT SERPL-MCNC: 0.9 MG/DL
DIFFERENTIAL METHOD: ABNORMAL
EOSINOPHIL # BLD AUTO: 0.3 K/UL
EOSINOPHIL NFR BLD: 1.1 %
ERYTHROCYTE [DISTWIDTH] IN BLOOD BY AUTOMATED COUNT: 16.5 %
EST. GFR  (AFRICAN AMERICAN): >60 ML/MIN/1.73 M^2
EST. GFR  (NON AFRICAN AMERICAN): >60 ML/MIN/1.73 M^2
GLUCOSE SERPL-MCNC: 155 MG/DL
GLUCOSE UR QL STRIP: NEGATIVE
GRAM STN SPEC: NORMAL
GRAM STN SPEC: NORMAL
HCT VFR BLD AUTO: 20.8 %
HGB BLD-MCNC: 6.8 G/DL
HGB UR QL STRIP: ABNORMAL
HYPOCHROMIA BLD QL SMEAR: ABNORMAL
INR PPP: 1.2
KETONES UR QL STRIP: NEGATIVE
LEUKOCYTE ESTERASE UR QL STRIP: NEGATIVE
LYMPHOCYTES # BLD AUTO: 1.9 K/UL
LYMPHOCYTES NFR BLD: 8.3 %
MAGNESIUM SERPL-MCNC: 2.3 MG/DL
MCH RBC QN AUTO: 28.8 PG
MCHC RBC AUTO-ENTMCNC: 32.7 %
MCV RBC AUTO: 88 FL
MONOCYTES # BLD AUTO: 1.9 K/UL
MONOCYTES NFR BLD: 8.1 %
NEUTROPHILS # BLD AUTO: 18.5 K/UL
NEUTROPHILS NFR BLD: 82.2 %
NITRITE UR QL STRIP: NEGATIVE
PH UR STRIP: 7 [PH] (ref 5–8)
PHOSPHATE SERPL-MCNC: 3.3 MG/DL
PLATELET # BLD AUTO: 346 K/UL
PLATELET BLD QL SMEAR: ABNORMAL
PMV BLD AUTO: 8.9 FL
POTASSIUM SERPL-SCNC: 4.4 MMOL/L
PROT SERPL-MCNC: 6 G/DL
PROT UR QL STRIP: ABNORMAL
PROTHROMBIN TIME: 12.8 SEC
RBC # BLD AUTO: 2.36 M/UL
SODIUM SERPL-SCNC: 133 MMOL/L
SP GR UR STRIP: 1.01 (ref 1–1.03)
URN SPEC COLLECT METH UR: ABNORMAL
UROBILINOGEN UR STRIP-ACNC: 1 EU/DL
WBC # BLD AUTO: 22.91 K/UL

## 2017-04-10 PROCEDURE — 87070 CULTURE OTHR SPECIMN AEROBIC: CPT

## 2017-04-10 PROCEDURE — 87186 SC STD MICRODIL/AGAR DIL: CPT

## 2017-04-10 PROCEDURE — 63600175 PHARM REV CODE 636 W HCPCS: Performed by: RADIOLOGY

## 2017-04-10 PROCEDURE — 25000003 PHARM REV CODE 250: Performed by: COLON & RECTAL SURGERY

## 2017-04-10 PROCEDURE — 63600175 PHARM REV CODE 636 W HCPCS: Performed by: COLON & RECTAL SURGERY

## 2017-04-10 PROCEDURE — 87102 FUNGUS ISOLATION CULTURE: CPT | Mod: 59

## 2017-04-10 PROCEDURE — 63600175 PHARM REV CODE 636 W HCPCS: Performed by: SURGERY

## 2017-04-10 PROCEDURE — 87205 SMEAR GRAM STAIN: CPT | Mod: 91

## 2017-04-10 PROCEDURE — 25000003 PHARM REV CODE 250: Performed by: SURGERY

## 2017-04-10 PROCEDURE — 81003 URINALYSIS AUTO W/O SCOPE: CPT

## 2017-04-10 PROCEDURE — 87015 SPECIMEN INFECT AGNT CONCNTJ: CPT

## 2017-04-10 PROCEDURE — 85730 THROMBOPLASTIN TIME PARTIAL: CPT

## 2017-04-10 PROCEDURE — 85025 COMPLETE CBC W/AUTO DIFF WBC: CPT

## 2017-04-10 PROCEDURE — 94761 N-INVAS EAR/PLS OXIMETRY MLT: CPT

## 2017-04-10 PROCEDURE — 80053 COMPREHEN METABOLIC PANEL: CPT

## 2017-04-10 PROCEDURE — 87075 CULTR BACTERIA EXCEPT BLOOD: CPT

## 2017-04-10 PROCEDURE — 84100 ASSAY OF PHOSPHORUS: CPT

## 2017-04-10 PROCEDURE — 83735 ASSAY OF MAGNESIUM: CPT

## 2017-04-10 PROCEDURE — 0W9G3ZZ DRAINAGE OF PERITONEAL CAVITY, PERCUTANEOUS APPROACH: ICD-10-PCS | Performed by: INTERNAL MEDICINE

## 2017-04-10 PROCEDURE — 25500020 PHARM REV CODE 255: Performed by: SURGERY

## 2017-04-10 PROCEDURE — 87077 CULTURE AEROBIC IDENTIFY: CPT

## 2017-04-10 PROCEDURE — 97535 SELF CARE MNGMENT TRAINING: CPT

## 2017-04-10 PROCEDURE — 97116 GAIT TRAINING THERAPY: CPT

## 2017-04-10 PROCEDURE — 82247 BILIRUBIN TOTAL: CPT

## 2017-04-10 PROCEDURE — 36415 COLL VENOUS BLD VENIPUNCTURE: CPT

## 2017-04-10 PROCEDURE — 63600175 PHARM REV CODE 636 W HCPCS: Performed by: STUDENT IN AN ORGANIZED HEALTH CARE EDUCATION/TRAINING PROGRAM

## 2017-04-10 PROCEDURE — 11000001 HC ACUTE MED/SURG PRIVATE ROOM

## 2017-04-10 PROCEDURE — 87116 MYCOBACTERIA CULTURE: CPT

## 2017-04-10 PROCEDURE — 85610 PROTHROMBIN TIME: CPT

## 2017-04-10 PROCEDURE — 97530 THERAPEUTIC ACTIVITIES: CPT

## 2017-04-10 RX ORDER — FENTANYL CITRATE 50 UG/ML
INJECTION, SOLUTION INTRAMUSCULAR; INTRAVENOUS CODE/TRAUMA/SEDATION MEDICATION
Status: COMPLETED | OUTPATIENT
Start: 2017-04-10 | End: 2017-04-10

## 2017-04-10 RX ORDER — MIDAZOLAM HYDROCHLORIDE 1 MG/ML
INJECTION, SOLUTION INTRAMUSCULAR; INTRAVENOUS CODE/TRAUMA/SEDATION MEDICATION
Status: COMPLETED | OUTPATIENT
Start: 2017-04-10 | End: 2017-04-10

## 2017-04-10 RX ADMIN — MIDAZOLAM 1 MG: 1 INJECTION INTRAMUSCULAR; INTRAVENOUS at 03:04

## 2017-04-10 RX ADMIN — TRAMADOL HYDROCHLORIDE 100 MG: 50 TABLET, COATED ORAL at 09:04

## 2017-04-10 RX ADMIN — TAMSULOSIN HYDROCHLORIDE 0.4 MG: 0.4 CAPSULE ORAL at 10:04

## 2017-04-10 RX ADMIN — BETHANECHOL CHLORIDE 10 MG: 10 TABLET ORAL at 09:04

## 2017-04-10 RX ADMIN — FAMOTIDINE 20 MG: 20 TABLET ORAL at 10:04

## 2017-04-10 RX ADMIN — CYANOCOBALAMIN 1000 MCG: 1000 INJECTION, SOLUTION INTRAMUSCULAR; SUBCUTANEOUS at 10:04

## 2017-04-10 RX ADMIN — CIPROFLOXACIN 400 MG: 2 INJECTION, SOLUTION INTRAVENOUS at 05:04

## 2017-04-10 RX ADMIN — ACETAMINOPHEN 650 MG: 325 TABLET ORAL at 12:04

## 2017-04-10 RX ADMIN — FENTANYL CITRATE 50 MCG: 50 INJECTION, SOLUTION INTRAMUSCULAR; INTRAVENOUS at 03:04

## 2017-04-10 RX ADMIN — TRAMADOL HYDROCHLORIDE 100 MG: 50 TABLET, COATED ORAL at 05:04

## 2017-04-10 RX ADMIN — ENOXAPARIN SODIUM 40 MG: 100 INJECTION SUBCUTANEOUS at 05:04

## 2017-04-10 RX ADMIN — TRAMADOL HYDROCHLORIDE 100 MG: 50 TABLET, COATED ORAL at 01:04

## 2017-04-10 RX ADMIN — IRON SUCROSE 100 MG: 20 INJECTION, SOLUTION INTRAVENOUS at 10:04

## 2017-04-10 RX ADMIN — FAMOTIDINE 20 MG: 20 TABLET ORAL at 09:04

## 2017-04-10 RX ADMIN — TRAMADOL HYDROCHLORIDE 100 MG: 50 TABLET, COATED ORAL at 12:04

## 2017-04-10 RX ADMIN — ERYTHROPOIETIN 10000 UNITS: 10000 INJECTION, SOLUTION INTRAVENOUS; SUBCUTANEOUS at 10:04

## 2017-04-10 RX ADMIN — CIPROFLOXACIN 400 MG: 2 INJECTION, SOLUTION INTRAVENOUS at 04:04

## 2017-04-10 RX ADMIN — BETHANECHOL CHLORIDE 10 MG: 10 TABLET ORAL at 05:04

## 2017-04-10 RX ADMIN — IOHEXOL 100 ML: 350 INJECTION, SOLUTION INTRAVENOUS at 11:04

## 2017-04-10 RX ADMIN — IOHEXOL 30 ML: 350 INJECTION, SOLUTION INTRAVENOUS at 09:04

## 2017-04-10 NOTE — PT/OT/SLP PROGRESS
Physical Therapy  Treatment    Javy Thompson   MRN: [id number]   Admitting Diagnosis: Secondary malignant neoplasm of liver    PT Received On: 04/10/17  PT Start Time: 1345     PT Stop Time: 1401    PT Total Time (min): 16 min       Billable Minutes:  Gait [id number]    Treatment Type: Treatment, 6th Visit  PT/PTA: PT     PTA Visit Number: 5       General Precautions: Standard, fall  Orthopedic Precautions: N/A   Braces: N/A    Do you have any cultural, spiritual, Evangelical conflicts, given your current situation?: No    Subjective:  Communicated with primary nurse prior to session.  The primary encounter diagnosis was Secondary malignant neoplasm of liver. Diagnoses of Malignant carcinoid tumor of ileum, Secondary neuroendocrine tumor of liver, Secondary neuroendocrine tumor of distant lymph nodes, Carcinoid syndrome, Acute retention of urine, Malignant carcinoid tumor of unknown primary site, Mesenteric mass, and Metastatic malignant carcinoid tumor to liver were also pertinent to this visit.      Pain Ratin/10              Pain Rating Post-Intervention: 0/10    Objective:   Patient found with: central line    Functional Mobility:  Bed Mobility:        Transfers:  Sit <> Stand Assistance: Supervision  Sit <> Stand Assistive Device: No Assistive Device    Gait:   Gait Distance: 245 ft  Assistance 1: Supervision  Gait Assistive Device: No device  Gait Pattern: reciprocal  Gait Deviation(s): decreased shoshana    Stairs:  TBD    Balance:   Static Sit: GOOD-: Takes MODERATE challenges from all directions but inconsistently  Dynamic Sit: GOOD-: Maintains balance through MODERATE excursions of active trunk movement,     Static Stand: GOOD-: Takes MODERATE challenges from all directions inconsistently  Dynamic stand: GOOD: Needs SUPERVISION only during gait and able to self right with moderate      Therapeutic Activities and Exercises:  Reviewed trunk rotation and ant/post pelvic rocks along with setting abdominals      AM-PAC 6 CLICK MOBILITY  How much help from another person does this patient currently need?   1 = Unable, Total/Dependent Assistance  2 = A lot, Maximum/Moderate Assistance  3 = A little, Minimum/Contact Guard/Supervision  4 = None, Modified [place]/Independent    Turning over in bed (including adjusting bedclothes, sheets and blankets)?: 4  Sitting down on and standing up from a chair with arms (e.g., wheelchair, bedside commode, etc.): 4  Moving from lying on back to sitting on the side of the bed?: 3  Moving to and from a bed to a chair (including a wheelchair)?: 4  Need to walk in hospital room?: 4  Climbing 3-5 steps with a railing?: 3  Total Score: 22    AM-PAC Raw Score CMS G-Code Modifier Level of Impairment Assistance   6 % Total / Unable   7 - 9 CM 80 - 100% Maximal Assist   10 - 14 CL 60 - 80% Moderate Assist   15 - 19 CK 40 - 60% Moderate Assist   20 - 22 CJ 20 - 40% Minimal Assist   23 CI 1-20% SBA / CGA   24 CH 0% Independent/ Mod I     Patient left up in chair with call button in reach and family present.    Assessment:  Javy Thompson is a 47 y.o. male with a medical diagnosis of Secondary malignant neoplasm of liver and presents with decrease endurance and weakness.    Rehab identified problem list/impairments: Rehab identified problem list/impairments: impaired endurance, weakness    Rehab potential is good.    Activity tolerance: Good    Discharge recommendations: Discharge Facility/Level Of Care Needs: home     Barriers to discharge:      Equipment recommendations: Equipment Needed After Discharge: shower chair     GOALS:   Physical Therapy Goals        Problem: Physical Therapy Goal    Goal Priority Disciplines Outcome Goal Variances Interventions   Physical Therapy Goal     PT/OT, PT Ongoing (interventions implemented as appropriate)     Description:  Goals to be met by: 17     Patient will increase functional independence with mobility by performin. Supine to sit  with Modified [place]  2. Sit to supine with Modified [place]  3. Sit to stand transfer with Modified [place]  4. Bed to chair transfer with Modified [place] using appropriate AD if needed  5. Gait  x 150 feet with Modified [place] using appropriate AD if needed.  6. Ascend/descend 16 stairs with bilateral Handrails Supervision       Recommendations: Pt to benefit from continued PT intervention to improve independence with functional mobility/ADL. Recommend d/c home c/ family. Further d/c recs pending progress c/ therapy.                  PLAN:    Patient to be seen 6 x/week  to address the above listed problems via gait training, therapeutic activities, therapeutic exercises  Plan of Care expires: 05/02/17  Plan of Care reviewed with: patient, spouse         Yunior AAM Cevallos, PT  04/10/2017

## 2017-04-10 NOTE — PROGRESS NOTES
Patient complaining of pain, cannot have PRN medication for over an hour. Notified Dr. Abhinav MD does not want to change medication regimen at this time, okay to offer PRN tylenol for temperature to patient.

## 2017-04-10 NOTE — PROGRESS NOTES
[facility] - Neuroendocrine Surgery Service  Daily Progress Note     HD#10  POD#10 Days Post-Op    Subjective  ELISSA. Reports feeling well. Tolerating diet. No N/V. +flatus. BMx1 yesterday. Ambulating. Voiding without difficulty. Denies SOB, cough, chest pain, fevers, chills.      Scheduled Meds:   acetaminophen  650 mg Oral 1 time in Clinic/HOD    bethanechol  10 mg Oral TID    ciprofloxacin  400 mg Intravenous Q12H    cyanocobalamin  1,000 mcg Subcutaneous Daily    enoxaparin  40 mg Subcutaneous Daily    epoetin eduarda (PROCRIT) injection  10,000 Units Subcutaneous Every Mon, Wed, Fri    famotidine  20 mg Oral BID    iron sucrose (VENOFER) IVPB  100 mg Intravenous Daily    tamsulosin  0.4 mg Oral Daily       Continuous Infusions:   Amino acid 2.75% - dextrose 10% (CLINIMIX-E) solution with additives ( 1L provides 27.5 gm AA, 100 gm CHO (340 kcal/L dextrose), Na 35, K 30, Mg 5, Ca 4.5, Acetate 51, Cl 39, Phos 15) 75 mL/hr at 04/09/17 2056       PRN Meds:sodium chloride, acetaminophen, albuterol sulfate, diphenhydrAMINE, hydrALAZINE, labetalol, naloxone, ondansetron, senna-docusate 8.6-50 mg, simethicone, tramadol     Objective  Temp:  [98.7 °F (37.1 °C)-100 °F (37.8 °C)] 100 °F (37.8 °C)  Pulse:  [101-116] 108  Resp:  [17-18] 18  SpO2:  [94 %] 94 %  BP: (110-125)/(67-71) 110/67     I/O last 3 completed shifts:  In: 3858.8 [P.O.:240; IV Piggyback:1250]  Out: 2750 [Urine:2750]        GEN: NAD  NEURO: AAOx3  RESP: MAKSIM  CV: Reg rate  ABD: soft, ND, aTTP, incisions healing c/d/i     Labs  Recent Labs      04/08/17   0500  04/09/17   0711  04/10/17   0656   WBC  12.68  19.41*  22.91*   HGB  7.0*  7.6*  6.8*   HCT  20.5*  23.6*  20.8*   PLT  237  352*  346     Recent Labs      04/08/17   0500  04/09/17   0711  04/10/17   0656   NA  133*  133*  133*   K  3.6  3.9  4.4   CL  98  99  100   CO2  28  24  27   BUN  20  15  12   CREATININE  0.9  0.8  0.9   GLU  173*  137*  155*   CALCIUM  7.9*  8.0*  8.1*   MG  2.4  2.3   2.3   PHOS  3.6  3.5  3.3   PROT  5.5*  6.3  6.0   ALBUMIN  2.5*  2.6*  2.4*   BILITOT  1.5*  1.6*  1.1*   AST  38  63*  44*   ALKPHOS  97  142*  130   ALT  36  47*  37     Bld cultures 4/9: NGTD    Imaging  -     Assessment/Plan  47M hx NET now s/p exlap, R hepatectomy, SBR, cholecystectomy, mesenteric lymphadenectomy.   -Cont PO pain control. Limit narcotics.   -Cont TPN while PO intake low  -Replete lytes as needed  -Will monitory H/H. Patient not symptomatic. Will discuss transfusion pRBCs with staff.  -OOB, IS, Ambulate. PT/OT consulted.  -Leukocytosis persists: Will order BLE duplex, CT abd/pelvis.      Nick Hackett MD  [facility], PGY2  c [phone]  4/10/2017  8:23 AM

## 2017-04-10 NOTE — PT/OT/SLP PROGRESS
Occupational Therapy  Treatment    Javy Thompson   MRN: [id number]   Admitting Diagnosis: Secondary malignant neoplasm of liver    OT Date of Treatment: 04/10/17   OT Start Time: 1423  OT Stop Time: 1447  OT Total Time (min): 24 min    Billable Minutes:  Self Care/Home Management 10 and Therapeutic Activity 14    General Precautions: Standard, fall  Orthopedic Precautions: N/A  Braces: N/A    Subjective:  Communicated with nurseLyn prior to session.    Pain Ratin/10  Pain Rating Post-Intervention: 0/10    Objective:  Patient found with: central line     Functional Mobility:  Bed Mobility: N/A - Patient     Transfers:   Sit <> Stand Assistance: Supervision  Sit <> Stand Assistive Device: No Assistive Device  Toilet Transfer Assistance: Supervision  Toilet Transfer Assistive Device: No Assistive Device    Functional Ambulation: In room without AD with distant sup/mod indep    Activities of Daily Living:  Grooming Position: Standing at sink  Grooming Level of Assistance: Modified independent    Balance:   Static Sit: GOOD: Takes MODERATE challenges from all directions  Dynamic Sit: GOOD: Maintains balance through MODERATE excursions of active trunk movement  Static Stand: GOOD: Takes MODERATE challenges from all directions  Dynamic stand: GOOD: Needs SUPERVISION only during gait and able to self right with moderate     Therapeutic Activities and Exercises:  Patient in bedside chair on arrival. Issued HEP printout for therex and chair yoga to promote healing of abd incision and core/trunk strengthening. Patient able to complete all ax with minimal discomfort. ADL/toileting task completed as above. Patient leaving floor for CT scan.    AM-PAC 6 CLICK ADL   How much help from another person does this patient currently need?   1 = Unable, Total/Dependent Assistance  2 = A lot, Maximum/Moderate Assistance  3 = A little, Minimum/Contact Guard/Supervision  4 = None, Modified [place]/Independent    Putting on and  taking off regular lower body clothing? : 3  Bathing (including washing, rinsing, drying)?: 3  Toileting, which includes using toilet, bedpan, or urinal? : 4  Putting on and taking off regular upper body clothing?: 4  Taking care of personal grooming such as brushing teeth?: 4  Eating meals?: 4  Total Score: 22     AM-PAC Raw Score CMS G-Code Modifier Level of Impairment Assistance   6 % Total / Unable   7 - 9 CM 80 - 100% Maximal Assist   10 - 14 CL 60 - 80% Moderate Assist   15 - 19 CK 40 - 60% Moderate Assist   20 - 22 CJ 20 - 40% Minimal Assist   23 CI 1-20% SBA / CGA   24 CH 0% Independent/ Mod I     Patient left leaving for CT with all lines intact, call button in reach and spouse present    ASSESSMENT:  Javy Thompson is a 47 y.o. male with a medical diagnosis of Secondary malignant neoplasm of liver   Patient performing most ADL/self-care tasks with mod indep. Provided HEP handout on therex and chair yoga to encourage core/trunk stretches to improve healing of abd incision. Patient able to return demo of all exercises/moves and reported minimal soreness/pain with movements. Will benefit from continued OT to address functional deficits.    Rehab identified problem list/impairments: Rehab identified problem list/impairments: weakness, impaired endurance    Rehab potential is excellent.    Activity tolerance: Good    Discharge recommendations: Discharge Facility/Level Of Care Needs: home     Barriers to discharge: Barriers to Discharge: Inaccessible home environment    Equipment recommendations: shower chair     GOALS:   Occupational Therapy Goals        Problem: Occupational Therapy Goal    Goal Priority Disciplines Outcome Interventions   Occupational Therapy Goal     OT, PT/OT Ongoing (interventions implemented as appropriate)    Description:  Goals to be met by: 5/2/17     Patient will increase functional independence with ADLs by performing:    LE Dressing with Modified [place].  Grooming while  standing with Modified [place]. --Goal met 4/10  Toileting from toilet with Modified [place] for hygiene and clothing management. --Goal met 4/10  Supine to sit with Modified [place].  Stand pivot transfers with Modified [place]. --Goal met 4/10  Toilet transfer to toilet with Modified [place].--Goal met 4/10  Upper extremity exercise program x10 reps per handout, with independence.                 Plan:  Patient to be seen 5 x/week to address the above listed problems via self-care/home management, therapeutic activities, therapeutic exercises  Plan of Care expires: 05/02/17  Plan of Care reviewed with: patient, spouse         RUSS Pate  04/10/2017

## 2017-04-10 NOTE — PLAN OF CARE
Problem: Patient Care Overview  Goal: Plan of Care Review  Outcome: Ongoing (interventions implemented as appropriate)  Patient resting in bed, AAOx4. Family at the bedside for majority of shift. TPN infusing as ordered. Medications administered as ordered. Patient complained of pain overnight, PRN medication administered. Patient ambulating to bathroom with stand-by assistance, safety maintained. Encouraged to call with needs or concerns. Will continue to monitor.

## 2017-04-10 NOTE — PLAN OF CARE
Problem: Occupational Therapy Goal  Goal: Occupational Therapy Goal  Goals to be met by: 5/2/17     Patient will increase functional independence with ADLs by performing:    LE Dressing with Modified [place].  Grooming while standing with Modified [place]. --Goal met 4/10  Toileting from toilet with Modified [place] for hygiene and clothing management. --Goal met 4/10  Supine to sit with Modified [place].  Stand pivot transfers with Modified [place]. --Goal met 4/10  Toilet transfer to toilet with Modified [place].--Goal met 4/10  Upper extremity exercise program x10 reps per handout, with independence.   Outcome: Ongoing (interventions implemented as appropriate)  Patient performing most ADL/self-care tasks with mod indep. Provided HEP handout on therex and chair yoga to encourage core/trunk stretches to improve healing of abd incision. Patient able to return demo of all exercises/moves and reported minimal soreness/pain with movements. Will benefit from continued OT to address functional deficits.

## 2017-04-10 NOTE — CONSULTS
Inpatient Radiology Pre-procedure Note    History of Present Illness:  Javy hTompson is a 47 y.o. male who presents for CT guided aspiration of perihepatic and RLQ fluid collections.  Admission H&P reviewed.  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 Authorizing Provider   hydrocodone-acetaminophen 5-325mg (NORCO) 5-325 mg per tablet Take 1 tablet by mouth every 6 (six) hours as needed for Pain.   Yes Historical Provider, MD   lanreotide (SOMATULINE DEPOT) 120 mg/0.5 mL Syrg Inject 120 mg into the skin every 28 days.   Yes Historical Provider, MD     Scheduled Meds:    acetaminophen  650 mg Oral 1 time in Clinic/HOD    bethanechol  10 mg Oral TID    ciprofloxacin  400 mg Intravenous Q12H    cyanocobalamin  1,000 mcg Subcutaneous Daily    enoxaparin  40 mg Subcutaneous Daily    epoetin eduarda (PROCRIT) injection  10,000 Units Subcutaneous Every Mon, Wed, Fri    famotidine  20 mg Oral BID    tamsulosin  0.4 mg Oral Daily     Continuous Infusions:    PRN Meds:sodium chloride, acetaminophen, albuterol sulfate, diphenhydrAMINE, hydrALAZINE, labetalol, naloxone, ondansetron, senna-docusate 8.6-50 mg, simethicone, tramadol  Anticoagulants/Antiplatelets: no anticoagulation    Allergies:   Review of patient's allergies indicates:   Allergen Reactions    Epinephrine Other (See Comments)     Carcinoid patient     Sedation Hx: have  not been any systemic reactions    Labs:    Recent Labs  Lab 04/10/17  1221   INR 1.2       Recent Labs  Lab 04/10/17  0656   WBC 22.91*   HGB 6.8*   HCT 20.8*   MCV 88         Recent Labs  Lab 04/10/17  0656   *   *   K 4.4      CO2 27   BUN 12   CREATININE 0.9   CALCIUM 8.1*   MG 2.3   ALT 37   AST 44*   ALBUMIN 2.4*   BILITOT 1.1*         Vitals:  Temp: 99.9 °F (37.7 °C) (04/10/17 0725)  Pulse: (!) 112 (04/10/17 0725)  Resp: 16 (04/10/17 0725)  BP: 119/66 (04/10/17 0725)  SpO2: 96 % (04/10/17 0906)     Physical Exam:  ASA: 3  Mallampati: 2    General: no acute distress  Mental Status: alert and oriented to person, place and time  HEENT: normocephalic, atraumatic  Chest: unlabored breathing  Heart: regular heart rate  Abdomen: nondistended  Extremity: moves all extremities    Plan: CT guided aspiration of perihepatic and RLQ fluid collections. Informed consent obtained.  Sedation Plan: Moderate.    Dennis Weaver MD  Department of Radiology  Pager: [phone]

## 2017-04-10 NOTE — SEDATION DOCUMENTATION
Pt. Arrive to IR, 2 pt. Identifier used to verify pt. Procedure explained by MD. Consent obtained.

## 2017-04-11 LAB
ALBUMIN SERPL BCP-MCNC: 2.4 G/DL
ALP SERPL-CCNC: 147 U/L
ALT SERPL W/O P-5'-P-CCNC: 34 U/L
ANION GAP SERPL CALC-SCNC: 7 MMOL/L
ANISOCYTOSIS BLD QL SMEAR: SLIGHT
AST SERPL-CCNC: 42 U/L
BACTERIA CATH TIP CULT: NO GROWTH
BASOPHILS # BLD AUTO: 0.05 K/UL
BASOPHILS NFR BLD: 0.3 %
BILIRUB SERPL-MCNC: 1.2 MG/DL
BUN SERPL-MCNC: 14 MG/DL
CALCIUM SERPL-MCNC: 8.1 MG/DL
CHLORIDE SERPL-SCNC: 97 MMOL/L
CO2 SERPL-SCNC: 27 MMOL/L
CREAT SERPL-MCNC: 0.9 MG/DL
DIFFERENTIAL METHOD: ABNORMAL
EOSINOPHIL # BLD AUTO: 0.4 K/UL
EOSINOPHIL NFR BLD: 2.1 %
ERYTHROCYTE [DISTWIDTH] IN BLOOD BY AUTOMATED COUNT: 16.6 %
EST. GFR  (AFRICAN AMERICAN): >60 ML/MIN/1.73 M^2
EST. GFR  (NON AFRICAN AMERICAN): >60 ML/MIN/1.73 M^2
GLUCOSE SERPL-MCNC: 105 MG/DL
GRAM STN SPEC: NORMAL
GRAM STN SPEC: NORMAL
HCT VFR BLD AUTO: 21.7 %
HGB BLD-MCNC: 6.8 G/DL
HYPOCHROMIA BLD QL SMEAR: ABNORMAL
LYMPHOCYTES # BLD AUTO: 1.5 K/UL
LYMPHOCYTES NFR BLD: 8.9 %
MAGNESIUM SERPL-MCNC: 2.3 MG/DL
MCH RBC QN AUTO: 28 PG
MCHC RBC AUTO-ENTMCNC: 31.3 %
MCV RBC AUTO: 89 FL
MONOCYTES # BLD AUTO: 1.4 K/UL
MONOCYTES NFR BLD: 8.7 %
NEUTROPHILS # BLD AUTO: 12.8 K/UL
NEUTROPHILS NFR BLD: 80 %
PHOSPHATE SERPL-MCNC: 3.5 MG/DL
PLATELET # BLD AUTO: 420 K/UL
PLATELET BLD QL SMEAR: ABNORMAL
PMV BLD AUTO: 9.2 FL
POIKILOCYTOSIS BLD QL SMEAR: SLIGHT
POLYCHROMASIA BLD QL SMEAR: ABNORMAL
POTASSIUM SERPL-SCNC: 4.5 MMOL/L
PROT SERPL-MCNC: 6.3 G/DL
RBC # BLD AUTO: 2.43 M/UL
SODIUM SERPL-SCNC: 131 MMOL/L
STOMATOCYTES BLD QL SMEAR: PRESENT
WBC # BLD AUTO: 16.3 K/UL

## 2017-04-11 PROCEDURE — 80053 COMPREHEN METABOLIC PANEL: CPT

## 2017-04-11 PROCEDURE — 97530 THERAPEUTIC ACTIVITIES: CPT

## 2017-04-11 PROCEDURE — 83735 ASSAY OF MAGNESIUM: CPT

## 2017-04-11 PROCEDURE — 25000003 PHARM REV CODE 250: Performed by: COLON & RECTAL SURGERY

## 2017-04-11 PROCEDURE — 63600175 PHARM REV CODE 636 W HCPCS: Performed by: COLON & RECTAL SURGERY

## 2017-04-11 PROCEDURE — 63600175 PHARM REV CODE 636 W HCPCS: Performed by: STUDENT IN AN ORGANIZED HEALTH CARE EDUCATION/TRAINING PROGRAM

## 2017-04-11 PROCEDURE — 63600175 PHARM REV CODE 636 W HCPCS: Performed by: SURGERY

## 2017-04-11 PROCEDURE — 94761 N-INVAS EAR/PLS OXIMETRY MLT: CPT

## 2017-04-11 PROCEDURE — 25000003 PHARM REV CODE 250: Performed by: SURGERY

## 2017-04-11 PROCEDURE — 97116 GAIT TRAINING THERAPY: CPT

## 2017-04-11 PROCEDURE — 85025 COMPLETE CBC W/AUTO DIFF WBC: CPT

## 2017-04-11 PROCEDURE — 84100 ASSAY OF PHOSPHORUS: CPT

## 2017-04-11 PROCEDURE — 97803 MED NUTRITION INDIV SUBSEQ: CPT

## 2017-04-11 PROCEDURE — 11000001 HC ACUTE MED/SURG PRIVATE ROOM

## 2017-04-11 PROCEDURE — 36415 COLL VENOUS BLD VENIPUNCTURE: CPT

## 2017-04-11 RX ADMIN — CYANOCOBALAMIN 1000 MCG: 1000 INJECTION, SOLUTION INTRAMUSCULAR; SUBCUTANEOUS at 09:04

## 2017-04-11 RX ADMIN — PIPERACILLIN SODIUM AND TAZOBACTAM SODIUM 4.5 G: 4; .5 INJECTION, POWDER, FOR SOLUTION INTRAVENOUS at 09:04

## 2017-04-11 RX ADMIN — BETHANECHOL CHLORIDE 10 MG: 10 TABLET ORAL at 01:04

## 2017-04-11 RX ADMIN — TRAMADOL HYDROCHLORIDE 100 MG: 50 TABLET, COATED ORAL at 09:04

## 2017-04-11 RX ADMIN — FAMOTIDINE 20 MG: 20 TABLET ORAL at 09:04

## 2017-04-11 RX ADMIN — CIPROFLOXACIN 400 MG: 2 INJECTION, SOLUTION INTRAVENOUS at 05:04

## 2017-04-11 RX ADMIN — TRAMADOL HYDROCHLORIDE 100 MG: 50 TABLET, COATED ORAL at 01:04

## 2017-04-11 RX ADMIN — PIPERACILLIN SODIUM AND TAZOBACTAM SODIUM 4.5 G: 4; .5 INJECTION, POWDER, FOR SOLUTION INTRAVENOUS at 05:04

## 2017-04-11 RX ADMIN — TAMSULOSIN HYDROCHLORIDE 0.4 MG: 0.4 CAPSULE ORAL at 09:04

## 2017-04-11 RX ADMIN — TRAMADOL HYDROCHLORIDE 100 MG: 50 TABLET, COATED ORAL at 05:04

## 2017-04-11 RX ADMIN — BETHANECHOL CHLORIDE 10 MG: 10 TABLET ORAL at 09:04

## 2017-04-11 RX ADMIN — ENOXAPARIN SODIUM 40 MG: 100 INJECTION SUBCUTANEOUS at 06:04

## 2017-04-11 RX ADMIN — TRAMADOL HYDROCHLORIDE 100 MG: 50 TABLET, COATED ORAL at 10:04

## 2017-04-11 RX ADMIN — BETHANECHOL CHLORIDE 10 MG: 10 TABLET ORAL at 05:04

## 2017-04-11 RX ADMIN — TRAMADOL HYDROCHLORIDE 100 MG: 50 TABLET, COATED ORAL at 06:04

## 2017-04-11 NOTE — PT/OT/SLP PROGRESS
Physical Therapy  Treatment    Javy Thompson   MRN: [id number]   Admitting Diagnosis: Secondary malignant neoplasm of liver    PT Received On: 17  PT Start Time: 1535     PT Stop Time: 1600    PT Total Time (min): 25 min       Billable Minutes:  Gait [id number] and Therapeutic Activity 10    Treatment Type: Treatment  PT/PTA: PTA     PTA Visit Number: 1       General Precautions: Standard, fall  Orthopedic Precautions: N/A   Braces:      Do you have any cultural, spiritual, Buddhist conflicts, given your current situation?: No    Subjective:  Communicated with RN  prior to session.  Pt agreed to tx.    Pain Ratin/10              Pain Rating Post-Intervention: 0/10    Objective:   Patient found with:  (Pt not connected to IV/O2.  no drains)    Functional Mobility:  Bed Mobility:        Transfers:  Sit <> Stand Assistance: Supervision, Modified Independent  Sit <> Stand Assistive Device: No Assistive Device    Gait:   Gait Distance: 150ft and 225ft with S/Mod I without A.D.  Assistance 1: Supervision, Modified Independent  Gait Assistive Device: No device  Gait Pattern: reciprocal  Gait Deviation(s): decreased shoshana    Stairs:  Pt ambulated up/down 12 steps using HR with CGA/SBA with step to gait pattern.      Balance:   Static Sit: GOOD: Takes MODERATE challenges from all directions  Dynamic Sit: GOOD: Maintains balance through MODERATE excursions of active trunk movement  Static Stand: GOOD: Takes MODERATE challenges from all directions  Dynamic stand: FAIR+: Needs CLOSE SUPERVISION during gait and is able to right self with minor LOB     Therapeutic Activities and Exercises:  Static standing with Mod I.    Up/Down 12 steps with one HR and CGA/SBA without LOB or unsteadiness.  Pt has steps in his house with bilateral HRs.  AMB: as above.  Pt ambulated without A.D. And demonstrates decreased step length and shoshana without LOB. One standing rest needed after stair climbing before continuing with gait  training.    AM-PAC 6 CLICK MOBILITY  How much help from another person does this patient currently need?   1 = Unable, Total/Dependent Assistance  2 = A lot, Maximum/Moderate Assistance  3 = A little, Minimum/Contact Guard/Supervision  4 = None, Modified [place]/Independent    Turning over in bed (including adjusting bedclothes, sheets and blankets)?: 4  Sitting down on and standing up from a chair with arms (e.g., wheelchair, bedside commode, etc.): 4  Moving from lying on back to sitting on the side of the bed?: 3  Moving to and from a bed to a chair (including a wheelchair)?: 4  Need to walk in hospital room?: 4  Climbing 3-5 steps with a railing?: 3  Total Score: 22    AM-PAC Raw Score CMS G-Code Modifier Level of Impairment Assistance   6 % Total / Unable   7 - 9 CM 80 - 100% Maximal Assist   10 - 14 CL 60 - 80% Moderate Assist   15 - 19 CK 40 - 60% Moderate Assist   20 - 22 CJ 20 - 40% Minimal Assist   23 CI 1-20% SBA / CGA   24 CH 0% Independent/ Mod I     Patient left up in chair with all lines intact, call button in reach and RN notified.    Assessment:  Javy Thompson is a 47 y.o. male with a medical diagnosis of Secondary malignant neoplasm of liver and presents with decreased strength and endurance.  Pt continues to progress with gait demonstrating increased steadiness and stability while ambulating without an assistive device. Pt would continue to benefit from P.T. To assist pt's return to PLOF.    Rehab identified problem list/impairments: Rehab identified problem list/impairments: weakness, impaired endurance, impaired functional mobilty, gait instability, decreased ROM    Rehab potential is excellent.    Activity tolerance: Good    Discharge recommendations: Discharge Facility/Level Of Care Needs: home     Barriers to discharge: Barriers to Discharge: Inaccessible home environment    Equipment recommendations: Equipment Needed After Discharge: shower chair     GOALS:   Physical Therapy  Goals        Problem: Physical Therapy Goal    Goal Priority Disciplines Outcome Goal Variances Interventions   Physical Therapy Goal     PT/OT, PT Ongoing (interventions implemented as appropriate)     Description:  Goals to be met by: 17     Patient will increase functional independence with mobility by performin. Supine to sit with Modified [place]  2. Sit to supine with Modified [place]  3. Sit to stand transfer with Modified [place]  4. Bed to chair transfer with Modified [place] using appropriate AD if needed  5. Gait  x 150 feet with Modified [place] using appropriate AD if needed.  6. Ascend/descend 16 stairs with bilateral Handrails Supervision       Recommendations: Pt to benefit from continued PT intervention to improve independence with functional mobility/ADL. Recommend d/c home c/ family. Further d/c recs pending progress c/ therapy.                  PLAN:    Patient to be seen 6 x/week  to address the above listed problems via gait training, therapeutic activities, therapeutic exercises  Plan of Care expires: 17  Plan of Care reviewed with: patient         Dee Dee Aaron, NEERU  2017

## 2017-04-11 NOTE — PT/OT/SLP PROGRESS
"Occupational Therapy  Treatment    Javy Thompson   MRN: [id number]   Admitting Diagnosis: Secondary malignant neoplasm of liver    OT Date of Treatment: 17   OT Start Time: 1440  OT Stop Time: 1505  OT Total Time (min): 25 min    Billable Minutes:  Therapeutic Activity 25    General Precautions: Standard, fall  Orthopedic Precautions: N/A  Braces: N/A    Subjective:  Communicated with nurseFlori prior to session. "I feel so much better after that shower"    Pain Ratin/10  Pain Rating Post-Intervention: 0/10    Objective:  Patient found with: peripheral IV     Functional Mobility:  Bed Mobility: N/A - patient in bedside chair    Transfers:   Sit <> Stand Assistance: Modified Independent  Sit <> Stand Assistive Device: No Assistive Device  Bed <> Chair Transfer Assistance: Modified Independent    Functional Ambulation: N/A    Activities of Daily Living:  Patient reported he completed showering in standing and UB/LB dsg without (A)    Balance:   Static Sit: NORMAL: No deviations seen in posture held statically  Dynamic Sit: GOOD-: Maintains balance through MODERATE excursions of active trunk movement,     Static Stand: GOOD: Takes MODERATE challenges from all directions  Dynamic stand: GOOD: Needs SUPERVISION only during gait and able to self right with moderate     Therapeutic Activities and Exercises:  Patient with good recollection of chair yoga exercises -- spinal twist, cat/cow pose, overhead reach, and side bends in standing. Completed all moves, 1 x 10 reps, with VCs for pursed lip breathing between exercises. Patient reported feeling "good stretch" and improvement with minor back pain after completing chair yoga poses. Patient educated on energy conservation/ax pacing once D/C'd (for vehicle ride home and once home). Verbalized understanding.     AM-PAC 6 CLICK ADL   How much help from another person does this patient currently need?   1 = Unable, Total/Dependent Assistance  2 = A lot, " Maximum/Moderate Assistance  3 = A little, Minimum/Contact Guard/Supervision  4 = None, Modified [place]/Independent    Putting on and taking off regular lower body clothing? : 3  Bathing (including washing, rinsing, drying)?: 3  Toileting, which includes using toilet, bedpan, or urinal? : 4  Putting on and taking off regular upper body clothing?: 4  Taking care of personal grooming such as brushing teeth?: 4  Eating meals?: 4  Total Score: 22     AM-PAC Raw Score CMS G-Code Modifier Level of Impairment Assistance   6 % Total / Unable   7 - 9 CM 80 - 100% Maximal Assist   10 - 14 CL 60 - 80% Moderate Assist   15 - 19 CK 40 - 60% Moderate Assist   20 - 22 CJ 20 - 40% Minimal Assist   23 CI 1-20% SBA / CGA   24 CH 0% Independent/ Mod I     Patient left up in chair with all lines intact, call button in reach and nsg notified    ASSESSMENT:  Javy Thompson is a 47 y.o. male with a medical diagnosis of Secondary malignant neoplasm of liver   Patient reports he showered in standing and got dressed with mod indep. Able to recall core/trunk stretches and chair yoga moves. Minimal pain reported during stretches - did c/o tightness at the upper portion of abd incision. Cont OT efforts during acute stay.    Rehab identified problem list/impairments: Rehab identified problem list/impairments: weakness, impaired endurance, pain, decreased ROM    Rehab potential is excellent.    Activity tolerance: Good    Discharge recommendations: Discharge Facility/Level Of Care Needs: home     Barriers to discharge: Barriers to Discharge: Inaccessible home environment    Equipment recommendations: shower chair     GOALS:   Occupational Therapy Goals        Problem: Occupational Therapy Goal    Goal Priority Disciplines Outcome Interventions   Occupational Therapy Goal     OT, PT/OT Ongoing (interventions implemented as appropriate)    Description:  Goals to be met by: 5/2/17     Patient will increase functional independence with  ADLs by performing:    LE Dressing with Modified [place].  Grooming while standing with Modified [place]. --Goal met 4/10  Toileting from toilet with Modified [place] for hygiene and clothing management. --Goal met 4/10  Supine to sit with Modified [place].  Stand pivot transfers with Modified [place]. --Goal met 4/10  Toilet transfer to toilet with Modified [place].--Goal met 4/10  Upper extremity exercise program x10 reps per handout, with independence.                 Plan:  Patient to be seen 5 x/week to address the above listed problems via self-care/home management, therapeutic activities, therapeutic exercises  Plan of Care expires: 05/02/17  Plan of Care reviewed with: patient         RUSS Pate  04/11/2017

## 2017-04-11 NOTE — PROGRESS NOTES
[facility]  Adult Nutrition  Consult Note    SUMMARY     Recommendations    Recommendation/Intervention:   1. Boost plus tid with beneprotein 2 packs per meal   2. If patient with GI issues rec change diet to Low residue   3. 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 Tolerating Diet advancement with 50-75% po intake. Discussed use of supplements s/p surgery- MD would like boost plus/beneprotein- patient aware and receptive.     Nutrition Prescription Ordered    Current Diet Order: Regular       Evaluation of Received Nutrients/Fluid Intake     % Intake of Estimated Needs: 50-75%   % Intake of Meals: 75%    I/O 700/2750      Nutrition Risk Screen     Nutrition Risk Screen: no indicators present    Nutrition/Diet History     Food Preferences: Denies cultural, Denominational, ethnic food preferences  Factors Affecting Nutritional Intake: altered gastrointestinal function    Labs/Tests/Procedures/Meds     Pertinent Labs Reviewed: reviewed  Pertinent Labs Comments: alb 2.4  Pertinent Medications Reviewed: reviewed  Pertinent Medications Comments: cipro, pantoprazole    Physical Findings    Overall Physical Appearance:  (WDL)  Tubes:  (removed)  Oral/Mouth Cavity: WDL  Skin:  (Favio 19-abd incision)    Anthropometrics     Height (inches): 75.98 in  Weight Method: Bed Scale  Weight (kg): 95.3 kg  Ideal Body Weight (IBW), Male: 201.88 lb     % Ideal Body Weight, Male (lb): 104.07 lb     BMI (kg/m2): 25.58  BMI Grade: 25 - 29.9 - overweight     Estimated/Assessed Needs    Weight Used  For Calorie Calculations: 95.3 kg (210 lb 1.6 oz)   Height (cm): 193 cm     Energy Need Method: [place]-St Jeor (2414-5928 kcal)     RMR ([place]-St. Jeor Equation): 1927.14      Weight Used For Protein Calculations: 95.3 kg (210 lb 1.6 oz)  Protein Requirements: 115 g    Fluid Need Method: RDA Method      Nutrition Diagnosis    Problem: Altered GI Function  Etiology: NET  As Evidenced by: PPN/SBR  Nutrition Diagnosis: Improved     Monitor and Evaluation    Food and Nutrient Intake: energy intake, parenteral nutrition intake  Food and Nutrient Adminstration: diet order, enteral and parenteral nutrition administration  Anthropometric Measurements: weight, weight change  Biochemical Data, Medical Tests and Procedures: electrolyte and renal panel, gastrointestinal profile, lipid profile  Nutrition-Focused Physical Findings: overall appearance    Nutrition Risk    Level of Risk:  (1 x week)    Nutrition Follow-Up    RD Follow-up?: Yes    Assessment and Plan    No new Assessment & Plan notes have been filed under this hospital service since the last note was generated.  Service: Nutrition

## 2017-04-11 NOTE — PROCEDURES
Radiology Post-Procedure Note    Pre Op Diagnosis: Perihepatic collection and free fluid in pelvis  Post Op Diagnosis: Same    Procedure: CT guided right upper quadrant aspiration and CT guided aspiration of RLQ fluid    Procedure performed by: Dennis Weaver MD    Written Informed Consent Obtained: Yes  Specimen Removed:  cc of red purulent fluid from RUQ and 800 cc serosanguinous fluid from rlq  Estimated Blood Loss: Minimal    Findings:   CT guided aspiration of RUQ collection yielding 250 cc of red purulent fluid and CT guided aspiration of RLQ fluid yielding 800 cc serosanguinous.    Patient tolerated procedure well.    Dennis Weaver MD  Department of Radiology  Pager: [phone]

## 2017-04-11 NOTE — PLAN OF CARE
Problem: Patient Care Overview  Goal: Plan of Care Review  Outcome: Ongoing (interventions implemented as appropriate)  Pt AAOx4, wife remains at bedside throughout shift. Initial assessment documented per flowsheet. Pt complains of pain but denies n/v/d and SOB. Midline incision CDI. IV antibiotics infusing per MAR. Safety maintained - will cont to monitor.

## 2017-04-11 NOTE — PLAN OF CARE
Problem: Physical Therapy Goal  Goal: Physical Therapy Goal  Goals to be met by: 17     Patient will increase functional independence with mobility by performin. Supine to sit with Modified [place]  2. Sit to supine with Modified [place]  3. Sit to stand transfer with Modified [place]  4. Bed to chair transfer with Modified [place] using appropriate AD if needed  5. Gait x 150 feet with Modified [place] using appropriate AD if needed.  6. Ascend/descend 16 stairs with bilateral Handrails Supervision       Recommendations: Pt to benefit from continued PT intervention to improve independence with functional mobility/ADL. Recommend d/c home c/ family. Further d/c recs pending progress c/ therapy.    Outcome: Ongoing (interventions implemented as appropriate)  Continue working toward goals.

## 2017-04-11 NOTE — PROGRESS NOTES
Called into pt's room, pt noticed slight swelling to bilat lower extremities starting at the knee. Pt has been in recliner most of the day, so I advised pt to lie in bed with feet elevated with ice packs to decrease swelling. Very mild swelling noted on examination. Will cont to monitor.

## 2017-04-11 NOTE — PROGRESS NOTES
[facility] - Neuroendocrine Surgery Service  Daily Progress Note     HD#11  POD#11 Days Post-Op    Subjective  Underwent IR drainage of RUQ/RLQ collections. Reports feeling well. Tolerating diet. No N/V. +flatus. No BM yesterday. Ambulating. Voiding without difficulty.      Scheduled Meds:   acetaminophen  650 mg Oral 1 time in Clinic/HOD    bethanechol  10 mg Oral TID    cyanocobalamin  1,000 mcg Subcutaneous Daily    enoxaparin  40 mg Subcutaneous Daily    epoetin eduarda (PROCRIT) injection  10,000 Units Subcutaneous Every Mon, Wed, Fri    famotidine  20 mg Oral BID    tamsulosin  0.4 mg Oral Daily       Continuous Infusions:       PRN Meds:sodium chloride, acetaminophen, albuterol sulfate, diphenhydrAMINE, hydrALAZINE, labetalol, naloxone, ondansetron, senna-docusate 8.6-50 mg, simethicone, tramadol     Objective  Temp:  [98.3 °F (36.8 °C)-99.9 °F (37.7 °C)] 98.3 °F (36.8 °C)  Pulse:  [107-123] 123  Resp:  [16-20] 16  SpO2:  [93 %-99 %] 93 %  BP: (105-149)/(56-88) 119/66     I/O last 3 completed shifts:  In: 2630 [IV Piggyback:1050]  Out: 3900 [Urine:2850; Other:1050]  I/O this shift:  In: 500 [P.O.:300; IV Piggyback:200]  Out: 1100 [Urine:1100]     GEN: NAD  NEURO: AAOx3  RESP: MAKSIM  CV: Reg rate  ABD: soft, ND, aTTP, incisions healing c/d/i     Labs  Recent Labs      04/09/17   0711  04/10/17   0656  04/10/17   1221   WBC  19.41*  22.91*   --    HGB  7.6*  6.8*   --    HCT  23.6*  20.8*   --    PLT  352*  346   --    INR   --    --   1.2     Recent Labs      04/09/17   0711  04/10/17   0656   NA  133*  133*   K  3.9  4.4   CL  99  100   CO2  24  27   BUN  15  12   CREATININE  0.8  0.9   GLU  137*  155*   CALCIUM  8.0*  8.1*   MG  2.3  2.3   PHOS  3.5  3.3   PROT  6.3  6.0   ALBUMIN  2.6*  2.4*   BILITOT  1.6*  1.1*   AST  63*  44*   ALKPHOS  142*  130   ALT  47*  37     RUQ fluid cultures 4/10: Rare GNRs on gram stain in 1/4 vials  RLQ fluid cultures 4/10: No orgs on gram stain. NGTD  Bld cultures 4/9:  NGTD    Imaging  BLE duplex: No DVTs  CT abd/pelvis: +fluid collection RUQ, RLQ.      Assessment/Plan  47M hx NET now s/p exlap, R hepatectomy, SBR, cholecystectomy, mesenteric lymphadenectomy.   -Cont PO pain control. Limit narcotics.   -Reg diet  -Replete lytes as needed  -Will monitory WBCs, H/H. Patient afebrile, non tachycardic, not symptomatic. AM labs pending  -OOB, IS, Ambulate. PT/OT consulted.     Nick Hackett MD  [facility], PGY2  c [phone]  4/11/2017  6:38 AM

## 2017-04-11 NOTE — PROGRESS NOTES
.Pharmacy New Medication Education    Patient accepted medication education.    Pharmacy educated patient on the following medications, using the teach-back method.   zosyn    Learners of pharmacy medication education included:  patient    Patient +/- learner response:  verbalize understanding

## 2017-04-11 NOTE — PLAN OF CARE
Problem: Occupational Therapy Goal  Goal: Occupational Therapy Goal  Goals to be met by: 5/2/17     Patient will increase functional independence with ADLs by performing:    LE Dressing with Modified [place].  Grooming while standing with Modified [place]. --Goal met 4/10  Toileting from toilet with Modified [place] for hygiene and clothing management. --Goal met 4/10  Supine to sit with Modified [place].  Stand pivot transfers with Modified [place]. --Goal met 4/10  Toilet transfer to toilet with Modified [place].--Goal met 4/10  Upper extremity exercise program x10 reps per handout, with independence.   Outcome: Ongoing (interventions implemented as appropriate)  Patient reports he showered in standing and got dressed with mod indep. Able to recall core/trunk stretches and chair yoga moves. Minimal pain reported during stretches - did c/o tightness at the upper portion of abd incision. Cont OT efforts during acute stay.

## 2017-04-11 NOTE — PT/OT/SLP PROGRESS
Physical Therapy      Javy Thompson  MRN: [id number]    Patient not seen today secondary to  (Pt asked PTA to return secondary to just getting  ready to shower.  ). Will follow-up later this afternoon.    Dee Dee Aaron, PTA

## 2017-04-11 NOTE — PLAN OF CARE
TN rounded on pt   hopeful re:  d/c tomorrow - will stay at an area hotel for a few days then drive to [place]    TN confirmed with MD - unlikely to need iv abx - will prob d/c on po abx        04/11/17 1646   Discharge Reassessment   Assessment Type Discharge Planning Reassessment   Can the patient answer the patient profile reliably? Yes, cognitively intact   How does the patient rate their overall health at the present time? Good   Describe the patient's ability to walk at the present time. No restrictions   How often would a person be available to care for the patient? Whenever needed   Number of comorbid conditions (as recorded on the chart) None   Discharge plan remains the same: Yes   Provided patient/caregiver education on the expected discharge date and the discharge plan Yes   Discharge Plan A Home;Home with family   Discharge Plan B Home;Home with family;Home Health   Change in patient condition or support system No   Patient choice form signed by patient/caregiver N/A   Explained to the the patient/caregiver why the discharge planned changed: No  (na )   Involved the patient/caregiver in establishing a new discharge plan: (na)

## 2017-04-12 ENCOUNTER — TELEPHONE (OUTPATIENT)
Dept: NEUROLOGY | Facility: HOSPITAL | Age: 47
End: 2017-04-12

## 2017-04-12 VITALS
WEIGHT: 211 LBS | DIASTOLIC BLOOD PRESSURE: 75 MMHG | RESPIRATION RATE: 18 BRPM | SYSTOLIC BLOOD PRESSURE: 120 MMHG | BODY MASS INDEX: 25.69 KG/M2 | OXYGEN SATURATION: 93 % | TEMPERATURE: 98 F | HEART RATE: 97 BPM | HEIGHT: 76 IN

## 2017-04-12 DIAGNOSIS — C7A.8 PRIMARY MALIGNANT NEUROENDOCRINE NEOPLASM OF ILEUM: ICD-10-CM

## 2017-04-12 DIAGNOSIS — C7B.8 METASTATIC MALIGNANT NEUROENDOCRINE TUMOR TO LIVER: Primary | ICD-10-CM

## 2017-04-12 PROBLEM — R33.8 ACUTE RETENTION OF URINE: Status: RESOLVED | Noted: 2017-04-05 | Resolved: 2017-04-12

## 2017-04-12 LAB
ALBUMIN SERPL BCP-MCNC: 2.4 G/DL
ALP SERPL-CCNC: 163 U/L
ALT SERPL W/O P-5'-P-CCNC: 29 U/L
ANION GAP SERPL CALC-SCNC: 4 MMOL/L
AST SERPL-CCNC: 40 U/L
BACTERIA SPEC AEROBE CULT: NORMAL
BACTERIA SPEC ANAEROBE CULT: NORMAL
BASOPHILS # BLD AUTO: 0.03 K/UL
BASOPHILS NFR BLD: 0.3 %
BILIRUB FLD-MCNC: 0.7 MG/DL
BILIRUB FLD-MCNC: 2.6 MG/DL
BILIRUB SERPL-MCNC: 1 MG/DL
BUN SERPL-MCNC: 13 MG/DL
CALCIUM SERPL-MCNC: 8 MG/DL
CHLORIDE SERPL-SCNC: 97 MMOL/L
CO2 SERPL-SCNC: 30 MMOL/L
CREAT SERPL-MCNC: 1 MG/DL
DIFFERENTIAL METHOD: ABNORMAL
EOSINOPHIL # BLD AUTO: 0.3 K/UL
EOSINOPHIL NFR BLD: 3.8 %
ERYTHROCYTE [DISTWIDTH] IN BLOOD BY AUTOMATED COUNT: 16.5 %
EST. GFR  (AFRICAN AMERICAN): >60 ML/MIN/1.73 M^2
EST. GFR  (NON AFRICAN AMERICAN): >60 ML/MIN/1.73 M^2
GLUCOSE SERPL-MCNC: 120 MG/DL
HCT VFR BLD AUTO: 23.8 %
HGB BLD-MCNC: 7.4 G/DL
LYMPHOCYTES # BLD AUTO: 1.1 K/UL
LYMPHOCYTES NFR BLD: 12.1 %
MAGNESIUM SERPL-MCNC: 2.5 MG/DL
MCH RBC QN AUTO: 27.6 PG
MCHC RBC AUTO-ENTMCNC: 31.1 %
MCV RBC AUTO: 89 FL
MONOCYTES # BLD AUTO: 0.8 K/UL
MONOCYTES NFR BLD: 9.1 %
NEUTROPHILS # BLD AUTO: 6.5 K/UL
NEUTROPHILS NFR BLD: 73.2 %
PHOSPHATE SERPL-MCNC: 2.9 MG/DL
PLATELET # BLD AUTO: 441 K/UL
PMV BLD AUTO: 8.9 FL
POTASSIUM SERPL-SCNC: 4.3 MMOL/L
PROT SERPL-MCNC: 6.1 G/DL
RBC # BLD AUTO: 2.68 M/UL
SODIUM SERPL-SCNC: 131 MMOL/L
SPECIMEN SOURCE: NORMAL
SPECIMEN SOURCE: NORMAL
WBC # BLD AUTO: 8.92 K/UL

## 2017-04-12 PROCEDURE — 63600175 PHARM REV CODE 636 W HCPCS: Performed by: SURGERY

## 2017-04-12 PROCEDURE — 85025 COMPLETE CBC W/AUTO DIFF WBC: CPT

## 2017-04-12 PROCEDURE — 36415 COLL VENOUS BLD VENIPUNCTURE: CPT

## 2017-04-12 PROCEDURE — 63600175 PHARM REV CODE 636 W HCPCS: Performed by: STUDENT IN AN ORGANIZED HEALTH CARE EDUCATION/TRAINING PROGRAM

## 2017-04-12 PROCEDURE — 63600175 PHARM REV CODE 636 W HCPCS: Performed by: COLON & RECTAL SURGERY

## 2017-04-12 PROCEDURE — 25000003 PHARM REV CODE 250: Performed by: SURGERY

## 2017-04-12 PROCEDURE — 83735 ASSAY OF MAGNESIUM: CPT

## 2017-04-12 PROCEDURE — 80053 COMPREHEN METABOLIC PANEL: CPT

## 2017-04-12 PROCEDURE — 94761 N-INVAS EAR/PLS OXIMETRY MLT: CPT

## 2017-04-12 PROCEDURE — 97116 GAIT TRAINING THERAPY: CPT

## 2017-04-12 PROCEDURE — 97110 THERAPEUTIC EXERCISES: CPT

## 2017-04-12 PROCEDURE — 25000003 PHARM REV CODE 250: Performed by: COLON & RECTAL SURGERY

## 2017-04-12 PROCEDURE — 84100 ASSAY OF PHOSPHORUS: CPT

## 2017-04-12 PROCEDURE — 97530 THERAPEUTIC ACTIVITIES: CPT

## 2017-04-12 RX ORDER — ERTAPENEM 1 G/1
1 INJECTION, POWDER, LYOPHILIZED, FOR SOLUTION INTRAMUSCULAR; INTRAVENOUS DAILY
Qty: 5 G | Refills: 0 | Status: SHIPPED | OUTPATIENT
Start: 2017-04-12 | End: 2017-04-17

## 2017-04-12 RX ORDER — ZOLPIDEM TARTRATE 5 MG/1
5 TABLET ORAL NIGHTLY PRN
Status: DISCONTINUED | OUTPATIENT
Start: 2017-04-12 | End: 2017-04-12 | Stop reason: HOSPADM

## 2017-04-12 RX ORDER — TRAMADOL HYDROCHLORIDE 50 MG/1
100 TABLET ORAL EVERY 4 HOURS PRN
Qty: 50 TABLET | Refills: 0 | Status: SHIPPED | OUTPATIENT
Start: 2017-04-12 | End: 2017-04-22

## 2017-04-12 RX ORDER — ERTAPENEM 1 G/1
1 INJECTION, POWDER, LYOPHILIZED, FOR SOLUTION INTRAMUSCULAR; INTRAVENOUS
Status: DISCONTINUED | OUTPATIENT
Start: 2017-04-12 | End: 2017-04-12

## 2017-04-12 RX ORDER — AMOXICILLIN 250 MG
1 CAPSULE ORAL DAILY PRN
COMMUNITY
Start: 2017-04-12 | End: 2021-02-02

## 2017-04-12 RX ADMIN — BETHANECHOL CHLORIDE 10 MG: 10 TABLET ORAL at 05:04

## 2017-04-12 RX ADMIN — PIPERACILLIN SODIUM AND TAZOBACTAM SODIUM 4.5 G: 4; .5 INJECTION, POWDER, FOR SOLUTION INTRAVENOUS at 12:04

## 2017-04-12 RX ADMIN — ERTAPENEM SODIUM 1 G: 1 INJECTION, POWDER, LYOPHILIZED, FOR SOLUTION INTRAMUSCULAR; INTRAVENOUS at 01:04

## 2017-04-12 RX ADMIN — TAMSULOSIN HYDROCHLORIDE 0.4 MG: 0.4 CAPSULE ORAL at 09:04

## 2017-04-12 RX ADMIN — FAMOTIDINE 20 MG: 20 TABLET ORAL at 09:04

## 2017-04-12 RX ADMIN — CYANOCOBALAMIN 1000 MCG: 1000 INJECTION, SOLUTION INTRAMUSCULAR; SUBCUTANEOUS at 09:04

## 2017-04-12 RX ADMIN — TRAMADOL HYDROCHLORIDE 100 MG: 50 TABLET, COATED ORAL at 06:04

## 2017-04-12 RX ADMIN — ZOLPIDEM TARTRATE 5 MG: 5 TABLET, FILM COATED ORAL at 01:04

## 2017-04-12 RX ADMIN — TRAMADOL HYDROCHLORIDE 100 MG: 50 TABLET, COATED ORAL at 07:04

## 2017-04-12 RX ADMIN — TRAMADOL HYDROCHLORIDE 100 MG: 50 TABLET, COATED ORAL at 03:04

## 2017-04-12 RX ADMIN — TRAMADOL HYDROCHLORIDE 100 MG: 50 TABLET, COATED ORAL at 12:04

## 2017-04-12 RX ADMIN — ERYTHROPOIETIN 10000 UNITS: 10000 INJECTION, SOLUTION INTRAVENOUS; SUBCUTANEOUS at 09:04

## 2017-04-12 RX ADMIN — BETHANECHOL CHLORIDE 10 MG: 10 TABLET ORAL at 03:04

## 2017-04-12 NOTE — PT/OT/SLP PROGRESS
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    Pain Ratin/10  Pain Rating Post-Intervention: 0/10    Objective:  Patient found with:  (N/A)     Functional Mobility:  N/A - patient seated in bedside chair    Activities of Daily Living:  N/A    Balance:   Static Sit: NORMAL: No deviations seen in posture held statically  Dynamic Sit: GOOD+: Maintains balance through MAXIMAL excursions of active trunk motion    Therapeutic Activities and Exercises:  Patient reported minor soreness s/p chair yoga and core/trunk stretches yesterday  Able to recall all moves without (A)  Education provided to patient and spouse on energy conservation, activity pacing and utilizing same for car ride home to [place]  Confirmed understanding - no other needs    AM-PAC 6 CLICK ADL   How much help from another person does this patient currently need?   1 = Unable, Total/Dependent Assistance  2 = A lot, Maximum/Moderate Assistance  3 = A little, Minimum/Contact Guard/Supervision  4 = None, Modified [place]/Independent    Putting on and taking off regular lower body clothing? : 3  Bathing (including washing, rinsing, drying)?: 3  Toileting, which includes using toilet, bedpan, or urinal? : 4  Putting on and taking off regular upper body clothing?: 4  Taking care of personal grooming such as brushing teeth?: 4  Eating meals?: 4  Total Score: 22     AM-PAC Raw Score CMS G-Code Modifier Level of Impairment Assistance   6 % Total / Unable   7 - 9 CM 80 - 100% Maximal Assist   10 - 14 CL 60 - 80% Moderate Assist   15 - 19 CK 40 - 60% Moderate Assist   20 - 22 CJ 20 - 40%  "Minimal Assist   23 CI 1-20% SBA / CGA   24 CH 0% Independent/ Mod I     Patient left up in chair with all lines intact, call button in reach, nsg notified and TN present    ASSESSMENT:  Javy Thompson is a 47 y.o. male with a medical diagnosis of Malignant carcinoid tumor of ileum   Patient has made great progress during acute stay. Reports some soreness/aches s/p chair yoga and therex yesterday, but states it's a "good sore". Education provided to patient and spouse for therex, energy conservation, and ax pacing. D/C home.    Rehab identified problem list/impairments: Rehab identified problem list/impairments: weakness, impaired endurance, pain, decreased ROM    Rehab potential is excellent    Activity tolerance: Good+    Discharge recommendations: Discharge Facility/Level Of Care Needs: home     Barriers to discharge: Barriers to Discharge: Inaccessible home environment    Equipment recommendations: shower chair     GOALS:   Occupational Therapy Goals        Problem: Occupational Therapy Goal    Goal Priority Disciplines Outcome Interventions   Occupational Therapy Goal     OT, PT/OT Ongoing (interventions implemented as appropriate)    Description:  Goals to be met by: 5/2/17     Patient will increase functional independence with ADLs by performing:    LE Dressing with Modified [place].  Grooming while standing with Modified [place]. --Goal met 4/10  Toileting from toilet with Modified [place] for hygiene and clothing management. --Goal met 4/10  Supine to sit with Modified [place].  Stand pivot transfers with Modified [place]. --Goal met 4/10  Toilet transfer to toilet with Modified [place].--Goal met 4/10  Upper extremity exercise program x10 reps per handout, with independence.                 Plan:  Patient to be seen 5 x/week to address the above listed problems via self-care/home management, therapeutic activities, therapeutic exercises  Plan of Care expires: 05/02/17  Plan of " Care reviewed with: patient, spouse         RUSS Pate  04/12/2017

## 2017-04-12 NOTE — PHYSICIAN QUERY
PT Name: Javy Thompson  MR #: [id number]     Physician Query Form - Documentation Clarification      CDS/: Stephany King               Contact information:[email or web address]  This form is a permanent document in the medical record.     Query Date: April 12, 2017    By submitting this query, we are merely seeking further clarification of documentation. Please utilize your independent clinical judgment when addressing the question(s) below.    The Medical record reflects the following:    Supporting Clinical Findings Location in Medical Record   Leukocytosis persists: Will order BLE duplex, CT abd/pelvis.     There is increasing air and fluid along the hepatic dome which could represent a seroma, hematoma, biloma or   abscess.      CT-guided aspiration of perihepatic fluid collection.   CT-guided aspiration of right lower quadrant fluid.     Underwent IR drainage of RUQ/RLQ collections.    Klebsiella pneumoniae      Transitioned to ertapenem 1g q24h   Discussed with SW re: need for continued infusions until 4/17 PN 04/10      CT Abdomen and Pelvis 04/10          Procedure Note 04/10          PN 04/11      Aerobic Culture 04/10, Specimen Type: Abscess  Specimen Source: Abdomen      PN 04/12                                                                                      Doctor, Please specify diagnosis or diagnoses associated with above clinical findings.    Provider Use Only      Please clarify the above diagnosis of RUQ/RLQ collections:                                                                                                                          [ xxx ] Clinically undetermined

## 2017-04-12 NOTE — TELEPHONE ENCOUNTER
----- Message from Natasha Kern sent at 4/12/2017  1:43 PM CDT -----  Contact: LANETTE Barrera- [facility] called stating Dr. Coats wants to see the patient on Monday 4/17/17. Patient is form out of town and will need to be seen before he returns home.   Holly can be reached at [phone] and the patients phone numbers are [phone] or [phone].

## 2017-04-12 NOTE — PROGRESS NOTES
[facility] - Neuroendocrine Surgery Service  Daily Progress Note     HD#12  POD#12 Days Post-Op    Subjective  Leukocytosis significantly improved. Patient reports feeling well, pain controlled, tolerating diet, having normal bowel function, and ambulating without issues.      Scheduled Meds:   acetaminophen  650 mg Oral 1 time in Clinic/HOD    bethanechol  10 mg Oral TID    cyanocobalamin  1,000 mcg Subcutaneous Daily    enoxaparin  40 mg Subcutaneous Daily    epoetin eduarda (PROCRIT) injection  10,000 Units Subcutaneous Every Mon, Wed, Fri    ertapenem  1 g Intramuscular Q24H    famotidine  20 mg Oral BID    tamsulosin  0.4 mg Oral Daily       Continuous Infusions:     PRN Meds:sodium chloride, acetaminophen, albuterol sulfate, diphenhydrAMINE, hydrALAZINE, labetalol, naloxone, ondansetron, senna-docusate 8.6-50 mg, simethicone, tramadol, zolpidem     Objective  Temp:  [97.5 °F (36.4 °C)-100 °F (37.8 °C)] 100 °F (37.8 °C)  Pulse:  [104-107] 107  Resp:  [16-21] 21  SpO2:  [93 %-95 %] 93 %  BP: (107-111)/(61-75) 107/70     I/O last 3 completed shifts:  In: 800 [P.O.:300; IV Piggyback:500]  Out: 2600 [Urine:2600]        GEN: NAD  NEURO: AAOx3  RESP: MAKSIM  CV: Reg rate  ABD: Soft, ND, aTTP at incision, which is healing well.   : Cabrera absent     Labs  Recent Labs      04/10/17   0656  04/10/17   1221  04/11/17   0514  04/12/17   0446   WBC  22.91*   --   16.30*  8.92   HGB  6.8*   --   6.8*  7.4*   HCT  20.8*   --   21.7*  23.8*   PLT  346   --   420*  441*   INR   --   1.2   --    --      Recent Labs      04/10/17   0656  04/11/17   0514  04/12/17   0446   NA  133*  131*  131*   K  4.4  4.5  4.3   CL  100  97  97   CO2  27  27  30*   BUN  12  14  13   CREATININE  0.9  0.9  1.0   GLU  155*  105  120*   CALCIUM  8.1*  8.1*  8.0*   MG  2.3  2.3  2.5   PHOS  3.3  3.5  2.9   PROT  6.0  6.3  6.1   ALBUMIN  2.4*  2.4*  2.4*   BILITOT  1.1*  1.2*  1.0   AST  44*  42*  40   ALKPHOS  130  147*  163*   ALT  37  34  29      RUQ fluid cultures 4/10: Rare GNRs on gram stain in 1/4 vials  RLQ fluid cultures 4/10: No orgs on gram stain. NGTD  Bld cultures 4/9: NGTD    Imaging  HIDA: No evidence of bile leak     Assessment/Plan  47M hx NET now s/p exlap, R hepatectomy, SBR, cholecystectomy, mesenteric lymphadenectomy.   -Cont PO pain control.   -Reg diet  -Leukocytosis resolved  -OOB, IS, Ambulate. PT/OT consulted.  -IV abx: transitioned to ertapenem 1g q24h  -Discussed with SW re: need for continued infusions until 4/17. Will f/u and consider DC if arrangements can be finalized.      Nick Hackett MD  [facility], PGY2  c [phone]  4/12/2017  10:02 AM

## 2017-04-12 NOTE — PLAN OF CARE
Problem: Physical Therapy Goal  Goal: Physical Therapy Goal  Goals to be met by: 17     Patient will increase functional independence with mobility by performin. Supine to sit with Modified [place] MET 17  2. Sit to supine with Modified [place] MET 17  3. Sit to stand transfer with Modified [place] 17  4. Bed to chair transfer with Modified [place] using appropriate AD if needed  5. Gait x 150 feet with Modified [place] using appropriate AD if needed. MET 17  6. Ascend/descend 16 stairs with bilateral Handrails Supervision       Recommendations: Pt to benefit from continued PT intervention to improve independence with functional mobility/ADL. Recommend d/c home c/ family. Further d/c recs pending progress c/ therapy.    Outcome: Ongoing (interventions implemented as appropriate)  Goals 1,2,3 and 5 met

## 2017-04-12 NOTE — PLAN OF CARE
"Problem: Occupational Therapy Goal  Goal: Occupational Therapy Goal  Goals to be met by: 5/2/17     Patient will increase functional independence with ADLs by performing:    LE Dressing with Modified [place].  Grooming while standing with Modified [place]. --Goal met 4/10  Toileting from toilet with Modified [place] for hygiene and clothing management. --Goal met 4/10  Supine to sit with Modified [place].  Stand pivot transfers with Modified [place]. --Goal met 4/10  Toilet transfer to toilet with Modified [place].--Goal met 4/10  Upper extremity exercise program x10 reps per handout, with independence.   Outcome: Ongoing (interventions implemented as appropriate)  Patient has made great progress during acute stay. Reports some soreness/aches s/p chair yoga and therex yesterday, but states it's a "good sore". Education provided to patient and spouse for therex, energy conservation, and ax pacing. D/C home.      "

## 2017-04-12 NOTE — PLAN OF CARE
for d/c today   will stay at a local [facility] in [place] --   will report for apt Monday   Future Appointments  Date Time Provider Department Center   4/17/2017 1:00 PM Fidelia Reyes MD [facility]     pt will call MD truong Mon am for arrangements to be made for abd CT - will have labwork drawn prior to apt   pt to fly back to [facility]     [facility] has set up iv abx -- [place] nurse to see pt        04/12/17 1244   Final Note   Assessment Type Final Discharge Note   Discharge Disposition IV Therapy  ([place] )   Discharge planning education complete? Yes   What phone number can be called within the next 1-3 days to see how you are doing after discharge? [phone]   Hospital Follow Up  Appt(s) scheduled? Yes   Discharge plans and expectations educations in teach back method with documentation complete? Yes   Offered [facility] -- Bedside Delivery? Yes   Discharge/Hospital Encounter Summary to (non-[facility]) PCP n/a   Referral to Outpatient Case Management complete? n/a   Referral to / orders for Home Health Complete? Yes   30 day supply of medicines given at discharge, if documented non-compliance / non-adherence? n/a   Any social issues identified prior to discharge? No   Did you assess the readiness or willingness of the family or caregiver to support self management of care? Yes

## 2017-04-12 NOTE — PROGRESS NOTES
TN spoke with MD - pt will need iv abx upon d/c   Ertapenem one gram iv q 24 hrs - stop date Mon 4/17.     TN spoke with Dee Dee at [place]; left message for Tanya -- pt wants to d/c today (prefers before 3pm)     pt does not have a PICC line -- (MD would prefer that pt not have a PICC )     pt will be staying at:   [facility] in [place] [address]   room # unknown at this time.

## 2017-04-12 NOTE — TELEPHONE ENCOUNTER
Called Holly,  to inform that pt has been added on for Monday. Per Dr. Reyes, pt should also get CT scan abd/pelvis w/ contrast and CBC/CMP prior to clinic. Per Dr. Reyes, pt will get CT as an inpatient prior to discharge. Holly notified.

## 2017-04-12 NOTE — PT/OT/SLP PROGRESS
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      Pain Ratin/10                   Objective:        Functional Mobility:  Bed Mobility:        Transfers:  Sit <> Stand Assistance: Modified Independent  Sit <> Stand Assistive Device: No Assistive Device    Gait:   Gait Distance: ~250' and ~175' with decreased pace  Assistance 1: Supervision, Modified Independent  Gait Assistive Device: No device  Gait Pattern: reciprocal  Gait Deviation(s): decreased shoshana, decreased step length, decreased stride length    Stairs:      Balance:   Static Sit: FAIR+: Able to take MINIMAL challenges from all directions  Dynamic Sit: FAIR+: Maintains balance through MINIMAL excursions of active trunk motion  Static Stand: FAIR+: Takes MINIMAL challenges from all directions  Dynamic stand: GOOD: Needs SUPERVISION only during gait and able to self right with moderate      Therapeutic Activities and Exercises: standing ex's with B ue support X 15 reps inc: toe raises,mini squats,abd/add       AM-PAC 6 CLICK MOBILITY  How much help from another person does this patient currently need?   1 = Unable, Total/Dependent Assistance  2 = A lot, Maximum/Moderate Assistance  3 = A little, Minimum/Contact Guard/Supervision  4 = None, Modified [place]/Independent    Turning over in bed (including adjusting bedclothes, sheets and blankets)?: 4  Sitting down on and standing up from a chair with arms (e.g.,  wheelchair, bedside commode, etc.): 4  Moving from lying on back to sitting on the side of the bed?: 4  Need to walk in hospital room?: 4  Climbing 3-5 steps with a railing?: 3    AM-PAC Raw Score CMS G-Code Modifier Level of Impairment Assistance   6 % Total / Unable   7 - 9 CM 80 - 100% Maximal Assist   10 - 14 CL 60 - 80% Moderate Assist   15 - 19 CK 40 - 60% Moderate Assist   20 - 22 CJ 20 - 40% Minimal Assist   23 CI 1-20% SBA / CGA   24 CH 0% Independent/ Mod I     Patient left up in chair with call button in reach and wife present.    Assessment: improving endurance,decreased pace with gait  Javy Thompson is a 47 y.o. male with a medical diagnosis of Malignant carcinoid tumor of ileum and presents with .    Rehab identified problem list/impairments: Rehab identified problem list/impairments: weakness, impaired endurance, impaired functional mobilty    Rehab potential is excellent.    Activity tolerance: Good    Discharge recommendations: Discharge Facility/Level Of Care Needs: home     Barriers to discharge: Barriers to Discharge: Inaccessible home environment    Equipment recommendations: Equipment Needed After Discharge: shower chair     GOALS: see general POC  Physical Therapy Goals        Problem: Physical Therapy Goal    Goal Priority Disciplines Outcome Goal Variances Interventions   Physical Therapy Goal     PT/OT, PT Ongoing (interventions implemented as appropriate)     Description:  Goals to be met by: 17     Patient will increase functional independence with mobility by performin. Supine to sit with Modified [place] MET 17  2. Sit to supine with Modified [place] MET 17  3. Sit to stand transfer with Modified [place] 17  4. Bed to chair transfer with Modified [place] using appropriate AD if needed  5. Gait  x 150 feet with Modified [place] using appropriate AD if needed. MET 17  6. Ascend/descend 16 stairs with bilateral Handrails  Supervision       Recommendations: Pt to benefit from continued PT intervention to improve independence with functional mobility/ADL. Recommend d/c home c/ family. Further d/c recs pending progress c/ therapy.                   PLAN:    Patient to be seen 6 x/week  to address the above listed problems via gait training, therapeutic activities, therapeutic exercises  Plan of Care expires: 05/02/17  Plan of Care reviewed with: patient, spouse         Eugenio Artis, PTA  04/12/2017

## 2017-04-12 NOTE — PROGRESS NOTES
Notified Dr. La that patient requests sleep aid; reports often wakes up startled during night, unable to sleep.  MD verbalized understanding.  Verbal telephone order received for ambien 5mg QHS PRN insomnia.  NAD noted; will continue to monitor.

## 2017-04-13 ENCOUNTER — TELEPHONE (OUTPATIENT)
Dept: NEUROLOGY | Facility: HOSPITAL | Age: 47
End: 2017-04-13

## 2017-04-13 LAB — BACTERIA SPEC AEROBE CULT: NO GROWTH

## 2017-04-13 NOTE — PLAN OF CARE
Problem: Patient Care Overview  Goal: Plan of Care Review  Outcome: Ongoing (interventions implemented as appropriate)  Discharge prescription have been received by  Pt from outpt pharmacy   New iv access placed and invanz given through new access    Tolerated well  New site without redness or swelling   Iv left in place at discharge for continuance of daily iv antibiotic therapy per home health and cor  Arrangements for self administration completed per case management.   Pain controlled with oral medications   Discharge instructions reviewed with pt using teach back method   Wife has been given iv protection covers and instructed how to use when pt showers    No additional questions  To exit per wheelchair accompanied by transport at 18 25

## 2017-04-13 NOTE — TELEPHONE ENCOUNTER
Phoned patient to inquire about condition, spoke with his wife.  She said he is doing quite well only some swelling in his legs and instructed that he keep them elevated when he is sitting and not dependent.  She also said that he is eating and not in pain and the infusion nurse came today and he got his first dose of antibiotic and is doing well.  Instructed him that there would be someone on call over the weekend for him should he need assistance they should call the clinic and they would be instructed from there to get in touch with Dr Reyes or Dr Oliva.  She verbalized understanding and was reassured.

## 2017-04-14 LAB — BACTERIA BLD CULT: NORMAL

## 2017-04-14 NOTE — DISCHARGE SUMMARY
[facility]  Discharge Summary      Admit Date: 3/31/2017    Discharge Date and Time: 4/12/2017  6:39 PM    Attending Physician: Usman Reyes MD    Reason for Admission: Patient admitted for postoperative care following planned surgical intervention.    Procedures Performed: Procedure(s) (LRB):  EXPLORATORY-LAPAROTOMY (N/A)  LOBECTOMY-LIVER (N/A)  DISSECTION-LYMPH NODE (N/A)  CHOLECYSTECTOMY  RESECTION - SMALL BOWEL  APPENDECTOMY  MAPPING-SENTINEL NODE  BIOPSY LIVER AND ULTRASOUND    Hospital Course: Patient presented for scheduled surgery as above. Tolerated well. Please refer to op note for details. He was subsequently admitted to the ICU for close monitoring and continued care. An NGT, Cabrera, and PCA were continued postoperatively. He was continued on octreotide postoperatively. His Cabrera was discontinued on POD2. He unfortunately was not able to void and did require reinsertion of the Cabrera. He did have some chest pain that night, which was found to be due to a pleural effusion. He was also noted to be anemic, requiring transfusion of blood products. His NGT was discontinued and his diet was advanced. He was transitioned to the floors. His respiratory status demonstrated slow improvement, and he ultimately underwent thoracentesis with significant improvement on POD5. He was initiated on TPN due to low PO intake. He did progressively demonstrate return of bowel function, and was able to advance to a regular diet. Had a persistent leukocytosis, prompting repeat CT scan. This was notable for two separate intraabdominal collections. Patient was initiated on antibiotics and IR was consulted to drain these collections. He demonstrated subsequent resolution of leukocytosis. He ultimately was able to progress to home discharge, with plan for continued IV abx and close follow up.    Plan: Discharge to local [facility] for daily IV infusions, follow up 4/17, plan to repeat CT scan at that  time.    Consults: PT, OT, RT and interventional radiology    Significant Diagnostic Studies: CBCs, CMPs, CT scans.    Final Diagnoses:    Principal Problem: Malignant carcinoid tumor of ileum   Secondary Diagnoses:   Active Hospital Problems    Diagnosis  POA    *Malignant carcinoid tumor of ileum [C7A.012]  Yes    Secondary malignant neoplasm of liver [C78.7]  Yes      Resolved Hospital Problems    Diagnosis Date Resolved POA    Acute retention of urine [R33.8] 04/12/2017 No       Discharged Condition: good    Disposition: Home or Self Care    Follow Up/Patient Instructions:     Medications:  Reconciled Home Medications:   Discharge Medication List as of 4/12/2017  5:52 PM      START taking these medications    Details   ertapenem (INVANZ) 1 gram injection Inject 1 g into the vein once daily., Starting 4/12/2017, Until Mon 4/17/17, Print      senna-docusate 8.6-50 mg (PERICOLACE) 8.6-50 mg per tablet Take 1 tablet by mouth daily as needed for Constipation., Starting 4/12/2017, Until Discontinued, OTC      tramadol (ULTRAM) 50 mg tablet Take 2 tablets (100 mg total) by mouth every 4 (four) hours as needed for Pain., Starting 4/12/2017, Until Sat 4/22/17, Print         CONTINUE these medications which have NOT CHANGED    Details   lanreotide (SOMATULINE DEPOT) 120 mg/0.5 mL Syrg Inject 120 mg into the skin every 28 days., Until Discontinued, Historical Med         STOP taking these medications       hydrocodone-acetaminophen 5-325mg (NORCO) 5-325 mg per tablet Comments:   Reason for Stopping:               Discharge Procedure Orders  CT Abdomen Pelvis With Contrast   Standing Status: Future  Standing Exp. Date: 04/12/18   Order Specific Question Answer Comments   Oral/Rectal Contrast instructions: Routine Oral Contrast    Special CT ABD Protocol Request? Routine    Reason for Exam: Rule out acute intraabdominal process    Is the patient allergic to iodine or contrast? Has a steroid / antihistamine prep been  administered? No    Is the patient on ANY Metformin drug such as Glugophage/Glucovance?           Should be off drug 48 hours after contrast. Check renal function before restart. No    Age > 60 years? No    History of Kidney Disease - including: decreased kidney function, dialysis, kidney transplay, single kidney, kidney cancer, kidney surgery? None    Does the patient have high blood pressure requiring medical treatment? No    Diabetes? No    May the Radiologist modify the order per protocol to meet the clinical needs of the patient? Yes    Recist criteria? No      Diet general     Activity as tolerated     Lifting restrictions   Order Comments: No lifting >10 lbs x6 weeks     Other restrictions (specify):   Order Comments: Ok to shower, no soaking/swimming/bathtubs x2 weeks     Call MD for:  temperature >100.4     Call MD for:  persistent nausea and vomiting or diarrhea     Call MD for:  severe uncontrolled pain     Call MD for:  redness, tenderness, or signs of infection (pain, swelling, redness, odor or green/yellow discharge around incision site)     Call MD for:  difficulty breathing or increased cough     Call MD for:  severe persistent headache     Call MD for:  worsening rash     Call MD for:  persistent dizziness, light-headedness, or visual disturbances     Call MD for:  increased confusion or weakness       Follow-up Information     Follow up with Fidelia Reyes MD On 4/17/2017.    Specialty:  General Surgery    Why:  1:00pm . PLEASE CALL THE OFFICE AT 8:30 AM 4/17 -- MD WILL MAKE ARRANGEMENTS FOR YOU TO GET ABD CT BEFORE YOUR 1PM APT.   Please report to the !st floor registration desk prior to appointment (12N) -- you must have labwork drawn  before the appointment     Contact information:    [address]  [address]  [facility] [address]  [phone]          Follow up with [facility].    Specialties:  Pharmacist, DME Provider, IV Infusion    Why:  Infusion    Contact information:     [address] [address] [phone]

## 2017-04-17 ENCOUNTER — HOSPITAL ENCOUNTER (OUTPATIENT)
Dept: RADIOLOGY | Facility: HOSPITAL | Age: 47
Discharge: HOME | End: 2017-04-17
Attending: SURGERY
Payer: COMMERCIAL

## 2017-04-17 ENCOUNTER — OFFICE VISIT (OUTPATIENT)
Dept: NEUROLOGY | Facility: HOSPITAL | Age: 47
End: 2017-04-17
Attending: SURGERY
Payer: COMMERCIAL

## 2017-04-17 VITALS
BODY MASS INDEX: 26.3 KG/M2 | SYSTOLIC BLOOD PRESSURE: 116 MMHG | TEMPERATURE: 98 F | DIASTOLIC BLOOD PRESSURE: 70 MMHG | WEIGHT: 216 LBS | HEIGHT: 76 IN

## 2017-04-17 DIAGNOSIS — Z98.890 STATUS POST EXPLORATORY LAPAROTOMY: ICD-10-CM

## 2017-04-17 DIAGNOSIS — C7B.00 SECONDARY CARCINOID TUMORS: Primary | ICD-10-CM

## 2017-04-17 DIAGNOSIS — C7B.8 METASTATIC MALIGNANT NEUROENDOCRINE TUMOR TO LIVER: ICD-10-CM

## 2017-04-17 DIAGNOSIS — C7B.00 METASTATIC CARCINOID TUMOR: Primary | ICD-10-CM

## 2017-04-17 DIAGNOSIS — C7A.8 PRIMARY MALIGNANT NEUROENDOCRINE NEOPLASM OF ILEUM: ICD-10-CM

## 2017-04-17 LAB
BACTERIA SPEC ANAEROBE CULT: NORMAL
BACTERIA SPEC ANAEROBE CULT: NORMAL

## 2017-04-17 PROCEDURE — 74178 CT ABD&PLV WO CNTR FLWD CNTR: CPT | Mod: TC

## 2017-04-17 PROCEDURE — 25500020 PHARM REV CODE 255: Performed by: SURGERY

## 2017-04-17 PROCEDURE — 74178 CT ABD&PLV WO CNTR FLWD CNTR: CPT | Mod: 26,,, | Performed by: RADIOLOGY

## 2017-04-17 PROCEDURE — 99213 OFFICE O/P EST LOW 20 MIN: CPT | Performed by: SURGERY

## 2017-04-17 RX ORDER — ONDANSETRON 2 MG/ML
4 INJECTION INTRAMUSCULAR; INTRAVENOUS EVERY 12 HOURS PRN
Status: CANCELLED | OUTPATIENT
Start: 2017-04-17

## 2017-04-17 RX ADMIN — IOHEXOL 30 ML: 350 INJECTION, SOLUTION INTRAVENOUS at 11:04

## 2017-04-17 RX ADMIN — IOHEXOL 100 ML: 350 INJECTION, SOLUTION INTRAVENOUS at 01:04

## 2017-04-17 NOTE — PROGRESS NOTES
S/p ex lap  Had ct this am  Still shows fluid collection     No fever  Doing well  appeiite still poor  Having normal BM    abd soft wound looks good    Will need ct guided drainage    Will p[alyssa to do it as outpt    Will do labs today

## 2017-04-17 NOTE — MR AVS SNAPSHOT
[facility]  [address] [place] [address]  Phone: [phone]  Fax: [phone]                  Javy Thompson   2017 1:00 PM   Office Visit    Description:  Male : 1970   Provider:  Eric Mistry MD   Department:  [facility]           Diagnoses this Visit        Comments    Metastatic carcinoid tumor    -  Primary            To Do List           Goals (5 Years of Data)     None      Follow-Up and Disposition     Return in about 3 months (around 2017).    Follow-up and Disposition History      [facility]     [facility] Nurse Care Line -  Assistance  Unless otherwise directed by your provider, please contact [facility], our nurse care line that is available for  assistance.     Registered nurses in the [facility] provide: appointment scheduling, clinical advisement, health education, and other advisory services.  Call: [phone] (toll free)               Medications           Message regarding Medications     Verify the changes and/or additions to your medication regime listed below are the same as discussed with your clinician today.  If any of these changes or additions are incorrect, please notify your healthcare provider.             Verify that the below list of medications is an accurate representation of the medications you are currently taking.  If none reported, the list may be blank. If incorrect, please contact your healthcare provider. Carry this list with you in case of emergency.           Current Medications     lanreotide (SOMATULINE DEPOT) 120 mg/0.5 mL Syrg Inject 120 mg into the skin every 28 days.    senna-docusate 8.6-50 mg (PERICOLACE) 8.6-50 mg per tablet Take 1 tablet by mouth daily as needed for Constipation.    tramadol (ULTRAM) 50 mg tablet Take 2 tablets (100 mg total) by mouth every 4 (four) hours as needed for Pain.    ertapenem (INVANZ) 1 gram injection Inject 1 g into  "the vein once daily.           Clinical Reference Information           Your Vitals Were     BP Temp Height Weight BMI    116/70 97.5 °F (36.4 °C) (Oral) 6' 4" (1.93 m) 98 kg (216 lb) 26.29 kg/m2      Blood Pressure          Most Recent Value    BP  116/70      Allergies as of 4/17/2017     Epinephrine      Immunizations Administered on Date of Encounter - 4/17/2017     None      Orders Placed During Today's Visit      Normal Orders This Visit    Ambulatory consult to Interventional Radiology       [facility] Sign-Up     Activating your [facility] account is as easy as 1-2-3!     1) Visit [email or web address], select Sign Up Now, enter this activation code and your date of birth, then select Next.  [email or web address]  Expires: 5/14/2017 11:59 AM      2) Create a username and password to use when you visit [facility] in the future and select a security question in case you lose your password and select Next.    3) Enter your e-mail address and click Sign Up!    Additional Information  If you have questions, please e-mail [email or web address] or call [phone] to talk to our [facility] staff. Remember, [facility] is NOT to be used for urgent needs. For medical emergencies, dial 911.         Instructions    Nothing to eat or drink after midnight   Someone will contact you to set up IR drainage for tomorrow morning   Go to 1st floor outpatient diagnostic center now to have labs drawn        Language Assistance Services     ATTENTION: Language assistance services are available, free of charge. Please call [phone].      ATENCIÓN: Si habla español, tiene a john disposición servicios gratuitos de asistencia lingüística. Llame al [phone].     CASEY Ý: N?u b?n nói Ti?ng Vi?t, có các d?ch v? h? tr? ngôn ng? mi?n phí dành cho b?n. G?i s? [phone].         [facility] complies with applicable Federal civil rights laws and does not discriminate on the basis of race, color, national origin, age, " disability, or sex.

## 2017-04-17 NOTE — PATIENT INSTRUCTIONS
Nothing to eat or drink after midnight   Someone will contact you to set up IR drainage for tomorrow morning   Go to 1st floor outpatient diagnostic center now to have labs drawn

## 2017-04-18 ENCOUNTER — HOSPITAL ENCOUNTER (OUTPATIENT)
Facility: HOSPITAL | Age: 47
Discharge: HOME | End: 2017-04-18
Attending: RADIOLOGY | Admitting: RADIOLOGY
Payer: COMMERCIAL

## 2017-04-18 VITALS
BODY MASS INDEX: 26.3 KG/M2 | HEIGHT: 76 IN | SYSTOLIC BLOOD PRESSURE: 92 MMHG | RESPIRATION RATE: 16 BRPM | WEIGHT: 216 LBS | DIASTOLIC BLOOD PRESSURE: 55 MMHG | TEMPERATURE: 98 F | OXYGEN SATURATION: 98 % | HEART RATE: 89 BPM

## 2017-04-18 DIAGNOSIS — C7B.00 METASTATIC CARCINOID TUMOR: ICD-10-CM

## 2017-04-18 DIAGNOSIS — Z98.890 STATUS POST EXPLORATORY LAPAROTOMY: ICD-10-CM

## 2017-04-18 DIAGNOSIS — L02.91 ABSCESS: ICD-10-CM

## 2017-04-18 DIAGNOSIS — C7B.00 SECONDARY CARCINOID TUMORS: ICD-10-CM

## 2017-04-18 LAB
GRAM STN SPEC: NORMAL

## 2017-04-18 PROCEDURE — 87186 SC STD MICRODIL/AGAR DIL: CPT

## 2017-04-18 PROCEDURE — 82247 BILIRUBIN TOTAL: CPT

## 2017-04-18 PROCEDURE — 87075 CULTR BACTERIA EXCEPT BLOOD: CPT

## 2017-04-18 PROCEDURE — 49406 IMAGE CATH FLUID PERI/RETRO: CPT

## 2017-04-18 PROCEDURE — 83690 ASSAY OF LIPASE: CPT

## 2017-04-18 PROCEDURE — 87070 CULTURE OTHR SPECIMN AEROBIC: CPT | Mod: 59

## 2017-04-18 PROCEDURE — 87205 SMEAR GRAM STAIN: CPT

## 2017-04-18 PROCEDURE — 25000003 PHARM REV CODE 250: Performed by: RADIOLOGY

## 2017-04-18 PROCEDURE — 87102 FUNGUS ISOLATION CULTURE: CPT | Mod: 59

## 2017-04-18 PROCEDURE — 63600175 PHARM REV CODE 636 W HCPCS: Performed by: RADIOLOGY

## 2017-04-18 PROCEDURE — 87116 MYCOBACTERIA CULTURE: CPT | Mod: 59

## 2017-04-18 PROCEDURE — 87077 CULTURE AEROBIC IDENTIFY: CPT

## 2017-04-18 RX ORDER — MIDAZOLAM HYDROCHLORIDE 1 MG/ML
INJECTION, SOLUTION INTRAMUSCULAR; INTRAVENOUS CODE/TRAUMA/SEDATION MEDICATION
Status: COMPLETED | OUTPATIENT
Start: 2017-04-18 | End: 2017-04-18

## 2017-04-18 RX ORDER — LIDOCAINE HYDROCHLORIDE 10 MG/ML
INJECTION INFILTRATION; PERINEURAL CODE/TRAUMA/SEDATION MEDICATION
Status: COMPLETED | OUTPATIENT
Start: 2017-04-18 | End: 2017-04-18

## 2017-04-18 RX ORDER — ONDANSETRON 2 MG/ML
4 INJECTION INTRAMUSCULAR; INTRAVENOUS EVERY 12 HOURS PRN
Status: DISCONTINUED | OUTPATIENT
Start: 2017-04-18 | End: 2017-04-20 | Stop reason: HOSPADM

## 2017-04-18 RX ORDER — FENTANYL CITRATE 50 UG/ML
INJECTION, SOLUTION INTRAMUSCULAR; INTRAVENOUS CODE/TRAUMA/SEDATION MEDICATION
Status: COMPLETED | OUTPATIENT
Start: 2017-04-18 | End: 2017-04-18

## 2017-04-18 RX ORDER — SODIUM CHLORIDE 9 MG/ML
INJECTION, SOLUTION INTRAVENOUS CONTINUOUS
Status: DISCONTINUED | OUTPATIENT
Start: 2017-04-18 | End: 2017-04-20 | Stop reason: HOSPADM

## 2017-04-18 RX ORDER — HYDROCODONE BITARTRATE AND ACETAMINOPHEN 5; 325 MG/1; MG/1
1 TABLET ORAL EVERY 4 HOURS PRN
Status: DISCONTINUED | OUTPATIENT
Start: 2017-04-18 | End: 2017-04-20 | Stop reason: HOSPADM

## 2017-04-18 RX ADMIN — HYDROCODONE BITARTRATE AND ACETAMINOPHEN 1 TABLET: 5; 325 TABLET ORAL at 12:04

## 2017-04-18 RX ADMIN — FENTANYL CITRATE 50 MCG: 50 INJECTION, SOLUTION INTRAMUSCULAR; INTRAVENOUS at 10:04

## 2017-04-18 RX ADMIN — LIDOCAINE HYDROCHLORIDE 10 ML: 10 INJECTION, SOLUTION INFILTRATION; PERINEURAL at 10:04

## 2017-04-18 RX ADMIN — MIDAZOLAM 1 MG: 1 INJECTION INTRAMUSCULAR; INTRAVENOUS at 10:04

## 2017-04-18 NOTE — DISCHARGE SUMMARY
Radiology Discharge Summary      Hospital Course: No complications    Admit Date: 4/18/2017  Discharge Date: 04/18/2017     Instructions Given to Patient: Yes  Diet: Resume prior diet  Activity: activity as tolerated and no driving for today    Description of Condition on Discharge: Stable  Vital Signs (Most Recent): Temp: 98.4 °F (36.9 °C) (04/18/17 0858)  Pulse: 89 (04/18/17 1115)  Resp: 17 (04/18/17 1115)  BP: 115/68 (04/18/17 1115)  SpO2: 97 % (04/18/17 1115)    Discharge Disposition: Home    Discharge Diagnosis: Abscess    Anuel Fox M.D.  Diagnostic and Interventional Radiologist  Department of Radiology  Pager: [phone]

## 2017-04-18 NOTE — H&P
Radiology History & Physical      SUBJECTIVE:     Chief Complaint: Recurrent fluid collections    History of Present Illness:  Javy Thompson is a 47 y.o. male who presents for CT guided drain placement in RUQ collection and aspiration vs drainage placement of pelvic fluid.  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 Authorizing Provider   ertapenem (INVANZ) 1 gram injection Inject 1 g into the vein once daily. 4/12/17 4/17/17  Nick English MD   lanreotide (SOMATULINE DEPOT) 120 mg/0.5 mL Syrg Inject 120 mg into the skin every 28 days.    Historical Provider, MD   senna-docusate 8.6-50 mg (PERICOLACE) 8.6-50 mg per tablet Take 1 tablet by mouth daily as needed for Constipation. 4/12/17   Nick English MD   tramadol (ULTRAM) 50 mg tablet Take 2 tablets (100 mg total) by mouth every 4 (four) hours as needed for Pain. 4/12/17 4/22/17  Nick English MD     Anticoagulants/Antiplatelets: no anticoagulation    Allergies:   Review of patient's allergies indicates:   Allergen Reactions    Epinephrine Other (See Comments)     Carcinoid patient     Sedation History:  no adverse reactions    Review of Systems:   Hematological: no known coagulopathies  Respiratory: no shortness of breath  Cardiovascular: no chest pain  Gastrointestinal: no abdominal pain  Genito-Urinary: no dysuria  Musculoskeletal: negative  Neurological: no TIA or stroke symptoms          OBJECTIVE:     Vital Signs (Most Recent)  Temp: 98.4 °F (36.9 °C) (04/18/17 0858)  Pulse: 105 (04/18/17 0858)  Resp: 18 (04/18/17 0858)  BP: 122/67 (04/18/17 0858)  SpO2: 95 % (04/18/17 0858)    Physical Exam:  ASA: 2  Mallampati: 2    General: no acute distress  Mental Status: alert and oriented to person, place and time  HEENT: normocephalic, atraumatic  Chest: unlabored breathing  Heart: regular heart rate  Abdomen: nondistended  Extremity: moves all extremities    Laboratory  Lab Results   Component Value Date    INR 1.2 04/10/2017       Lab Results   Component Value Date    WBC 13.16 (H) 04/17/2017    HGB 9.3 (L) 04/17/2017    HCT 30.0 (L) 04/17/2017    MCV 89 04/17/2017     (H) 04/17/2017      Lab Results   Component Value Date     04/17/2017     (L) 04/17/2017    K 4.1 04/17/2017    CL 98 04/17/2017    CO2 25 04/17/2017    BUN 11 04/17/2017    CREATININE 1.0 04/17/2017    CALCIUM 8.9 04/17/2017    MG 2.5 04/12/2017    ALT 20 04/17/2017    AST 37 04/17/2017    ALBUMIN 3.0 (L) 04/17/2017    BILITOT 0.8 04/17/2017       ASSESSMENT/PLAN:     Sedation Plan: Moderate  Patient will undergo CT guided collections drainage as above.    Anuel Fox M.D.  Diagnostic and Interventional Radiologist  Department of Radiology  Pager: [phone]

## 2017-04-18 NOTE — PROCEDURES
Radiology Post-Procedure Note    Pre Op Diagnosis: Abscess    Post Op Diagnosis: Same     Procedure:right upper quadrant and pelvic drainage    Procedure performed by: Anuel Fox MD    Written Informed Consent Obtained: Yes    Specimen Removed:  cc bloody purulent material from RUQ and 200 cc bloody bilious material from pelvis    Estimated Blood Loss: Minimal    Findings: CT was used for localization of abnormal fluid collection. A needle was inserted into the fluid collection in the RUQ and purulent fluid was aspirated.  A wire was inserted into the collection and the tract was dilated.  A 10 Portuguese all-purpose drainage catheter was inserted and a pigtail loop of the distal end was formed.  The catheter was secured into place and approximately  400 mL fluid was removed.     A needle was inserted into the fluid collection in the pelvis and bloody bilious fluid was aspirated.  A wire was inserted into the collection and the tract was dilated.  An 8 Portuguese all-purpose drainage catheter was inserted and a pigtail loop of the distal end was formed.  The catheter was secured into place and approximately  400 mL fluid was removed.     A specimen was sent to the lab for further analysis and culture.    The patient tolerated procedure well and there were no complications. Please see procedure report under Imaging for further details.    Anuel Fox M.D.  Diagnostic and Interventional Radiologist  Department of Radiology  Pager: [phone]

## 2017-04-18 NOTE — DISCHARGE INSTRUCTIONS
When to seek medical advice  Get prompt medical attention if any of these occur:  · An increase in redness or swelling  · Red streaks in the skin leading away from the abscess  · An increase in local pain or swelling  · Fever of 100.4ºF (38ºC) or higher, or as directed by your healthcare provider  · Pus or fluid coming from the abscess  · Boil returns after getting better

## 2017-04-18 NOTE — IP AVS SNAPSHOT
[facility]  [address]  [place] [address]  Phone: [phone]           Patient Discharge Instructions   Our goal is to set you up for success. This packet includes information on your condition, medications, and your home care.  It will help you care for yourself to prevent having to return to the hospital.     Please ask your nurse if you have any questions.      There are many details to remember when preparing to leave the hospital. Here is what you will need to do:    1. Take your medicine. If you are prescribed medications, review your Medication List on the following pages. You may have new medications to  at the pharmacy and others that you'll need to stop taking. Review the instructions for how and when to take your medications. Talk with your doctor or nurses if you are unsure of what to do.     2. Go to your follow-up appointments. Specific follow-up information is listed in the following pages. Your may be contacted by a nurse or clinical provider about future appointments. Be sure we have all of the phone numbers to reach you. Please contact your provider's office if you are unable to make an appointment.     3. Watch for warning signs. Your doctor or nurse will give you detailed warning signs to watch for and when to call for assistance. These instructions may also include educational information about your condition. If you experience any of warning signs to your health, call your doctor.           [facility]  Unless otherwise directed by your provider, please   contact [facility], our nurse care line   that is available for 24/7 assistance.     [phone] (toll-free)     Registered nurses in the [facility]   provide: appointment scheduling, clinical advisement, health education, and other advisory services.                  ** Verify the list of medication(s) below is accurate and up to date. Carry this with you in case of emergency. If your  medications have changed, please notify your healthcare provider.             Medication List      ASK your doctor about these medications        Additional Info                      ertapenem 1 gram injection   Commonly known as:  INVANZ   Quantity:  5 g   Refills:  0   Dose:  1 g   Indications:  Intra-abdominal   Ask about: Should I take this medication?    Instructions:  Inject 1 g into the vein once daily.     Begin Date    AM    Noon    PM    Bedtime       lanreotide 120 mg/0.5 mL Syrg   Commonly known as:  SOMATULINE DEPOT   Refills:  0   Dose:  120 mg    Instructions:  Inject 120 mg into the skin every 28 days.     Begin Date    AM    Noon    PM    Bedtime       senna-docusate 8.6-50 mg 8.6-50 mg per tablet   Commonly known as:  PERICOLACE   Refills:  0   Dose:  1 tablet    Instructions:  Take 1 tablet by mouth daily as needed for Constipation.     Begin Date    AM    Noon    PM    Bedtime       tramadol 50 mg tablet   Commonly known as:  ULTRAM   Quantity:  50 tablet   Refills:  0   Dose:  100 mg    Instructions:  Take 2 tablets (100 mg total) by mouth every 4 (four) hours as needed for Pain.     Begin Date    AM    Noon    PM    Bedtime                  Please bring to all follow up appointments:    1. A copy of your discharge instructions.  2. All medicines you are currently taking in their original bottles.  3. Identification and insurance card.    Please arrive 15 minutes ahead of scheduled appointment time.    Please call 24 hours in advance if you must reschedule your appointment and/or time.            Discharge Instructions       When to seek medical advice  Get prompt medical attention if any of these occur:  · An increase in redness or swelling  · Red streaks in the skin leading away from the abscess  · An increase in local pain or swelling  · Fever of 100.4ºF (38ºC) or higher, or as directed by your healthcare provider  · Pus or fluid coming from the abscess  · Boil returns after getting  "better        Admission Information     Date & Time Provider Department CSN    4/18/2017  8:38 AM Anuel Fox MD [facility] [id number]      Care Providers     Provider Role Specialty Primary office phone    Anuel Fox MD Attending Provider Radiology [phone]      Your Vitals Were     BP Pulse Temp Resp Height Weight    92/55 89 98.4 °F (36.9 °C) (Oral) 16 6' 4" (1.93 m) 98 kg (216 lb)    SpO2 BMI             98% 26.29 kg/m2         Recent Lab Values     No lab values to display.      Pending Labs     Order Current Status    AFB Culture & Smear In process    AFB Culture & Smear In process    Aerobic culture In process    Aerobic culture In process    Culture, Anaerobic In process    Culture, Anaerobic In process    Fungus culture In process    Fungus culture In process    Gram stain In process    Gram stain In process    Lipase, Body Fluid Abdominal Fluid In process    Lipase, Body Fluid Abdominal Fluid In process    Bilirubin, Body Fluid (Reference Lab) Abdominal Fluid Preliminary result    Bilirubin, Body Fluid (Reference Lab) Abdominal Fluid Preliminary result      Allergies as of 4/18/2017        Reactions    Epinephrine Other (See Comments)    Carcinoid patient      Advance Directives     An advance directive is a document which, in the event you are no longer able to make decisions for yourself, tells your healthcare team what kind of treatment you do or do not want to receive, or who you would like to make those decisions for you.  If you do not currently have an advance directive, [facility] encourages you to create one.  For more information call:  [phone]), [phone] ([phone]),  or log on to [email or web address].        Language Assistance Services     ATTENTION: Language assistance services are available, free of charge. Please call [phone].      ATENCIÓN: Si habla español, tiene a john disposición servicios gratuitos de asistencia " delona. Sanya carbone [phone].     CASEY Ý: N?u b?n nói Ti?ng Vi?t, có các d?ch v? h? tr? ngôn ng? mi?n phí dành cho b?n. G?i s? [phone].        [facility] Sign-Up     Activating your [facility] account is as easy as 1-2-3!     1) Visit [email or web address], select Sign Up Now, enter this activation code and your date of birth, then select Next.  [email or web address]  Expires: 5/14/2017 11:59 AM      2) Create a username and password to use when you visit [facility] in the future and select a security question in case you lose your password and select Next.    3) Enter your e-mail address and click Sign Up!    Additional Information  If you have questions, please e-mail [email or web address] or call [phone] to talk to our [facility] staff. Remember, [facility] is NOT to be used for urgent needs. For medical emergencies, dial 911.          [facility] complies with applicable Federal civil rights laws and does not discriminate on the basis of race, color, national origin, age, disability, or sex.

## 2017-04-19 ENCOUNTER — TELEPHONE (OUTPATIENT)
Dept: NEUROLOGY | Facility: HOSPITAL | Age: 47
End: 2017-04-19

## 2017-04-19 LAB
LIPASE FLD-CCNC: 14 U/L
LIPASE FLD-CCNC: 15 U/L
SPECIMEN SOURCE: NORMAL
SPECIMEN SOURCE: NORMAL

## 2017-04-19 NOTE — TELEPHONE ENCOUNTER
Called pt to inquire about how he is feeling post IR drainage, per request of Dr. Reyes. No answer. [facility] for pt to return call. Awaiting call back.

## 2017-04-19 NOTE — PT/OT/SLP DISCHARGE
Physical Therapy Discharge Summary    Javy Thompson  MRN: [id number]   Malignant carcinoid tumor of ileum   Patient Discharged from acute Physical Therapy on 17.  Please refer to prior PT noted date on 17 for functional status.     Assessment:   Patient appropriate for care in another setting.  GOALS:   Physical Therapy Goals        Problem: Physical Therapy Goal    Goal Priority Disciplines Outcome Goal Variances Interventions   Physical Therapy Goal     PT/OT, PT Ongoing (interventions implemented as appropriate)     Description:  Goals to be met by: 17     Patient will increase functional independence with mobility by performin. Supine to sit with Modified [place] MET 17  2. Sit to supine with Modified [place] MET 17  3. Sit to stand transfer with Modified [place] 17  4. Bed to chair transfer with Modified [place] using appropriate AD if needed  5. Gait  x 150 feet with Modified [place] using appropriate AD if needed. MET 17  6. Ascend/descend 16 stairs with bilateral Handrails Supervision       Recommendations: Pt to benefit from continued PT intervention to improve independence with functional mobility/ADL. Recommend d/c home c/ family. Further d/c recs pending progress c/ therapy.                 Reasons for Discontinuation of Therapy Services  Transfer to alternate level of care.      Plan:  Patient Discharged to: Home no PT services needed.

## 2017-04-20 ENCOUNTER — TELEPHONE (OUTPATIENT)
Dept: NEUROLOGY | Facility: HOSPITAL | Age: 47
End: 2017-04-20

## 2017-04-20 LAB
BACTERIA SPEC AEROBE CULT: NORMAL
BILIRUB FLD-MCNC: 0.2 MG/DL
BILIRUB FLD-MCNC: 5.6 MG/DL
SPECIMEN SOURCE: NORMAL
SPECIMEN SOURCE: NORMAL

## 2017-04-20 NOTE — TELEPHONE ENCOUNTER
Returned pts call. Pt states chest tube area still a little sore, but other than that he is feeling good. Pt flew home yesterday and is actively trying to find someone in his hometown to remove upper and lower drains that were placed, hopefully tomorrow for the lower tube and Monday for the upper tube. Pt denies fevers.  Pt will call to update clinic on Monday.

## 2017-04-20 NOTE — TELEPHONE ENCOUNTER
----- Message from Samreen Boyd sent at 4/20/2017 12:48 PM CDT -----  Contact: Patients wife  LENIN- Patient called requesting for Dr. Coats to send over clinical, operative, pathology notes for procedure. Patients contact number [phone].  Dr Addie Ziegler fax number [phone] >ATTENTION  Grisel.

## 2017-04-21 LAB — BACTERIA SPEC AEROBE CULT: NO GROWTH

## 2017-04-24 LAB
BACTERIA SPEC ANAEROBE CULT: NORMAL
BACTERIA SPEC ANAEROBE CULT: NORMAL

## 2017-04-26 ENCOUNTER — TELEPHONE (OUTPATIENT)
Dept: NEUROLOGY | Facility: HOSPITAL | Age: 47
End: 2017-04-26

## 2017-04-26 NOTE — TELEPHONE ENCOUNTER
----- Message from Iman Kennedy sent at 4/26/2017  9:04 AM CDT -----  Contact: [phone] vernonsheryl PHELPS- Patient's wife (Damaris) called stating the patient is still producing a lot of fluid out of his drains and she doesn't know if it is normal or not. She says it's been 7 weeks since his surgery. Please call Damaris back at [phone]

## 2017-04-26 NOTE — TELEPHONE ENCOUNTER
"Returned pts wife call. Pt saw surgeon last Monday and removed lower drain. Having CT and other drain removed tomorrow if output is low. Pt still ranging between 25-150cc per day. Output described as "milk chocolate colored with a mild odor". Output is between thick and thing. Pt denies fevers. Over all feeling OK. Will have CT and blood work tomorrow. Will call with updates. Dr. Coats notified.   "

## 2017-04-27 ENCOUNTER — TELEPHONE (OUTPATIENT)
Dept: NEUROLOGY | Facility: HOSPITAL | Age: 47
End: 2017-04-27

## 2017-04-27 NOTE — TELEPHONE ENCOUNTER
Called pt and wife to follow up on CT scan and labs per request of Dr. Reyes. No answer. LVM on both numbers to return call. Awaiting call back.

## 2017-04-28 RX ORDER — SPIRONOLACTONE 50 MG/1
50 TABLET, FILM COATED ORAL DAILY
Qty: 30 TABLET | Refills: 1 | Status: SHIPPED | OUTPATIENT
Start: 2017-04-28 | End: 2021-02-02

## 2017-04-28 NOTE — TELEPHONE ENCOUNTER
Called pt per request of DONNA Reyes. Pt states that he had CT and labs yesterday and will have his Doctors office fax the reports. Per office staff , blood work looks good, and Dr. Reyes discussed CT results w/ pts home surgeon. Pt further states that home surgeon refilled abx as he felt it was necessary to do another round. Informed pt that Dr. Reyes would like him to start taking Aldactone to reduce fluid collection around drain. Pt verbalizes understanding. Will send rx for approval to Dr. Reyes. Pt states that he is overall feeling better every day. Dr. Reyes notified.

## 2017-04-28 NOTE — TELEPHONE ENCOUNTER
----- Message from Daija Ortez LPN sent at 4/28/2017  8:45 AM CDT -----  Per Dr. Coats, call pt to request a copy of labs and CT report from yesterday. Pt also needs Aldactone 50mg QD called in or e-scribed to pharmacy of his choice, to reduce the fluid collection around his drain site. Dr. Reyes spoke w/ Surgeon yesterday.

## 2017-05-02 ENCOUNTER — TELEPHONE (OUTPATIENT)
Dept: NEUROLOGY | Facility: HOSPITAL | Age: 47
End: 2017-05-02

## 2017-05-02 NOTE — TELEPHONE ENCOUNTER
Called pt to assess symptoms as f/u from last week. Spoke w/ pts wife. Going to doctor tomorrow in hopes of getting drained removed. Drain still putting out 40-50cc of peach colored fluid per day. Denies fevers. Will notify Dr. Reyes. Still have not received CT report. Wife states she will request if from  Tomorrow.

## 2017-05-03 ENCOUNTER — TELEPHONE (OUTPATIENT)
Dept: NEUROLOGY | Facility: HOSPITAL | Age: 47
End: 2017-05-03

## 2017-05-03 LAB — FUNGUS SPEC CULT: NORMAL

## 2017-05-03 NOTE — LETTER
May 3, 2017      [facility] [address] [facility] [place] [address]  Phone: [phone]  Fax: [phone]       Patient: Javy Thompson   YOB: 1970  Date of Visit: 05/03/2017    To Whom It May Concern:    Javy Grossman Has been under my care since his surgery on on 3/31/2017. He may return to work/school on 6/26/2017 with no restrictions, pending a good report at follow up appointments. Mr. Thompson has encountered some complications following surgery and is unable to sit/stand/walk long periods, nor take extended drives. If you have any questions or concerns, or if I can be of further assistance, please do not hesitate to contact me.    Sincerely,      Dr. Eric Mistry

## 2017-05-03 NOTE — TELEPHONE ENCOUNTER
----- Message from Natasha Kern sent at 5/3/2017  9:39 AM CDT -----  Contact: Patient  LENIN- Patient needs help with his short term disability paperwork. List of restrictions from 4/15/17 to present and a return to work date faxed to [employer] @ [phone]. Patient would like a  call at [phone] before you fax the information.

## 2017-05-03 NOTE — TELEPHONE ENCOUNTER
Returned pts call. Pt states that disability paperwork originally said to return to work 4/26, and will need a list of restrictions sent to [facility]. Decided on tentative return to work date of June 26th, can adjust if pt needs more or less time. Faxed per pt request.

## 2017-05-05 LAB — FUNGUS SPEC CULT: NORMAL

## 2017-05-11 LAB
FUNGUS SPEC CULT: NORMAL
FUNGUS SPEC CULT: NORMAL

## 2017-05-12 ENCOUNTER — TELEPHONE (OUTPATIENT)
Dept: NEUROLOGY | Facility: HOSPITAL | Age: 47
End: 2017-05-12

## 2017-05-12 NOTE — TELEPHONE ENCOUNTER
"Called pt to follow up on condition. Pt had a larger chest tube placed yesterday. Going to get reassessed on Tuesday. They removed vacuum drain and placed gravity drain. Malodorous drainage started to come out after placed to gravity. Now to vacuum, small amount of reddish/brownish/milky color. Denies fevers. Was on 3 days of amoxicillin. Has not returned to work. Pt states he was feeling very good, then last nigh went to a baseball game and started to feel tachycardic and "almost passed out". Her doctor thought it may have been caused by infection. Home physician to call Dr. Reyes. Dr. Reyes notified.   "

## 2017-05-19 LAB
FUNGUS SPEC CULT: NORMAL
FUNGUS SPEC CULT: NORMAL

## 2017-05-23 ENCOUNTER — CONFERENCE (OUTPATIENT)
Dept: NEUROLOGY | Facility: HOSPITAL | Age: 47
End: 2017-05-23

## 2017-05-23 NOTE — TELEPHONE ENCOUNTER
Multidisciplinary NET Tumor Board Summary:    Attendees:  Derik Hadley Boudreaux, Ramcharan, Gimenez     Discussion: history reviewed, huge lesions removed in surgery, went home with a drain, Ki 67 is different from different places, had one nodule in the lower esophagus , seen on CT but not on O scan, does he need additional th right Vats in the future,     Recommendations:   1. Watch for now  2. Needs upper EUS to look at periesophageal after surgery  3. Get gallium scan, CT Chest Abdomen and pelvis and MRI, and markers  4 see Eric and [place]

## 2017-05-25 ENCOUNTER — TELEPHONE (OUTPATIENT)
Dept: NEUROLOGY | Facility: HOSPITAL | Age: 47
End: 2017-05-25

## 2017-05-25 NOTE — TELEPHONE ENCOUNTER
Called pt per request of Dr. Reyes. Pt states that he was hospitalized last week for 3 days due to an abscess, high fever, was treated with abx. Still currently has drainage tube. Pt going tomorrow to hopefully have tube removed. Also discussed tumor board recommendations. Pt currently seeing a new doctor at home that is much more willing to work with our office. Will update Dr. Reyes.

## 2017-05-25 NOTE — PT/OT/SLP DISCHARGE
Occupational Therapy Discharge Summary    Javy Thompson  MRN: [id number]   Malignant carcinoid tumor of ileum   Patient Discharged from acute Occupational Therapy on 4/12/17.  Please refer to prior OT note dated on 4/12/17 for functional status.     Assessment:   Patient appropriate for care in another setting.  GOALS:    Occupational Therapy Goals        Problem: Occupational Therapy Goal    Goal Priority Disciplines Outcome Interventions   Occupational Therapy Goal     OT, PT/OT Ongoing (interventions implemented as appropriate)    Description:  Goals to be met by: 5/2/17     Patient will increase functional independence with ADLs by performing:    LE Dressing with Modified [place].  Grooming while standing with Modified [place]. --Goal met 4/10  Toileting from toilet with Modified [place] for hygiene and clothing management. --Goal met 4/10  Supine to sit with Modified [place].  Stand pivot transfers with Modified [place]. --Goal met 4/10  Toilet transfer to toilet with Modified [place].--Goal met 4/10  Upper extremity exercise program x10 reps per handout, with independence.                     Reasons for Discontinuation of Therapy Services  Transfer to alternate level of care.      Plan:  Patient Discharged to: Home no PT services needed.    Sharla Fair, OT  5/25/2017

## 2017-05-25 NOTE — TELEPHONE ENCOUNTER
----- Message from Daija Ortez LPN sent at 5/25/2017  8:15 AM CDT -----  Call pt to check on condition per Dr. Coats

## 2017-05-25 NOTE — TELEPHONE ENCOUNTER
Dr. Reyes called pt to get an update on pt condition. No answer. Physician LVM for pt to return call. Nurse will also attempt to call pt at later time.

## 2017-05-29 ENCOUNTER — TELEPHONE (OUTPATIENT)
Dept: NEUROLOGY | Facility: HOSPITAL | Age: 47
End: 2017-05-29

## 2017-06-05 LAB
ACID FAST MOD KINY STN SPEC: NORMAL
MYCOBACTERIUM SPEC QL CULT: NORMAL

## 2017-06-07 LAB
ACID FAST MOD KINY STN SPEC: NORMAL
MYCOBACTERIUM SPEC QL CULT: NORMAL

## 2017-06-12 LAB
ACID FAST MOD KINY STN SPEC: NORMAL
ACID FAST MOD KINY STN SPEC: NORMAL
MYCOBACTERIUM SPEC QL CULT: NORMAL
MYCOBACTERIUM SPEC QL CULT: NORMAL

## 2018-10-17 ENCOUNTER — PATIENT MESSAGE (OUTPATIENT)
Dept: NEUROLOGY | Facility: HOSPITAL | Age: 48
End: 2018-10-17

## 2018-10-17 ENCOUNTER — TELEPHONE (OUTPATIENT)
Dept: NEUROLOGY | Facility: HOSPITAL | Age: 48
End: 2018-10-17

## 2018-10-17 DIAGNOSIS — C7A.012 MALIGNANT CARCINOID TUMOR OF THE ILEUM: Primary | ICD-10-CM

## 2018-10-17 DIAGNOSIS — C7B.8 SECONDARY NEUROENDOCRINE TUMOR OF DISTANT LYMPH NODES: ICD-10-CM

## 2018-10-17 DIAGNOSIS — C78.7 SECONDARY MALIGNANT NEOPLASM OF LIVER: ICD-10-CM

## 2018-10-17 NOTE — TELEPHONE ENCOUNTER
----- Message from Samreen Boyd sent at 10/17/2018 10:13 AM CDT -----  Contact: patient  EAW- Patient called to set up scans (if needed) and a appt with Dr Hadley, he hasn't been here since his procedure. # [phone]

## 2018-10-17 NOTE — TELEPHONE ENCOUNTER
Returned patients call. Advised patient that he will need to have updated scans and blood work done before his appointment. Orders dropped in Epic for patient to have scans done here. Emailed patient a new set of blood work orders. Clinic appointment set up for patient to see Dr. Hadley in November. Patient verbalized his understanding and has no further questions at this time.

## 2018-10-24 LAB
EXT 24 HR UR METANEPHRINE: ABNORMAL
EXT 24 HR UR NORMETANEPHRINE: ABNORMAL
EXT 24 HR UR NORMETANEPHRINE: ABNORMAL
EXT 25 HYDROXY VIT D2: ABNORMAL
EXT 25 HYDROXY VIT D3: ABNORMAL
EXT 5 HIAA 24 HR URINE: ABNORMAL
EXT 5 HIAA BLOOD: 11 NG/ML (ref 0–22)
EXT ACTH: ABNORMAL
EXT AFP: ABNORMAL
EXT ALBUMIN: 4.1 GM/DL (ref 3.5–5)
EXT ALKALINE PHOSPHATASE: 90 U/L (ref 38–126)
EXT ALT: 16 U/L (ref 11–58)
EXT AMYLASE: ABNORMAL
EXT ANTI ISLET CELL AB: ABNORMAL
EXT ANTI PARIETAL CELL AB: ABNORMAL
EXT ANTI THYROID AB: ABNORMAL
EXT AST: 30 U/L (ref 17–59)
EXT BILIRUBIN DIRECT: ABNORMAL MG/DL
EXT BILIRUBIN TOTAL: 1.1 MG/DL (ref 0.1–1.2)
EXT BK VIRUS DNA QN PCR: ABNORMAL
EXT BUN: 13 MG/DL (ref 8–26)
EXT C PEPTIDE: ABNORMAL
EXT CA 125: ABNORMAL
EXT CA 19-9: ABNORMAL
EXT CA 27-29: ABNORMAL
EXT CALCITONIN: ABNORMAL
EXT CALCIUM: 9.6 MG/DL (ref 8.4–10.5)
EXT CEA: ABNORMAL
EXT CHLORIDE: 99 MMOL/L (ref 98–110)
EXT CHOLESTEROL: ABNORMAL
EXT CHROMOGRANIN A: ABNORMAL
EXT CO2: 28 MMOL/L (ref 20–29)
EXT CREATININE UA: ABNORMAL
EXT CREATININE: 0.93 MG/DL (ref 0.8–1.5)
EXT CYCLOSPORONE LEVEL: ABNORMAL
EXT DOPAMINE: ABNORMAL
EXT EBV DNA BY PCR: ABNORMAL
EXT EPINEPHRINE: ABNORMAL
EXT FOLATE: ABNORMAL
EXT FREE T3: ABNORMAL
EXT FREE T4: ABNORMAL
EXT FSH: ABNORMAL
EXT GASTRIN RELEASING PEPTIDE: ABNORMAL
EXT GASTRIN RELEASING PEPTIDE: ABNORMAL
EXT GASTRIN: ABNORMAL
EXT GGT: ABNORMAL
EXT GHRELIN: ABNORMAL
EXT GLUCAGON: ABNORMAL
EXT GLUCOSE: 136 MG/DL (ref 65–99)
EXT GROWTH HORMONE: ABNORMAL
EXT HCV RNA QUANT PCR: ABNORMAL
EXT HDL: ABNORMAL
EXT HEMATOCRIT: 46.5 % (ref 38.8–50.2)
EXT HEMOGLOBIN A1C: ABNORMAL
EXT HEMOGLOBIN: 15.7 G/DL (ref 12.8–16.9)
EXT HISTAMINE 24 HR URINE: ABNORMAL
EXT HISTAMINE: ABNORMAL
EXT IGF-1: ABNORMAL
EXT IMMUNKNOW (NON-STIMULATED): ABNORMAL
EXT IMMUNKNOW (STIMULATED): ABNORMAL
EXT INR: ABNORMAL
EXT INSULIN: ABNORMAL
EXT LANREOTIDE LEVEL: ABNORMAL
EXT LDH, TOTAL: ABNORMAL
EXT LDL CHOLESTEROL: ABNORMAL
EXT LIPASE: ABNORMAL
EXT MAGNESIUM: ABNORMAL
EXT METANEPHRINE FREE PLASMA: ABNORMAL
EXT MOTILIN: ABNORMAL
EXT NEUROKININ A CAMB: ABNORMAL
EXT NEUROKININ A ISI: 20 PG/ML (ref 0–40)
EXT NEUROTENSIN: ABNORMAL
EXT NOREPINEPHRINE: ABNORMAL
EXT NORMETANEPHRINE: ABNORMAL
EXT NSE: ABNORMAL
EXT OCTREOTIDE LEVEL: ABNORMAL
EXT PANCREASTATIN CAMB: ABNORMAL
EXT PANCREASTATIN ISI: 89 PG/ML (ref 10–135)
EXT PANCREATIC POLYPEPTIDE: ABNORMAL
EXT PHOSPHORUS: ABNORMAL
EXT PLATELETS: 169 K/CUMM (ref 150–450)
EXT POTASSIUM: 4.3 MMOL/L (ref 3.5–5.5)
EXT PROGRAF LEVEL: ABNORMAL
EXT PROLACTIN: ABNORMAL
EXT PROTEIN TOTAL: 7.4 G/DL (ref 6–8.3)
EXT PROTEIN UA: ABNORMAL
EXT PT: ABNORMAL
EXT PTH, INTACT: ABNORMAL
EXT PTT: ABNORMAL
EXT RAPAMUNE LEVEL: ABNORMAL
EXT SEROTONIN: 173 NG/ML (ref 56–244)
EXT SODIUM: 138 MMOL/L (ref 136–145)
EXT SOMATOSTATIN: ABNORMAL
EXT SUBSTANCE P: ABNORMAL
EXT TRIGLYCERIDES: ABNORMAL
EXT TRYPTASE: ABNORMAL
EXT TSH: ABNORMAL
EXT URIC ACID: ABNORMAL
EXT URINE AMYLASE U/HR: ABNORMAL
EXT URINE AMYLASE U/L: ABNORMAL
EXT VASOACTIVE INTESTINAL POLYPEPTIDE: ABNORMAL
EXT VITAMIN B12: ABNORMAL
EXT VMA 24 HR URINE: ABNORMAL
EXT WBC: 7.2 K/CUMM (ref 3.3–10.5)
NEURON SPECIFIC ENOLASE: ABNORMAL

## 2018-11-05 ENCOUNTER — PATIENT MESSAGE (OUTPATIENT)
Dept: NEUROLOGY | Facility: HOSPITAL | Age: 48
End: 2018-11-05

## 2018-11-26 ENCOUNTER — HOSPITAL ENCOUNTER (OUTPATIENT)
Dept: CARDIOLOGY | Facility: HOSPITAL | Age: 48
Discharge: HOME | End: 2018-11-26
Attending: SURGERY
Payer: COMMERCIAL

## 2018-11-26 ENCOUNTER — HOSPITAL ENCOUNTER (OUTPATIENT)
Dept: RADIOLOGY | Facility: HOSPITAL | Age: 48
Discharge: HOME | End: 2018-11-26
Attending: SURGERY
Payer: COMMERCIAL

## 2018-11-26 DIAGNOSIS — C7B.8 SECONDARY NEUROENDOCRINE TUMOR OF DISTANT LYMPH NODES: ICD-10-CM

## 2018-11-26 DIAGNOSIS — C78.7 SECONDARY MALIGNANT NEOPLASM OF LIVER: ICD-10-CM

## 2018-11-26 DIAGNOSIS — C7A.012 MALIGNANT CARCINOID TUMOR OF THE ILEUM: Primary | ICD-10-CM

## 2018-11-26 DIAGNOSIS — C7A.012 MALIGNANT CARCINOID TUMOR OF THE ILEUM: ICD-10-CM

## 2018-11-26 LAB
AORTIC ROOT ANNULUS: 3.52 CM
AV INDEX (PROSTH): 0.77
AV MEAN GRADIENT: 3.56 MMHG
AV PEAK GRADIENT: 5.66 MMHG
AV VALVE AREA: 3.82 CM2
CV ECHO LV RWT: 0.42 CM
DOP CALC AO PEAK VEL: 1.19 M/S
DOP CALC AO VTI: 28.26 CM
DOP CALC LVOT AREA: 4.99 CM2
DOP CALC LVOT DIAMETER: 2.52 CM
DOP CALC LVOT STROKE VOLUME: 108.03 CM3
DOP CALCLVOT PEAK VEL VTI: 21.67 CM
E WAVE DECELERATION TIME: 161.3 MSEC
E/A RATIO: 2.06
ECHO LV POSTERIOR WALL: 1.1 CM (ref 0.6–1.1)
FRACTIONAL SHORTENING: 36 % (ref 28–44)
INTERVENTRICULAR SEPTUM: 1.14 CM (ref 0.6–1.1)
LA MAJOR: 4.76 CM
LA MINOR: 4.6 CM
LA WIDTH: 4.52 CM
LEFT ATRIUM SIZE: 4.2 CM
LEFT ATRIUM VOLUME: 75.5 CM3
LEFT INTERNAL DIMENSION IN SYSTOLE: 3.4 CM (ref 2.1–4)
LEFT VENTRICLE DIASTOLIC VOLUME: 134.89 ML
LEFT VENTRICLE SYSTOLIC VOLUME: 47.46 ML
LEFT VENTRICULAR INTERNAL DIMENSION IN DIASTOLE: 5.29 CM (ref 3.5–6)
LEFT VENTRICULAR MASS: 232.66 G
LV LATERAL E/E' RATIO: 5.54
MV PEAK A VEL: 0.35 M/S
MV PEAK E VEL: 0.72 M/S
PISA TR MAX VEL: 2.04 M/S
PULM VEIN S/D RATIO: 0.95
PV PEAK D VEL: 0.57 M/S
PV PEAK S VEL: 0.54 M/S
PV PEAK VELOCITY: 0.92 CM/S
RA MAJOR: 4.19 CM
RA PRESSURE: 3 MMHG
RIGHT VENTRICULAR END-DIASTOLIC DIMENSION: 2.75 CM
TDI LATERAL: 0.13
TR MAX PG: 16.65 MMHG
TV REST PULMONARY ARTERY PRESSURE: 19.65 MMHG

## 2018-11-26 PROCEDURE — 74183 MRI ABD W/O CNTR FLWD CNTR: CPT | Mod: 26,,, | Performed by: RADIOLOGY

## 2018-11-26 PROCEDURE — 74177 CT ABD & PELVIS W/CONTRAST: CPT | Mod: 26,,, | Performed by: RADIOLOGY

## 2018-11-26 PROCEDURE — 63600175 PHARM REV CODE 636 W HCPCS: Performed by: SURGERY

## 2018-11-26 PROCEDURE — 74183 MRI ABD W/O CNTR FLWD CNTR: CPT | Mod: TC

## 2018-11-26 PROCEDURE — 78815 PET IMAGE W/CT SKULL-THIGH: CPT | Mod: TC

## 2018-11-26 PROCEDURE — 25500020 PHARM REV CODE 255: Performed by: SURGERY

## 2018-11-26 PROCEDURE — 93306 TTE W/DOPPLER COMPLETE: CPT

## 2018-11-26 PROCEDURE — 93306 TTE W/DOPPLER COMPLETE: CPT | Mod: 26,,, | Performed by: INTERNAL MEDICINE

## 2018-11-26 PROCEDURE — 78815 PET IMAGE W/CT SKULL-THIGH: CPT | Mod: 26,PI,, | Performed by: RADIOLOGY

## 2018-11-26 PROCEDURE — A9585 GADOBUTROL INJECTION: HCPCS | Performed by: SURGERY

## 2018-11-26 PROCEDURE — 74177 CT ABD & PELVIS W/CONTRAST: CPT | Mod: TC

## 2018-11-26 PROCEDURE — A9587 GALLIUM GA-68: HCPCS | Mod: TB

## 2018-11-26 RX ORDER — DIPHENHYDRAMINE HYDROCHLORIDE 50 MG/ML
50 INJECTION INTRAMUSCULAR; INTRAVENOUS ONCE
Status: COMPLETED | OUTPATIENT
Start: 2018-11-26 | End: 2018-11-26

## 2018-11-26 RX ORDER — GADOBUTROL 604.72 MG/ML
10 INJECTION INTRAVENOUS
Status: COMPLETED | OUTPATIENT
Start: 2018-11-26 | End: 2018-11-26

## 2018-11-26 RX ADMIN — GADOBUTROL 10 ML: 604.72 INJECTION INTRAVENOUS at 10:11

## 2018-11-26 RX ADMIN — DIPHENHYDRAMINE HYDROCHLORIDE 50 MG: 50 INJECTION, SOLUTION INTRAMUSCULAR; INTRAVENOUS at 12:11

## 2018-11-26 RX ADMIN — IOHEXOL 100 ML: 350 INJECTION, SOLUTION INTRAVENOUS at 01:11

## 2018-11-26 RX ADMIN — IOHEXOL 30 ML: 350 INJECTION, SOLUTION INTRAVENOUS at 11:11

## 2018-11-26 NOTE — NURSING
Pt informed CT tech that he gets hives with contrast.  Received a call from Dr. Hadley advising me to place an order for Diphenhydramine 50 mg IV once now. Placed order and administered the medication to the pt.

## 2018-11-27 ENCOUNTER — OFFICE VISIT (OUTPATIENT)
Dept: NEUROLOGY | Facility: HOSPITAL | Age: 48
End: 2018-11-27
Attending: SURGERY
Payer: COMMERCIAL

## 2018-11-27 VITALS
TEMPERATURE: 98 F | BODY MASS INDEX: 26.6 KG/M2 | WEIGHT: 225.31 LBS | HEART RATE: 78 BPM | SYSTOLIC BLOOD PRESSURE: 129 MMHG | HEIGHT: 77 IN | DIASTOLIC BLOOD PRESSURE: 89 MMHG

## 2018-11-27 DIAGNOSIS — D49.89 NEOPLASM OF ABDOMEN: ICD-10-CM

## 2018-11-27 DIAGNOSIS — C78.7 SECONDARY MALIGNANT NEOPLASM OF LIVER: ICD-10-CM

## 2018-11-27 DIAGNOSIS — C7A.012 MALIGNANT CARCINOID TUMOR OF ILEUM: Primary | ICD-10-CM

## 2018-11-27 DIAGNOSIS — C7B.8 SECONDARY NEUROENDOCRINE TUMOR OF DISTANT LYMPH NODES: ICD-10-CM

## 2018-11-27 PROCEDURE — 99215 OFFICE O/P EST HI 40 MIN: CPT | Performed by: SURGERY

## 2018-11-27 RX ORDER — HYDROCODONE BITARTRATE AND ACETAMINOPHEN 5; 325 MG/1; MG/1
1 TABLET ORAL EVERY 6 HOURS PRN
COMMUNITY
End: 2020-10-29

## 2018-11-27 NOTE — PATIENT INSTRUCTIONS
Blood work / Lab work / Tumor markers every 3 mos.  Get lab work drawn at least 1 month prior to appointment. --- due January 2019 and April 2019    Scans:  Gallium 68, CT Abd/Pelvis and MRI liver in 6 mos.  ---  Due in May 2019  Call Scheduling Department at [phone] to schedule scans prior to next appointment:      Return clinic appointment in 6 months with Dr. Hadley - appointment made    Echo every 6 months - due in May 2019     Tumor Board: will present your case at Tumor Board on 12/4/18 and call you back with the boards recommendations

## 2018-11-27 NOTE — PROGRESS NOTES
"NOLANETS:  [facility]  A collaboration between [facility] and [facility]    PATIENT: Javy Thompson  MRN: [id number]  DATE: 11/27/2018    Vitals:    11/27/18 1005   BP: 129/89   BP Location: Left arm   Patient Position: Sitting   BP Method: Large (Automatic)   Pulse: 78   Temp: 97.6 °F (36.4 °C)   TempSrc: Oral   Weight: 102.2 kg (225 lb 5 oz)   Height: 6' 5" (1.956 m)      Last 2 Weight Measurements:   Wt Readings from Last 2 Encounters:   11/27/18 102.2 kg (225 lb 5 oz)   04/18/17 98 kg (216 lb)     BMI: Body mass index is 26.72 kg/m².    Karnofsky Score    Karnofsky Score:  80% - Normal Activity with Effort: Some Symptoms of Disease       Diagnosis:   1. Malignant carcinoid tumor of ileum    2. Secondary neuroendocrine tumor of distant lymph nodes    3. Secondary malignant neoplasm of liver    4. Neoplasm of abdomen      Interval History: Mr. 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       Past Surgical History:   Procedure Laterality Date    ANTERIOR CRUCIATE LIGAMENT REPAIR Right 1986    APPENDECTOMY  3/31/2017    Procedure: APPENDECTOMY;  Surgeon: Fidelia Reyes MD;  Location: [facility] OR;  Service: General;;    APPENDECTOMY  3/31/2017    Performed by [facility]ia [facility]yes MD at [facility] OR    BIOPSY LIVER AND ULTRASOUND  3/31/2017    Performed by [facility] at [facility] OR    CHOLECYSTECTOMY  3/31/2017    Performed by [facility] MD at [facility] OR    DISSECTION-LYMPH NODE N/A 3/31/2017    Performed by [facility] at [facility] OR    EXPLORATORY-LAPAROTOMY N/A 3/31/2017    Performed by [facility]yes [facility] at [facility] OR    KNEE ARTHROSCOPY Right 1986    x2    LIVER BIOPSY  " 12/2016, 1/2017    LOBECTOMY-LIVER N/A 3/31/2017    [facility]    MAPPING-SENTINEL NODE  3/31/2017    [facility] OR    RESECTION - SMALL BOWEL  3/31/2017    [facility] OR    TONSILLECTOMY         Review of patient's allergies indicates:   Allergen Reactions    Epinephrine Other (See Comments)     Carcinoid patient    Iodinated contrast- oral and iv dye Hives       Current Outpatient Medications   Medication Sig Dispense Refill    HYDROcodone-acetaminophen (NORCO) 5-325 mg per tablet Take 1 tablet by mouth every 6 (six) hours as needed for Pain.      senna-docusate 8.6-50 mg (PERICOLACE) 8.6-50 mg per tablet Take 1 tablet by mouth daily as needed for Constipation.      lanreotide (SOMATULINE DEPOT) 120 mg/0.5 mL Syrg Inject 120 mg into the skin every 28 days.      spironolactone (ALDACTONE) 50 MG tablet Take 1 tablet (50 mg total) by mouth once daily. 30 tablet 1     No current facility-administered medications for this visit.        Review of Systems     Number of Days per Week Number per Day   DIARRHEA Occasional about 4 x per month    BRISTOL STOOL SCALE RATING Type 6    FLUSHING 0    WHEEZING 0      WEIGHT GAIN/LOSS Stable 225 today   ENERGY RATING (0-10) 10        Physical Exam: deferred.     Pathology Data:   no new tissue    Laboratory Data:  Neuroendocrine Labs Latest Ref Rng & Units 10/24/2018   EXT 5 HIAA BLOOD 0 - 22 ng/ml 11   EXT SEROTONIN 56 - 244 ng/ml 173   EXT CHROMOGRANIN A 0 - 15 ng/ml    EXT PANCREASTATIN COLE 10 - 135 pg/ml 89   EXT NEUROKININ A COLE 0 - 40 pg/ml 20   WBC 3.90 - 12.70 K/uL    EXT WBC 3.3 - 10.5 k/cumm 7.2   HGB 14.0 - 18.0 g/dL    EXT HGB 12.8 - 16.9 g/dl 15.7   HCT 40.0 - 54.0 %    EXT HCT 38.8 - 50.2 % 46.5   PLATLETS 150 - 350 K/uL    EXT PLATLETS 150 - 450 k/cumm 169   PT 9.0 - 12.5 sec    PTT 21.0 - 32.0 sec    INR 0.8 - 1.2    GLUCOSE 70 - 110 mg/dL    EXT  GLUCOSE 65 - 99 mg/dl 136 (A)   BUN 6 - 20 mg/dL    EXT BUN 8 - 26 mg/dl 13   CREATININE 0.5 - 1.4 mg/dL    EXT CREATININE 0.80 - 1.50 mg/dl 0.93    - 145 mmol/L    EXT  - 145 mmol/l 138   K 3.5 - 5.1 mmol/L    EXT K 3.5 - 5.5 mmol/l 4.3   CHLORIDE 95 - 110 mmol/L    EXT CHLORIDE 98 - 110 mmol/l 99   CO2 23 - 29 mmol/L    EXT CO2 20 - 29 mmol/l 28   CALCIUM 8.7 - 10.5 mg/dL    EXT CALCIUM 8.4 - 10.5 mg/dl 9.6   PROTEIN, TOTAL 6.0 - 8.4 g/dL    EXT PROTEIN, TOTAL 6.0 - 8.3 g/dl 7.4   PHOSPHORUS 2.7 - 4.5 mg/dL    ALBUMIN 3.5 - 5.2 g/dL    EXT ALBUMIN 3.5 - 5.0 gm/dl 4.1   TOTAL BILIRUBIN 0.1 - 1.0 mg/dL    EXT TOTAL BILIRUBIN 0.1 - 1.2 mg/dl 1.1   ALK PHOSPHATASE 55 - 135 U/L    EXT ALK PHOSPHATASE 38 - 126 u/l 90   SGOT (AST) 10 - 40 U/L    EXT SGOT (AST) 17 - 59 u/l 30   SGPT (ALT) 10 - 44 U/L    EXT ALT 11 - 58 u/l 16   MG 1.6 - 2.6 mg/dL    Weight             Radiology Data:  FINDINGS:  Minimal left bibasilar atelectasis is noted.  No pleural effusion is seen.  An enlarged paraesophageal lymph node is noted, measuring 1.4 cm in short axis diameter.    Postsurgical changes are identified consistent with partial right hepatectomy.  Surgical clips are noted adjacent to the posterolateral aspect of the right hepatic lobe and ventral aspect of the left hepatic lobe.  Scattered foci of restricted diffusion are noted throughout both hepatic lobes.  Approximately 3 areas of subcentimeter restricted diffusion are noted in the left hepatic lobe.  Approximately 4 subcentimeter foci of restricted diffusion are noted in the right hepatic lobe.  Two larger foci of restricted diffusion are noted in the right hepatic lobe that correspond to hypoenhancing masses, measuring 1.9 cm in segment V and 2.4 cm in segment VIII.  These lesions are not well appreciated on prior examination.    Several enlarged erendira hepatis and celiac axis lymph nodes are noted.  For reference, a 1.7 cm erendira hepatis lymph node is noted.  A 1.7  cm aortocaval lymph node is noted.  An enlarged right retroperitoneal lymph node is noted, measuring 1 cm in short axis diameter posterior to the IVC.  Prominent mesenteric lymph nodes are also noted.    The stomach, spleen, pancreas, and bilateral adrenal glands appear unremarkable.    The kidneys are normal in size and location.  They enhance with contrast appropriately.  The abdominal aorta is normal in caliber and course.  Normal osseous marrow signal is noted.      Impression       Two focal hypoenhancing lesions in the right hepatic lobe, as detailed above and not seen on prior imaging as well as additional subcentimeter scattered foci of restricted diffusion.  Findings are concerning for metastatic disease in this patient with history of carcinoid tumor.  Consider further evaluation is clinically warranted.    Multifocal lymphadenopathy with lymph node enlargement in the erendira hepatis, celiac axis, retroperitoneum, and paraesophageal region.  Prominent mesenteric lymph nodes are also seen.                   Impression       There are PET-CT findings of somatostatin receptor positive disease involving hepatic lesions, thoracic and abdominal lymphadenopathy.    In addition there are 2 foci of uptake seen in association with the myocardium, 1 in the region of the interventricular septum in 1 within the region of the right atrial wall.  It should be noted that no abnormal lesions were seen in these regions on prior MRI or CT scan however possible early lesion cannot be excluded.  Continued surveillance of these regions is recommended.     FINDINGS:  Atelectatic changes are noted at the lung bases.    There are postoperative changes seen at the hepatic dome, related to prior partial hepatectomy.  Arterial enhancing lesion seen in hepatic segment VIII measuring 2.6 cm (series 2, image 18), previously 2.6 cm.  Additional subcentimeter arterial enhancing lesion seen additional 1.3 cm arterial enhancing lesion seen at  the inferior aspect of the right hepatic lobe, previously 1.2 cm (series 2, image 53).  Hepatic and portal veins are patent.    Gallbladder is absent.  No biliary dilatation.  Pancreas, spleen, adrenal glands, and kidneys are unremarkable.  No hydronephrosis.    There is periportal lymphadenopathy.  Periportal node measures 1.8 cm in short axis, previously 1.6 cm.  Probable smaller necrotic lymph nodes seen adjacent to the IVC (series 3, image 41).  Numerous subcentimeter mesenteric lymph nodes noted.    No ascites.  Abdominal aorta is normal caliber.    GI tract demonstrates no evidence for obstruction or inflammation.  Appendix is absent.  Umbilical hernia containing small bowel loop demonstrates no evidence for obstruction or inflammation.    Regional osseous structures demonstrate no aggressive appearing lytic or blastic lesions.      Impression       Hepatic metastatic disease and retroperitoneal lymphadenopathy, similar to prior study.    RECIST SUMMARY:    Date of prior examination for comparison: 09/11/2018    Lesion 1: Hepatic segment VIII.  2.6 6 cm. Series 2 image 18.  Prior measurement 2.6 cm.    Lesion 2: Hepatic segment V.  1.3 cm. Series 2 image 53.  Prior measurement 1.2 cm.    Lesion 3: Periportal lymph node.  1.8 cm. Series 3 image 40.  Prior measurement 1.6 cm.           Left Ventricle Normal ejection fraction at 55%. Normal left ventricular diastolic function.   Right Ventricle Normal cavity size, wall thickness and ejection fraction. Wall motion normal.   Left Atrium Patent foramen ovale not present.   Aortic Valve The valve is trileaflet. Trace regurgitation. Mobility is normal   Mitral Valve Normal valve structure. Mild mitral sclerosis. Trace regurgitation. Normal leaflet mobility.   Tricuspid Valve The tricuspid valve is normal. No stenosis. Trace regurgitation. Mobility is normalThe estimated PA systolic pressure is 19.65 mmHg   Pulmonic Valve Normal valve structure. No stenosis. No  regurgitation.   IVC/SVC Normal central venous pressure (3 mm Hg).           Impression: gallium scan show myocardial areas x 2-3 and liver shows about 5 lesions         Plan:  Restage in 6 months   present at tumor board re:----- reread of echo-- are lesions real or gallium artifact?       Labs: Markers: 3 month intervals             Scans: 6 month intervals        Echocardiogram: 6 month intervals    Return to Clinic:6 month intervals    Orders placed this visit:   Orders Placed This Encounter   Procedures    CT Abdomen Pelvis With Contrast    MRI Abdomen W WO Contrast    NM PET Ga68 Dotatate Whole Body    5-HIAA Plasma (Neuoendocrine)    Serotonin serum    Pancreastatin    CBC auto differential    Comprehensive metabolic panel    Transthoracic echo (TTE) complete (Cupid Only)        45 est Minutes Face-to-Face; Counseling/Coordination of Care > 50 percent of Visit     Brandon Hadley MD, FACS  The [employer] Professor of Surgery, [facility]  [address], [address]  [place]  [address]  Cell [phone]  ph. [phone]; [phone]  fax. [phone]  [email or web address]

## 2018-11-30 ENCOUNTER — TELEPHONE (OUTPATIENT)
Dept: NEUROLOGY | Facility: HOSPITAL | Age: 48
End: 2018-11-30

## 2018-11-30 NOTE — TELEPHONE ENCOUNTER
Spoke to patient, advised of Cardio MRI that was recommended by both Dr. Hadley and Dr. Barger (cardiologist). Orders emailed to patient. Patient advised to send report & images to our office once completed and we will then discuss his case at Tumor Board. Patient verbalized his understanding and has no further questions at this time.

## 2018-11-30 NOTE — TELEPHONE ENCOUNTER
----- Message from Brandon Hadley MD sent at 11/30/2018 12:50 PM CST -----  Regarding: RE: Echo Re-Read / Comparison  Get cardiac MRI  ----- Message -----  From: Ayesha Nava MA  Sent: 11/30/2018   9:10 AM  To: Brandon Hadley MD  Subject: FW: Echo Re-Read / Comparison                    Please see message below from Dr. Barger regarding the the re-read on the Echo that you wanted.    ThanksAyesha-  ----- Message -----  From: Robert Barger MD  Sent: 11/29/2018   2:53 PM  To: Ayesha Nava MA  Subject: RE: Echo Re-Read / Comparison                    No mass or lesion seen on Echo. Echo was not meant for that type of study. PET is more accurate and if confirmatory test is required maybe a cardiac MRI.       ----- Message -----  From: Ayesha Nava MA  Sent: 11/29/2018   2:24 PM  To: Brandon Hadley MD, Robert Barger MD  Subject: Echo Re-Read / Comparison                        Hi Dr Barger,    Dr Hadley seen this patient in clinic on Tues 11/27/18. After reviewing the patients Echocardiogram (you read) and the Gallium 68 PET/CT, Dr. Hadley is requesting that you do a comparison with the Echo and the PET/CT to see if the two corollate.     Please advise us of your findings.    ThanksAyesha-

## 2018-12-03 ENCOUNTER — TELEPHONE (OUTPATIENT)
Dept: NEUROLOGY | Facility: HOSPITAL | Age: 48
End: 2018-12-03

## 2018-12-03 ENCOUNTER — PATIENT MESSAGE (OUTPATIENT)
Dept: NEUROLOGY | Facility: HOSPITAL | Age: 48
End: 2018-12-03

## 2018-12-03 NOTE — TELEPHONE ENCOUNTER
Spoke to Vidya at [facility]. Faxed her a copy of the orders and scheduled the patient's Cardiac for 12/12/18 @ 9:30am. Patient needs to be NPO for 4 hours prior to MRI, no caffeine for 12 hours prior to MRI, and ok to take meds with sips of water.    LM for patient to call the office in regards to MRI.

## 2018-12-03 NOTE — TELEPHONE ENCOUNTER
----- Message from Natasha Kern sent at 12/3/2018  8:40 AM CST -----  Contact: Patient  EAW- Patient states the doctors office will need to call [facility] to schedule a cardiac -[phone]. They will not let the patient schedule. Patient can be reached at [phone].

## 2018-12-04 ENCOUNTER — TELEPHONE (OUTPATIENT)
Dept: NEUROLOGY | Facility: HOSPITAL | Age: 48
End: 2018-12-04

## 2018-12-04 NOTE — TELEPHONE ENCOUNTER
----- Message from Samreen Sena sent at 12/4/2018  2:07 PM CST -----  Contact: Vidya Dasilva is calling from [facility] for Ayesha in reference to Mr. Thompson. She can be contacted at [phone]

## 2018-12-04 NOTE — TELEPHONE ENCOUNTER
Returned Vidya's call. Advised her as to why the patient needs to have the Cardiac MRI. Vidya verbalized her understanding and has no further questions at this time.

## 2018-12-17 ENCOUNTER — PATIENT MESSAGE (OUTPATIENT)
Dept: NEUROLOGY | Facility: HOSPITAL | Age: 48
End: 2018-12-17

## 2019-01-14 ENCOUNTER — PATIENT MESSAGE (OUTPATIENT)
Dept: NEUROLOGY | Facility: HOSPITAL | Age: 49
End: 2019-01-14

## 2019-02-14 ENCOUNTER — PATIENT MESSAGE (OUTPATIENT)
Dept: NEUROLOGY | Facility: HOSPITAL | Age: 49
End: 2019-02-14

## 2019-06-13 ENCOUNTER — TELEPHONE (OUTPATIENT)
Dept: NEUROLOGY | Facility: HOSPITAL | Age: 49
End: 2019-06-13

## 2019-06-13 NOTE — TELEPHONE ENCOUNTER
----- Message from Bel White sent at 6/13/2019  4:02 PM CDT -----  Contact: [phone]/self  EAW  Patient is requesting to reschedule to his appt on 6/25/19 to 7/9/19. Please advise.

## 2019-06-14 ENCOUNTER — TELEPHONE (OUTPATIENT)
Dept: NEUROLOGY | Facility: HOSPITAL | Age: 49
End: 2019-06-14

## 2019-06-14 NOTE — TELEPHONE ENCOUNTER
----- Message from Tayla SAMIGil Casey sent at 6/14/2019  3:51 PM CDT -----  Contact: [phone] self  CORY  Pt states that he is returning your call. Please advise

## 2019-07-08 ENCOUNTER — HOSPITAL ENCOUNTER (OUTPATIENT)
Dept: RADIOLOGY | Facility: HOSPITAL | Age: 49
Discharge: HOME | End: 2019-07-08
Attending: SURGERY
Payer: COMMERCIAL

## 2019-07-08 ENCOUNTER — HOSPITAL ENCOUNTER (OUTPATIENT)
Dept: CARDIOLOGY | Facility: HOSPITAL | Age: 49
Discharge: HOME | End: 2019-07-08
Attending: SURGERY
Payer: COMMERCIAL

## 2019-07-08 ENCOUNTER — TELEPHONE (OUTPATIENT)
Dept: NEUROLOGY | Facility: HOSPITAL | Age: 49
End: 2019-07-08

## 2019-07-08 DIAGNOSIS — D49.89 NEOPLASM OF ABDOMEN: ICD-10-CM

## 2019-07-08 DIAGNOSIS — C78.7 SECONDARY MALIGNANT NEOPLASM OF LIVER: ICD-10-CM

## 2019-07-08 DIAGNOSIS — C7A.012 MALIGNANT CARCINOID TUMOR OF ILEUM: ICD-10-CM

## 2019-07-08 DIAGNOSIS — C7B.8 SECONDARY NEUROENDOCRINE TUMOR OF DISTANT LYMPH NODES: ICD-10-CM

## 2019-07-08 LAB
AORTIC ROOT ANNULUS: 3.78 CM
AV INDEX (PROSTH): 0.88
AV MEAN GRADIENT: 2 MMHG
AV PEAK GRADIENT: 5 MMHG
AV VALVE AREA: 4.47 CM2
AV VELOCITY RATIO: 0.77
CV ECHO LV RWT: 0.49 CM
DOP CALC AO PEAK VEL: 1.08 M/S
DOP CALC AO VTI: 21.46 CM
DOP CALC LVOT AREA: 5.1 CM2
DOP CALC LVOT DIAMETER: 2.55 CM
DOP CALC LVOT PEAK VEL: 0.83 M/S
DOP CALC LVOT STROKE VOLUME: 95.86 CM3
DOP CALCLVOT PEAK VEL VTI: 18.78 CM
E WAVE DECELERATION TIME: 187.54 MSEC
E/A RATIO: 1.29
ECHO LV POSTERIOR WALL: 1.12 CM (ref 0.6–1.1)
FRACTIONAL SHORTENING: 32 % (ref 28–44)
INTERVENTRICULAR SEPTUM: 1.24 CM (ref 0.6–1.1)
LA MAJOR: 4.46 CM
LA MINOR: 4.35 CM
LA WIDTH: 3.92 CM
LEFT ATRIUM SIZE: 3.18 CM
LEFT ATRIUM VOLUME: 46.67 CM3
LEFT INTERNAL DIMENSION IN SYSTOLE: 3.12 CM (ref 2.1–4)
LEFT VENTRICLE DIASTOLIC VOLUME: 96.87 ML
LEFT VENTRICLE SYSTOLIC VOLUME: 38.4 ML
LEFT VENTRICULAR INTERNAL DIMENSION IN DIASTOLE: 4.59 CM (ref 3.5–6)
LEFT VENTRICULAR MASS: 199.45 G
LV LATERAL E/E' RATIO: 4.46 M/S
MV PEAK A VEL: 0.45 M/S
MV PEAK E VEL: 0.58 M/S
PISA TR MAX VEL: 1.96 M/S
PULM VEIN S/D RATIO: 1.21
PV PEAK D VEL: 0.53 M/S
PV PEAK S VEL: 0.64 M/S
RA MAJOR: 3.74 CM
RA PRESSURE: 3 MMHG
RIGHT VENTRICULAR END-DIASTOLIC DIMENSION: 3.17 CM
TDI LATERAL: 0.13 M/S
TR MAX PG: 15 MMHG
TV REST PULMONARY ARTERY PRESSURE: 18 MMHG

## 2019-07-08 PROCEDURE — 93306 TTE W/DOPPLER COMPLETE: CPT

## 2019-07-08 PROCEDURE — 93306 TRANSTHORACIC ECHO (TTE) COMPLETE (CUPID ONLY): ICD-10-PCS | Mod: 26,,, | Performed by: INTERNAL MEDICINE

## 2019-07-08 PROCEDURE — 74183 MRI ABDOMEN W WO CONTRAST: ICD-10-PCS | Mod: 26,,, | Performed by: RADIOLOGY

## 2019-07-08 PROCEDURE — 74183 MRI ABD W/O CNTR FLWD CNTR: CPT | Mod: 26,,, | Performed by: RADIOLOGY

## 2019-07-08 PROCEDURE — 74183 MRI ABD W/O CNTR FLWD CNTR: CPT | Mod: TC

## 2019-07-08 PROCEDURE — A9585 GADOBUTROL INJECTION: HCPCS | Performed by: SURGERY

## 2019-07-08 PROCEDURE — 93306 TTE W/DOPPLER COMPLETE: CPT | Mod: 26,,, | Performed by: INTERNAL MEDICINE

## 2019-07-08 PROCEDURE — 25500020 PHARM REV CODE 255: Performed by: SURGERY

## 2019-07-08 RX ORDER — GADOBUTROL 604.72 MG/ML
10 INJECTION INTRAVENOUS
Status: COMPLETED | OUTPATIENT
Start: 2019-07-08 | End: 2019-07-08

## 2019-07-08 RX ADMIN — GADOBUTROL 10 ML: 604.72 INJECTION INTRAVENOUS at 04:07

## 2019-07-08 NOTE — TELEPHONE ENCOUNTER
Spoke with patient and he will be scanned tomorrow and then come see the physician after the scan since the PET machine went down today. Patient verbalized understanding.

## 2019-07-09 ENCOUNTER — CLINICAL SUPPORT (OUTPATIENT)
Dept: NEUROLOGY | Facility: HOSPITAL | Age: 49
End: 2019-07-09
Payer: COMMERCIAL

## 2019-07-09 ENCOUNTER — HOSPITAL ENCOUNTER (OUTPATIENT)
Dept: RADIOLOGY | Facility: HOSPITAL | Age: 49
Discharge: HOME | End: 2019-07-09
Attending: SURGERY
Payer: COMMERCIAL

## 2019-07-09 ENCOUNTER — OFFICE VISIT (OUTPATIENT)
Dept: NEUROLOGY | Facility: HOSPITAL | Age: 49
End: 2019-07-09
Attending: SURGERY
Payer: COMMERCIAL

## 2019-07-09 VITALS
DIASTOLIC BLOOD PRESSURE: 98 MMHG | WEIGHT: 222 LBS | HEIGHT: 77 IN | SYSTOLIC BLOOD PRESSURE: 148 MMHG | BODY MASS INDEX: 26.21 KG/M2 | TEMPERATURE: 99 F | HEART RATE: 81 BPM

## 2019-07-09 DIAGNOSIS — C7B.02 METASTATIC MALIGNANT CARCINOID TUMOR TO LIVER: Primary | ICD-10-CM

## 2019-07-09 DIAGNOSIS — C7B.00 METASTATIC CARCINOID TUMOR: ICD-10-CM

## 2019-07-09 DIAGNOSIS — C7A.012 MALIGNANT CARCINOID TUMOR OF ILEUM: ICD-10-CM

## 2019-07-09 DIAGNOSIS — E34.0 DIARRHEA CONCURRENT WITH AND DUE TO CARCINOID SYNDROME: ICD-10-CM

## 2019-07-09 DIAGNOSIS — K52.9 DIARRHEA CONCURRENT WITH AND DUE TO CARCINOID SYNDROME: ICD-10-CM

## 2019-07-09 PROBLEM — L02.91 ABSCESS: Status: RESOLVED | Noted: 2017-04-18 | Resolved: 2019-07-09

## 2019-07-09 PROBLEM — R12 HEARTBURN: Status: ACTIVE | Noted: 2019-07-09

## 2019-07-09 PROBLEM — K63.89 MESENTERIC MASS: Status: RESOLVED | Noted: 2017-02-08 | Resolved: 2019-07-09

## 2019-07-09 PROCEDURE — 78815 NM PET 68GA DOTATATE WHOLE BODY: ICD-10-PCS | Mod: 26,PS,, | Performed by: RADIOLOGY

## 2019-07-09 PROCEDURE — 78815 PET IMAGE W/CT SKULL-THIGH: CPT | Mod: 26,PS,, | Performed by: RADIOLOGY

## 2019-07-09 PROCEDURE — 99211 OFF/OP EST MAY X REQ PHY/QHP: CPT | Mod: 25,27

## 2019-07-09 PROCEDURE — 78815 PET IMAGE W/CT SKULL-THIGH: CPT | Mod: TC

## 2019-07-09 PROCEDURE — A9587 GALLIUM GA-68: HCPCS | Mod: TB

## 2019-07-09 RX ORDER — OXYCODONE HYDROCHLORIDE 10 MG/1
TABLET ORAL
Refills: 0 | COMMUNITY
Start: 2019-05-17 | End: 2020-04-27

## 2019-07-09 RX ORDER — MONTELUKAST SODIUM 4 MG/1
TABLET, CHEWABLE ORAL
COMMUNITY
End: 2020-06-29

## 2019-07-09 RX ORDER — OXYCODONE AND ACETAMINOPHEN 10; 325 MG/1; MG/1
1 TABLET ORAL EVERY 6 HOURS PRN
COMMUNITY
End: 2021-02-02

## 2019-07-09 NOTE — PROGRESS NOTES
MRN: [id number]    Referring Physician:  Tayla Her MD    Reason for Visit: Nutrition Consult for Diarrhea     Pertinent Social History: Patient is independent with activities of daily living. He works but his GI symptoms are causing additional stress to his work day. He lives with is wife. He reports good po intake at this time. He denies recent weight loss.     Previous Medical History:  Past Medical History:   Diagnosis Date    Mesenteric mass 2/8/2017    Metastatic carcinoid tumor 4/18/2017    Mr. Thompson is well known to me having been diagnosed with metastatic ileal carcinoid in 2016. He was resected in 3/2017. He has had slow progression of disease in the mediastinum, liver, and retroperitoneal román basins.    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       Previous Surgical History:  Past Surgical History:   Procedure Laterality Date    ANTERIOR CRUCIATE LIGAMENT REPAIR Right 1986    APPENDECTOMY  3/31/2017    [facility]    BIOPSY LIVER AND ULTRASOUND  3/31/2017    [facility]    CHOLECYSTECTOMY  3/31/2017    [facility]    DISSECTION-LYMPH NODE N/A 3/31/2017    [facility]    EXPLORATORY-LAPAROTOMY N/A 3/31/2017    [facility]    KNEE ARTHROSCOPY Right 1986    x2    LIVER BIOPSY  12/2016, 1/2017    LOBECTOMY-LIVER N/A 3/31/2017    [facility]    MAPPING-SENTINEL NODE  3/31/2017    [facility]    RESECTION - SMALL BOWEL  3/31/2017    [facility]    TONSILLECTOMY         Medication:    colestipol (COLESTID) 1 gram Tab, Take by mouth., Disp: , Rfl:     HYDROcodone-acetaminophen (NORCO) 5-325 mg per tablet, Take 1  "tablet by mouth every 6 (six) hours as needed for Pain., Disp: , Rfl:     lanreotide (SOMATULINE DEPOT) 120 mg/0.5 mL Syrg, Inject 120 mg into the skin every 28 days., Disp: , Rfl:     oxyCODONE (ROXICODONE) 10 mg Tab immediate release tablet, TK 2 TS PO Q 6 H PRF PAIN, Disp: , Rfl: 0    oxyCODONE-acetaminophen (PERCOCET)  mg per tablet, Take 1 tablet by mouth every 6 (six) hours as needed for Pain., Disp: , Rfl:     senna-docusate 8.6-50 mg (PERICOLACE) 8.6-50 mg per tablet, Take 1 tablet by mouth daily as needed for Constipation., Disp: , Rfl:     spironolactone (ALDACTONE) 50 MG tablet, Take 1 tablet (50 mg total) by mouth once daily., Disp: 30 tablet, Rfl: 1      Vitamin/Supplements/Herbs:     lipase-protease-amylase (ZENPEP) 25,000-79,000- 105,000 unit CpDR, TAKE 3 CAPSULES BY MOUTH WITH MEALS AND 2 CAPSULES BY MOUTH WITH MEALS, Disp: 200 capsule, Rfl: 0       Allergies:  Review of patient's allergies indicates:   Allergen Reactions    Epinephrine Other (See Comments)     Carcinoid patient    Iodinated contrast- oral and iv dye Hives       Labs:        : 1970  Age: 49 y.o.    Anthropometrics  Weight: 100.7 kg (222 lb 0.1 oz)  Height: 6' 5" (1.956 m)    Estimated body mass index is 26.33 kg/m²     BMI Weight Status   Below 18.5 Underweight   18.6-24.9 Normal/Healthy   25.0-29.9 Overweight   30.0-34.9 Obesity Class I   35.0-39.9 Obesity Class II   >40 Extreme Obesity   Class III     Ideal Weight Range for Your Height: 6'5" = 205 lbs    Weight History:  Weight has been stable     Wt Readings from Last 12 Encounters:   19 100.7 kg (222 lb 0.1 oz)   18 102.2 kg (225 lb 5 oz)   17 98 kg (216 lb)   17 98 kg (216 lb)   17 95.7 kg (211 lb)   17 103.1 kg (227 lb 4.7 oz)   17 103.4 kg (228 lb)   17 102.5 kg (226 lb)       Estimated Nutrition Needs:   Energy Needs: 2378 ( MSJ x 1.2 PAL)  Protein Needs: 100 (1.0 gm Protein/kg)  Fluid Needs:  (1 " mL/calorie)    Malnutrition Assessment:  Well nourished. No malnutrition focused physical findings on review.     Gastrointestinal Habits: 3-4 times per day, soft, loose and oily Described as Type 5  on [place] Stool Form Scale. Denies flushing and watery stool.     Nutrition Symptoms: diarrhea    Nutrition History    Meal Pattern: 2-3 meals per day  Beverage Intake: Water, Diet Coke, Daniela Green Tea, Juice     Food Intolerance: none    Meal Preparation: infrequently  Dining Out: frequently; 1-2 x per day;     Dentition: normal dentition for age                Difficulty chewing or swallowing? none  Exercise: Physical ability to complete tasks for meal preparation, Physical ability to self-feed, Cognitive ability to complete tasks for meal preparation, Remembers to eat, recalls eating and Type of physical activity-not active due to fatigue.       Motivation to Change: Expect good compliance to diet recommendations. Patient verbalizing need to improve GI symptoms.       Nutrition Diagnosis  Nutrition Problem  Altered GI function      Related to (etiology):   Malignant carcinoid tumor of ileum     Signs and Symptoms (as evidenced by):   +multiple stools through out the day described as Type 5 on [place] stool scale. +oily, Odorous, loose.     Interventions:  General/healthful diet-eat well balanced meals low in fat.    Fiber-modified diet-Increase soluble fiber in presence of diarrhea   Vitamin-modified diet-Add Vit B12 subq or lingual per MD        Nutrition Diagnosis Status:   New        Recommendations:  1. Written information provided and discussed on soluble and insoluble fiber in presence of diarrhea.   2. Discussed need for Zenpep. Patient may titrate dose. Start with x2 per meal, take at first bite and x1 per snack, adjust as needed per symptom management.   3. Discussed use of Imodium during daytime hours to decrease GI disturbances.   4. Encouraged intake of balanced meals and reduce fat intake in presence  of diarrhea.   5. Discussed probiotic use. Patient may start with 50M, 10 strains; with prebiotic as fuel source.   6. RD contact information provided for questions. RD added to Care Team.       Goals:   1. Adequate nutrition to improve GI symptoms.       Routed to Tayla Her MD      Consultation Time: 15 minutes.      Follow Up: 3 months.

## 2019-07-09 NOTE — PATIENT INSTRUCTIONS
Blood work / Lab work / Tumor markers every 3 mos.  Get lab work drawn at least 1 month prior to appointment. --- due September 2019 and December 2019    Scans:  MRI liver in 6 mos.  ---  Due in December 2019 / January 2020  Call Scheduling Department at [phone] to schedule scans prior to next appointment:      Return clinic appointment in 6 months with Dr. Her - call when ready to schedule    Echo every year - due July 2020    Consult: with nutrition today in regards to malabsorption    Medications: will start a trial of Zenpep ----  from [facility]      Stool Studies:  for malabsorption (can be done closer to home)    Initiate:  sublingual zinc, thiamine supplementation (B complex should do), and B12 sublingual or IM

## 2019-07-09 NOTE — ASSESSMENT & PLAN NOTE
Mr. Thompson is well known to me having been diagnosed with metastatic ileal carcinoid in 2016. He was resected in 3/2017. He has had slow progression of disease in the mediastinum, liver, and retroperitoneal román basins. His prior visit was 6 months ago. He is here with followup MRI and DOTATATE imaging.

## 2019-07-09 NOTE — PROGRESS NOTES
[facility]:  [facility]  A collaboration between [facility] and [facility]      PATIENT: Javy Thompson  MRN: [id number]  DATE: 7/9/2019    Subjective:      Chief Complaint: progressively worse diarrhea associated with fatigue and crampy abdominal pain    Problem List Items Addressed This Visit        Oncology    Metastatic carcinoid tumor - Primary    Overview     Mr. Thompson is well known to me having been diagnosed with metastatic ileal carcinoid in 2016. He was resected in 3/2017. He has had slow progression of disease in the mediastinum, liver, and retroperitoneal román basins.              Other    Diarrhea concurrent with and due to carcinoid syndrome          Vitals: documented     Karnofsky Score:  80  Diagnosis:   1. Metastatic carcinoid tumor    2. 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       Past Surgical History:  Past Surgical History:   Procedure Laterality Date    ANTERIOR CRUCIATE LIGAMENT REPAIR Right 1986    APPENDECTOMY  3/31/2017    [facility]    BIOPSY LIVER AND ULTRASOUND  3/31/2017    [facility]    CHOLECYSTECTOMY  3/31/2017    [facility]    DISSECTION-LYMPH NODE N/A 3/31/2017    [facility]    EXPLORATORY-LAPAROTOMY N/A 3/31/2017    [facility]    KNEE ARTHROSCOPY Right 1986    x2    LIVER BIOPSY  12/2016, 1/2017    LOBECTOMY-LIVER N/A 3/31/2017     [facility]    MAPPING-SENTINEL NODE  3/31/2017    [facility]    RESECTION - SMALL BOWEL  3/31/2017    [facility]    TONSILLECTOMY         Family History:  Family History   Problem Relation Age of Onset    Cancer Mother        Allergies:  Review of patient's allergies indicates:   Allergen Reactions    Epinephrine Other (See Comments)     Carcinoid patient    Iodinated contrast- oral and iv dye Hives       Medications:  Current Outpatient Medications   Medication Sig Dispense Refill    HYDROcodone-acetaminophen (NORCO) 5-325 mg per tablet Take 1 tablet by mouth every 6 (six) hours as needed for Pain.      lanreotide (SOMATULINE DEPOT) 120 mg/0.5 mL Syrg Inject 120 mg into the skin every 28 days.      senna-docusate 8.6-50 mg (PERICOLACE) 8.6-50 mg per tablet Take 1 tablet by mouth daily as needed for Constipation.      spironolactone (ALDACTONE) 50 MG tablet Take 1 tablet (50 mg total) by mouth once daily. 30 tablet 1     No current facility-administered medications for this visit.        Review of Systems   Constitutional: Positive for activity change and fatigue.   HENT: Negative.    Eyes: Negative.    Respiratory: Negative.    Cardiovascular: Negative.    Gastrointestinal: Positive for abdominal pain and diarrhea.   Endocrine: Negative.    Genitourinary: Negative.    Musculoskeletal: Negative.    Allergic/Immunologic: Negative.    Neurological: Negative.    Hematological: Negative.    Psychiatric/Behavioral: Negative.       Objective:          Number of Days per Week Number per Day   DIARRHEA 7 3-6   [place] STOOL SCALE RATING     FLUSHING 0    WHEEZING 0      WEIGHT GAIN/LOSS 0   ENERGY RATING (0-10) 8             Physical Exam      Thin stature at baseline  Weight stable  Complexion normal, no jaundice  Abdomen with a moderate sided incisional hernia around the umbilicus, reducible  No  edema        Assessment:       1. Metastatic carcinoid tumor    2. Diarrhea most consistent with malabsorption       Laboratory Data:    Neuroendocrine Labs Latest Ref Rng & Units 11/27/2018 10/24/2018 4/18/2017 4/17/2017 4/17/2017 4/12/2017 4/11/2017   EXT 5 HIAA BLOOD 0 - 22 ng/ml - 11 - - - - -   EXT SEROTONIN 56 - 244 ng/ml - 173 - - - - -   EXT CHROMOGRANIN A 0 - 15 ng/ml - - - - - - -   EXT PANCREASTATIN COLE 10 - 135 pg/ml - 89 - - - - -   EXT NEUROKININ A COLE 0 - 40 pg/ml - 20 - - - - -   WBC 3.90 - 12.70 K/uL - - - 13.16(H) - 8.92 16.30(H)   EXT WBC 3.3 - 10.5 k/cumm - 7.2 - - - - -   HGB 14.0 - 18.0 g/dL - - - 9.3(L) - 7.4(L) 6.8(L)   EXT HGB 12.8 - 16.9 g/dl - 15.7 - - - - -   HCT 40.0 - 54.0 % - - - 30.0(L) - 23.8(L) 21.7(L)   EXT HCT 38.8 - 50.2 % - 46.5 - - - - -   PLATLETS 150 - 350 K/uL - - - 696(H) - 441(H) 420(H)   EXT PLATLETS 150 - 450 k/cumm - 169 - - - - -   PT 9.0 - 12.5 sec - - - - - - -   PTT 21.0 - 32.0 sec - - - - - - -   INR 0.8 - 1.2 - - - - - - -   GLUCOSE 70 - 110 mg/dL - - - 105 - 120(H) 105   EXT GLUCOSE 65 - 99 mg/dl - 136(A) - - - - -   BUN 6 - 20 mg/dL - - - 11 - 13 14   EXT BUN 8 - 26 mg/dl - 13 - - - - -   CREATININE 0.5 - 1.4 mg/dL - - - 1.0 - 1.0 0.9   EXT CREATININE 0.80 - 1.50 mg/dl - 0.93 - - - - -    - 145 mmol/L - - - 133(L) - 131(L) 131(L)   EXT  - 145 mmol/l - 138 - - - - -   K 3.5 - 5.1 mmol/L - - - 4.1 - 4.3 4.5   EXT K 3.5 - 5.5 mmol/l - 4.3 - - - - -   CHLORIDE 95 - 110 mmol/L - - - 98 - 97 97   EXT CHLORIDE 98 - 110 mmol/l - 99 - - - - -   CO2 23 - 29 mmol/L - - - 25 - 30(H) 27   EXT CO2 20 - 29 mmol/l - 28 - - - - -   CALCIUM 8.7 - 10.5 mg/dL - - - 8.9 - 8.0(L) 8.1(L)   EXT CALCIUM 8.4 - 10.5 mg/dl - 9.6 - - - - -   PROTEIN, TOTAL 6.0 - 8.4 g/dL - - - 7.9 - 6.1 6.3   EXT PROTEIN, TOTAL 6.0 - 8.3 g/dl - 7.4 - - - - -   PHOSPHORUS 2.7 - 4.5 mg/dL - - - - - 2.9 3.5   ALBUMIN 3.5 - 5.2 g/dL - - - 3.0(L) - 2.4(L) 2.4(L)   EXT ALBUMIN 3.5 - 5.0 gm/dl - 4.1 -  - - - -   TOTAL BILIRUBIN 0.1 - 1.0 mg/dL - - - 0.8 - 1.0 1.2(H)   EXT TOTAL BILIRUBIN 0.1 - 1.2 mg/dl - 1.1 - - - - -   ALK PHOSPHATASE 55 - 135 U/L - - - 143(H) - 163(H) 147(H)   EXT ALK PHOSPHATASE 38 - 126 u/l - 90 - - - - -   SGOT (AST) 10 - 40 U/L - - - 37 - 40 42(H)   EXT SGOT (AST) 17 - 59 u/l - 30 - - - - -   SGPT (ALT) 10 - 44 U/L - - - 20 - 29 34   EXT ALT 11 - 58 u/l - 16 - - - - -   MG 1.6 - 2.6 mg/dL - - - - - 2.5 2.3   Weight - 225 lbs 5 oz - 216 lbs - 216 lbs - -         Impression: 50 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 Imaging associated with this visit shows:  1) stable disease within the liver with bilobar small volume disease  2) stable disease within the mediastinum and retroperitoneum  3) pending dotatate final read  4) malabsorption       His symptoms are most consistent with malabsorption. I am basing this on the fact that he has had stable disease on imaging and no other signs of carcinoid syndrome on a stable dose of lanreotide.     Plan:    Try creon 24K up to 3-4 times per day with meals  Recommend nutrition followup after 2 weeks of pancreatic enzyme replacement and micronutrient supplementation  Future labs should include B12, zinc, CRP  Stool studies for malabsorption (can be done closer to home)  Initiate sublingual zinc, thiamine supplementation (B complex should do), and B12 sublingual or IM              Tayla Her MD, MPH, FACS  Professor of Surgery, [facility]  Neuroendocrine Surgery, Hepatic/Pancreatic & General Surgery  [address]  [place]  [address]  ph. [phone]; [phone]  fax. [phone]

## 2019-08-19 ENCOUNTER — PATIENT MESSAGE (OUTPATIENT)
Dept: NEUROLOGY | Facility: HOSPITAL | Age: 49
End: 2019-08-19

## 2019-08-20 DIAGNOSIS — K86.89 PANCREATIC INSUFFICIENCY: Primary | ICD-10-CM

## 2019-08-20 NOTE — TELEPHONE ENCOUNTER
Patient send a portal message advising that the Zenpep is working and he needs a refill at this local [facility]. Refill was placed and routed to Dr. Her to sign.

## 2019-09-16 ENCOUNTER — PATIENT MESSAGE (OUTPATIENT)
Dept: NEUROLOGY | Facility: HOSPITAL | Age: 49
End: 2019-09-16

## 2019-10-02 ENCOUNTER — PATIENT MESSAGE (OUTPATIENT)
Dept: NEUROLOGY | Facility: HOSPITAL | Age: 49
End: 2019-10-02

## 2019-10-09 ENCOUNTER — PATIENT MESSAGE (OUTPATIENT)
Dept: NEUROLOGY | Facility: HOSPITAL | Age: 49
End: 2019-10-09

## 2019-12-16 ENCOUNTER — PATIENT MESSAGE (OUTPATIENT)
Dept: NEUROLOGY | Facility: HOSPITAL | Age: 49
End: 2019-12-16

## 2020-01-02 ENCOUNTER — PATIENT MESSAGE (OUTPATIENT)
Dept: NEUROLOGY | Facility: HOSPITAL | Age: 50
End: 2020-01-02

## 2020-01-15 ENCOUNTER — PATIENT MESSAGE (OUTPATIENT)
Dept: NEUROLOGY | Facility: HOSPITAL | Age: 50
End: 2020-01-15

## 2020-01-27 ENCOUNTER — PATIENT MESSAGE (OUTPATIENT)
Dept: NEUROLOGY | Facility: HOSPITAL | Age: 50
End: 2020-01-27

## 2020-02-24 ENCOUNTER — TELEPHONE (OUTPATIENT)
Dept: NEUROLOGY | Facility: HOSPITAL | Age: 50
End: 2020-02-24

## 2020-02-24 ENCOUNTER — PATIENT MESSAGE (OUTPATIENT)
Dept: NEUROLOGY | Facility: HOSPITAL | Age: 50
End: 2020-02-24

## 2020-02-24 NOTE — TELEPHONE ENCOUNTER
Notified pt that he was added to NET tumor board next Tuesday, March 3, 2020, and that we will get back to him with further recs once discussed in our multidisciplinary board. Pt verbalized understanding.

## 2020-03-03 ENCOUNTER — CONFERENCE (OUTPATIENT)
Dept: NEUROLOGY | Facility: HOSPITAL | Age: 50
End: 2020-03-03

## 2020-03-03 ENCOUNTER — PATIENT MESSAGE (OUTPATIENT)
Dept: NEUROLOGY | Facility: HOSPITAL | Age: 50
End: 2020-03-03

## 2020-03-03 NOTE — TELEPHONE ENCOUNTER
[facility]      NEUROENDOCRINE TUMOR MULTIDISCIPLINARY TUMOR BOARD  _____________________________________________________________________    PRESENTER:   SISSY Her MD    REASON FOR PRESENTATION:  Scan Review    ATTENDEES:   Surgery:              MD YARIEL Harris, MD NILA Johnson MD  Interventional Radiology - Dale Barbour MD  Nuclear Medicine - Yunior White MD  Pathology - Erasmo Doyle MD  Oncology - Gama More,   Gastroenterology - Not present   Palliative Care- STEPHANY Avitia MD  Research- Klaudia Lee, phD  Nutritional Support- Tracie Weil-Cavalier, RDN  Nursing    PATIENT STATUS:  Established Patient    PATIENT SUMMARY:  Past Medical History:   Diagnosis Date    Mesenteric mass 2/8/2017    Metastatic carcinoid tumor 4/18/2017    Mr. Thompson is well known to me having been diagnosed with metastatic ileal carcinoid in 2016. He was resected in 3/2017. He has had slow progression of disease in the mediastinum, liver, and retroperitoneal román basins.    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 Retrocaval román met and a nodule near left adrenal are enlarging. Multiple liver mets, not well seen on CT. Would need MRI, but the largest of these in the right lobe is amenable to ablation.      BOARD RECOMMENDATIONS:   Surgical Consult  Not a PRRT Candidate at this time.

## 2020-03-09 ENCOUNTER — PATIENT MESSAGE (OUTPATIENT)
Dept: NEUROLOGY | Facility: HOSPITAL | Age: 50
End: 2020-03-09

## 2020-03-24 ENCOUNTER — PATIENT MESSAGE (OUTPATIENT)
Dept: NEUROLOGY | Facility: HOSPITAL | Age: 50
End: 2020-03-24

## 2020-04-20 ENCOUNTER — PATIENT MESSAGE (OUTPATIENT)
Dept: NEUROLOGY | Facility: HOSPITAL | Age: 50
End: 2020-04-20

## 2020-04-24 NOTE — PROGRESS NOTES
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  Worsening carcinoid syndrome     The patient location is: home  The chief complaint leading to consultation is: scheduled follow-up, worsening carcinoid syndrome despite monthly injections, hyperglycemia  Visit type: Virtual visit with synchronous audio and video  Total time spent with patient: 60    Each patient to whom he or she provides medical services by telemedicine is:  (1) informed of the relationship between the physician and patient and the respective role of any other health care provider with respect to management of the patient; and (2) notified that he or she may decline to receive medical services by telemedicine and may withdraw from such care at any time.    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 He presented with measurable disease within the small bowel, small bowel mesentery, and liver    Interval History:   He had a CT scan of the abdomen pelvis in February 2020.  These images were compared to an MRI and gallium 68 PET done last year.  The interpretation of these images suggested progression of disease, primarily within a retroperitoneal lymph node.  His case was presented at tumor board and consideration of re-exploration and removal of that as well as other retroperitoneal lymph nodes and liver lesions was discussed.  I had a conversation with the patient over the phone after those tumor board recommendations and went over the advantages and disadvantages of a number of treatment options for him at this juncture.  The treatment options included:  1.  Re-exploration and surgical debulking, 2.  Peptide base receptor therapies, and 3.  Targeted  radiation to the retroperitoneal lymph node.    He had a CT scan of the chest also done in February 2020.  Disease that was considered new within the lung parenchyma and hilar lymph node basins had previously been considered related to granulomatous disease.  The gallium 68 PET did not  evidence of disease in the chest.  If there is any concern of progression in this site, a follow-up gallium 68 PET would be indicated.    Recent testing includes tumor markers (10/2018), Ct abd/pelvis (2/2020), Ct chest (2/2020), MRI abd (7/2019), Ga 68 PET/CT scan (7/2019), echocardiogram (7/2019).    Vitals: There were no vitals taken for this visit. --stable    ECOG Score: 1 - Symptomatic but completely ambulatory    Oncologic History:   Oncologic History Ileal net, dx 2016, liver and román mets  Carcinoid syndrome- diarrhea   Oncologic Treatment 3/2017- surgery (Eric)  lanreotide   Pathology 3/2017- small bowel- well diff net, multifocal (5), G2, Ki 67- 10.9%, 3/12 nodes pos; liver- well diff net     Past Medical History:  Past Medical History:   Diagnosis Date    Mesenteric mass 2/8/2017    Metastatic carcinoid tumor 4/18/2017    Mr. Thompson is well known to me having been diagnosed with metastatic ileal carcinoid in 2016. He was resected in 3/2017. He has had slow progression of disease in the mediastinum, liver, and retroperitoneal román basins.    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       Past Surgical History:  Past Surgical History:   Procedure Laterality Date    ANTERIOR CRUCIATE LIGAMENT REPAIR Right 1986    APPENDECTOMY  3/31/2017    Procedure: APPENDECTOMY;  Surgeon: Fidelia Reyes MD;  Location: [facility];  Service: General;;    KNEE ARTHROSCOPY Right 1986    x2    LIVER BIOPSY  12/2016, 1/2017    TONSILLECTOMY         Family History:  Family History   Problem Relation Age of Onset    Cancer Mother         Allergies:  Epinephrine and Iodinated contrast media    Medications:  Current Outpatient Medications   Medication Sig    colestipol (COLESTID) 1 gram Tab Take by mouth.    HYDROcodone-acetaminophen (NORCO) 5-325 mg per tablet Take 1 tablet by mouth every 6 (six) hours as needed for Pain.    lanreotide (SOMATULINE DEPOT) 120 mg/0.5 mL Syrg Inject 120 mg into the skin every 28 days.    lipase-protease-amylase (ZENPEP) 25,000-79,000- 105,000 unit CpDR Take 3 caps by mouth with meals and Take 2 caps by mouth with snacks    oxyCODONE (ROXICODONE) 10 mg Tab immediate release tablet TK 2 TS PO Q 6 H PRF PAIN    oxyCODONE-acetaminophen (PERCOCET)  mg per tablet Take 1 tablet by mouth every 6 (six) hours as needed for Pain.    senna-docusate 8.6-50 mg (PERICOLACE) 8.6-50 mg per tablet Take 1 tablet by mouth daily as needed for Constipation.    spironolactone (ALDACTONE) 50 MG tablet Take 1 tablet (50 mg total) by mouth once daily.     No current facility-administered medications for this visit.         Review of Systems   Constitutional: Negative.    HENT: Negative.    Eyes: Negative.    Respiratory: Negative.    Cardiovascular: Negative.    Gastrointestinal: Positive for abdominal distention, abdominal pain and diarrhea.   Endocrine:        Hyperglycemia   Genitourinary: Negative.    Musculoskeletal: Positive for back pain.   Skin: Negative.    Allergic/Immunologic: Negative.    Neurological: Negative.    Hematological: Negative.    Psychiatric/Behavioral: Negative.           Objective:      Physical Exam   Constitutional: He is oriented to person, place, and time. He appears well-developed and well-nourished. No distress.   HENT:   Head: Normocephalic.   Pulmonary/Chest: No stridor. No respiratory distress.   Neurological: He is alert and oriented to person, place, and time.   Psychiatric: He has a normal mood and affect. His behavior is normal. Judgment and thought content normal.        Laboratory  Data:    Neuroendocrine Labs Latest Ref Rng & Units 10/24/2018   EXT 5 HIAA BLOOD 0 - 22 ng/ml 11   EXT SEROTONIN 56 - 244 ng/ml 173   EXT CHROMOGRANIN A 0 - 15 ng/ml    EXT PANCREASTATIN COLE 10 - 135 pg/ml 89   EXT NEUROKININ A COLE 0 - 40 pg/ml 20   WBC 3.90 - 12.70 K/uL    EXT WBC 3.3 - 10.5 k/cumm 7.2   HGB 14.0 - 18.0 g/dL    EXT HGB 12.8 - 16.9 g/dl 15.7   HCT 40.0 - 54.0 %    EXT HCT 38.8 - 50.2 % 46.5   PLATLETS 150 - 350 K/uL    EXT PLATLETS 150 - 450 k/cumm 169   PT 9.0 - 12.5 sec    PTT 21.0 - 32.0 sec    INR 0.8 - 1.2    GLUCOSE 70 - 110 mg/dL    EXT GLUCOSE 65 - 99 mg/dl 136 (A)   BUN 6 - 20 mg/dL    EXT BUN 8 - 26 mg/dl 13   CREATININE 0.5 - 1.4 mg/dL    EXT CREATININE 0.80 - 1.50 mg/dl 0.93    - 145 mmol/L    EXT  - 145 mmol/l 138   K 3.5 - 5.1 mmol/L    EXT K 3.5 - 5.5 mmol/l 4.3   CHLORIDE 95 - 110 mmol/L    EXT CHLORIDE 98 - 110 mmol/l 99   CO2 23 - 29 mmol/L    EXT CO2 20 - 29 mmol/l 28   CALCIUM 8.7 - 10.5 mg/dL    EXT CALCIUM 8.4 - 10.5 mg/dl 9.6   PROTEIN, TOTAL 6.0 - 8.4 g/dL    EXT PROTEIN, TOTAL 6.0 - 8.3 g/dl 7.4   PHOSPHORUS 2.7 - 4.5 mg/dL    ALBUMIN 3.5 - 5.2 g/dL    EXT ALBUMIN 3.5 - 5.0 gm/dl 4.1   TOTAL BILIRUBIN 0.1 - 1.0 mg/dL    EXT TOTAL BILIRUBIN 0.1 - 1.2 mg/dl 1.1   ALK PHOSPHATASE 55 - 135 U/L    EXT ALK PHOSPHATASE 38 - 126 u/l 90   SGOT (AST) 10 - 40 U/L    EXT SGOT (AST) 17 - 59 u/l 30   SGPT (ALT) 10 - 44 U/L    EXT ALT 11 - 58 u/l 16     Scans:   CT abd/pelvis- 2/24/20          CT chest- 2/9/20          NM PET Ga68 Dotatate Whole Body-7/9/19  Narrative: EXAMINATION:  NM PET 68GA DOTATATE WHOLE BODY    CLINICAL HISTORY:  Malignant carcinoid tumor of the ileum    TECHNIQUE:  4.67 mCi of GA68 Dotatate was administered intravenously in the right antecubital fossa.  After an approximately 60 min distribution time, PET/CT images were acquired from the skull vertex to the mid thigh. Transmission images were acquired to correct for attenuation using a whole body  low-dose CT scan without contrast with the arms positioned above the head.    COMPARISON:  Gallium 68 dota-hua PET 11/26/2018.    FINDINGS:  Quality of the study: Adequate.    Multiple areas increased FDG avidity including innumerable hepatic lesions, retroperitoneal, mesenteric, and peripancreatic lymph nodes with index lesions as below:    Superior right lobe of the liver: SUV max 13.64, previously 22.83.    Paraesophageal lymph node has resolved.    Peripancreatic lymphadenopathy (largest lymph measures 1.7 cm in short axis, previously 2.0 cm; of note, this was incorrectly labeled as periaortic lymphadenopathy on examination dated 11/26/2018): SUV max 6.65, previously 37.    Stable right retrocaval lymph node measures approximately 1.6 cm (previously 1.5 cm) and SUV max 19.62, previously 20.63.    Redemonstration of increased tracer uptake associated with the myocardium, 1 in the region of the interventricular septum (SUV max 7.13, previously 8.8), and 1 in the region of the left ventricle lateral wall (SUV max 7.32, previously 6.43).  There has been interval resolution of FDG avidity within the region of the right atrial/ventricular wall.  New nonspecific FDG avid subcentimeter submandibular lymph node demonstrating SUV max of 3.10.  This may be reactive in nature.  Nonspecific increased uptake within the anterior aspect of the ethmoid sinus.    Physiologic uptake of the tracer is seen within the pituitary, salivary, and thyroid glands, stomach, liver, spleen, kidneys, adrenal glands, prostate, and bowel.    Incidental CT findings: Heterogeneous liver.  Multiple calcified lymph nodes within the right hilar region, likely sequela prior granulomatous disease.  Subsegmental opacity within the right middle lobe which may represent subsegmental atelectasis or scarring.  Small amount of left sided dependent pleural fluid.  Prior cholecystectomy.  Punctate calcification within the spleen, likely sequela prior  granulomatous disease.  Multiple rounded hyperdensities within the subcutaneous tissue of the buttocks presumably related to injection sites.  Impression: 1.  Multiple foci of uptake indicating persistent somatostatin receptor positive metastases with resolved paraesophageal node and smaller peripancreatic nodes that are no longer tracer avid and in keeping with partial response to treatment.    2.  Two out of 3 foci within the myocardium remaining that may represent cardiac metastases, though other processes may cause uptake.  Recommend correlation with cardiac history and consideration of dedicated cardiac MRI for further evaluation.      MRI Abdomen W WO Contrast-7/9/19  Narrative: EXAMINATION:  MRI ABDOMEN W WO CONTRAST    CLINICAL HISTORY:  Neoplasm: abdomen, metastatic, recurrence, suspected/known;  Neoplasm of unspecified behavior of other specified sites    TECHNIQUE:  Multisequence, multiplanar MRI of the abdomen performed per liver protocol before and after administration of 10 mL Gadavist intravenous contrast.    COMPARISON:  11/26/2018    FINDINGS:  Liver: There is diffuse loss of signal on opposed phase imaging consistent with steatosis.  There are multiple T2 hyperintense lesions which demonstrate restricted diffusion and arterial enhancement.  Segment VIII lesion measures 2.9 cm, previously 2.4 cm (series 1400, image 31).  Segment V lesion measures 1.5 cm, previously 1.6 cm (series 1400, image 77).  Overall number of lesions appear similar to prior study.  There is apparent interval increase in enhancement.  Postsurgical changes of prior partial right hepatectomy noted.    Biliary: Gallbladder is absent.  No biliary dilatation.    Pancreas: Unremarkable.  No pancreatic ductal dilatation.    Spleen: Normal size.  No focal lesions.    Adrenal glands: Unremarkable.    Kidneys: No renal masses.  Several tiny cysts noted.  No hydronephrosis.    Miscellaneous: Bowel containing ventral abdominal hernia  partially visualized.  No evidence for bowel obstruction.  Multiple prominent periportal and retroperitoneal lymph nodes are noted.  Aortocaval necrotic lymph node measures 1.6 cm short axis, previously 1.7 cm (series 1401, image 58).  Additional retrocaval necrotic lymph node measuring 1.5 cm noted, previously 1.1 cm (series 1401, image 72).  Impression: 1. Hepatic metastases and abdominal lymphadenopathy, overall similar to prior study.  2. Partially visualized bowel containing ventral abdominal hernia.  RECIST SUMMARY:    Date of prior examination for comparison: 11/26/2018    Lesion 1: Hepatic segment V.  1.5 cm. Series 1400 image 77.  Prior measurement 1.6 cm.    Lesion 2: Hepatic segment VIII.  2.9 cm. Series 1400 image 31.  Prior measurement 2.4 cm.    Lesion 3: Aortocaval lymph node.  1.6 cm. Series 1401 image 58.  Prior measurement 1.7 cm.    Lesion 4: Retrocaval lymph node.  1.5 cm. Series 1401 image 72.  Prior measurement 1.1 cm.       Echocardiogram- 7/8/19  · Normal left ventricular systolic function. The estimated ejection fraction is 65%  · Normal LV diastolic function.  · Concentric left ventricular hypertrophy.  · Mild aortic regurgitation.  · Normal right ventricular systolic function.  · Normal central venous pressure (3 mm Hg).  · The estimated PA systolic pressure is 18 mm Hg      Assessment/Impression:         Problem List Items Addressed This Visit     None         COVID-19 Discussion:     I had long discussion with patient and any applicable family about the COVID-19 coronavirus epidemic and the recommended precautions with regard to cancer and/or hematology patients. I have re-iterated the CDC recommendations for adequate hand washing, use of hand -like products, and coughing into elbow, etc. In addition, especially for our patients who are on chemotherapy and/or our otherwise immunocompromised patients, I have recommended avoidance of crowds, including movie theaters, restaurants,  churches, etc. I have recommended avoidance of any sick or symptomatic family members and/or friends. I have also recommended avoidance of any raw and unwashed food products, and general avoidance of food items that have not been prepared by themselves. The patient has been asked to call us immediately with any symptom developments, issues, questions or other general concerns.     Plan:         Had a long conversation with the patient about the features of his case that support the treatment and surveillance recommendations outlined below:  1.  When he and I met in July, we discussed the favorable and then some of the less favorable aspects of this disease.  The favorable aspects of the disease include a slow in indolent biologic behavior, patients live a long time even in the setting of unresected, measurable, and/or metastatic disease, and even diagnosed at a relatively young age patients will often live to die of something else.  The less favorable aspects of the disease include an ongoing risk of slow progression of the disease over time, patients with residual disease are managed with systemic therapy with its associated side effects for what can be over a decade, and 3. in patients who are symptomatic either with a neuroendocrine syndrome or as a consequence of the operation required to treat the disease there is significant behavior and dietary modification required to keep the symptoms in check.  His disease has been slow growing and remains low volume, but he has certainly experienced the unfavorable aspects of this disease.  We spent a great deal of time during this office visit discussing how we can better manage the symptoms of the disease.  2.  In patients with objective evidence of progression of disease despite adequate doses of somatostatin receptor inhibitor therapy, we asked ourselves several questions:  1.  Is the progression in a single site or in multiple sites?, 2.  Is the progression growth of  individual tumors we have followed over time or is it emergence of new tumors within one or more sites, 3. the treatment options for the dominant site of progression, and 4. the expectations of that treatment to delay progression in that site.  His cross-sectional imaging in February suggested that there was progression of disease in more than one site.  On review at tumor board, our cross-sectional imagers and nuclear medicine radiologist felt that the objective progression was growth of a retroperitoneal lymph node sitting inferior to the right renal vein.  The discussions around treatment options were focused primarily on that site.  Given his young age and limited disease burden, the consensus of the group was to offer him re-exploration and additional debulking surgery.  Other than the reoperative nature of the recommendation and his history of having complications after his initial surgery, there are established landmarks whereby it would not be difficult to locate his current disease burden within the retroperitoneum.  He and I discussed two additional treatment options including the evidence that supports them:   A) SBRT:  Targeted radiation with techniques similar to those used in the brain have been used in both primary and metastatic cancers.  If the target of  treatment is a single area amenable to targeted radiation, SBRT is an excellent noninvasive choice associated with reasonable local tumor control  rates.  Although the studies supporting SBRT were not focused on neuroendocrine, there is nothing about neuroendocrine that would suggest that the  results would be any different.= than those documented with other tumor subtypes.   B) PRRT:  Peptide base receptor therapies would also be a reasonable option and appropriate in someone with a pattern of spread that includes  multiple sites.  The indications for PRRT include patients who have progressed despite adequate doses of somatostatin receptor  inhibitor therapy and  is associated with a significantly longer progression free survival compared to SSI treatment alone.  It is a targeted radiation treatment that can bacilio to  multiple sites instead of targeting a single tumor or a single organ site.  In patients with progression of disease in more than 1 tumor or more than 1  site, this is likely the best option for managing the entirety of the disease.  The major disadvantage of this is the potential increased toxicities in  patients with small volume disease.  3.  He is having worsening carcinoid syndrome that manifest as intermittent flushing as well as diarrhea.  When he and I spoke a few months ago, it sounded more like malabsorption and we tried him on pancreatic enzyme supplementation.  The fact that taking up to 3 supplements before meals has not improved this symptoms would go against that theory and I do not believe that increasing doses of pancreatic enzyme supplementation is worthwhile.    4. Carcinoid syndrome causes a rapid intestinal transit time that can also manifest as diarrhea with signs that overlap with malabsorption.  The treatment for this attempts to slow down the intestinal transit time and give the intestines more time to digest and absorb the nutrition.  It will also desensitize the intestinal nerves to neuroendocrine stimulation.  The most reliable method to deal with intestinal carcinoid syndrome is using tincture of opium given prior to meals.  He has used oral opiates for persistent abdominal and back pain.  This can also have the same effect, but tincture of opium is rarely associated with any systemic opiate symptoms and is therefore a safer method for this indication.  5.  His glucose levels have been running high on his recent blood draws.  We discussed the side effects of long-acting somatostatin receptor inhibitor therapy on the glycemic index including how this contributes to fatty infiltration of the liver.  Since we were  considering increasing this systemic levels of his somatostatin receptor inhibition, I felt that it important to document several things including a current A1c level, a current Lanreotide drug level, and try short-acting octreotide injections in conjunction with the longer-acting monthly injection prior to a Q 3 week dosing of his SSI.     Neuroendocrine blood work, including A1c, CMP,  And Lanreotide drug level  He wants to consider SBRT  Tincture of opium  Renewed his roxicodone  Still considering re-exploration and surgical debulking in a few months      Tayla Her MD, MPH, FACS  Professor of Surgery, [facility]  Neuroendocrine Surgery, Hepatic/Pancreatic & General Surgical Oncology  [address], [address]  [place]  [address]  ph. [phone]; [phone]  fax. [phone]      60 minutes were spent in record review, medication reconciliation, coordination of care between multiple providers and counseling the patient on short term and longer term treatment goals.  More than 50 minutes was spent in direct patient contact, either face to face over the virtual platform or over the phone.

## 2020-04-27 ENCOUNTER — PATIENT MESSAGE (OUTPATIENT)
Dept: NEUROLOGY | Facility: HOSPITAL | Age: 50
End: 2020-04-27

## 2020-04-27 ENCOUNTER — OFFICE VISIT (OUTPATIENT)
Dept: NEUROLOGY | Facility: HOSPITAL | Age: 50
End: 2020-04-27
Attending: SURGERY
Payer: COMMERCIAL

## 2020-04-27 DIAGNOSIS — C7B.8 SECONDARY NEUROENDOCRINE TUMOR OF DISTANT LYMPH NODES: ICD-10-CM

## 2020-04-27 DIAGNOSIS — R73.9 DRUG-INDUCED HYPERGLYCEMIA: ICD-10-CM

## 2020-04-27 DIAGNOSIS — T50.905A DRUG-INDUCED HYPERGLYCEMIA: ICD-10-CM

## 2020-04-27 DIAGNOSIS — C7B.02 METASTATIC MALIGNANT CARCINOID TUMOR TO LIVER: Primary | ICD-10-CM

## 2020-04-27 DIAGNOSIS — C7A.012 MALIGNANT CARCINOID TUMOR OF ILEUM: ICD-10-CM

## 2020-04-27 DIAGNOSIS — K52.9 DIARRHEA CONCURRENT WITH AND DUE TO CARCINOID SYNDROME: ICD-10-CM

## 2020-04-27 DIAGNOSIS — T50.905A HYPERGLYCEMIA, DRUG-INDUCED: ICD-10-CM

## 2020-04-27 DIAGNOSIS — K76.0 FATTY LIVER DISEASE, NONALCOHOLIC: ICD-10-CM

## 2020-04-27 DIAGNOSIS — E34.0 DIARRHEA CONCURRENT WITH AND DUE TO CARCINOID SYNDROME: ICD-10-CM

## 2020-04-27 DIAGNOSIS — R73.9 HYPERGLYCEMIA, DRUG-INDUCED: ICD-10-CM

## 2020-04-27 DIAGNOSIS — C7A.012 MALIGNANT CARCINOID TUMOR OF THE ILEUM: ICD-10-CM

## 2020-04-27 RX ORDER — OCTREOTIDE ACETATE 1000 UG/ML
100 INJECTION INTRAVENOUS EVERY 8 HOURS
Status: SHIPPED | OUTPATIENT
Start: 2020-04-28 | End: 2020-05-28

## 2020-04-27 RX ORDER — MORPHINE 10 MG/ML
10 TINCTURE ORAL 4 TIMES DAILY PRN
Qty: 120 ML | Refills: 0 | Status: SHIPPED | OUTPATIENT
Start: 2020-04-27 | End: 2020-05-27

## 2020-04-27 RX ORDER — OXYCODONE HYDROCHLORIDE 5 MG/1
15 TABLET ORAL EVERY 4 HOURS PRN
Qty: 28 TABLET | Refills: 0 | Status: SHIPPED | OUTPATIENT
Start: 2020-04-27 | End: 2020-05-04

## 2020-04-29 ENCOUNTER — PATIENT MESSAGE (OUTPATIENT)
Dept: NEUROLOGY | Facility: HOSPITAL | Age: 50
End: 2020-04-29

## 2020-04-29 NOTE — PATIENT INSTRUCTIONS
Neuroendocrine blood work, including A1c, CMP,  And Lanreotide drug level  He wants to consider SBRT  Tincture of opium  Renewed his roxicodone  Still considering re-exploration and surgical debulking in a few months

## 2020-05-13 LAB
EXT 24 HR UR METANEPHRINE: ABNORMAL
EXT 24 HR UR NORMETANEPHRINE: ABNORMAL
EXT 24 HR UR NORMETANEPHRINE: ABNORMAL
EXT 25 HYDROXY VIT D2: ABNORMAL
EXT 25 HYDROXY VIT D3: ABNORMAL
EXT 5 HIAA 24 HR URINE: ABNORMAL
EXT 5 HIAA BLOOD: 18 NG/ML (ref 0–22)
EXT ACTH: ABNORMAL
EXT AFP: ABNORMAL
EXT ALBUMIN: 4 GM/DL (ref 3.5–5)
EXT ALKALINE PHOSPHATASE: 96 U/L (ref 38–126)
EXT ALT: ABNORMAL
EXT AMYLASE: ABNORMAL
EXT ANTI ISLET CELL AB: ABNORMAL
EXT ANTI PARIETAL CELL AB: ABNORMAL
EXT ANTI THYROID AB: ABNORMAL
EXT AST: ABNORMAL
EXT BILIRUBIN DIRECT: ABNORMAL
EXT BILIRUBIN TOTAL: 0.5 MG/DL (ref 0.1–1.2)
EXT BK VIRUS DNA QN PCR: ABNORMAL
EXT BUN: 18 MG/DL (ref 8–26)
EXT C PEPTIDE: ABNORMAL
EXT CA 125: ABNORMAL
EXT CA 19-9: ABNORMAL
EXT CA 27-29: ABNORMAL
EXT CALCITONIN: ABNORMAL
EXT CALCIUM: 9.3 MG/DL (ref 8.4–10.5)
EXT CEA: ABNORMAL
EXT CHLORIDE: 104 MMOL/L (ref 98–110)
EXT CHOLESTEROL: ABNORMAL
EXT CHROMOGRANIN A: ABNORMAL
EXT CO2: ABNORMAL
EXT CREATININE UA: ABNORMAL
EXT CREATININE: 0.91 MG/DL (ref 0.8–1.5)
EXT CYCLOSPORONE LEVEL: ABNORMAL
EXT DOPAMINE: ABNORMAL
EXT EBV DNA BY PCR: ABNORMAL
EXT EPINEPHRINE: ABNORMAL
EXT FOLATE: ABNORMAL
EXT FREE T3: ABNORMAL
EXT FREE T4: ABNORMAL
EXT FSH: ABNORMAL
EXT GASTRIN RELEASING PEPTIDE: ABNORMAL
EXT GASTRIN RELEASING PEPTIDE: ABNORMAL
EXT GASTRIN: ABNORMAL
EXT GGT: ABNORMAL
EXT GHRELIN: ABNORMAL
EXT GLUCAGON: ABNORMAL
EXT GLUCOSE: 131 MG/DL (ref 65–99)
EXT GROWTH HORMONE: ABNORMAL
EXT HCV RNA QUANT PCR: ABNORMAL
EXT HDL: ABNORMAL
EXT HEMATOCRIT: 48.8 % (ref 38.8–50.2)
EXT HEMOGLOBIN A1C: 7.2 % (ref 0–5.7)
EXT HEMOGLOBIN: 15.4 G/DL (ref 12.8–16.9)
EXT HISTAMINE 24 HR URINE: ABNORMAL
EXT HISTAMINE: ABNORMAL
EXT IGF-1: ABNORMAL
EXT IMMUNKNOW (NON-STIMULATED): ABNORMAL
EXT IMMUNKNOW (STIMULATED): ABNORMAL
EXT INR: ABNORMAL
EXT INSULIN: ABNORMAL
EXT LANREOTIDE LEVEL: 2569 PG/ML
EXT LDH, TOTAL: ABNORMAL
EXT LDL CHOLESTEROL: ABNORMAL
EXT LIPASE: ABNORMAL
EXT MAGNESIUM: ABNORMAL
EXT METANEPHRINE FREE PLASMA: ABNORMAL
EXT MOTILIN: ABNORMAL
EXT NEUROKININ A CAMB: ABNORMAL
EXT NEUROKININ A ISI: ABNORMAL
EXT NEUROTENSIN: ABNORMAL
EXT NOREPINEPHRINE: ABNORMAL
EXT NORMETANEPHRINE: ABNORMAL
EXT NSE: ABNORMAL
EXT OCTREOTIDE LEVEL: ABNORMAL
EXT PANCREASTATIN CAMB: ABNORMAL
EXT PANCREASTATIN ISI: 207 PG/ML (ref 10–135)
EXT PANCREATIC POLYPEPTIDE: ABNORMAL
EXT PHOSPHORUS: ABNORMAL
EXT PLATELETS: 227 K/CUMM (ref 150–450)
EXT POTASSIUM: 4.9 MMOL/L (ref 3.5–5.5)
EXT PROGRAF LEVEL: ABNORMAL
EXT PROLACTIN: ABNORMAL
EXT PROTEIN TOTAL: 7.6 G/DL (ref 6–8.3)
EXT PROTEIN UA: ABNORMAL
EXT PT: ABNORMAL
EXT PTH, INTACT: ABNORMAL
EXT PTT: ABNORMAL
EXT RAPAMUNE LEVEL: ABNORMAL
EXT SEROTONIN: 457 NG/ML (ref 56–244)
EXT SODIUM: 141 MMOL/L (ref 136–145)
EXT SOMATOSTATIN: ABNORMAL
EXT SUBSTANCE P: ABNORMAL
EXT TRIGLYCERIDES: ABNORMAL
EXT TRYPTASE: ABNORMAL
EXT TSH: ABNORMAL
EXT URIC ACID: ABNORMAL
EXT URINE AMYLASE U/HR: ABNORMAL
EXT URINE AMYLASE U/L: ABNORMAL
EXT VASOACTIVE INTESTINAL POLYPEPTIDE: ABNORMAL
EXT VITAMIN B12: ABNORMAL
EXT VMA 24 HR URINE: ABNORMAL
EXT WBC: 7 K/CUMM (ref 3.3–10.5)
NEURON SPECIFIC ENOLASE: ABNORMAL

## 2020-05-14 LAB
EXT 24 HR UR METANEPHRINE: ABNORMAL
EXT 24 HR UR METANEPHRINE: NORMAL
EXT 24 HR UR METANEPHRINE: NORMAL
EXT 24 HR UR NORMETANEPHRINE: ABNORMAL
EXT 24 HR UR NORMETANEPHRINE: ABNORMAL
EXT 24 HR UR NORMETANEPHRINE: NORMAL
EXT 25 HYDROXY VIT D2: ABNORMAL
EXT 25 HYDROXY VIT D2: NORMAL
EXT 25 HYDROXY VIT D2: NORMAL
EXT 25 HYDROXY VIT D3: ABNORMAL
EXT 25 HYDROXY VIT D3: NORMAL
EXT 25 HYDROXY VIT D3: NORMAL
EXT 5 HIAA 24 HR URINE: ABNORMAL
EXT 5 HIAA 24 HR URINE: NORMAL
EXT 5 HIAA 24 HR URINE: NORMAL
EXT 5 HIAA BLOOD: 18 NG/ML (ref 0–22)
EXT 5 HIAA BLOOD: ABNORMAL
EXT 5 HIAA BLOOD: NORMAL
EXT ACTH: ABNORMAL
EXT ACTH: NORMAL
EXT ACTH: NORMAL
EXT AFP: ABNORMAL
EXT AFP: NORMAL
EXT AFP: NORMAL
EXT ALBUMIN: ABNORMAL
EXT ALBUMIN: NORMAL
EXT ALBUMIN: NORMAL
EXT ALKALINE PHOSPHATASE: ABNORMAL
EXT ALKALINE PHOSPHATASE: NORMAL
EXT ALKALINE PHOSPHATASE: NORMAL
EXT ALT: ABNORMAL
EXT ALT: NORMAL
EXT ALT: NORMAL
EXT AMYLASE: ABNORMAL
EXT AMYLASE: NORMAL
EXT AMYLASE: NORMAL
EXT ANTI ISLET CELL AB: ABNORMAL
EXT ANTI ISLET CELL AB: NORMAL
EXT ANTI ISLET CELL AB: NORMAL
EXT ANTI PARIETAL CELL AB: ABNORMAL
EXT ANTI PARIETAL CELL AB: NORMAL
EXT ANTI PARIETAL CELL AB: NORMAL
EXT ANTI THYROID AB: ABNORMAL
EXT ANTI THYROID AB: NORMAL
EXT ANTI THYROID AB: NORMAL
EXT AST: ABNORMAL
EXT AST: NORMAL
EXT AST: NORMAL
EXT BILIRUBIN DIRECT: ABNORMAL
EXT BILIRUBIN DIRECT: NORMAL
EXT BILIRUBIN DIRECT: NORMAL
EXT BILIRUBIN TOTAL: ABNORMAL
EXT BILIRUBIN TOTAL: NORMAL
EXT BILIRUBIN TOTAL: NORMAL
EXT BK VIRUS DNA QN PCR: ABNORMAL
EXT BK VIRUS DNA QN PCR: NORMAL
EXT BK VIRUS DNA QN PCR: NORMAL
EXT BUN: ABNORMAL
EXT BUN: NORMAL
EXT BUN: NORMAL
EXT C PEPTIDE: ABNORMAL
EXT C PEPTIDE: NORMAL
EXT C PEPTIDE: NORMAL
EXT CA 125: ABNORMAL
EXT CA 125: NORMAL
EXT CA 125: NORMAL
EXT CA 19-9: ABNORMAL
EXT CA 19-9: NORMAL
EXT CA 19-9: NORMAL
EXT CA 27-29: ABNORMAL
EXT CA 27-29: NORMAL
EXT CA 27-29: NORMAL
EXT CALCITONIN: ABNORMAL
EXT CALCITONIN: NORMAL
EXT CALCITONIN: NORMAL
EXT CALCIUM: ABNORMAL
EXT CALCIUM: NORMAL
EXT CALCIUM: NORMAL
EXT CEA: ABNORMAL
EXT CEA: NORMAL
EXT CEA: NORMAL
EXT CHLORIDE: ABNORMAL
EXT CHLORIDE: NORMAL
EXT CHLORIDE: NORMAL
EXT CHOLESTEROL: ABNORMAL
EXT CHOLESTEROL: NORMAL
EXT CHOLESTEROL: NORMAL
EXT CHROMOGRANIN A: ABNORMAL
EXT CHROMOGRANIN A: NORMAL
EXT CHROMOGRANIN A: NORMAL
EXT CO2: ABNORMAL
EXT CO2: NORMAL
EXT CO2: NORMAL
EXT CREATININE UA: ABNORMAL
EXT CREATININE UA: NORMAL
EXT CREATININE UA: NORMAL
EXT CREATININE: ABNORMAL
EXT CREATININE: NORMAL
EXT CREATININE: NORMAL
EXT CYCLOSPORONE LEVEL: ABNORMAL
EXT CYCLOSPORONE LEVEL: NORMAL
EXT CYCLOSPORONE LEVEL: NORMAL
EXT DOPAMINE: ABNORMAL
EXT DOPAMINE: NORMAL
EXT DOPAMINE: NORMAL
EXT EBV DNA BY PCR: ABNORMAL
EXT EBV DNA BY PCR: NORMAL
EXT EBV DNA BY PCR: NORMAL
EXT EPINEPHRINE: ABNORMAL
EXT EPINEPHRINE: NORMAL
EXT EPINEPHRINE: NORMAL
EXT FOLATE: ABNORMAL
EXT FOLATE: NORMAL
EXT FOLATE: NORMAL
EXT FREE T3: ABNORMAL
EXT FREE T3: NORMAL
EXT FREE T3: NORMAL
EXT FREE T4: ABNORMAL
EXT FREE T4: NORMAL
EXT FREE T4: NORMAL
EXT FSH: ABNORMAL
EXT FSH: NORMAL
EXT FSH: NORMAL
EXT GASTRIN RELEASING PEPTIDE: ABNORMAL
EXT GASTRIN RELEASING PEPTIDE: ABNORMAL
EXT GASTRIN RELEASING PEPTIDE: NORMAL
EXT GASTRIN: ABNORMAL
EXT GASTRIN: NORMAL
EXT GASTRIN: NORMAL
EXT GGT: ABNORMAL
EXT GGT: NORMAL
EXT GGT: NORMAL
EXT GHRELIN: ABNORMAL
EXT GHRELIN: NORMAL
EXT GHRELIN: NORMAL
EXT GLUCAGON: ABNORMAL
EXT GLUCAGON: NORMAL
EXT GLUCAGON: NORMAL
EXT GLUCOSE: ABNORMAL
EXT GLUCOSE: NORMAL
EXT GLUCOSE: NORMAL
EXT GROWTH HORMONE: ABNORMAL
EXT GROWTH HORMONE: NORMAL
EXT GROWTH HORMONE: NORMAL
EXT HCV RNA QUANT PCR: ABNORMAL
EXT HCV RNA QUANT PCR: NORMAL
EXT HCV RNA QUANT PCR: NORMAL
EXT HDL: ABNORMAL
EXT HDL: NORMAL
EXT HDL: NORMAL
EXT HEMATOCRIT: ABNORMAL
EXT HEMATOCRIT: NORMAL
EXT HEMATOCRIT: NORMAL
EXT HEMOGLOBIN A1C: ABNORMAL
EXT HEMOGLOBIN A1C: NORMAL
EXT HEMOGLOBIN A1C: NORMAL
EXT HEMOGLOBIN: ABNORMAL
EXT HEMOGLOBIN: NORMAL
EXT HEMOGLOBIN: NORMAL
EXT HISTAMINE 24 HR URINE: ABNORMAL
EXT HISTAMINE 24 HR URINE: NORMAL
EXT HISTAMINE 24 HR URINE: NORMAL
EXT HISTAMINE: ABNORMAL
EXT HISTAMINE: NORMAL
EXT HISTAMINE: NORMAL
EXT IGF-1: ABNORMAL
EXT IGF-1: NORMAL
EXT IGF-1: NORMAL
EXT IMMUNKNOW (NON-STIMULATED): ABNORMAL
EXT IMMUNKNOW (NON-STIMULATED): NORMAL
EXT IMMUNKNOW (NON-STIMULATED): NORMAL
EXT IMMUNKNOW (STIMULATED): ABNORMAL
EXT IMMUNKNOW (STIMULATED): NORMAL
EXT IMMUNKNOW (STIMULATED): NORMAL
EXT INR: ABNORMAL
EXT INR: NORMAL
EXT INR: NORMAL
EXT INSULIN: ABNORMAL
EXT INSULIN: NORMAL
EXT INSULIN: NORMAL
EXT LANREOTIDE LEVEL: 256 PG/ML
EXT LANREOTIDE LEVEL: ABNORMAL
EXT LANREOTIDE LEVEL: NORMAL
EXT LDH, TOTAL: ABNORMAL
EXT LDH, TOTAL: NORMAL
EXT LDH, TOTAL: NORMAL
EXT LDL CHOLESTEROL: ABNORMAL
EXT LDL CHOLESTEROL: NORMAL
EXT LDL CHOLESTEROL: NORMAL
EXT LIPASE: ABNORMAL
EXT LIPASE: NORMAL
EXT LIPASE: NORMAL
EXT MAGNESIUM: ABNORMAL
EXT MAGNESIUM: NORMAL
EXT MAGNESIUM: NORMAL
EXT METANEPHRINE FREE PLASMA: ABNORMAL
EXT METANEPHRINE FREE PLASMA: NORMAL
EXT METANEPHRINE FREE PLASMA: NORMAL
EXT MOTILIN: ABNORMAL
EXT MOTILIN: NORMAL
EXT MOTILIN: NORMAL
EXT NEUROKININ A CAMB: ABNORMAL
EXT NEUROKININ A CAMB: NORMAL
EXT NEUROKININ A CAMB: NORMAL
EXT NEUROKININ A ISI: ABNORMAL
EXT NEUROKININ A ISI: NORMAL
EXT NEUROKININ A ISI: NORMAL
EXT NEUROTENSIN: ABNORMAL
EXT NEUROTENSIN: NORMAL
EXT NEUROTENSIN: NORMAL
EXT NOREPINEPHRINE: ABNORMAL
EXT NOREPINEPHRINE: NORMAL
EXT NOREPINEPHRINE: NORMAL
EXT NORMETANEPHRINE: ABNORMAL
EXT NORMETANEPHRINE: NORMAL
EXT NORMETANEPHRINE: NORMAL
EXT NSE: ABNORMAL
EXT NSE: NORMAL
EXT NSE: NORMAL
EXT OCTREOTIDE LEVEL: ABNORMAL
EXT OCTREOTIDE LEVEL: NORMAL
EXT OCTREOTIDE LEVEL: NORMAL
EXT PANCREASTATIN CAMB: ABNORMAL
EXT PANCREASTATIN CAMB: NORMAL
EXT PANCREASTATIN CAMB: NORMAL
EXT PANCREASTATIN ISI: 207 PG/ML (ref 10–135)
EXT PANCREASTATIN ISI: NORMAL
EXT PANCREASTATIN ISI: NORMAL
EXT PANCREATIC POLYPEPTIDE: ABNORMAL
EXT PANCREATIC POLYPEPTIDE: NORMAL
EXT PANCREATIC POLYPEPTIDE: NORMAL
EXT PHOSPHORUS: ABNORMAL
EXT PHOSPHORUS: NORMAL
EXT PHOSPHORUS: NORMAL
EXT PLATELETS: ABNORMAL
EXT PLATELETS: NORMAL
EXT PLATELETS: NORMAL
EXT POTASSIUM: ABNORMAL
EXT POTASSIUM: NORMAL
EXT POTASSIUM: NORMAL
EXT PROGRAF LEVEL: ABNORMAL
EXT PROGRAF LEVEL: NORMAL
EXT PROGRAF LEVEL: NORMAL
EXT PROLACTIN: ABNORMAL
EXT PROLACTIN: NORMAL
EXT PROLACTIN: NORMAL
EXT PROTEIN TOTAL: ABNORMAL
EXT PROTEIN TOTAL: NORMAL
EXT PROTEIN TOTAL: NORMAL
EXT PROTEIN UA: ABNORMAL
EXT PROTEIN UA: NORMAL
EXT PROTEIN UA: NORMAL
EXT PT: ABNORMAL
EXT PT: NORMAL
EXT PT: NORMAL
EXT PTH, INTACT: ABNORMAL
EXT PTH, INTACT: NORMAL
EXT PTH, INTACT: NORMAL
EXT PTT: ABNORMAL
EXT PTT: NORMAL
EXT PTT: NORMAL
EXT RAPAMUNE LEVEL: ABNORMAL
EXT RAPAMUNE LEVEL: NORMAL
EXT RAPAMUNE LEVEL: NORMAL
EXT SEROTONIN: ABNORMAL
EXT SEROTONIN: NORMAL
EXT SEROTONIN: NORMAL
EXT SODIUM: ABNORMAL
EXT SODIUM: NORMAL
EXT SODIUM: NORMAL
EXT SOMATOSTATIN: ABNORMAL
EXT SOMATOSTATIN: NORMAL
EXT SOMATOSTATIN: NORMAL
EXT SUBSTANCE P: ABNORMAL
EXT SUBSTANCE P: NORMAL
EXT SUBSTANCE P: NORMAL
EXT TRIGLYCERIDES: ABNORMAL
EXT TRIGLYCERIDES: NORMAL
EXT TRIGLYCERIDES: NORMAL
EXT TRYPTASE: ABNORMAL
EXT TRYPTASE: NORMAL
EXT TRYPTASE: NORMAL
EXT TSH: ABNORMAL
EXT TSH: NORMAL
EXT TSH: NORMAL
EXT URIC ACID: ABNORMAL
EXT URIC ACID: NORMAL
EXT URIC ACID: NORMAL
EXT URINE AMYLASE U/HR: ABNORMAL
EXT URINE AMYLASE U/HR: NORMAL
EXT URINE AMYLASE U/HR: NORMAL
EXT URINE AMYLASE U/L: ABNORMAL
EXT URINE AMYLASE U/L: NORMAL
EXT URINE AMYLASE U/L: NORMAL
EXT VASOACTIVE INTESTINAL POLYPEPTIDE: ABNORMAL
EXT VASOACTIVE INTESTINAL POLYPEPTIDE: NORMAL
EXT VASOACTIVE INTESTINAL POLYPEPTIDE: NORMAL
EXT VITAMIN B12: ABNORMAL
EXT VITAMIN B12: NORMAL
EXT VITAMIN B12: NORMAL
EXT VMA 24 HR URINE: ABNORMAL
EXT VMA 24 HR URINE: NORMAL
EXT VMA 24 HR URINE: NORMAL
EXT WBC: ABNORMAL
EXT WBC: NORMAL
EXT WBC: NORMAL
NEURON SPECIFIC ENOLASE: ABNORMAL
NEURON SPECIFIC ENOLASE: NORMAL
NEURON SPECIFIC ENOLASE: NORMAL

## 2020-06-28 NOTE — PROGRESS NOTES
NOLANETS:  [facility]  A collaboration between [facility] and [facility]      PATIENT: Javy Thompson  MRN: [id number]  DATE: 6/28/2020    Subjective:      Chief Complaint: metastatic midgut carcinoid, on active treatment, symptomatic  Worsening carcinoid syndrome     The patient location is: home  The chief complaint leading to consultation is: scheduled follow-up, worsening carcinoid syndrome despite monthly injections, hyperglycemia  Visit type: Virtual visit with synchronous audio and video  Total time spent with patient: 60    Each patient to whom he or she provides medical services by telemedicine is:  (1) informed of the relationship between the physician and patient and the respective role of any other health care provider with respect to management of the patient; and (2) notified that he or she may decline to receive medical services by telemedicine and may withdraw from such care at any time.    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 He presented with measurable disease within the small bowel, small bowel mesentery, and liver    Interval History:     SBRT to a retroperitoneal lymph node, completed treatment 2 weeks ago  No new labs  Started tincture of opium    Vitals: There were no vitals taken for this visit. --stable    ECOG Score: 1 - Symptomatic but completely ambulatory    Oncologic History:   Oncologic History Ileal net, dx 2016, liver and román mets  Carcinoid syndrome- diarrhea   Oncologic Treatment 3/2017- surgery (Eric)  Lanreotide  SBRT to retroperitoneal lymph node   Pathology 3/2017- small bowel- well diff net, multifocal (5), G2, Ki 67- 10.9%, 3/12 nodes pos; liver- well diff net     Past Medical History:  Past Medical History:   Diagnosis Date    Mesenteric mass 2/8/2017    Metastatic carcinoid tumor 4/18/2017    Mr. Thompson is well known to me having  been diagnosed with metastatic ileal carcinoid in 2016. He was resected in 3/2017. He has had slow progression of disease in the mediastinum, liver, and retroperitoneal román basins.    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       Past Surgical History:  Past Surgical History:   Procedure Laterality Date    ANTERIOR CRUCIATE LIGAMENT REPAIR Right 1986    APPENDECTOMY  3/31/2017    Procedure: APPENDECTOMY;  Surgeon: Fidelia Reyes MD;  Location: [facility];  Service: General;;    KNEE ARTHROSCOPY Right 1986    x2    LIVER BIOPSY  12/2016, 1/2017    TONSILLECTOMY         Family History:  Family History   Problem Relation Age of Onset    Cancer Mother        Allergies:  Epinephrine and Iodinated contrast media    Medications:  Current Outpatient Medications   Medication Sig    colestipol (COLESTID) 1 gram Tab Take by mouth.    HYDROcodone-acetaminophen (NORCO) 5-325 mg per tablet Take 1 tablet by mouth every 6 (six) hours as needed for Pain.    lanreotide (SOMATULINE DEPOT) 120 mg/0.5 mL Syrg Inject 120 mg into the skin every 28 days.    lipase-protease-amylase (ZENPEP) 25,000-79,000- 105,000 unit CpDR Take 3 caps by mouth with meals and Take 2 caps by mouth with snacks    oxyCODONE-acetaminophen (PERCOCET)  mg per tablet Take 1 tablet by mouth every 6 (six) hours as needed for Pain.    senna-docusate 8.6-50 mg (PERICOLACE) 8.6-50 mg per tablet Take 1 tablet by mouth daily as needed for Constipation.    spironolactone (ALDACTONE) 50 MG tablet Take 1 tablet (50 mg total) by mouth once daily.     No current facility-administered medications for this visit.         Review of Systems   Constitutional: Negative.    HENT: Negative.    Eyes: Negative.    Respiratory: Negative.    Cardiovascular: Negative.    Gastrointestinal: Positive for abdominal distention, abdominal pain and diarrhea.   Endocrine:        Hyperglycemia    Genitourinary: Negative.    Musculoskeletal: Positive for back pain.   Skin: Negative.    Allergic/Immunologic: Negative.    Neurological: Negative.    Hematological: Negative.    Psychiatric/Behavioral: Negative.           Objective:      Physical Exam  Constitutional:       General: He is not in acute distress.     Appearance: He is well-developed.   HENT:      Head: Normocephalic.   Pulmonary:      Effort: No respiratory distress.      Breath sounds: No stridor.   Neurological:      Mental Status: He is alert and oriented to person, place, and time.   Psychiatric:         Behavior: Behavior normal.         Thought Content: Thought content normal.         Judgment: Judgment normal.          Laboratory Data:    Neuroendocrine Labs Latest Ref Rng & Units 10/24/2018   EXT 5 HIAA BLOOD 0 - 22 ng/ml 11   EXT SEROTONIN 56 - 244 ng/ml 173   EXT CHROMOGRANIN A 0 - 15 ng/ml    EXT PANCREASTATIN COLE 10 - 135 pg/ml 89   EXT NEUROKININ A COLE 0 - 40 pg/ml 20   WBC 3.90 - 12.70 K/uL    EXT WBC 3.3 - 10.5 k/cumm 7.2   HGB 14.0 - 18.0 g/dL    EXT HGB 12.8 - 16.9 g/dl 15.7   HCT 40.0 - 54.0 %    EXT HCT 38.8 - 50.2 % 46.5   PLATLETS 150 - 350 K/uL    EXT PLATLETS 150 - 450 k/cumm 169   PT 9.0 - 12.5 sec    PTT 21.0 - 32.0 sec    INR 0.8 - 1.2    GLUCOSE 70 - 110 mg/dL    EXT GLUCOSE 65 - 99 mg/dl 136 (A)   BUN 6 - 20 mg/dL    EXT BUN 8 - 26 mg/dl 13   CREATININE 0.5 - 1.4 mg/dL    EXT CREATININE 0.80 - 1.50 mg/dl 0.93    - 145 mmol/L    EXT  - 145 mmol/l 138   K 3.5 - 5.1 mmol/L    EXT K 3.5 - 5.5 mmol/l 4.3   CHLORIDE 95 - 110 mmol/L    EXT CHLORIDE 98 - 110 mmol/l 99   CO2 23 - 29 mmol/L    EXT CO2 20 - 29 mmol/l 28   CALCIUM 8.7 - 10.5 mg/dL    EXT CALCIUM 8.4 - 10.5 mg/dl 9.6   PROTEIN, TOTAL 6.0 - 8.4 g/dL    EXT PROTEIN, TOTAL 6.0 - 8.3 g/dl 7.4   PHOSPHORUS 2.7 - 4.5 mg/dL    ALBUMIN 3.5 - 5.2 g/dL    EXT ALBUMIN 3.5 - 5.0 gm/dl 4.1   TOTAL BILIRUBIN 0.1 - 1.0 mg/dL    EXT TOTAL BILIRUBIN 0.1 - 1.2 mg/dl 1.1    ALK PHOSPHATASE 55 - 135 U/L    EXT ALK PHOSPHATASE 38 - 126 u/l 90   SGOT (AST) 10 - 40 U/L    EXT SGOT (AST) 17 - 59 u/l 30   SGPT (ALT) 10 - 44 U/L    EXT ALT 11 - 58 u/l 16     Scans:   CT abd/pelvis- 2/24/20          CT chest- 2/9/20          NM PET Ga68 Dotatate Whole Body-7/9/19  Narrative: EXAMINATION:  NM PET 68GA DOTATATE WHOLE BODY    CLINICAL HISTORY:  Malignant carcinoid tumor of the ileum    TECHNIQUE:  4.67 mCi of GA68 Dotatate was administered intravenously in the right antecubital fossa.  After an approximately 60 min distribution time, PET/CT images were acquired from the skull vertex to the mid thigh. Transmission images were acquired to correct for attenuation using a whole body low-dose CT scan without contrast with the arms positioned above the head.    COMPARISON:  Gallium 68 dota-hua PET 11/26/2018.    FINDINGS:  Quality of the study: Adequate.    Multiple areas increased FDG avidity including innumerable hepatic lesions, retroperitoneal, mesenteric, and peripancreatic lymph nodes with index lesions as below:    Superior right lobe of the liver: SUV max 13.64, previously 22.83.    Paraesophageal lymph node has resolved.    Peripancreatic lymphadenopathy (largest lymph measures 1.7 cm in short axis, previously 2.0 cm; of note, this was incorrectly labeled as periaortic lymphadenopathy on examination dated 11/26/2018): SUV max 6.65, previously 37.    Stable right retrocaval lymph node measures approximately 1.6 cm (previously 1.5 cm) and SUV max 19.62, previously 20.63.    Redemonstration of increased tracer uptake associated with the myocardium, 1 in the region of the interventricular septum (SUV max 7.13, previously 8.8), and 1 in the region of the left ventricle lateral wall (SUV max 7.32, previously 6.43).  There has been interval resolution of FDG avidity within the region of the right atrial/ventricular wall.  New nonspecific FDG avid subcentimeter submandibular lymph node  demonstrating SUV max of 3.10.  This may be reactive in nature.  Nonspecific increased uptake within the anterior aspect of the ethmoid sinus.    Physiologic uptake of the tracer is seen within the pituitary, salivary, and thyroid glands, stomach, liver, spleen, kidneys, adrenal glands, prostate, and bowel.    Incidental CT findings: Heterogeneous liver.  Multiple calcified lymph nodes within the right hilar region, likely sequela prior granulomatous disease.  Subsegmental opacity within the right middle lobe which may represent subsegmental atelectasis or scarring.  Small amount of left sided dependent pleural fluid.  Prior cholecystectomy.  Punctate calcification within the spleen, likely sequela prior granulomatous disease.  Multiple rounded hyperdensities within the subcutaneous tissue of the buttocks presumably related to injection sites.  Impression: 1.  Multiple foci of uptake indicating persistent somatostatin receptor positive metastases with resolved paraesophageal node and smaller peripancreatic nodes that are no longer tracer avid and in keeping with partial response to treatment.    2.  Two out of 3 foci within the myocardium remaining that may represent cardiac metastases, though other processes may cause uptake.  Recommend correlation with cardiac history and consideration of dedicated cardiac MRI for further evaluation.      MRI Abdomen W WO Contrast-7/9/19  Narrative: EXAMINATION:  MRI ABDOMEN W WO CONTRAST    CLINICAL HISTORY:  Neoplasm: abdomen, metastatic, recurrence, suspected/known;  Neoplasm of unspecified behavior of other specified sites    TECHNIQUE:  Multisequence, multiplanar MRI of the abdomen performed per liver protocol before and after administration of 10 mL Gadavist intravenous contrast.    COMPARISON:  11/26/2018    FINDINGS:  Liver: There is diffuse loss of signal on opposed phase imaging consistent with steatosis.  There are multiple T2 hyperintense lesions which demonstrate  restricted diffusion and arterial enhancement.  Segment VIII lesion measures 2.9 cm, previously 2.4 cm (series 1400, image 31).  Segment V lesion measures 1.5 cm, previously 1.6 cm (series 1400, image 77).  Overall number of lesions appear similar to prior study.  There is apparent interval increase in enhancement.  Postsurgical changes of prior partial right hepatectomy noted.    Biliary: Gallbladder is absent.  No biliary dilatation.    Pancreas: Unremarkable.  No pancreatic ductal dilatation.    Spleen: Normal size.  No focal lesions.    Adrenal glands: Unremarkable.    Kidneys: No renal masses.  Several tiny cysts noted.  No hydronephrosis.    Miscellaneous: Bowel containing ventral abdominal hernia partially visualized.  No evidence for bowel obstruction.  Multiple prominent periportal and retroperitoneal lymph nodes are noted.  Aortocaval necrotic lymph node measures 1.6 cm short axis, previously 1.7 cm (series 1401, image 58).  Additional retrocaval necrotic lymph node measuring 1.5 cm noted, previously 1.1 cm (series 1401, image 72).  Impression: 1. Hepatic metastases and abdominal lymphadenopathy, overall similar to prior study.  2. Partially visualized bowel containing ventral abdominal hernia.  RECIST SUMMARY:    Date of prior examination for comparison: 11/26/2018    Lesion 1: Hepatic segment V.  1.5 cm. Series 1400 image 77.  Prior measurement 1.6 cm.    Lesion 2: Hepatic segment VIII.  2.9 cm. Series 1400 image 31.  Prior measurement 2.4 cm.    Lesion 3: Aortocaval lymph node.  1.6 cm. Series 1401 image 58.  Prior measurement 1.7 cm.    Lesion 4: Retrocaval lymph node.  1.5 cm. Series 1401 image 72.  Prior measurement 1.1 cm.       Echocardiogram- 7/8/19  · Normal left ventricular systolic function. The estimated ejection fraction is 65%  · Normal LV diastolic function.  · Concentric left ventricular hypertrophy.  · Mild aortic regurgitation.  · Normal right ventricular systolic function.  · Normal  central venous pressure (3 mm Hg).  · The estimated PA systolic pressure is 18 mm Hg      Assessment/Impression:         Problem List Items Addressed This Visit     None         COVID-19 Discussion:     I had long discussion with patient and any applicable family about the COVID-19 coronavirus epidemic and the recommended precautions with regard to cancer and/or hematology patients. I have re-iterated the CDC recommendations for adequate hand washing, use of hand -like products, and coughing into elbow, etc. In addition, especially for our patients who are on chemotherapy and/or our otherwise immunocompromised patients, I have recommended avoidance of crowds, including movie theaters, restaurants, churches, etc. I have recommended avoidance of any sick or symptomatic family members and/or friends. I have also recommended avoidance of any raw and unwashed food products, and general avoidance of food items that have not been prepared by themselves. The patient has been asked to call us immediately with any symptom developments, issues, questions or other general concerns.     Plan:         Had a long conversation with the patient about the features of his case that support the treatment and surveillance recommendations outlined below:  1.  His original diagnosis dates back to 2017.  He underwent surgical debulking and has been maintained on standard of care somatostatin receptor inhibitor therapy since that time.  Approximately 6 months ago, he was noted to have progression of disease in a retroperitoneal lymph node.  When we reviewed his images at tumor board, the group felt that the majority of his disease was overall stable, but agreed that the retroperitoneal lymph node had grown.  He and I discussed treatment options for single sites versus multiple sites.  He ultimately decided on stereotactic body radiotherapy to the retroperitoneal lymph node.  He completed that around 2 weeks ago.  I do not have any  blood work or cross-sectional imaging since completing treatment.    2.  Coincident with his radiographic progression of disease was a rise in his pancreastatin level.  Although his level of 200 was outside the normal range, this is compared to a level of over 3000 at diagnosis.  Our goal with treatment would be to see this value return into the normal range.  He has had some trouble finding labs closer to home with the appropriate tubes for his neuroendocrine blood work.  I will have Ayesha confirm with one or more labs in [place] where he can get his blood work done without difficulty.  3.  He is due for imaging 3 months after SBRT.  Unless his blood work were to suggest otherwise, I would leave his next follow-up imaging at the 3 month trudi status post SBRT.  If there is any question about his extent of disease after seeing blood work, I would move for a gallium 68 PET to confirm both pattern and extent of disease along with somatostatin receptor activity.      4.  At his prior visit, we spent a great deal of time discussing how we can better manage the symptoms of the disease.  He started tincture of opium and found that it not only work, but may have slowed him down more than expected.  I told him to cut back on the dose an use drops instead of mL.  I also suggested cholestyramine.  As a bile salt binder, can be used alone or in combination with tincture of opium to manage symptoms related to carcinoid syndrome.  5.  His glucose levels have been running high on his recent blood draws.  We discussed the side effects of long-acting somatostatin receptor inhibitor therapy on the glycemic index including how this contributes to fatty infiltration of the liver.  His last lanreotide drug level was low.  This was due to a delay in getting his injection.  Even with this in mind, I think his drug levels running significantly below threshold may leave him vulnerable to further progression of disease.  I am reluctant to  decrease the interval between injections since this would only worsen his recent issues with elevated A1c levels and potentially cause more fatty infiltration of the liver.  I will get follow-up blood work with neuroendocrine markers, an A1c level, and lanreotide drug levels before taking his case back to tumor board to determine the best course of action.      We also discussed the fact that he and his wife are spending more time in [place].  They will likely spend most of next winter in [place] instead of [place].  They are in [place] and therefore close to the [facility].  I believe that it should be seem less to root him in that system for his somatostatin receptor inhibitor injections.  They will likely have the ability to dry his labs as well.    Need to confirm the lab in [place] has the appropriate neuroendocrine tubes  S/p SBRT completed 2 weeks ago  Neuroendocrine blood work, including A1c, CMP,  And Lanreotide drug level  Continue Tincture of opium  Try cholestyramine  Renewed his oxycodone        Tayla Her MD, MPH, FACS  Professor of Surgery, [facility]  Neuroendocrine Surgery, Hepatic/Pancreatic & General Surgical Oncology  [address]  [place]  [address]  ph. [phone]; [phone]  fax. [phone]      60 minutes were spent in record review, medication reconciliation, coordination of care between multiple providers and counseling the patient on short term and longer term treatment goals.  More than 45 minutes was spent in direct patient contact, either face to face over the virtual platform.

## 2020-06-29 ENCOUNTER — OFFICE VISIT (OUTPATIENT)
Dept: NEUROLOGY | Facility: HOSPITAL | Age: 50
End: 2020-06-29
Attending: SURGERY
Payer: COMMERCIAL

## 2020-06-29 DIAGNOSIS — T50.905A HYPERGLYCEMIA, DRUG-INDUCED: ICD-10-CM

## 2020-06-29 DIAGNOSIS — E34.0 DIARRHEA CONCURRENT WITH AND DUE TO CARCINOID SYNDROME: ICD-10-CM

## 2020-06-29 DIAGNOSIS — C7A.012 MALIGNANT CARCINOID TUMOR OF THE ILEUM: ICD-10-CM

## 2020-06-29 DIAGNOSIS — K76.0 FATTY LIVER DISEASE, NONALCOHOLIC: ICD-10-CM

## 2020-06-29 DIAGNOSIS — R73.9 HYPERGLYCEMIA, DRUG-INDUCED: ICD-10-CM

## 2020-06-29 DIAGNOSIS — R73.9 DRUG-INDUCED HYPERGLYCEMIA: ICD-10-CM

## 2020-06-29 DIAGNOSIS — T50.905A DRUG-INDUCED HYPERGLYCEMIA: ICD-10-CM

## 2020-06-29 DIAGNOSIS — K52.9 DIARRHEA CONCURRENT WITH AND DUE TO CARCINOID SYNDROME: ICD-10-CM

## 2020-06-29 DIAGNOSIS — C7B.8 SECONDARY NEUROENDOCRINE TUMOR OF DISTANT LYMPH NODES: ICD-10-CM

## 2020-06-29 DIAGNOSIS — C7A.012 MALIGNANT CARCINOID TUMOR OF ILEUM: ICD-10-CM

## 2020-06-29 DIAGNOSIS — C7B.02 METASTATIC MALIGNANT CARCINOID TUMOR TO LIVER: Primary | ICD-10-CM

## 2020-06-29 RX ORDER — CHOLESTYRAMINE 4 G/9G
4 POWDER, FOR SUSPENSION ORAL
Qty: 90 PACKET | Refills: 11 | Status: SHIPPED | OUTPATIENT
Start: 2020-06-29 | End: 2022-03-15

## 2020-06-29 RX ORDER — OXYCODONE HCL 10 MG/1
10 TABLET, FILM COATED, EXTENDED RELEASE ORAL EVERY 12 HOURS PRN
Qty: 14 TABLET | Refills: 0 | Status: SHIPPED | OUTPATIENT
Start: 2020-06-29 | End: 2021-03-30 | Stop reason: SDUPTHER

## 2020-06-29 RX ORDER — MORPHINE 10 MG/ML
10 TINCTURE ORAL 4 TIMES DAILY PRN
Qty: 120 ML | Refills: 0 | Status: SHIPPED | OUTPATIENT
Start: 2020-06-29 | End: 2021-02-02

## 2020-06-30 NOTE — PATIENT INSTRUCTIONS
Tumor markers: every 6 months  --- due November 2020  Neuroendocrine blood work, including A1c, CMP,  And Lanreotide drug level  Need to confirm the lab in [place] has the appropriate neuroendocrine tubes  Get lab work drawn at least 1 month prior to appointment.    Scans: hold on scans at this time    Medicines:   Continue Tincture of opium  Try cholestyramine  Renewed his oxycodone    Notes From Physician: S/p SBRT completed 2 weeks ago    Return Clinic Appointment: as needed with Dr. Her - patient will call to schedule an appointment

## 2020-07-13 NOTE — PLAN OF CARE
Mom wants to know if she can drop off ua due to strong smell. Thanks for calling mom back.   Problem: Patient Care Overview  Goal: Plan of Care Review  Outcome: Ongoing (interventions implemented as appropriate)  Patient is AAOx3, NAD noted, VSS.  Central line removed.  Tip sent to lab.  Vancomycin administered, shortly after patient complained of few hives on his forehead.  Benadryl given.  No hives seen at the moment.  Blood cultures done.  TPN infusing at 75cc,hr.  Patient tolerating diet.  PRN Tramadol given once today.  Patient will be NPO at midnight.  Wife is at the bedside.  Safety has been maintained.  Will continue to monitor.     Problem: Pain, Acute (Adult)  Goal: Acceptable Pain Control/Comfort Level  Patient will demonstrate the desired outcomes by discharge/transition of care.   Outcome: Ongoing (interventions implemented as appropriate)

## 2020-07-23 DIAGNOSIS — C7B.02 METASTATIC MALIGNANT CARCINOID TUMOR TO LIVER: Primary | ICD-10-CM

## 2020-07-23 DIAGNOSIS — C7B.8 SECONDARY NEUROENDOCRINE TUMOR OF DISTANT LYMPH NODES: ICD-10-CM

## 2020-09-10 ENCOUNTER — OFFICE VISIT (OUTPATIENT)
Dept: NEUROLOGY | Facility: HOSPITAL | Age: 50
End: 2020-09-10
Attending: SURGERY
Payer: COMMERCIAL

## 2020-09-10 DIAGNOSIS — E34.0 CARCINOID SYNDROME: ICD-10-CM

## 2020-09-10 DIAGNOSIS — T50.905A DRUG-INDUCED HYPERGLYCEMIA: ICD-10-CM

## 2020-09-10 DIAGNOSIS — C7B.8 SECONDARY NEUROENDOCRINE TUMOR OF DISTANT LYMPH NODES: ICD-10-CM

## 2020-09-10 DIAGNOSIS — E34.0 DIARRHEA CONCURRENT WITH AND DUE TO CARCINOID SYNDROME: ICD-10-CM

## 2020-09-10 DIAGNOSIS — K52.9 DIARRHEA CONCURRENT WITH AND DUE TO CARCINOID SYNDROME: ICD-10-CM

## 2020-09-10 DIAGNOSIS — C7B.02 METASTATIC MALIGNANT CARCINOID TUMOR TO LIVER: Primary | ICD-10-CM

## 2020-09-10 DIAGNOSIS — K76.0 FATTY LIVER DISEASE, NONALCOHOLIC: ICD-10-CM

## 2020-09-10 DIAGNOSIS — R73.9 DRUG-INDUCED HYPERGLYCEMIA: ICD-10-CM

## 2020-09-10 DIAGNOSIS — C7A.012 MALIGNANT CARCINOID TUMOR OF ILEUM: ICD-10-CM

## 2020-09-10 NOTE — PROGRESS NOTES
NOLANETS:  [facility]  A collaboration between [facility] and [facility]      PATIENT: Javy Thompson  MRN: [id number]  DATE: 9/10/2020    Subjective:      Chief Complaint: metastatic midgut carcinoid, on active treatment, symptomatic  Worsening carcinoid syndrome     The patient location is: home  The chief complaint leading to consultation is: scheduled follow-up, worsening carcinoid syndrome despite monthly injections, hyperglycemia  Visit type: Virtual visit with synchronous audio and video  Total time spent with patient: 40    Each patient to whom he or she provides medical services by telemedicine is:  (1) informed of the relationship between the physician and patient and the respective role of any other health care provider with respect to management of the patient; and (2) notified that he or she may decline to receive medical services by telemedicine and may withdraw from such care at any time.    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 He presented with measurable disease within the small bowel, small bowel mesentery, and liver    Interval History:     CT chest  MRI abdomen with contrast  NET BW    Vitals: There were no vitals taken for this visit. --stable    ECOG Score: 1 - Symptomatic but completely ambulatory    Oncologic History:   Oncologic History Ileal net, dx 2016, liver and román mets  Carcinoid syndrome- diarrhea   Oncologic Treatment 3/2017- surgery (Eric)  Lanreotide  SBRT to retroperitoneal lymph node   Pathology 3/2017- small bowel- well diff net, multifocal (5), G2, Ki 67- 10.9%, 3/12 nodes pos; liver- well diff net     Past Medical History:  Past Medical History:   Diagnosis Date    Mesenteric mass 2/8/2017    Metastatic carcinoid tumor 4/18/2017    Mr. Thompson is well known to me having been diagnosed with metastatic ileal carcinoid in 2016. He was  resected in 3/2017. He has had slow progression of disease in the mediastinum, liver, and retroperitoneal román basins.    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       Past Surgical History:  Past Surgical History:   Procedure Laterality Date    ANTERIOR CRUCIATE LIGAMENT REPAIR Right 1986    APPENDECTOMY  3/31/2017    Procedure: APPENDECTOMY;  Surgeon: Fidelia Reyes MD;  Location: [facility];  Service: General;;    KNEE ARTHROSCOPY Right 1986    x2    LIVER BIOPSY  12/2016, 1/2017    TONSILLECTOMY         Family History:  Family History   Problem Relation Age of Onset    Cancer Mother        Allergies:  Epinephrine and Iodinated contrast media    Medications:  Current Outpatient Medications   Medication Sig    cholestyramine (QUESTRAN) 4 gram packet Take 1 packet (4 g total) by mouth 3 (three) times daily with meals.    HYDROcodone-acetaminophen (NORCO) 5-325 mg per tablet Take 1 tablet by mouth every 6 (six) hours as needed for Pain.    lanreotide (SOMATULINE DEPOT) 120 mg/0.5 mL Syrg Inject 120 mg into the skin every 28 days.    lipase-protease-amylase (ZENPEP) 25,000-79,000- 105,000 unit CpDR Take 3 caps by mouth with meals and Take 2 caps by mouth with snacks    opium tincture 10 mg/mL (morphine) Tinc Take 1 mL (10 mg total) by mouth 4 (four) times daily as needed. Titrate starting with 4 drops with meals to 1-2 bowel movements a day. Can be used in combination with cholestyramine for the management of carcinoid syndrome    oxyCODONE (OXYCONTIN) 10 mg 12 hr tablet Take 1 tablet (10 mg total) by mouth every 12 (twelve) hours as needed for Pain.    oxyCODONE-acetaminophen (PERCOCET)  mg per tablet Take 1 tablet by mouth every 6 (six) hours as needed for Pain.    senna-docusate 8.6-50 mg (PERICOLACE) 8.6-50 mg per tablet Take 1 tablet by mouth daily as needed for Constipation.    spironolactone (ALDACTONE)  50 MG tablet Take 1 tablet (50 mg total) by mouth once daily.     No current facility-administered medications for this visit.         Review of Systems   Constitutional: Negative.    HENT: Negative.    Eyes: Negative.    Respiratory: Negative.    Cardiovascular: Negative.    Gastrointestinal: Positive for abdominal distention, abdominal pain and diarrhea.   Endocrine:        Hyperglycemia   Genitourinary: Negative.    Musculoskeletal: Positive for back pain.   Skin: Negative.    Allergic/Immunologic: Negative.    Neurological: Negative.    Hematological: Negative.    Psychiatric/Behavioral: Negative.           Objective:      Physical Exam  Constitutional:       General: He is not in acute distress.     Appearance: He is well-developed.   HENT:      Head: Normocephalic.   Pulmonary:      Effort: No respiratory distress.      Breath sounds: No stridor.   Neurological:      Mental Status: He is alert and oriented to person, place, and time.   Psychiatric:         Behavior: Behavior normal.         Thought Content: Thought content normal.         Judgment: Judgment normal.          Laboratory Data:    Neuroendocrine Labs Latest Ref Rng & Units 5/14/2020 5/13/2020   EXT 5 HIAA BLOOD 0 - 22 ng/mL 18 18   EXT SEROTONIN 56 - 244 ng/ml  457 (A)   EXT CHROMOGRANIN A 0 - 15 ng/ml     EXT PANCREASTATIN COLE 10 - 135 pg/mL 207 (A) 207 (A)   EXT NEUROKININ A COLE 0 - 40 pg/ml     EXT LANREOTIDE LEVEL pg/mL 256 2,569       Neuroendocrine Labs Latest Ref Rng & Units 10/24/2018   EXT 5 HIAA BLOOD 0 - 22 ng/ml 11   EXT SEROTONIN 56 - 244 ng/ml 173   EXT CHROMOGRANIN A 0 - 15 ng/ml    EXT PANCREASTATIN COLE 10 - 135 pg/ml 89   EXT NEUROKININ A COLE 0 - 40 pg/ml 20   WBC 3.90 - 12.70 K/uL    EXT WBC 3.3 - 10.5 k/cumm 7.2   HGB 14.0 - 18.0 g/dL    EXT HGB 12.8 - 16.9 g/dl 15.7   HCT 40.0 - 54.0 %    EXT HCT 38.8 - 50.2 % 46.5   PLATLETS 150 - 350 K/uL    EXT PLATLETS 150 - 450 k/cumm 169   PT 9.0 - 12.5 sec    PTT 21.0 - 32.0 sec    INR  0.8 - 1.2    GLUCOSE 70 - 110 mg/dL    EXT GLUCOSE 65 - 99 mg/dl 136 (A)   BUN 6 - 20 mg/dL    EXT BUN 8 - 26 mg/dl 13   CREATININE 0.5 - 1.4 mg/dL    EXT CREATININE 0.80 - 1.50 mg/dl 0.93    - 145 mmol/L    EXT  - 145 mmol/l 138   K 3.5 - 5.1 mmol/L    EXT K 3.5 - 5.5 mmol/l 4.3   CHLORIDE 95 - 110 mmol/L    EXT CHLORIDE 98 - 110 mmol/l 99   CO2 23 - 29 mmol/L    EXT CO2 20 - 29 mmol/l 28   CALCIUM 8.7 - 10.5 mg/dL    EXT CALCIUM 8.4 - 10.5 mg/dl 9.6   PROTEIN, TOTAL 6.0 - 8.4 g/dL    EXT PROTEIN, TOTAL 6.0 - 8.3 g/dl 7.4   PHOSPHORUS 2.7 - 4.5 mg/dL    ALBUMIN 3.5 - 5.2 g/dL    EXT ALBUMIN 3.5 - 5.0 gm/dl 4.1   TOTAL BILIRUBIN 0.1 - 1.0 mg/dL    EXT TOTAL BILIRUBIN 0.1 - 1.2 mg/dl 1.1   ALK PHOSPHATASE 55 - 135 U/L    EXT ALK PHOSPHATASE 38 - 126 u/l 90   SGOT (AST) 10 - 40 U/L    EXT SGOT (AST) 17 - 59 u/l 30   SGPT (ALT) 10 - 44 U/L    EXT ALT 11 - 58 u/l 16     Scans:         MRI abdomen 9/2020    [facility] RAD[facility] REPORT  EXAM: MRI Abdomen wo then w Contrast  DATE: 09/02/2020 15:11 hours  INDICATION: History of neuroendocrine tumor status post resection.  COMPARISON: CT abdomen and pelvis dated May 6, 2020  TECHNIQUE: MR imaging of the abdomen was obtained pre and post IV  contrast. Routine protocol was utilized. Images were obtained in  multiple planes.  10 ml of GADAVIST were administered  FINDINGS:  Liver: Hepatic steatosis. Multiple T2 hyperintense lesions are noted  throughout the liver. The largest is located within the right hepatic  lobe, segment 8 measuring approximately 3.5 cm, increased in size  from the comparison CT. At least 4 lesions are noted within the  liver. . Two of the smaller lesions were not apparent on the  comparison CT. The posterior peripheral right hepatic lesion  measuring 1.7 cm increased size from prior examination (previously  1.3 cm) (series 10, image 10). Postsurgical changes noted within the  right lobe of the liver.  Bile Ducts:  Negative for intra or extrahepatic biliary dilatation.  Gallbladder: The gallbladder is surgically absent.  Pancreas: The pancreas is fatty and atrophied.  Spleen: The spleen is unremarkable in size and contour.  Adrenals: Small nodule adjacent to the left adrenal gland. The nodule  measures 0.9 cm and is unchanged from the prior examination.  The adrenal gland itself is unremarkable.  Urinary Tract: Negative for hydronephrosis. Adjacent to the right  renal vein is a peripherally enhancing nodule measuring approximately  1.2 cm in the short axis, decreased in size from the prior  examination and favored to represent a necrotic lymph node.  Reproductive Organs: Not included on this field-of-view  Bowel: No overly dilated loops of small bowel. The stomach and  esophagus are unremarkable.  Appendix: The appendix is not visualized.  Lymph Nodes: Similar lymphadenopathy of the erendira hepatis.  Peritoneum: Negative for free fluid.  Vasculature: The abdominal aorta is unremarkable in size and contour.  Abdominal Wall: Midline abdominal scar.  Bones: Negative for acute osseous findings. Focal 0.6 cm T1  hypointense lesion within T12.  Visualized thorax: Normal heart size. Evaluation of the lungs is  suboptimal on MRI and a concurrent chest CT was performed. 2  pulmonary nodules in the right lower lobe appear enlarged from the  prior examination, however comparison and evaluation is better  performed by CT.  IMPRESSION:  1. Increased size and number of multiple hepatic lesions concerning  for progression of disease.  2. Question osseous metastatic disease within T12. Attention on  follow-up examinations is recommended.  3. Similar erendira hepatis and retroperitoneal lymphadenopathy.  Template Version: [id number]  Thank you for consulting our team of subspecialty body imaging  radiologists at [facility].  Healthcare providers wishing to discuss this case further can contact  the [facility] Abdominal Imaging Reading Room at  [phone].  Petar JEFF have personally reviewed the image(s) and the  prepared and signed interpretation by Samreen Romo. Based on my review,  I agree with the findings.  Electronically Signed by: Petar Rob  Dictated on: 9/2/2020 3:14 PM    CT abd/pelvis- 2/24/20          NM PET Ga68 Dotatate Whole Body-7/9/19  Narrative: EXAMINATION:  NM PET 68GA DOTATATE WHOLE BODY    CLINICAL HISTORY:  Malignant carcinoid tumor of the ileum    TECHNIQUE:  4.67 mCi of GA68 Dotatate was administered intravenously in the right antecubital fossa.  After an approximately 60 min distribution time, PET/CT images were acquired from the skull vertex to the mid thigh. Transmission images were acquired to correct for attenuation using a whole body low-dose CT scan without contrast with the arms positioned above the head.    COMPARISON:  Gallium 68 dota-hua PET 11/26/2018.    FINDINGS:  Quality of the study: Adequate.    Multiple areas increased FDG avidity including innumerable hepatic lesions, retroperitoneal, mesenteric, and peripancreatic lymph nodes with index lesions as below:    Superior right lobe of the liver: SUV max 13.64, previously 22.83.    Paraesophageal lymph node has resolved.    Peripancreatic lymphadenopathy (largest lymph measures 1.7 cm in short axis, previously 2.0 cm; of note, this was incorrectly labeled as periaortic lymphadenopathy on examination dated 11/26/2018): SUV max 6.65, previously 37.    Stable right retrocaval lymph node measures approximately 1.6 cm (previously 1.5 cm) and SUV max 19.62, previously 20.63.    Redemonstration of increased tracer uptake associated with the myocardium, 1 in the region of the interventricular septum (SUV max 7.13, previously 8.8), and 1 in the region of the left ventricle lateral wall (SUV max 7.32, previously 6.43).  There has been interval resolution of FDG avidity within the region of the right  atrial/ventricular wall.  New nonspecific FDG avid subcentimeter submandibular lymph node demonstrating SUV max of 3.10.  This may be reactive in nature.  Nonspecific increased uptake within the anterior aspect of the ethmoid sinus.    Physiologic uptake of the tracer is seen within the pituitary, salivary, and thyroid glands, stomach, liver, spleen, kidneys, adrenal glands, prostate, and bowel.    Incidental CT findings: Heterogeneous liver.  Multiple calcified lymph nodes within the right hilar region, likely sequela prior granulomatous disease.  Subsegmental opacity within the right middle lobe which may represent subsegmental atelectasis or scarring.  Small amount of left sided dependent pleural fluid.  Prior cholecystectomy.  Punctate calcification within the spleen, likely sequela prior granulomatous disease.  Multiple rounded hyperdensities within the subcutaneous tissue of the buttocks presumably related to injection sites.  Impression: 1.  Multiple foci of uptake indicating persistent somatostatin receptor positive metastases with resolved paraesophageal node and smaller peripancreatic nodes that are no longer tracer avid and in keeping with partial response to treatment.    2.  Two out of 3 foci within the myocardium remaining that may represent cardiac metastases, though other processes may cause uptake.  Recommend correlation with cardiac history and consideration of dedicated cardiac MRI for further evaluation.      MRI Abdomen W WO Contrast-7/9/19  Narrative: EXAMINATION:  MRI ABDOMEN W WO CONTRAST    CLINICAL HISTORY:  Neoplasm: abdomen, metastatic, recurrence, suspected/known;  Neoplasm of unspecified behavior of other specified sites    TECHNIQUE:  Multisequence, multiplanar MRI of the abdomen performed per liver protocol before and after administration of 10 mL Gadavist intravenous contrast.    COMPARISON:  11/26/2018    FINDINGS:  Liver: There is diffuse loss of signal on opposed phase imaging  consistent with steatosis.  There are multiple T2 hyperintense lesions which demonstrate restricted diffusion and arterial enhancement.  Segment VIII lesion measures 2.9 cm, previously 2.4 cm (series 1400, image 31).  Segment V lesion measures 1.5 cm, previously 1.6 cm (series 1400, image 77).  Overall number of lesions appear similar to prior study.  There is apparent interval increase in enhancement.  Postsurgical changes of prior partial right hepatectomy noted.    Biliary: Gallbladder is absent.  No biliary dilatation.    Pancreas: Unremarkable.  No pancreatic ductal dilatation.    Spleen: Normal size.  No focal lesions.    Adrenal glands: Unremarkable.    Kidneys: No renal masses.  Several tiny cysts noted.  No hydronephrosis.    Miscellaneous: Bowel containing ventral abdominal hernia partially visualized.  No evidence for bowel obstruction.  Multiple prominent periportal and retroperitoneal lymph nodes are noted.  Aortocaval necrotic lymph node measures 1.6 cm short axis, previously 1.7 cm (series 1401, image 58).  Additional retrocaval necrotic lymph node measuring 1.5 cm noted, previously 1.1 cm (series 1401, image 72).  Impression: 1. Hepatic metastases and abdominal lymphadenopathy, overall similar to prior study.  2. Partially visualized bowel containing ventral abdominal hernia.  RECIST SUMMARY:    Date of prior examination for comparison: 11/26/2018    Lesion 1: Hepatic segment V.  1.5 cm. Series 1400 image 77.  Prior measurement 1.6 cm.    Lesion 2: Hepatic segment VIII.  2.9 cm. Series 1400 image 31.  Prior measurement 2.4 cm.    Lesion 3: Aortocaval lymph node.  1.6 cm. Series 1401 image 58.  Prior measurement 1.7 cm.    Lesion 4: Retrocaval lymph node.  1.5 cm. Series 1401 image 72.  Prior measurement 1.1 cm.       Echocardiogram- 7/8/19  · Normal left ventricular systolic function. The estimated ejection fraction is 65%  · Normal LV diastolic function.  · Concentric left ventricular  hypertrophy.  · Mild aortic regurgitation.  · Normal right ventricular systolic function.  · Normal central venous pressure (3 mm Hg).  · The estimated PA systolic pressure is 18 mm Hg      Assessment/Impression:         Problem List Items Addressed This Visit     None           Plan:         Had a long conversation with the patient about the features of his case that support the treatment and surveillance recommendations outlined below:  1.  His original diagnosis dates back to 2017.  He underwent surgical debulking and has been maintained on standard of care somatostatin receptor inhibitor therapy since that time.  Within the last year, he has experienced progression of disease despite SSI treatment that has prompted additional treatment.  He completed stereotactic body radiotherapy to a retroperitoneal lymph node.   2.  Coincident with his radiographic progression of disease was a rise in his pancreastatin level.  Although his level of 200 was outside the normal range, this is compared to a level of over 3000 at diagnosis.  His most recent neuroendocrine blood work is from May 2020 showing a pancreas statin level of 207.    3.  He had a CT scan of the chest as well as the abdominal MRI with contrast prior to this visit.  The CT scan of the chest comments on parenchymal soft tissue abnormalities that have grown slightly compared to a prior scan.  His liver MRI also shows progression of disease in 1 or more sites although the pattern of disease within his liver is the same.  The retroperitoneal and peripancreatic lymphadenopathy appears stable.  Of the treatment options available for multilevel multisite progression of disease despite adequate doses of somatostatin receptor inhibitor therapy, PRRT would be the most effective at targeting multiple sites with a single treatment modality.  I will present his case at tumor board to discuss treatment options.      He asked several appropriate questions about prognosis.   We have previously discussed the natural history of a low-grade metastatic midgut carcinoid tumor.  The favorable aspects of the disease the are most often a slow in indolent biologic behavior with patient's living along time despite evidence of metastatic disease at presentation.  However in his case, he has shown progression of disease over a relatively short period of time in the context of a midgut primary.  This would temper our expectations on long-term survival without additional treatment.  The goal of peptide base receptor treatment would be a durable delay in progression over time.  Although I stand by our decision for local treatment to the retroperitoneal lymph node, I believe that systemic treatment at this juncture is clearly warranted.  An updated gallium 68 PET would help assess the applicable it of this in the context of his current disease burden.    Tumor board to confirm treatment options  Progression of disease with in the chest and liver  Need updated neuroendocrine blood work  Gallium 68 PET  Consider PRRT at this juncture    Discussed case with Dr. Lew, his local medical oncologist      Tayla Her MD, MPH, FACS  Professor of Surgery, [facility]  Neuroendocrine Surgery, Hepatic/Pancreatic & General Surgical Oncology  [address]  [place]  [address]  ph. [phone]; [phone]  fax. [phone]      60 minutes were spent in record review, medication reconciliation, coordination of care between multiple providers and counseling the patient on short term and longer term treatment goals.  More than 45 minutes was spent in direct patient contact, either face to face over the virtual platform.

## 2020-09-14 ENCOUNTER — TELEPHONE (OUTPATIENT)
Dept: NEUROLOGY | Facility: HOSPITAL | Age: 50
End: 2020-09-14

## 2020-09-14 NOTE — TELEPHONE ENCOUNTER
Message routed to Marleny to add on to Tumor Board. Patient advised that we would call him with recommendations.

## 2020-09-14 NOTE — TELEPHONE ENCOUNTER
----- Message from Tayla Her MD sent at 9/11/2020  4:43 PM CDT -----  One more for tumor board if possible. Multi-site progression. PRRT? LDT followed by PRRT?

## 2020-09-18 ENCOUNTER — PATIENT MESSAGE (OUTPATIENT)
Dept: NEUROLOGY | Facility: HOSPITAL | Age: 50
End: 2020-09-18

## 2020-09-22 ENCOUNTER — OFFICE VISIT (OUTPATIENT)
Dept: NEUROLOGY | Facility: HOSPITAL | Age: 50
End: 2020-09-22
Attending: SURGERY
Payer: COMMERCIAL

## 2020-09-22 DIAGNOSIS — R73.9 DRUG-INDUCED HYPERGLYCEMIA: ICD-10-CM

## 2020-09-22 DIAGNOSIS — K76.0 FATTY LIVER DISEASE, NONALCOHOLIC: ICD-10-CM

## 2020-09-22 DIAGNOSIS — C7B.8 SECONDARY NEUROENDOCRINE TUMOR OF DISTANT LYMPH NODES: Primary | ICD-10-CM

## 2020-09-22 DIAGNOSIS — C7B.02 METASTATIC MALIGNANT CARCINOID TUMOR TO LIVER: ICD-10-CM

## 2020-09-22 DIAGNOSIS — T50.905A DRUG-INDUCED HYPERGLYCEMIA: ICD-10-CM

## 2020-09-22 NOTE — PROGRESS NOTES
NOLANETS:  [facility]  A collaboration between [facility] and [facility]      PATIENT: Javy Thompson  MRN: [id number]  DATE: 9/21/2020    Subjective:      Chief Complaint: metastatic midgut carcinoid, on active treatment, symptomatic  Worsening carcinoid syndrome     The patient location is: home  The chief complaint leading to consultation is: scheduled follow-up, worsening carcinoid syndrome despite monthly injections, hyperglycemia  Visit type: Virtual visit with synchronous audio and video  Total time spent with patient: 40    Each patient to whom he or she provides medical services by telemedicine is:  (1) informed of the relationship between the physician and patient and the respective role of any other health care provider with respect to management of the patient; and (2) notified that he or she may decline to receive medical services by telemedicine and may withdraw from such care at any time.    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 He presented with measurable disease within the small bowel, small bowel mesentery, and liver    Interval History:     CT chest  MRI abdomen with contrast  NET BW    Vitals: There were no vitals taken for this visit. --stable    ECOG Score: 1 - Symptomatic but completely ambulatory    Oncologic History:   Oncologic History Ileal net, dx 2016, liver and román mets  Carcinoid syndrome- diarrhea   Oncologic Treatment 3/2017- surgery (Eric)  Lanreotide  SBRT to retroperitoneal lymph node   Pathology 3/2017- small bowel- well diff net, multifocal (5), G2, Ki 67- 10.9%, 3/12 nodes pos; liver- well diff net     Past Medical History:  Past Medical History:   Diagnosis Date    Mesenteric mass 2/8/2017    Metastatic carcinoid tumor 4/18/2017    Mr. Thompson is well known to me having been diagnosed with metastatic ileal carcinoid in 2016. He was  resected in 3/2017. He has had slow progression of disease in the mediastinum, liver, and retroperitoneal román basins.    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       Past Surgical History:  Past Surgical History:   Procedure Laterality Date    ANTERIOR CRUCIATE LIGAMENT REPAIR Right 1986    APPENDECTOMY  3/31/2017    Procedure: APPENDECTOMY;  Surgeon: Fidelia Reyes MD;  Location: [facility];  Service: General;;    KNEE ARTHROSCOPY Right 1986    x2    LIVER BIOPSY  12/2016, 1/2017    TONSILLECTOMY         Family History:  Family History   Problem Relation Age of Onset    Cancer Mother        Allergies:  Epinephrine and Iodinated contrast media    Medications:  Current Outpatient Medications   Medication Sig    cholestyramine (QUESTRAN) 4 gram packet Take 1 packet (4 g total) by mouth 3 (three) times daily with meals.    HYDROcodone-acetaminophen (NORCO) 5-325 mg per tablet Take 1 tablet by mouth every 6 (six) hours as needed for Pain.    lanreotide (SOMATULINE DEPOT) 120 mg/0.5 mL Syrg Inject 120 mg into the skin every 28 days.    lipase-protease-amylase (ZENPEP) 25,000-79,000- 105,000 unit CpDR Take 3 caps by mouth with meals and Take 2 caps by mouth with snacks    opium tincture 10 mg/mL (morphine) Tinc Take 1 mL (10 mg total) by mouth 4 (four) times daily as needed. Titrate starting with 4 drops with meals to 1-2 bowel movements a day. Can be used in combination with cholestyramine for the management of carcinoid syndrome    oxyCODONE (OXYCONTIN) 10 mg 12 hr tablet Take 1 tablet (10 mg total) by mouth every 12 (twelve) hours as needed for Pain.    oxyCODONE-acetaminophen (PERCOCET)  mg per tablet Take 1 tablet by mouth every 6 (six) hours as needed for Pain.    senna-docusate 8.6-50 mg (PERICOLACE) 8.6-50 mg per tablet Take 1 tablet by mouth daily as needed for Constipation.    spironolactone (ALDACTONE)  50 MG tablet Take 1 tablet (50 mg total) by mouth once daily.     No current facility-administered medications for this visit.         Review of Systems   Constitutional: Negative for fever and unexpected weight change.   HENT: Negative for facial swelling and hearing loss.    Eyes: Negative for photophobia and visual disturbance.   Respiratory: Negative for apnea and chest tightness.    Cardiovascular: Negative for palpitations.   Gastrointestinal: Positive for abdominal distention, abdominal pain and diarrhea.   Endocrine: Negative for cold intolerance and heat intolerance.        Hyperglycemia   Genitourinary: Negative for difficulty urinating and dysuria.   Musculoskeletal: Positive for back pain.   Skin: Negative for rash and wound.   Allergic/Immunologic: Negative for food allergies and immunocompromised state.   Neurological: Negative for tremors and weakness.   Hematological: Does not bruise/bleed easily.   Psychiatric/Behavioral: Negative.           Objective:      Physical Exam  Constitutional:       General: He is not in acute distress.     Appearance: He is well-developed.   HENT:      Head: Normocephalic.   Pulmonary:      Effort: No respiratory distress.      Breath sounds: No stridor.   Neurological:      Mental Status: He is alert and oriented to person, place, and time.   Psychiatric:         Behavior: Behavior normal.         Thought Content: Thought content normal.         Judgment: Judgment normal.          Laboratory Data:    Neuroendocrine Labs Latest Ref Rng & Units 5/14/2020 5/13/2020   EXT 5 HIAA BLOOD 0 - 22 ng/mL 18 18   EXT SEROTONIN 56 - 244 ng/ml  457 (A)   EXT CHROMOGRANIN A 0 - 15 ng/ml     EXT PANCREASTATIN COLE 10 - 135 pg/mL 207 (A) 207 (A)   EXT NEUROKININ A COLE 0 - 40 pg/ml     EXT LANREOTIDE LEVEL pg/mL 256 2,569       Neuroendocrine Labs Latest Ref Rng & Units 10/24/2018   EXT 5 HIAA BLOOD 0 - 22 ng/ml 11   EXT SEROTONIN 56 - 244 ng/ml 173   EXT CHROMOGRANIN A 0 - 15 ng/ml     EXT PANCREASTATIN COLE 10 - 135 pg/ml 89   EXT NEUROKININ A COLE 0 - 40 pg/ml 20   WBC 3.90 - 12.70 K/uL    EXT WBC 3.3 - 10.5 k/cumm 7.2   HGB 14.0 - 18.0 g/dL    EXT HGB 12.8 - 16.9 g/dl 15.7   HCT 40.0 - 54.0 %    EXT HCT 38.8 - 50.2 % 46.5   PLATLETS 150 - 350 K/uL    EXT PLATLETS 150 - 450 k/cumm 169   PT 9.0 - 12.5 sec    PTT 21.0 - 32.0 sec    INR 0.8 - 1.2    GLUCOSE 70 - 110 mg/dL    EXT GLUCOSE 65 - 99 mg/dl 136 (A)   BUN 6 - 20 mg/dL    EXT BUN 8 - 26 mg/dl 13   CREATININE 0.5 - 1.4 mg/dL    EXT CREATININE 0.80 - 1.50 mg/dl 0.93    - 145 mmol/L    EXT  - 145 mmol/l 138   K 3.5 - 5.1 mmol/L    EXT K 3.5 - 5.5 mmol/l 4.3   CHLORIDE 95 - 110 mmol/L    EXT CHLORIDE 98 - 110 mmol/l 99   CO2 23 - 29 mmol/L    EXT CO2 20 - 29 mmol/l 28   CALCIUM 8.7 - 10.5 mg/dL    EXT CALCIUM 8.4 - 10.5 mg/dl 9.6   PROTEIN, TOTAL 6.0 - 8.4 g/dL    EXT PROTEIN, TOTAL 6.0 - 8.3 g/dl 7.4   PHOSPHORUS 2.7 - 4.5 mg/dL    ALBUMIN 3.5 - 5.2 g/dL    EXT ALBUMIN 3.5 - 5.0 gm/dl 4.1   TOTAL BILIRUBIN 0.1 - 1.0 mg/dL    EXT TOTAL BILIRUBIN 0.1 - 1.2 mg/dl 1.1   ALK PHOSPHATASE 55 - 135 U/L    EXT ALK PHOSPHATASE 38 - 126 u/l 90   SGOT (AST) 10 - 40 U/L    EXT SGOT (AST) 17 - 59 u/l 30   SGPT (ALT) 10 - 44 U/L    EXT ALT 11 - 58 u/l 16     Scans:         MRI abdomen 9/2020    [facility]  EXAM: MRI Abdomen wo then w Contrast  DATE: 09/02/2020 15:11 hours  INDICATION: History of neuroendocrine tumor status post resection.  COMPARISON: CT abdomen and pelvis dated May 6, 2020  TECHNIQUE: MR imaging of the abdomen was obtained pre and post IV  contrast. Routine protocol was utilized. Images were obtained in  multiple planes.  10 ml of GADAVIST were administered  FINDINGS:  Liver: Hepatic steatosis. Multiple T2 hyperintense lesions are noted  throughout the liver. The largest is located within the right hepatic  lobe, segment 8 measuring approximately 3.5 cm, increased in size  from the  comparison CT. At least 4 lesions are noted within the  liver. . Two of the smaller lesions were not apparent on the  comparison CT. The posterior peripheral right hepatic lesion  measuring 1.7 cm increased size from prior examination (previously  1.3 cm) (series 10, image 10). Postsurgical changes noted within the  right lobe of the liver.  Bile Ducts: Negative for intra or extrahepatic biliary dilatation.  Gallbladder: The gallbladder is surgically absent.  Pancreas: The pancreas is fatty and atrophied.  Spleen: The spleen is unremarkable in size and contour.  Adrenals: Small nodule adjacent to the left adrenal gland. The nodule  measures 0.9 cm and is unchanged from the prior examination.  The adrenal gland itself is unremarkable.  Urinary Tract: Negative for hydronephrosis. Adjacent to the right  renal vein is a peripherally enhancing nodule measuring approximately  1.2 cm in the short axis, decreased in size from the prior  examination and favored to represent a necrotic lymph node.  Reproductive Organs: Not included on this field-of-view  Bowel: No overly dilated loops of small bowel. The stomach and  esophagus are unremarkable.  Appendix: The appendix is not visualized.  Lymph Nodes: Similar lymphadenopathy of the erendira hepatis.  Peritoneum: Negative for free fluid.  Vasculature: The abdominal aorta is unremarkable in size and contour.  Abdominal Wall: Midline abdominal scar.  Bones: Negative for acute osseous findings. Focal 0.6 cm T1  hypointense lesion within T12.  Visualized thorax: Normal heart size. Evaluation of the lungs is  suboptimal on MRI and a concurrent chest CT was performed. 2  pulmonary nodules in the right lower lobe appear enlarged from the  prior examination, however comparison and evaluation is better  performed by CT.  IMPRESSION:  1. Increased size and number of multiple hepatic lesions concerning  for progression of disease.  2. Question osseous metastatic disease within T12. Attention  on  follow-up examinations is recommended.  3. Similar erendira hepatis and retroperitoneal lymphadenopathy.  Template Version: [id number]  Thank you for consulting our team of subspecialty body imaging  radiologists at [facility].  Healthcare providers wishing to discuss this case further can contact  the [facility] Reading Room at  [phone].  Petar JEFF have personally reviewed the image(s) and the  prepared and signed interpretation by Samreen Romo. Based on my review,  I agree with the findings.  Electronically Signed by: Petar Rob  Dictated on: 9/2/2020 3:14 PM    CT abd/pelvis- 2/24/20          NM PET Ga68 Dotatate Whole Body-7/9/19  Narrative: EXAMINATION:  NM PET 68GA DOTATATE WHOLE BODY    CLINICAL HISTORY:  Malignant carcinoid tumor of the ileum    TECHNIQUE:  4.67 mCi of GA68 Dotatate was administered intravenously in the right antecubital fossa.  After an approximately 60 min distribution time, PET/CT images were acquired from the skull vertex to the mid thigh. Transmission images were acquired to correct for attenuation using a whole body low-dose CT scan without contrast with the arms positioned above the head.    COMPARISON:  Gallium 68 dota-hua PET 11/26/2018.    FINDINGS:  Quality of the study: Adequate.    Multiple areas increased FDG avidity including innumerable hepatic lesions, retroperitoneal, mesenteric, and peripancreatic lymph nodes with index lesions as below:    Superior right lobe of the liver: SUV max 13.64, previously 22.83.    Paraesophageal lymph node has resolved.    Peripancreatic lymphadenopathy (largest lymph measures 1.7 cm in short axis, previously 2.0 cm; of note, this was incorrectly labeled as periaortic lymphadenopathy on examination dated 11/26/2018): SUV max 6.65, previously 37.    Stable right retrocaval lymph node measures approximately 1.6 cm (previously 1.5 cm) and SUV max 19.62,  previously 20.63.    Redemonstration of increased tracer uptake associated with the myocardium, 1 in the region of the interventricular septum (SUV max 7.13, previously 8.8), and 1 in the region of the left ventricle lateral wall (SUV max 7.32, previously 6.43).  There has been interval resolution of FDG avidity within the region of the right atrial/ventricular wall.  New nonspecific FDG avid subcentimeter submandibular lymph node demonstrating SUV max of 3.10.  This may be reactive in nature.  Nonspecific increased uptake within the anterior aspect of the ethmoid sinus.    Physiologic uptake of the tracer is seen within the pituitary, salivary, and thyroid glands, stomach, liver, spleen, kidneys, adrenal glands, prostate, and bowel.    Incidental CT findings: Heterogeneous liver.  Multiple calcified lymph nodes within the right hilar region, likely sequela prior granulomatous disease.  Subsegmental opacity within the right middle lobe which may represent subsegmental atelectasis or scarring.  Small amount of left sided dependent pleural fluid.  Prior cholecystectomy.  Punctate calcification within the spleen, likely sequela prior granulomatous disease.  Multiple rounded hyperdensities within the subcutaneous tissue of the buttocks presumably related to injection sites.  Impression: 1.  Multiple foci of uptake indicating persistent somatostatin receptor positive metastases with resolved paraesophageal node and smaller peripancreatic nodes that are no longer tracer avid and in keeping with partial response to treatment.    2.  Two out of 3 foci within the myocardium remaining that may represent cardiac metastases, though other processes may cause uptake.  Recommend correlation with cardiac history and consideration of dedicated cardiac MRI for further evaluation.      MRI Abdomen W WO Contrast-7/9/19  Narrative: EXAMINATION:  MRI ABDOMEN W WO CONTRAST    CLINICAL HISTORY:  Neoplasm: abdomen, metastatic, recurrence,  suspected/known;  Neoplasm of unspecified behavior of other specified sites    TECHNIQUE:  Multisequence, multiplanar MRI of the abdomen performed per liver protocol before and after administration of 10 mL Gadavist intravenous contrast.    COMPARISON:  11/26/2018    FINDINGS:  Liver: There is diffuse loss of signal on opposed phase imaging consistent with steatosis.  There are multiple T2 hyperintense lesions which demonstrate restricted diffusion and arterial enhancement.  Segment VIII lesion measures 2.9 cm, previously 2.4 cm (series 1400, image 31).  Segment V lesion measures 1.5 cm, previously 1.6 cm (series 1400, image 77).  Overall number of lesions appear similar to prior study.  There is apparent interval increase in enhancement.  Postsurgical changes of prior partial right hepatectomy noted.    Biliary: Gallbladder is absent.  No biliary dilatation.    Pancreas: Unremarkable.  No pancreatic ductal dilatation.    Spleen: Normal size.  No focal lesions.    Adrenal glands: Unremarkable.    Kidneys: No renal masses.  Several tiny cysts noted.  No hydronephrosis.    Miscellaneous: Bowel containing ventral abdominal hernia partially visualized.  No evidence for bowel obstruction.  Multiple prominent periportal and retroperitoneal lymph nodes are noted.  Aortocaval necrotic lymph node measures 1.6 cm short axis, previously 1.7 cm (series 1401, image 58).  Additional retrocaval necrotic lymph node measuring 1.5 cm noted, previously 1.1 cm (series 1401, image 72).  Impression: 1. Hepatic metastases and abdominal lymphadenopathy, overall similar to prior study.  2. Partially visualized bowel containing ventral abdominal hernia.  RECIST SUMMARY:    Date of prior examination for comparison: 11/26/2018    Lesion 1: Hepatic segment V.  1.5 cm. Series 1400 image 77.  Prior measurement 1.6 cm.    Lesion 2: Hepatic segment VIII.  2.9 cm. Series 1400 image 31.  Prior measurement 2.4 cm.    Lesion 3: Aortocaval lymph node.   1.6 cm. Series 1401 image 58.  Prior measurement 1.7 cm.    Lesion 4: Retrocaval lymph node.  1.5 cm. Series 1401 image 72.  Prior measurement 1.1 cm.       Echocardiogram- 7/8/19  · Normal left ventricular systolic function. The estimated ejection fraction is 65%  · Normal LV diastolic function.  · Concentric left ventricular hypertrophy.  · Mild aortic regurgitation.  · Normal right ventricular systolic function.  · Normal central venous pressure (3 mm Hg).  · The estimated PA systolic pressure is 18 mm Hg      Assessment/Impression:         Problem List Items Addressed This Visit     None           Plan:         Had a long conversation with the patient about the features of his case that support the treatment and surveillance recommendations outlined below:  1.  His original diagnosis dates back to 2017.  He underwent surgical debulking and has been maintained on standard of care somatostatin receptor inhibitor therapy since that time.  Within the last year, he has experienced progression of disease despite SSI treatment that has prompted additional treatment.  He completed stereotactic body radiotherapy to a retroperitoneal lymph node.   2.  Coincident with his radiographic progression of disease was a rise in his pancreastatin level.  Although his level of 200 was outside the normal range, this is compared to a level of over 3000 at diagnosis.  His most recent neuroendocrine blood work is from May 2020 showing a pancreas statin level of 207.    3.  He had a CT scan of the chest as well as the abdominal MRI with contrast prior to this visit.  The CT scan of the chest comments on parenchymal soft tissue abnormalities that have grown slightly compared to a prior scan.  His liver MRI also shows progression of disease in 1 or more sites although the pattern of disease within his liver is the same.  The retroperitoneal and peripancreatic lymphadenopathy appears stable.  Of the treatment options available for  multilevel multisite progression of disease despite adequate doses of somatostatin receptor inhibitor therapy, PRRT would be the most effective at targeting multiple sites with a single treatment modality.  I will present his case at tumor board to discuss treatment options.      He asked several appropriate questions about prognosis.  We have previously discussed the natural history of a low-grade metastatic midgut carcinoid tumor.  The favorable aspects of the disease the are most often a slow in indolent biologic behavior with patient's living along time despite evidence of metastatic disease at presentation.  However in his case, he has shown progression of disease over a relatively short period of time in the context of a midgut primary.  This would temper our expectations on long-term survival without additional treatment.  The goal of peptide base receptor treatment would be a durable delay in progression over time.  Although I stand by our decision for local treatment to the retroperitoneal lymph node, I believe that systemic treatment at this juncture is clearly warranted.  An updated gallium 68 PET would help assess the applicable it of this in the context of his current disease burden.    Await results from testicular surgery  Ga68 PET  Plan for PRRT in [place]    Discussed case with Dr. Lew, his local medical oncologist      Tayla Her MD, MPH, FACS  Professor of Surgery, [facility]  Neuroendocrine Surgery, Hepatic/Pancreatic & General Surgical Oncology  [address]  [place]  [address]  ph. [phone]; [phone]  fax. [phone]      60 minutes were spent in record review, medication reconciliation, coordination of care between multiple providers and counseling the patient on short term and longer term treatment goals.  More than 45 minutes was spent in direct patient contact, either face to face over the virtual platform.

## 2020-10-01 NOTE — PATIENT INSTRUCTIONS
Tumor markers: Need updated neuroendocrine blood work    Scans: Gallium 68 ---  due ASAP    Consult: Discussed case with Dr. Lew, his local medical oncologist    Notes From Physician:   - Progression of disease with in the chest and liver  - Consider PRRT at this juncture    Tumor Board: Tumor board to confirm treatment options    Return Clinic Appointment: in 6 months with Dr. Her / Haz[facility] - appointment made

## 2020-10-02 ENCOUNTER — PATIENT MESSAGE (OUTPATIENT)
Dept: NEUROLOGY | Facility: HOSPITAL | Age: 50
End: 2020-10-02

## 2020-10-08 ENCOUNTER — OFFICE VISIT (OUTPATIENT)
Dept: NEUROLOGY | Facility: HOSPITAL | Age: 50
End: 2020-10-08
Attending: SURGERY
Payer: COMMERCIAL

## 2020-10-08 DIAGNOSIS — T50.905A DRUG-INDUCED HYPERGLYCEMIA: ICD-10-CM

## 2020-10-08 DIAGNOSIS — K76.0 FATTY LIVER DISEASE, NONALCOHOLIC: ICD-10-CM

## 2020-10-08 DIAGNOSIS — E34.0 DIARRHEA CONCURRENT WITH AND DUE TO CARCINOID SYNDROME: ICD-10-CM

## 2020-10-08 DIAGNOSIS — K52.9 DIARRHEA CONCURRENT WITH AND DUE TO CARCINOID SYNDROME: ICD-10-CM

## 2020-10-08 DIAGNOSIS — C7B.02 METASTATIC MALIGNANT CARCINOID TUMOR TO LIVER: Primary | ICD-10-CM

## 2020-10-08 DIAGNOSIS — R73.9 DRUG-INDUCED HYPERGLYCEMIA: ICD-10-CM

## 2020-10-08 DIAGNOSIS — C7B.8 SECONDARY NEUROENDOCRINE TUMOR OF DISTANT LYMPH NODES: ICD-10-CM

## 2020-10-08 NOTE — PROGRESS NOTES
NOLANETS:  [facility]  A collaboration between [facility] and [facility]      PATIENT: Javy Thompson  MRN: [id number]  DATE: 10/8/2020    Subjective:      Chief Complaint: metastatic midgut carcinoid, on active treatment, symptomatic  Worsening carcinoid syndrome     The patient location is: home  The chief complaint leading to consultation is: scheduled follow-up, worsening carcinoid syndrome despite monthly injections, hyperglycemia  Visit type: Virtual visit with synchronous audio and video  Total time spent with patient: 40    Each patient to whom he or she provides medical services by telemedicine is:  (1) informed of the relationship between the physician and patient and the respective role of any other health care provider with respect to management of the patient; and (2) notified that he or she may decline to receive medical services by telemedicine and may withdraw from such care at any time.    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 He presented with measurable disease within the small bowel, small bowel mesentery, and liver    Interval History:     NET BW  Ga68 PET    Vitals: There were no vitals taken for this visit. --stable    ECOG Score: 1 - Symptomatic but completely ambulatory    Oncologic History:   Oncologic History Ileal net, dx 2016, liver and román mets  Carcinoid syndrome- diarrhea   Oncologic Treatment 3/2017- surgery (Eric)  Lanreotide  SBRT to retroperitoneal lymph node   Pathology 3/2017- small bowel- well diff net, multifocal (5), G2, Ki 67- 10.9%, 3/12 nodes pos; liver- well diff net     Past Medical History:  Past Medical History:   Diagnosis Date    Mesenteric mass 2/8/2017    Metastatic carcinoid tumor 4/18/2017    Mr. Thompson is well known to me having been diagnosed with metastatic ileal carcinoid in 2016. He was resected in 3/2017. He has had  slow progression of disease in the mediastinum, liver, and retroperitoneal román basins.    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       Past Surgical History:  Past Surgical History:   Procedure Laterality Date    ANTERIOR CRUCIATE LIGAMENT REPAIR Right 1986    APPENDECTOMY  3/31/2017    Procedure: APPENDECTOMY;  Surgeon: Fidelia Reyes MD;  Location: [facility] OR;  Service: General;;    KNEE ARTHROSCOPY Right 1986    x2    LIVER BIOPSY  12/2016, 1/2017    TONSILLECTOMY         Family History:  Family History   Problem Relation Age of Onset    Cancer Mother        Allergies:  Epinephrine and Iodinated contrast media    Medications:  Current Outpatient Medications   Medication Sig    cholestyramine (QUESTRAN) 4 gram packet Take 1 packet (4 g total) by mouth 3 (three) times daily with meals.    HYDROcodone-acetaminophen (NORCO) 5-325 mg per tablet Take 1 tablet by mouth every 6 (six) hours as needed for Pain.    lanreotide (SOMATULINE DEPOT) 120 mg/0.5 mL Syrg Inject 120 mg into the skin every 28 days.    lipase-protease-amylase (ZENPEP) 25,000-79,000- 105,000 unit CpDR Take 3 caps by mouth with meals and Take 2 caps by mouth with snacks    opium tincture 10 mg/mL (morphine) Tinc Take 1 mL (10 mg total) by mouth 4 (four) times daily as needed. Titrate starting with 4 drops with meals to 1-2 bowel movements a day. Can be used in combination with cholestyramine for the management of carcinoid syndrome    oxyCODONE (OXYCONTIN) 10 mg 12 hr tablet Take 1 tablet (10 mg total) by mouth every 12 (twelve) hours as needed for Pain.    oxyCODONE-acetaminophen (PERCOCET)  mg per tablet Take 1 tablet by mouth every 6 (six) hours as needed for Pain.    senna-docusate 8.6-50 mg (PERICOLACE) 8.6-50 mg per tablet Take 1 tablet by mouth daily as needed for Constipation.    spironolactone (ALDACTONE) 50 MG tablet Take 1 tablet (50  mg total) by mouth once daily.     No current facility-administered medications for this visit.         Review of Systems   Constitutional: Negative for fever and unexpected weight change.   HENT: Negative for facial swelling and hearing loss.    Eyes: Negative for photophobia and visual disturbance.   Respiratory: Negative for apnea and chest tightness.    Cardiovascular: Negative for palpitations.   Gastrointestinal: Positive for abdominal distention, abdominal pain and diarrhea.   Endocrine: Negative for cold intolerance and heat intolerance.        Hyperglycemia   Genitourinary: Negative for difficulty urinating and dysuria.   Musculoskeletal: Positive for back pain.   Skin: Negative for rash and wound.   Allergic/Immunologic: Negative for food allergies and immunocompromised state.   Neurological: Negative for tremors and weakness.   Hematological: Does not bruise/bleed easily.   Psychiatric/Behavioral: Negative.           Objective:      Physical Exam  Constitutional:       General: He is not in acute distress.     Appearance: He is well-developed.   HENT:      Head: Normocephalic.   Pulmonary:      Effort: No respiratory distress.      Breath sounds: No stridor.   Neurological:      Mental Status: He is alert and oriented to person, place, and time.   Psychiatric:         Behavior: Behavior normal.         Thought Content: Thought content normal.         Judgment: Judgment normal.          Laboratory Data:    9/11/2020 labs sent via text from patient  Pancreastatin: 490  Serotonin: 489    Neuroendocrine Labs Latest Ref Rng & Units 5/14/2020 5/13/2020   EXT 5 HIAA BLOOD 0 - 22 ng/mL 18 18   EXT SEROTONIN 56 - 244 ng/ml  457 (A)   EXT CHROMOGRANIN A 0 - 15 ng/ml     EXT PANCREASTATIN COLE 10 - 135 pg/mL 207 (A) 207 (A)   EXT NEUROKININ A COLE 0 - 40 pg/ml     EXT LANREOTIDE LEVEL pg/mL 256 2,569       Neuroendocrine Labs Latest Ref Rng & Units 10/24/2018   EXT 5 HIAA BLOOD 0 - 22 ng/ml 11   EXT SEROTONIN 56 - 244  ng/ml 173   EXT CHROMOGRANIN A 0 - 15 ng/ml    EXT PANCREASTATIN COLE 10 - 135 pg/ml 89   EXT NEUROKININ A COLE 0 - 40 pg/ml 20   WBC 3.90 - 12.70 K/uL    EXT WBC 3.3 - 10.5 k/cumm 7.2   HGB 14.0 - 18.0 g/dL    EXT HGB 12.8 - 16.9 g/dl 15.7   HCT 40.0 - 54.0 %    EXT HCT 38.8 - 50.2 % 46.5   PLATLETS 150 - 350 K/uL    EXT PLATLETS 150 - 450 k/cumm 169   PT 9.0 - 12.5 sec    PTT 21.0 - 32.0 sec    INR 0.8 - 1.2    GLUCOSE 70 - 110 mg/dL    EXT GLUCOSE 65 - 99 mg/dl 136 (A)   BUN 6 - 20 mg/dL    EXT BUN 8 - 26 mg/dl 13   CREATININE 0.5 - 1.4 mg/dL    EXT CREATININE 0.80 - 1.50 mg/dl 0.93    - 145 mmol/L    EXT  - 145 mmol/l 138   K 3.5 - 5.1 mmol/L    EXT K 3.5 - 5.5 mmol/l 4.3   CHLORIDE 95 - 110 mmol/L    EXT CHLORIDE 98 - 110 mmol/l 99   CO2 23 - 29 mmol/L    EXT CO2 20 - 29 mmol/l 28   CALCIUM 8.7 - 10.5 mg/dL    EXT CALCIUM 8.4 - 10.5 mg/dl 9.6   PROTEIN, TOTAL 6.0 - 8.4 g/dL    EXT PROTEIN, TOTAL 6.0 - 8.3 g/dl 7.4   PHOSPHORUS 2.7 - 4.5 mg/dL    ALBUMIN 3.5 - 5.2 g/dL    EXT ALBUMIN 3.5 - 5.0 gm/dl 4.1   TOTAL BILIRUBIN 0.1 - 1.0 mg/dL    EXT TOTAL BILIRUBIN 0.1 - 1.2 mg/dl 1.1   ALK PHOSPHATASE 55 - 135 U/L    EXT ALK PHOSPHATASE 38 - 126 u/l 90   SGOT (AST) 10 - 40 U/L    EXT SGOT (AST) 17 - 59 u/l 30   SGPT (ALT) 10 - 44 U/L    EXT ALT 11 - 58 u/l 16     Scans:     INDICATION: SUBSEQUENT TREATMENT STRATEGY: Restaging. 50 year old  male with neuroendocrine tumor.  COMPARISON: PET/CT, 7/9/2019 and 7/10/2017  TECHNIQUE: PET CT of the body (skull base to thigh) was performed  without IV contrast. 6.0mCi Ga-68 DOTA-TREJO was administered IV.  Non-contrast CT was performed from skull base to thighs followed by  PET imaging of the same field. No contrast due to allergy.  FINDINGS:  HEAD/NECK:  Radiotracer avid, hyperattenuating lesion along the right medial  rectus muscle and along the posterior aspect of the left lower globe.  No significant proptosis bilaterally. These lesions are both new  compared to  prior examination in 2019. Radiotracer avid right level  IB lymph node measuring up to 1.1 cm.  The thyroid is unremarkable.  CHEST:  Multiple radiotracer avid lesions visualized within the pericardium  and along the course of the interventricular septum. Level 4R and 7  are radiotracer avid lymph nodes are present in addition to right  upper lobe, right middle lobe, and right lower lobe radiotracer avid  pulmonary nodules. Multiple radiotracer avid chest soft tissue  nodules are present, new compared to prior. Pleural metastases are  present along the inferior right base.  Trace left pleural effusion. No focal consolidation.  ABDOMEN/PELVIS:  Hepatic metastatic disease is present, with the largest lesion in the  hepatic dome, measuring 6.4 x 3.6 cm with a max SUV of 29.9 (image  145), previously measuring 2.5 x 1.9 cm with a max SUV of 20.9.  Interval increase in number and size of the erendira hepatic radiotracer  avid lymphadenopathy. Decreased size of a left adrenal nodule without  definite FDG uptake. Increased size of a right pararenal lymph node.  Interval development of mesenteric lymph nodes, and a left common  iliac chain lymph node.  BONES:  Multifocal radiotracer uptake within the appendicular and axial  osseous structures, overall increased compared to 2019. No pathologic  fractures.  IMPRESSION:  Multiple new foci of DOTATATE avid metastatic disease, including  within the posterior left globe, right orbital medial rectus muscle  and within the interventricular septum of the myocardium, consistent  with disease progression.        MRI abdomen 9/2020    [facility]  EXAM: MRI Abdomen wo then w Contrast  DATE: 09/02/2020 15:11 hours  INDICATION: History of neuroendocrine tumor status post resection.  COMPARISON: CT abdomen and pelvis dated May 6, 2020  TECHNIQUE: MR imaging of the abdomen was obtained pre and post IV  contrast. Routine protocol was utilized. Images  were obtained in  multiple planes.  10 ml of GADAVIST were administered  FINDINGS:  Liver: Hepatic steatosis. Multiple T2 hyperintense lesions are noted  throughout the liver. The largest is located within the right hepatic  lobe, segment 8 measuring approximately 3.5 cm, increased in size  from the comparison CT. At least 4 lesions are noted within the  liver. . Two of the smaller lesions were not apparent on the  comparison CT. The posterior peripheral right hepatic lesion  measuring 1.7 cm increased size from prior examination (previously  1.3 cm) (series 10, image 10). Postsurgical changes noted within the  right lobe of the liver.  Bile Ducts: Negative for intra or extrahepatic biliary dilatation.  Gallbladder: The gallbladder is surgically absent.  Pancreas: The pancreas is fatty and atrophied.  Spleen: The spleen is unremarkable in size and contour.  Adrenals: Small nodule adjacent to the left adrenal gland. The nodule  measures 0.9 cm and is unchanged from the prior examination.  The adrenal gland itself is unremarkable.  Urinary Tract: Negative for hydronephrosis. Adjacent to the right  renal vein is a peripherally enhancing nodule measuring approximately  1.2 cm in the short axis, decreased in size from the prior  examination and favored to represent a necrotic lymph node.  Reproductive Organs: Not included on this field-of-view  Bowel: No overly dilated loops of small bowel. The stomach and  esophagus are unremarkable.  Appendix: The appendix is not visualized.  Lymph Nodes: Similar lymphadenopathy of the erendira hepatis.  Peritoneum: Negative for free fluid.  Vasculature: The abdominal aorta is unremarkable in size and contour.  Abdominal Wall: Midline abdominal scar.  Bones: Negative for acute osseous findings. Focal 0.6 cm T1  hypointense lesion within T12.  Visualized thorax: Normal heart size. Evaluation of the lungs is  suboptimal on MRI and a concurrent chest CT was performed. 2  pulmonary nodules in  the right lower lobe appear enlarged from the  prior examination, however comparison and evaluation is better  performed by CT.  IMPRESSION:  1. Increased size and number of multiple hepatic lesions concerning  for progression of disease.  2. Question osseous metastatic disease within T12. Attention on  follow-up examinations is recommended.  3. Similar erendira hepatis and retroperitoneal lymphadenopathy.  Template Version: [id number]  Thank you for consulting our team of subspecialty body imaging  radiologists at [facility].  Healthcare providers wishing to discuss this case further can contact  the [facility] Reading Room at  [phone].  Petar JEFF have personally reviewed the image(s) and the  prepared and signed interpretation by Samreen Romo. Based on my review,  I agree with the findings.  Electronically Signed by: Petar Rob  Dictated on: 9/2/2020 3:14 PM    CT abd/pelvis- 2/24/20          NM PET Ga68 Dotatate Whole Body-7/9/19  Narrative: EXAMINATION:  NM PET 68GA DOTATATE WHOLE BODY    CLINICAL HISTORY:  Malignant carcinoid tumor of the ileum    TECHNIQUE:  4.67 mCi of GA68 Dotatate was administered intravenously in the right antecubital fossa.  After an approximately 60 min distribution time, PET/CT images were acquired from the skull vertex to the mid thigh. Transmission images were acquired to correct for attenuation using a whole body low-dose CT scan without contrast with the arms positioned above the head.    COMPARISON:  Gallium 68 dota-hua PET 11/26/2018.    FINDINGS:  Quality of the study: Adequate.    Multiple areas increased FDG avidity including innumerable hepatic lesions, retroperitoneal, mesenteric, and peripancreatic lymph nodes with index lesions as below:    Superior right lobe of the liver: SUV max 13.64, previously 22.83.    Paraesophageal lymph node has resolved.    Peripancreatic lymphadenopathy (largest  lymph measures 1.7 cm in short axis, previously 2.0 cm; of note, this was incorrectly labeled as periaortic lymphadenopathy on examination dated 11/26/2018): SUV max 6.65, previously 37.    Stable right retrocaval lymph node measures approximately 1.6 cm (previously 1.5 cm) and SUV max 19.62, previously 20.63.    Redemonstration of increased tracer uptake associated with the myocardium, 1 in the region of the interventricular septum (SUV max 7.13, previously 8.8), and 1 in the region of the left ventricle lateral wall (SUV max 7.32, previously 6.43).  There has been interval resolution of FDG avidity within the region of the right atrial/ventricular wall.  New nonspecific FDG avid subcentimeter submandibular lymph node demonstrating SUV max of 3.10.  This may be reactive in nature.  Nonspecific increased uptake within the anterior aspect of the ethmoid sinus.    Physiologic uptake of the tracer is seen within the pituitary, salivary, and thyroid glands, stomach, liver, spleen, kidneys, adrenal glands, prostate, and bowel.    Incidental CT findings: Heterogeneous liver.  Multiple calcified lymph nodes within the right hilar region, likely sequela prior granulomatous disease.  Subsegmental opacity within the right middle lobe which may represent subsegmental atelectasis or scarring.  Small amount of left sided dependent pleural fluid.  Prior cholecystectomy.  Punctate calcification within the spleen, likely sequela prior granulomatous disease.  Multiple rounded hyperdensities within the subcutaneous tissue of the buttocks presumably related to injection sites.  Impression: 1.  Multiple foci of uptake indicating persistent somatostatin receptor positive metastases with resolved paraesophageal node and smaller peripancreatic nodes that are no longer tracer avid and in keeping with partial response to treatment.    2.  Two out of 3 foci within the myocardium remaining that may represent cardiac metastases, though other  processes may cause uptake.  Recommend correlation with cardiac history and consideration of dedicated cardiac MRI for further evaluation.      MRI Abdomen W WO Contrast-7/9/19  Narrative: EXAMINATION:  MRI ABDOMEN W WO CONTRAST    CLINICAL HISTORY:  Neoplasm: abdomen, metastatic, recurrence, suspected/known;  Neoplasm of unspecified behavior of other specified sites    TECHNIQUE:  Multisequence, multiplanar MRI of the abdomen performed per liver protocol before and after administration of 10 mL Gadavist intravenous contrast.    COMPARISON:  11/26/2018    FINDINGS:  Liver: There is diffuse loss of signal on opposed phase imaging consistent with steatosis.  There are multiple T2 hyperintense lesions which demonstrate restricted diffusion and arterial enhancement.  Segment VIII lesion measures 2.9 cm, previously 2.4 cm (series 1400, image 31).  Segment V lesion measures 1.5 cm, previously 1.6 cm (series 1400, image 77).  Overall number of lesions appear similar to prior study.  There is apparent interval increase in enhancement.  Postsurgical changes of prior partial right hepatectomy noted.    Biliary: Gallbladder is absent.  No biliary dilatation.    Pancreas: Unremarkable.  No pancreatic ductal dilatation.    Spleen: Normal size.  No focal lesions.    Adrenal glands: Unremarkable.    Kidneys: No renal masses.  Several tiny cysts noted.  No hydronephrosis.    Miscellaneous: Bowel containing ventral abdominal hernia partially visualized.  No evidence for bowel obstruction.  Multiple prominent periportal and retroperitoneal lymph nodes are noted.  Aortocaval necrotic lymph node measures 1.6 cm short axis, previously 1.7 cm (series 1401, image 58).  Additional retrocaval necrotic lymph node measuring 1.5 cm noted, previously 1.1 cm (series 1401, image 72).  Impression: 1. Hepatic metastases and abdominal lymphadenopathy, overall similar to prior study.  2. Partially visualized bowel containing ventral abdominal  hernia.  RECIST SUMMARY:    Date of prior examination for comparison: 11/26/2018    Lesion 1: Hepatic segment V.  1.5 cm. Series 1400 image 77.  Prior measurement 1.6 cm.    Lesion 2: Hepatic segment VIII.  2.9 cm. Series 1400 image 31.  Prior measurement 2.4 cm.    Lesion 3: Aortocaval lymph node.  1.6 cm. Series 1401 image 58.  Prior measurement 1.7 cm.    Lesion 4: Retrocaval lymph node.  1.5 cm. Series 1401 image 72.  Prior measurement 1.1 cm.       Echocardiogram- 7/8/19  · Normal left ventricular systolic function. The estimated ejection fraction is 65%  · Normal LV diastolic function.  · Concentric left ventricular hypertrophy.  · Mild aortic regurgitation.  · Normal right ventricular systolic function.  · Normal central venous pressure (3 mm Hg).  · The estimated PA systolic pressure is 18 mm Hg      Assessment/Impression:         Problem List Items Addressed This Visit     None           Plan:         Had a long conversation with the patient about the features of his case that support the treatment and surveillance recommendations outlined below:  1.  His confirmed diagnosis dates back to 2017.  At that time, he had bulky, bilobar, multifocal liver involvement noted on an MRI of the abdomen as well as a somatostatin receptor PET.  He underwent surgical debulking to minimal residual disease, his neuroendocrine blood work normalized, and has been maintained on standard of care somatostatin receptor inhibitor therapy since that time.  Within the last year, he has experienced progression of disease despite SSI treatment that has prompted additional treatment.  He completed stereotactic body radiotherapy to a retroperitoneal lymph node.   2.  Coincident with his radiographic progression of disease was a rise in his pancreastatin level.  Although his level of 200 was outside the normal range, this is compared to a level of over 3000 at diagnosis.  His most recent neuroendocrine blood work shows a pancreastatin  level of 490 and a serotonin level of 489, both up from prior.  This would also be congruent with progression of disease in multiple sites.   3.  After his visit last month, we discussed his case at tumor board and the consensus was to move forward with PRRT.  I spoke to his medical oncologist in [place] at that time and we agreed that he would initiate that treatment closer to home.  He had not had an updated extent of disease evaluation and therefore a gallium 68 PET was ordered. The images show activity within his right and left eyes, right submandibular area, multiple bones, multiple areas of his chest and mediastinum, pericardium, liver and lymph nodes.  I clear for for him the pericardial involvement.  We have seen uptake within the pericardium on gallium 68 PET.   He has had at least 3 areas of activity noted within the heart on prior gallium 68 imaging.  An echocardiogram to evaluate any structural abnormalities that would correlate with that uptake did not show any measurable abnormalities.  I explained that this may be very small volume disease that is subclinical by conventional imaging, but there is little doubt that it represents neuroendocrine tumors.  I would suggest a follow-up echocardiogram at this juncture.  His medical oncologist is going to arrange that closer to home.  He has 1 area within the liver that has grown substantially between 2 scans done 4 months apart.  It is common for us to use catheter based liver directed therapies to diminish tumor burden within the liver prior to PRRT with the goal of holding the radiation platform to areas outside the liver that have progressed and where we have few if any other targeted therapies.  I am trying to arrange that at [facility].  Based on the best data we have about expectations of treatment, catheter based liver directed therapies when used in neuroendocrine tumors have a very high almost 80-90% short-term response rate (1 month) that  drops to just over 50% durable response rate at 6 months.  If we are going to give him every opportunity to meet local regional treatment goals, I would have the liver directed treatment done within 1 month of PRRT.  When used in this fashion, I will reimage people with cross-sectional imaging between PRRT doses to confirm or clarify the response in that site.  4.  He was recently diagnosed with a testicular mass.  He had an orchidectomy and the pathology was consistent with neuroendocrine.  This is a highly unusual site for metastatic involvement, but in the absence of a 2nd primary cancer, it focuses our treatment recommendations on neuroendocrine alone.  5.  The activity within his eyes raises some unique challenges.  Since I cannot home peptide base receptor therapies to any given site, it is difficult to understand risk associated with neuroendocrine involvement in that site.  In the handful of patients where we have had this situation, we have a neuro ophthalmologist that has weighed in on the risks associated with radiation targeting that part of the eyeball.  He is requesting an MRI of the globes in order to better understand any structural relationship between the activity seen on his gallium 68 PET and critical structures within the eye.  Will try to arrange that closer to home.    We also discussed recent issues with hyperglycemia and elevations in A1c levels.  This is common in patients on long-acting somatostatin receptor inhibitor therapy.  I gave him dietary and behavior modifications focused on decreasing overall carbohydrate intake and carbohydrate intake after 6:00 p.m. in particular.  I would hold on starting oral hypoglycemic agents until after we have tried other strategies.    Tumor board to rediscuss treatment plan in the context of his recent progression in multiple sites  PRRT  LDT (no Yttrium 90) prior to first PRRT dose to manage the progression of disease in the liver  MRI globes  ECHO to  evaluate pericardial involvement    Discussed case with Dr. Lew, his local medical oncologist      Tayla Her MD, MPH, FACS  Professor of Surgery, [facility]  Neuroendocrine Surgery, Hepatic/Pancreatic & General Surgical Oncology  [address]  [place]  [address]  ph. [phone]; [phone]  fax. [phone]      50 minutes were spent in record review, medication reconciliation, coordination of care between multiple providers and counseling the patient on short term and longer term treatment goals.  More than 35 minutes was spent in direct patient contact, either face to face over the virtual platform.

## 2020-10-09 ENCOUNTER — PATIENT MESSAGE (OUTPATIENT)
Dept: NEUROLOGY | Facility: HOSPITAL | Age: 50
End: 2020-10-09

## 2020-10-09 ENCOUNTER — TELEPHONE (OUTPATIENT)
Dept: NEUROLOGY | Facility: HOSPITAL | Age: 50
End: 2020-10-09

## 2020-10-09 DIAGNOSIS — C7A.012 MALIGNANT CARCINOID TUMOR OF ILEUM: Primary | ICD-10-CM

## 2020-10-09 DIAGNOSIS — Z13.9 SCREENING FOR UNSPECIFIED CONDITION: ICD-10-CM

## 2020-10-09 RX ORDER — ACETAMINOPHEN 325 MG/1
650 TABLET ORAL
Status: CANCELLED | OUTPATIENT
Start: 2020-10-28

## 2020-10-09 RX ORDER — SODIUM CHLORIDE 9 MG/ML
INJECTION, SOLUTION INTRAVENOUS ONCE
Status: CANCELLED | OUTPATIENT
Start: 2020-10-28

## 2020-10-09 RX ORDER — ARGININE/LYSINE/0.9 % SOD CHL 25-25MG/ML
1000 PLASTIC BAG, INJECTION (ML) INTRAVENOUS
Status: CANCELLED | OUTPATIENT
Start: 2020-10-28

## 2020-10-09 RX ORDER — DIPHENHYDRAMINE HYDROCHLORIDE 50 MG/ML
50 INJECTION INTRAMUSCULAR; INTRAVENOUS
Status: CANCELLED | OUTPATIENT
Start: 2020-10-28

## 2020-10-09 RX ORDER — SODIUM CHLORIDE 0.9 % (FLUSH) 0.9 %
10 SYRINGE (ML) INJECTION
Status: CANCELLED | OUTPATIENT
Start: 2020-10-28

## 2020-10-09 NOTE — TELEPHONE ENCOUNTER
"Discussed scheduling PRRT at our facility.  Reviewed PRRT process.    Instructed patient on the followin.  You will see your physician and have lab work within 1-2 weeks of the procedure.   2.  Arrive at scheduled time on the 2nd floor - [address] for check in.  3.  This is an Outpatient procedure. Plan to be here for at least 6 hours.   4.  The Nuclear Medicine MD will discuss procedure, answer questions and sign consents prior to procedure.  5.  Hydrate 3 days prior to and 3 days after procedure. This helps get rid of radiation.  6.  No visitors allowed in room during procedure.  7.  We will start 2 IV's for the treatment, one in each arm.  8.  You will receive pre medications, then an Amino Acid solution, then the Olga Lidia 177 infusion.  The Amino Acid solution is to protect your kidneys, it could cause nausea.  If you have nausea, we will slow down the Amino Acid solution and give you additional anti nausea medication.  9.  You will need to have lab work done at 4 weeks and 7 weeks post treatment.  10.  You will be instructed on Radiation Safety precautions upon discharge.    -Chacho made for MD, labs and Olga Lidia 177 treatment.     -Email notification sent to [employer] call team.    Last SSA injection- 10/02/20    PRRT scheduled to start-10/28/20    All questions answered.    Patient sent  "Your guide to Lutathera Treatment and "Recommendations for the patient to be treated with Olga Lidia- [id number](Lutathera)" via portal.  "

## 2020-10-12 ENCOUNTER — PATIENT MESSAGE (OUTPATIENT)
Dept: NEUROLOGY | Facility: HOSPITAL | Age: 50
End: 2020-10-12

## 2020-10-12 ENCOUNTER — TELEPHONE (OUTPATIENT)
Dept: NEUROLOGY | Facility: HOSPITAL | Age: 50
End: 2020-10-12

## 2020-10-12 NOTE — TELEPHONE ENCOUNTER
Spoke with patient and gave him his AAA codes 4077/8248 and he stated he will submit the information for review.

## 2020-10-13 ENCOUNTER — TELEPHONE (OUTPATIENT)
Dept: NEUROLOGY | Facility: HOSPITAL | Age: 50
End: 2020-10-13

## 2020-10-13 NOTE — TELEPHONE ENCOUNTER
----- Message from Nuzhat Lopez sent at 10/12/2020  5:15 PM CDT -----  Type:  Needs Medical Advice    Who Called: Beryl/BANDAR Patient  Connect  Would the patient rather a call back or a response via [facility]? call  Best Call Back Number: [phone]  Additional Information: She wanted to let Gris know his paperwork was signed and she can complete intake.

## 2020-10-15 NOTE — PATIENT INSTRUCTIONS
Wrap Up From 9/22/20 Visit:    Scans: Gallium 68 ---  due ASAP    Notes From Physician:   - Await results from testicular surgery  - Plan for PRRT in [place]  - Discussed case with Dr. Lew, his local medical oncologist    Return Clinic Appointment: as needed with Dr. Her --- patient will call to schedule

## 2020-10-15 NOTE — PATIENT INSTRUCTIONS
Wrap Up From 10/8/20 Visit:      Scans: MRI globes  ---  due now  Patient will have done in [place]    Echo: due now --- patient will have done in [place]    Notes From Physician:   - LDT (no Yttrium 90) prior to first PRRT dose to manage the progression of disease in the liver  - PRRT  - Discussed case with Dr. Lew, his local medical oncologist    Tumor Board: Tumor board to rediscuss treatment plan in the context of his recent progression in multiple sites    Return Clinic Appointment: as needed with Dr. Her

## 2020-10-20 ENCOUNTER — OFFICE VISIT (OUTPATIENT)
Dept: NEUROLOGY | Facility: HOSPITAL | Age: 50
End: 2020-10-20
Attending: SURGERY
Payer: COMMERCIAL

## 2020-10-20 ENCOUNTER — LAB VISIT (OUTPATIENT)
Dept: LAB | Facility: HOSPITAL | Age: 50
End: 2020-10-20
Attending: SURGERY
Payer: COMMERCIAL

## 2020-10-20 ENCOUNTER — TELEPHONE (OUTPATIENT)
Dept: INTERVENTIONAL RADIOLOGY/VASCULAR | Facility: HOSPITAL | Age: 50
End: 2020-10-20

## 2020-10-20 ENCOUNTER — PATIENT MESSAGE (OUTPATIENT)
Dept: NEUROLOGY | Facility: HOSPITAL | Age: 50
End: 2020-10-20

## 2020-10-20 VITALS
HEART RATE: 79 BPM | HEIGHT: 77 IN | BODY MASS INDEX: 25.11 KG/M2 | WEIGHT: 212.63 LBS | SYSTOLIC BLOOD PRESSURE: 133 MMHG | TEMPERATURE: 98 F | DIASTOLIC BLOOD PRESSURE: 90 MMHG

## 2020-10-20 DIAGNOSIS — E34.0 DIARRHEA CONCURRENT WITH AND DUE TO CARCINOID SYNDROME: ICD-10-CM

## 2020-10-20 DIAGNOSIS — K52.9 DIARRHEA CONCURRENT WITH AND DUE TO CARCINOID SYNDROME: ICD-10-CM

## 2020-10-20 DIAGNOSIS — C7B.02 METASTATIC MALIGNANT CARCINOID TUMOR TO LIVER: ICD-10-CM

## 2020-10-20 DIAGNOSIS — C7B.02 METASTATIC MALIGNANT CARCINOID TUMOR TO LIVER: Primary | ICD-10-CM

## 2020-10-20 DIAGNOSIS — C7B.8 SECONDARY NEUROENDOCRINE TUMOR OF DISTANT LYMPH NODES: ICD-10-CM

## 2020-10-20 LAB
ALBUMIN SERPL BCP-MCNC: 4.2 G/DL (ref 3.5–5.2)
ALP SERPL-CCNC: 78 U/L (ref 55–135)
ALT SERPL W/O P-5'-P-CCNC: 11 U/L (ref 10–44)
ANION GAP SERPL CALC-SCNC: 9 MMOL/L (ref 8–16)
AST SERPL-CCNC: 21 U/L (ref 10–40)
BASOPHILS # BLD AUTO: 0.04 K/UL (ref 0–0.2)
BASOPHILS NFR BLD: 0.7 % (ref 0–1.9)
BILIRUB SERPL-MCNC: 0.8 MG/DL (ref 0.1–1)
BUN SERPL-MCNC: 14 MG/DL (ref 6–20)
CALCIUM SERPL-MCNC: 8.9 MG/DL (ref 8.7–10.5)
CHLORIDE SERPL-SCNC: 101 MMOL/L (ref 95–110)
CO2 SERPL-SCNC: 29 MMOL/L (ref 23–29)
CREAT SERPL-MCNC: 1 MG/DL (ref 0.5–1.4)
DIFFERENTIAL METHOD: ABNORMAL
EOSINOPHIL # BLD AUTO: 0.2 K/UL (ref 0–0.5)
EOSINOPHIL NFR BLD: 2.8 % (ref 0–8)
ERYTHROCYTE [DISTWIDTH] IN BLOOD BY AUTOMATED COUNT: 11.7 % (ref 11.5–14.5)
EST. GFR  (AFRICAN AMERICAN): >60 ML/MIN/1.73 M^2
EST. GFR  (NON AFRICAN AMERICAN): >60 ML/MIN/1.73 M^2
GLUCOSE SERPL-MCNC: 185 MG/DL (ref 70–110)
HCT VFR BLD AUTO: 41.8 % (ref 40–54)
HGB BLD-MCNC: 14 G/DL (ref 14–18)
IMM GRANULOCYTES # BLD AUTO: 0.02 K/UL (ref 0–0.04)
IMM GRANULOCYTES NFR BLD AUTO: 0.3 % (ref 0–0.5)
INR PPP: 1 (ref 0.8–1.2)
LYMPHOCYTES # BLD AUTO: 1.8 K/UL (ref 1–4.8)
LYMPHOCYTES NFR BLD: 30.7 % (ref 18–48)
MCH RBC QN AUTO: 30.5 PG (ref 27–31)
MCHC RBC AUTO-ENTMCNC: 33.5 G/DL (ref 32–36)
MCV RBC AUTO: 91 FL (ref 82–98)
MONOCYTES # BLD AUTO: 0.4 K/UL (ref 0.3–1)
MONOCYTES NFR BLD: 7.3 % (ref 4–15)
NEUTROPHILS # BLD AUTO: 3.3 K/UL (ref 1.8–7.7)
NEUTROPHILS NFR BLD: 58.2 % (ref 38–73)
NRBC BLD-RTO: 0 /100 WBC
PLATELET # BLD AUTO: 165 K/UL (ref 150–350)
PMV BLD AUTO: 10.9 FL (ref 9.2–12.9)
POTASSIUM SERPL-SCNC: 4 MMOL/L (ref 3.5–5.1)
PROT SERPL-MCNC: 7.4 G/DL (ref 6–8.4)
PROTHROMBIN TIME: 10.9 SEC (ref 9–12.5)
RBC # BLD AUTO: 4.59 M/UL (ref 4.6–6.2)
SODIUM SERPL-SCNC: 139 MMOL/L (ref 136–145)
WBC # BLD AUTO: 5.74 K/UL (ref 3.9–12.7)

## 2020-10-20 PROCEDURE — 36415 COLL VENOUS BLD VENIPUNCTURE: CPT

## 2020-10-20 PROCEDURE — 85610 PROTHROMBIN TIME: CPT

## 2020-10-20 PROCEDURE — 85025 COMPLETE CBC W/AUTO DIFF WBC: CPT

## 2020-10-20 PROCEDURE — 80053 COMPREHEN METABOLIC PANEL: CPT

## 2020-10-20 PROCEDURE — 99214 OFFICE O/P EST MOD 30 MIN: CPT | Performed by: SURGERY

## 2020-10-20 RX ORDER — FAMOTIDINE 20 MG/1
20 TABLET, FILM COATED ORAL EVERY 12 HOURS
Status: CANCELLED | OUTPATIENT
Start: 2020-10-20

## 2020-10-20 RX ORDER — CIPROFLOXACIN 500 MG/1
500 TABLET ORAL EVERY 12 HOURS
Qty: 10 TABLET | Refills: 0 | Status: SHIPPED | OUTPATIENT
Start: 2020-10-20 | End: 2020-10-20 | Stop reason: SDUPTHER

## 2020-10-20 RX ORDER — HYDROMORPHONE HCL IN 0.9% NACL 6 MG/30 ML
PATIENT CONTROLLED ANALGESIA SYRINGE INTRAVENOUS CONTINUOUS
Status: CANCELLED | OUTPATIENT
Start: 2020-10-20

## 2020-10-20 RX ORDER — DEXTROSE MONOHYDRATE, SODIUM CHLORIDE, AND POTASSIUM CHLORIDE 50; 1.49; 4.5 G/1000ML; G/1000ML; G/1000ML
INJECTION, SOLUTION INTRAVENOUS CONTINUOUS
Status: CANCELLED | OUTPATIENT
Start: 2020-10-20

## 2020-10-20 RX ORDER — DEXAMETHASONE SODIUM PHOSPHATE 4 MG/ML
8 INJECTION, SOLUTION INTRA-ARTICULAR; INTRALESIONAL; INTRAMUSCULAR; INTRAVENOUS; SOFT TISSUE ONCE
Status: CANCELLED | OUTPATIENT
Start: 2020-10-20 | End: 2020-10-20

## 2020-10-20 RX ORDER — IBUPROFEN 400 MG/1
400 TABLET ORAL EVERY 4 HOURS PRN
Status: CANCELLED | OUTPATIENT
Start: 2020-10-20

## 2020-10-20 RX ORDER — FUROSEMIDE 10 MG/ML
20 INJECTION INTRAMUSCULAR; INTRAVENOUS ONCE
Status: CANCELLED | OUTPATIENT
Start: 2020-10-20 | End: 2020-10-20

## 2020-10-20 RX ORDER — DIPHENHYDRAMINE HYDROCHLORIDE 50 MG/ML
25 INJECTION INTRAMUSCULAR; INTRAVENOUS
Status: CANCELLED | OUTPATIENT
Start: 2020-10-20 | End: 2020-10-21

## 2020-10-20 RX ORDER — ONDANSETRON 2 MG/ML
8 INJECTION INTRAMUSCULAR; INTRAVENOUS EVERY 6 HOURS PRN
Status: CANCELLED | OUTPATIENT
Start: 2020-10-20

## 2020-10-20 RX ORDER — METHYLPREDNISOLONE SOD SUCC 125 MG
125 VIAL (EA) INJECTION
Status: CANCELLED | OUTPATIENT
Start: 2020-10-20 | End: 2020-10-21

## 2020-10-20 RX ORDER — MAGNESIUM SULFATE HEPTAHYDRATE 40 MG/ML
2 INJECTION, SOLUTION INTRAVENOUS ONCE
Status: CANCELLED | OUTPATIENT
Start: 2020-10-20 | End: 2020-10-20

## 2020-10-20 RX ORDER — CIPROFLOXACIN 500 MG/1
500 TABLET ORAL 2 TIMES DAILY
Qty: 10 TABLET | Refills: 0 | Status: CANCELLED | OUTPATIENT
Start: 2020-10-20 | End: 2020-10-25

## 2020-10-20 RX ORDER — ALLOPURINOL 100 MG/1
300 TABLET ORAL DAILY
Status: CANCELLED | OUTPATIENT
Start: 2020-10-20 | End: 2020-10-22

## 2020-10-20 RX ORDER — SODIUM CHLORIDE 9 MG/ML
500 INJECTION, SOLUTION INTRAVENOUS ONCE
Status: CANCELLED | OUTPATIENT
Start: 2020-10-20 | End: 2020-10-20

## 2020-10-20 RX ORDER — DIPHENHYDRAMINE HYDROCHLORIDE 50 MG/ML
50 INJECTION INTRAMUSCULAR; INTRAVENOUS ONCE
Status: CANCELLED | OUTPATIENT
Start: 2020-10-20 | End: 2020-10-20

## 2020-10-20 RX ORDER — PREDNISONE 50 MG/1
50 TABLET ORAL
Qty: 3 TABLET | Refills: 0 | Status: SHIPPED | OUTPATIENT
Start: 2020-10-20 | End: 2020-10-20 | Stop reason: SDUPTHER

## 2020-10-20 NOTE — PROGRESS NOTES
" NOLANETS:  [facility]  A collaboration between [facility] and [facility]      PATIENT: Javy Thompson  MRN: [id number]  DATE: 10/20/2020    Subjective:      Chief Complaint: metastatic midgut carcinoid, on active treatment, symptomatic  Worsening carcinoid syndrome     The patient location is: home  The chief complaint leading to consultation is: scheduled follow-up, worsening carcinoid syndrome despite monthly injections, hyperglycemia  Visit type: Virtual visit with synchronous audio and video  Total time spent with patient: 40    Each patient to whom he or she provides medical services by telemedicine is:  (1) informed of the relationship between the physician and patient and the respective role of any other health care provider with respect to management of the patient; and (2) notified that he or she may decline to receive medical services by telemedicine and may withdraw from such care at any time.    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 He presented with measurable disease within the small bowel, small bowel mesentery, and liver    Interval History:  Tumor board recommends TACE/PRRT  combination therapy.    NET BW  Ga68 PET    Vitals: Blood pressure (!) 133/90, pulse 79, temperature 98 °F (36.7 °C), temperature source Oral, height 6' 5" (1.956 m), weight 96.5 kg (212 lb 10.1 oz). --stable    ECOG Score: 1 - Symptomatic but completely ambulatory    Oncologic History:   Oncologic History Ileal net, dx 2016, liver and román mets  Carcinoid syndrome- diarrhea   Oncologic Treatment 3/2017- surgery (Eric)  Lanreotide  SBRT to retroperitoneal lymph node   Pathology 3/2017- small bowel- well diff net, multifocal (5), G2, Ki 67- 10.9%, 3/12 nodes pos; liver- well diff net     Past Medical History:  Past Medical History:   Diagnosis Date    Mesenteric mass 2/8/2017    Metastatic " carcinoid tumor 4/18/2017    Mr. Thompson is well known to me having been diagnosed with metastatic ileal carcinoid in 2016. He was resected in 3/2017. He has had slow progression of disease in the mediastinum, liver, and retroperitoneal román basins.    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       Past Surgical History:  Past Surgical History:   Procedure Laterality Date    ANTERIOR CRUCIATE LIGAMENT REPAIR Right 1986    APPENDECTOMY  3/31/2017    Procedure: APPENDECTOMY;  Surgeon: Fidelia Reyes MD;  Location: [facility];  Service: General;;    KNEE ARTHROSCOPY Right 1986    x2    LIVER BIOPSY  12/2016, 1/2017    TONSILLECTOMY         Family History:  Family History   Problem Relation Age of Onset    Cancer Mother        Allergies:  Epinephrine and Iodinated contrast media    Medications:  Current Outpatient Medications   Medication Sig    cholestyramine (QUESTRAN) 4 gram packet Take 1 packet (4 g total) by mouth 3 (three) times daily with meals.    lanreotide (SOMATULINE DEPOT) 120 mg/0.5 mL Syrg Inject 120 mg into the skin every 28 days.    oxyCODONE (OXYCONTIN) 10 mg 12 hr tablet Take 1 tablet (10 mg total) by mouth every 12 (twelve) hours as needed for Pain. (Patient taking differently: Take 5 mg by mouth every 12 (twelve) hours as needed for Pain. )    HYDROcodone-acetaminophen (NORCO) 5-325 mg per tablet Take 1 tablet by mouth every 6 (six) hours as needed for Pain.    lipase-protease-amylase (ZENPEP) 25,000-79,000- 105,000 unit CpDR Take 3 caps by mouth with meals and Take 2 caps by mouth with snacks    opium tincture 10 mg/mL (morphine) Tinc Take 1 mL (10 mg total) by mouth 4 (four) times daily as needed. Titrate starting with 4 drops with meals to 1-2 bowel movements a day. Can be used in combination with cholestyramine for the management of carcinoid syndrome    oxyCODONE-acetaminophen (PERCOCET)  mg  per tablet Take 1 tablet by mouth every 6 (six) hours as needed for Pain.    senna-docusate 8.6-50 mg (PERICOLACE) 8.6-50 mg per tablet Take 1 tablet by mouth daily as needed for Constipation.    spironolactone (ALDACTONE) 50 MG tablet Take 1 tablet (50 mg total) by mouth once daily.     No current facility-administered medications for this visit.         Review of Systems   Constitutional: Negative for fever and unexpected weight change.   HENT: Negative for facial swelling and hearing loss.    Eyes: Negative for photophobia and visual disturbance.   Respiratory: Negative for apnea and chest tightness.    Cardiovascular: Negative for palpitations.   Gastrointestinal: Positive for abdominal distention, abdominal pain and diarrhea.   Endocrine: Negative for cold intolerance and heat intolerance.        Hyperglycemia   Genitourinary: Negative for difficulty urinating and dysuria.   Musculoskeletal: Positive for back pain.   Skin: Negative for rash and wound.   Allergic/Immunologic: Negative for food allergies and immunocompromised state.   Neurological: Negative for tremors and weakness.   Hematological: Does not bruise/bleed easily.   Psychiatric/Behavioral: Negative.           Objective:      Physical Exam  Constitutional:       General: He is not in acute distress.     Appearance: He is well-developed.   HENT:      Head: Normocephalic.   Pulmonary:      Effort: No respiratory distress.      Breath sounds: No stridor.   Neurological:      Mental Status: He is alert and oriented to person, place, and time.   Psychiatric:         Behavior: Behavior normal.         Thought Content: Thought content normal.         Judgment: Judgment normal.          Laboratory Data:    9/11/2020 labs sent via text from patient  Pancreastatin: 490  Serotonin: 489    Neuroendocrine Labs Latest Ref Rng & Units 10/20/2020   WBC 3.90 - 12.70 K/uL 5.74   EXT WBC 3.3 - 10.5 k/cumm    RBC 4.60 - 6.20 M/uL 4.59 (L)   HGB 14.0 - 18.0 g/dL 14.0    EXT HGB 12.8 - 16.9 g/dl    HCT 40.0 - 54.0 % 41.8   EXT HCT 38.8 - 50.2 %    MCV 82 - 98 fL 91   MCH 27.0 - 31.0 pg 30.5   MCHC 32.0 - 36.0 g/dL 33.5   RDW 11.5 - 14.5 % 11.7   PLATLETS 150 - 350 K/uL 165       Neuroendocrine Labs Latest Ref Rng & Units 5/14/2020 5/13/2020   EXT 5 HIAA BLOOD 0 - 22 ng/mL 18 18   EXT SEROTONIN 56 - 244 ng/ml  457 (A)   EXT CHROMOGRANIN A 0 - 15 ng/ml     EXT PANCREASTATIN COLE 10 - 135 pg/mL 207 (A) 207 (A)   EXT NEUROKININ A COLE 0 - 40 pg/ml     EXT LANREOTIDE LEVEL pg/mL 256 2,569       Neuroendocrine Labs Latest Ref Rng & Units 10/20/2020   INR 0.8 - 1.2 1.0   GLUCOSE 70 - 110 mg/dL 185 (H)   EXT GLUCOSE 65 - 99 mg/dl    BUN 6 - 20 mg/dL 14   EXT BUN 8 - 26 mg/dl    CREATININE 0.5 - 1.4 mg/dL 1.0   EXT CREATININE 0.80 - 1.50 mg/dL     - 145 mmol/L 139   EXT  - 145 mmol/L    K 3.5 - 5.1 mmol/L 4.0   EXT K 3.5 - 5.5 mmol/l    CHLORIDE 95 - 110 mmol/L 101   EXT CHLORIDE 98 - 110 mmol/l    CO2 23 - 29 mmol/L 29   EXT CO2 20 - 29 mmol/l    CALCIUM 8.7 - 10.5 mg/dL 8.9   EXT CALCIUM 8.4 - 10.5 mg/dl    PROTEIN, TOTAL 6.0 - 8.4 g/dL 7.4   EXT PROTEIN, TOTAL 6.0 - 8.3 g/dl    PHOSPHORUS 2.7 - 4.5 mg/dL    ALBUMIN 3.5 - 5.2 g/dL 4.2   EXT ALBUMIN 3.5 - 5.0 gm/dl    TOTAL BILIRUBIN 0.1 - 1.0 mg/dL 0.8   EXT TOTAL BILIRUBIN 0.1 - 1.2 mg/dl    ALK PHOSPHATASE 55 - 135 U/L 78   EXT ALK PHOSPHATASE 38 - 126 u/l    SGOT (AST) 10 - 40 U/L 21   EXT SGOT (AST) 17 - 59 u/l    SGPT (ALT) 10 - 44 U/L 11       Scans:     INDICATION: SUBSEQUENT TREATMENT STRATEGY: Restaging. 50 year old  male with neuroendocrine tumor.  COMPARISON: PET/CT, 7/9/2019 and 7/10/2017  TECHNIQUE: PET CT of the body (skull base to thigh) was performed  without IV contrast. 6.0mCi Ga-68 DOTA-TREJO was administered IV.  Non-contrast CT was performed from skull base to thighs followed by  PET imaging of the same field. No contrast due to allergy.  FINDINGS:  HEAD/NECK:  Radiotracer avid, hyperattenuating lesion along  the right medial  rectus muscle and along the posterior aspect of the left lower globe.  No significant proptosis bilaterally. These lesions are both new  compared to prior examination in 2019. Radiotracer avid right level  IB lymph node measuring up to 1.1 cm.  The thyroid is unremarkable.  CHEST:  Multiple radiotracer avid lesions visualized within the pericardium  and along the course of the interventricular septum. Level 4R and 7  are radiotracer avid lymph nodes are present in addition to right  upper lobe, right middle lobe, and right lower lobe radiotracer avid  pulmonary nodules. Multiple radiotracer avid chest soft tissue  nodules are present, new compared to prior. Pleural metastases are  present along the inferior right base.  Trace left pleural effusion. No focal consolidation.  ABDOMEN/PELVIS:  Hepatic metastatic disease is present, with the largest lesion in the  hepatic dome, measuring 6.4 x 3.6 cm with a max SUV of 29.9 (image  145), previously measuring 2.5 x 1.9 cm with a max SUV of 20.9.  Interval increase in number and size of the erendira hepatic radiotracer  avid lymphadenopathy. Decreased size of a left adrenal nodule without  definite FDG uptake. Increased size of a right pararenal lymph node.  Interval development of mesenteric lymph nodes, and a left common  iliac chain lymph node.  BONES:  Multifocal radiotracer uptake within the appendicular and axial  osseous structures, overall increased compared to 2019. No pathologic  fractures.  IMPRESSION:  Multiple new foci of DOTATATE avid metastatic disease, including  within the posterior left globe, right orbital medial rectus muscle  and within the interventricular septum of the myocardium, consistent  with disease progression.        MRI abdomen 9/2020    [facility]  EXAM: MRI Abdomen wo then w Contrast  DATE: 09/02/2020 15:11 hours  INDICATION: History of neuroendocrine tumor status post  resection.  COMPARISON: CT abdomen and pelvis dated May 6, 2020  TECHNIQUE: MR imaging of the abdomen was obtained pre and post IV  contrast. Routine protocol was utilized. Images were obtained in  multiple planes.  10 ml of GADAVIST were administered  FINDINGS:  Liver: Hepatic steatosis. Multiple T2 hyperintense lesions are noted  throughout the liver. The largest is located within the right hepatic  lobe, segment 8 measuring approximately 3.5 cm, increased in size  from the comparison CT. At least 4 lesions are noted within the  liver. . Two of the smaller lesions were not apparent on the  comparison CT. The posterior peripheral right hepatic lesion  measuring 1.7 cm increased size from prior examination (previously  1.3 cm) (series 10, image 10). Postsurgical changes noted within the  right lobe of the liver.  Bile Ducts: Negative for intra or extrahepatic biliary dilatation.  Gallbladder: The gallbladder is surgically absent.  Pancreas: The pancreas is fatty and atrophied.  Spleen: The spleen is unremarkable in size and contour.  Adrenals: Small nodule adjacent to the left adrenal gland. The nodule  measures 0.9 cm and is unchanged from the prior examination.  The adrenal gland itself is unremarkable.  Urinary Tract: Negative for hydronephrosis. Adjacent to the right  renal vein is a peripherally enhancing nodule measuring approximately  1.2 cm in the short axis, decreased in size from the prior  examination and favored to represent a necrotic lymph node.  Reproductive Organs: Not included on this field-of-view  Bowel: No overly dilated loops of small bowel. The stomach and  esophagus are unremarkable.  Appendix: The appendix is not visualized.  Lymph Nodes: Similar lymphadenopathy of the erendira hepatis.  Peritoneum: Negative for free fluid.  Vasculature: The abdominal aorta is unremarkable in size and contour.  Abdominal Wall: Midline abdominal scar.  Bones: Negative for acute osseous findings. Focal 0.6 cm  T1  hypointense lesion within T12.  Visualized thorax: Normal heart size. Evaluation of the lungs is  suboptimal on MRI and a concurrent chest CT was performed. 2  pulmonary nodules in the right lower lobe appear enlarged from the  prior examination, however comparison and evaluation is better  performed by CT.  IMPRESSION:  1. Increased size and number of multiple hepatic lesions concerning  for progression of disease.  2. Question osseous metastatic disease within T12. Attention on  follow-up examinations is recommended.  3. Similar erendira hepatis and retroperitoneal lymphadenopathy.  Template Version: [id number]  Thank you for consulting our team of subspecialty body imaging  radiologists at [facility].  Healthcare providers wishing to discuss this case further can contact  the [facility] Reading Room at  [phone].  Petar JEFF have personally reviewed the image(s) and the  prepared and signed interpretation by Samreen Romo. Based on my review,  I agree with the findings.  Electronically Signed by: Petar Rob  Dictated on: 9/2/2020 3:14 PM    CT abd/pelvis- 2/24/20          NM PET Ga68 Dotatate Whole Body-7/9/19  Narrative: EXAMINATION:  NM PET 68GA DOTATATE WHOLE BODY    CLINICAL HISTORY:  Malignant carcinoid tumor of the ileum    TECHNIQUE:  4.67 mCi of GA68 Dotatate was administered intravenously in the right antecubital fossa.  After an approximately 60 min distribution time, PET/CT images were acquired from the skull vertex to the mid thigh. Transmission images were acquired to correct for attenuation using a whole body low-dose CT scan without contrast with the arms positioned above the head.    COMPARISON:  Gallium 68 dota-hua PET 11/26/2018.    FINDINGS:  Quality of the study: Adequate.    Multiple areas increased FDG avidity including innumerable hepatic lesions, retroperitoneal, mesenteric, and peripancreatic lymph nodes  with index lesions as below:    Superior right lobe of the liver: SUV max 13.64, previously 22.83.    Paraesophageal lymph node has resolved.    Peripancreatic lymphadenopathy (largest lymph measures 1.7 cm in short axis, previously 2.0 cm; of note, this was incorrectly labeled as periaortic lymphadenopathy on examination dated 11/26/2018): SUV max 6.65, previously 37.    Stable right retrocaval lymph node measures approximately 1.6 cm (previously 1.5 cm) and SUV max 19.62, previously 20.63.    Redemonstration of increased tracer uptake associated with the myocardium, 1 in the region of the interventricular septum (SUV max 7.13, previously 8.8), and 1 in the region of the left ventricle lateral wall (SUV max 7.32, previously 6.43).  There has been interval resolution of FDG avidity within the region of the right atrial/ventricular wall.  New nonspecific FDG avid subcentimeter submandibular lymph node demonstrating SUV max of 3.10.  This may be reactive in nature.  Nonspecific increased uptake within the anterior aspect of the ethmoid sinus.    Physiologic uptake of the tracer is seen within the pituitary, salivary, and thyroid glands, stomach, liver, spleen, kidneys, adrenal glands, prostate, and bowel.    Incidental CT findings: Heterogeneous liver.  Multiple calcified lymph nodes within the right hilar region, likely sequela prior granulomatous disease.  Subsegmental opacity within the right middle lobe which may represent subsegmental atelectasis or scarring.  Small amount of left sided dependent pleural fluid.  Prior cholecystectomy.  Punctate calcification within the spleen, likely sequela prior granulomatous disease.  Multiple rounded hyperdensities within the subcutaneous tissue of the buttocks presumably related to injection sites.  Impression: 1.  Multiple foci of uptake indicating persistent somatostatin receptor positive metastases with resolved paraesophageal node and smaller peripancreatic nodes that  are no longer tracer avid and in keeping with partial response to treatment.    2.  Two out of 3 foci within the myocardium remaining that may represent cardiac metastases, though other processes may cause uptake.  Recommend correlation with cardiac history and consideration of dedicated cardiac MRI for further evaluation.      MRI Abdomen W WO Contrast-7/9/19  Narrative: EXAMINATION:  MRI ABDOMEN W WO CONTRAST    CLINICAL HISTORY:  Neoplasm: abdomen, metastatic, recurrence, suspected/known;  Neoplasm of unspecified behavior of other specified sites    TECHNIQUE:  Multisequence, multiplanar MRI of the abdomen performed per liver protocol before and after administration of 10 mL Gadavist intravenous contrast.    COMPARISON:  11/26/2018    FINDINGS:  Liver: There is diffuse loss of signal on opposed phase imaging consistent with steatosis.  There are multiple T2 hyperintense lesions which demonstrate restricted diffusion and arterial enhancement.  Segment VIII lesion measures 2.9 cm, previously 2.4 cm (series 1400, image 31).  Segment V lesion measures 1.5 cm, previously 1.6 cm (series 1400, image 77).  Overall number of lesions appear similar to prior study.  There is apparent interval increase in enhancement.  Postsurgical changes of prior partial right hepatectomy noted.    Biliary: Gallbladder is absent.  No biliary dilatation.    Pancreas: Unremarkable.  No pancreatic ductal dilatation.    Spleen: Normal size.  No focal lesions.    Adrenal glands: Unremarkable.    Kidneys: No renal masses.  Several tiny cysts noted.  No hydronephrosis.    Miscellaneous: Bowel containing ventral abdominal hernia partially visualized.  No evidence for bowel obstruction.  Multiple prominent periportal and retroperitoneal lymph nodes are noted.  Aortocaval necrotic lymph node measures 1.6 cm short axis, previously 1.7 cm (series 1401, image 58).  Additional retrocaval necrotic lymph node measuring 1.5 cm noted, previously 1.1 cm  (series 1401, image 72).  Impression: 1. Hepatic metastases and abdominal lymphadenopathy, overall similar to prior study.  2. Partially visualized bowel containing ventral abdominal hernia.  RECIST SUMMARY:    Date of prior examination for comparison: 11/26/2018    Lesion 1: Hepatic segment V.  1.5 cm. Series 1400 image 77.  Prior measurement 1.6 cm.    Lesion 2: Hepatic segment VIII.  2.9 cm. Series 1400 image 31.  Prior measurement 2.4 cm.    Lesion 3: Aortocaval lymph node.  1.6 cm. Series 1401 image 58.  Prior measurement 1.7 cm.    Lesion 4: Retrocaval lymph node.  1.5 cm. Series 1401 image 72.  Prior measurement 1.1 cm.       Echocardiogram- 7/8/19  · Normal left ventricular systolic function. The estimated ejection fraction is 65%  · Normal LV diastolic function.  · Concentric left ventricular hypertrophy.  · Mild aortic regurgitation.  · Normal right ventricular systolic function.  · Normal central venous pressure (3 mm Hg).  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  His confirmed diagnosis dates back to 2017.  At that time, he had bulky, bilobar, multifocal liver involvement noted on an MRI of the abdomen as well as a somatostatin receptor PET.  He underwent surgical debulking to minimal residual disease, his neuroendocrine blood work normalized, and has been maintained on standard of care somatostatin receptor inhibitor therapy since that time.  Within the last year, he has experienced progression of disease despite SSI treatment that has prompted additional treatment.  He completed stereotactic body radiotherapy to a retroperitoneal  lymph node.   2.  Coincident with his radiographic progression of disease was a rise in his pancreastatin level.  Although his level of 200 was outside the normal range, this is compared to a level of over 3000 at diagnosis.  His most recent neuroendocrine blood work shows a pancreastatin level of 490 and a serotonin level of 489, both up from prior.  This would also be congruent with progression of disease in multiple sites.   3.  After his visit last month, we discussed his case at tumor board and the consensus was to move forward with PRRT.  I spoke to his medical oncologist in [place] at that time and we agreed that he would initiate that treatment closer to home.  He had not had an updated extent of disease evaluation and therefore a gallium 68 PET was ordered. The images show activity within his right and left eyes, right submandibular area, multiple bones, multiple areas of his chest and mediastinum, pericardium, liver and lymph nodes.  I clear for for him the pericardial involvement.  We have seen uptake within the pericardium on gallium 68 PET.   He has had at least 3 areas of activity noted within the heart on prior gallium 68 imaging.  An echocardiogram to evaluate any structural abnormalities that would correlate with that uptake did not show any measurable abnormalities.  I explained that this may be very small volume disease that is subclinical by conventional imaging, but there is little doubt that it represents neuroendocrine tumors.  I would suggest a follow-up echocardiogram at this juncture.  His medical oncologist is going to arrange that closer to home.  He has 1 area within the liver that has grown substantially between 2 scans done 4 months apart.  It is common for us to use catheter based liver directed therapies to diminish tumor burden within the liver prior to PRRT with the goal of holding the radiation platform to areas outside the liver that have progressed and where we have few if any  other targeted therapies.  I am trying to arrange that at [facility].  Based on the best data we have about expectations of treatment, catheter based liver directed therapies when used in neuroendocrine tumors have a very high almost 80-90% short-term response rate (1 month) that drops to just over 50% durable response rate at 6 months.  If we are going to give him every opportunity to meet local regional treatment goals, tumor board as recommend TACE alternating with  PRRT.  When used in this fashion, we can reimage with cross-sectional imaging between PRRT doses to confirm or clarify the response in that site.  4.  He was recently diagnosed with a testicular mass.  He had an orchidectomy and the pathology was consistent with neuroendocrine.  This is a highly unusual site for metastatic involvement, but in the absence of a 2nd primary cancer, it focuses our treatment recommendations on neuroendocrine alone.  5.  The activity within his eyes raises some unique challenges. In the handful of patients where we have had this situation, we have a neuro ophthalmologist that has weighed in on the risks associated with radiation targeting that part of the eyeball.  He is requesting an MRI of the globes in order to better understand any structural relationship between the activity seen on his gallium 68 PET and critical structures within the eye.  Will try to arrange that closer to home.    We also discussed recent issues with hyperglycemia and elevations in A1c levels.  This is common in patients on long-acting somatostatin receptor inhibitor therapy.  I gave him dietary and behavior modifications focused on decreasing overall carbohydrate intake and carbohydrate intake after 6:00 p.m. in particular.  I would hold on starting oral hypoglycemic agents until after we have tried other strategies.    Tumor board to rediscuss treatment plan in the context of his recent progression in multiple sites  PRRT and TACE  LDT  (no Yttrium 90) prior to first PRRT dose to manage the progression of disease in the liver  MRI globes  ECHO to evaluate pericardial involvement      Okay to proceed with TACE.

## 2020-10-21 ENCOUNTER — HOSPITAL ENCOUNTER (OUTPATIENT)
Facility: HOSPITAL | Age: 50
Discharge: HOME | End: 2020-10-22
Attending: SURGERY | Admitting: SURGERY
Payer: COMMERCIAL

## 2020-10-21 ENCOUNTER — TELEPHONE (OUTPATIENT)
Dept: NEUROLOGY | Facility: HOSPITAL | Age: 50
End: 2020-10-21

## 2020-10-21 DIAGNOSIS — K52.9 DIARRHEA CONCURRENT WITH AND DUE TO CARCINOID SYNDROME: Primary | ICD-10-CM

## 2020-10-21 DIAGNOSIS — C7B.02 METASTATIC MALIGNANT CARCINOID TUMOR TO LIVER: ICD-10-CM

## 2020-10-21 DIAGNOSIS — E34.0 DIARRHEA CONCURRENT WITH AND DUE TO CARCINOID SYNDROME: Primary | ICD-10-CM

## 2020-10-21 DIAGNOSIS — C7B.8 SECONDARY NEUROENDOCRINE TUMOR OF DISTANT LYMPH NODES: ICD-10-CM

## 2020-10-21 LAB — SARS-COV-2 RDRP RESP QL NAA+PROBE: NEGATIVE

## 2020-10-21 PROCEDURE — 25000003 PHARM REV CODE 250: Performed by: STUDENT IN AN ORGANIZED HEALTH CARE EDUCATION/TRAINING PROGRAM

## 2020-10-21 PROCEDURE — 63600175 PHARM REV CODE 636 W HCPCS: Performed by: SURGERY

## 2020-10-21 PROCEDURE — 25500020 PHARM REV CODE 255: Performed by: SURGERY

## 2020-10-21 PROCEDURE — 25000003 PHARM REV CODE 250: Performed by: SURGERY

## 2020-10-21 PROCEDURE — U0002 COVID-19 LAB TEST NON-CDC: HCPCS

## 2020-10-21 PROCEDURE — 63600175 PHARM REV CODE 636 W HCPCS: Performed by: RADIOLOGY

## 2020-10-21 PROCEDURE — 25000003 PHARM REV CODE 250: Performed by: RADIOLOGY

## 2020-10-21 RX ORDER — MAGNESIUM SULFATE HEPTAHYDRATE 40 MG/ML
2 INJECTION, SOLUTION INTRAVENOUS ONCE
Status: COMPLETED | OUTPATIENT
Start: 2020-10-21 | End: 2020-10-21

## 2020-10-21 RX ORDER — DIPHENHYDRAMINE HYDROCHLORIDE 50 MG/ML
50 INJECTION INTRAMUSCULAR; INTRAVENOUS ONCE
Status: COMPLETED | OUTPATIENT
Start: 2020-10-21 | End: 2020-10-21

## 2020-10-21 RX ORDER — SODIUM CHLORIDE 9 MG/ML
500 INJECTION, SOLUTION INTRAVENOUS ONCE
Status: COMPLETED | OUTPATIENT
Start: 2020-10-21 | End: 2020-10-21

## 2020-10-21 RX ORDER — OXYCODONE HYDROCHLORIDE 5 MG/1
5 TABLET ORAL EVERY 4 HOURS PRN
Status: DISCONTINUED | OUTPATIENT
Start: 2020-10-21 | End: 2020-10-22 | Stop reason: HOSPADM

## 2020-10-21 RX ORDER — DEXAMETHASONE SODIUM PHOSPHATE 4 MG/ML
8 INJECTION, SOLUTION INTRA-ARTICULAR; INTRALESIONAL; INTRAMUSCULAR; INTRAVENOUS; SOFT TISSUE ONCE
Status: COMPLETED | OUTPATIENT
Start: 2020-10-21 | End: 2020-10-21

## 2020-10-21 RX ORDER — ONDANSETRON 2 MG/ML
8 INJECTION INTRAMUSCULAR; INTRAVENOUS EVERY 6 HOURS PRN
Status: DISCONTINUED | OUTPATIENT
Start: 2020-10-21 | End: 2020-10-22 | Stop reason: HOSPADM

## 2020-10-21 RX ORDER — SODIUM CHLORIDE 9 MG/ML
500 INJECTION, SOLUTION INTRAVENOUS ONCE
Status: DISCONTINUED | OUTPATIENT
Start: 2020-10-21 | End: 2020-10-21

## 2020-10-21 RX ORDER — PROMETHAZINE HYDROCHLORIDE 25 MG/ML
INJECTION, SOLUTION INTRAMUSCULAR; INTRAVENOUS CODE/TRAUMA/SEDATION MEDICATION
Status: COMPLETED | OUTPATIENT
Start: 2020-10-21 | End: 2020-10-21

## 2020-10-21 RX ORDER — FAMOTIDINE 20 MG/1
20 TABLET, FILM COATED ORAL EVERY 12 HOURS
Status: DISCONTINUED | OUTPATIENT
Start: 2020-10-21 | End: 2020-10-22 | Stop reason: HOSPADM

## 2020-10-21 RX ORDER — FENTANYL CITRATE 50 UG/ML
INJECTION, SOLUTION INTRAMUSCULAR; INTRAVENOUS CODE/TRAUMA/SEDATION MEDICATION
Status: COMPLETED | OUTPATIENT
Start: 2020-10-21 | End: 2020-10-21

## 2020-10-21 RX ORDER — DIPHENHYDRAMINE HYDROCHLORIDE 50 MG/ML
50 INJECTION INTRAMUSCULAR; INTRAVENOUS ONCE
Status: DISCONTINUED | OUTPATIENT
Start: 2020-10-21 | End: 2020-10-21

## 2020-10-21 RX ORDER — IBUPROFEN 400 MG/1
400 TABLET ORAL EVERY 4 HOURS PRN
Status: DISCONTINUED | OUTPATIENT
Start: 2020-10-21 | End: 2020-10-22 | Stop reason: HOSPADM

## 2020-10-21 RX ORDER — DEXTROSE MONOHYDRATE, SODIUM CHLORIDE, AND POTASSIUM CHLORIDE 50; 1.49; 4.5 G/1000ML; G/1000ML; G/1000ML
INJECTION, SOLUTION INTRAVENOUS CONTINUOUS
Status: DISCONTINUED | OUTPATIENT
Start: 2020-10-21 | End: 2020-10-22 | Stop reason: HOSPADM

## 2020-10-21 RX ORDER — HYDROMORPHONE HYDROCHLORIDE 2 MG/ML
INJECTION, SOLUTION INTRAMUSCULAR; INTRAVENOUS; SUBCUTANEOUS CODE/TRAUMA/SEDATION MEDICATION
Status: COMPLETED | OUTPATIENT
Start: 2020-10-21 | End: 2020-10-21

## 2020-10-21 RX ORDER — ALLOPURINOL 100 MG/1
300 TABLET ORAL DAILY
Status: COMPLETED | OUTPATIENT
Start: 2020-10-21 | End: 2020-10-22

## 2020-10-21 RX ORDER — MAGNESIUM SULFATE HEPTAHYDRATE 40 MG/ML
2 INJECTION, SOLUTION INTRAVENOUS ONCE
Status: DISCONTINUED | OUTPATIENT
Start: 2020-10-21 | End: 2020-10-21

## 2020-10-21 RX ORDER — FUROSEMIDE 10 MG/ML
20 INJECTION INTRAMUSCULAR; INTRAVENOUS ONCE
Status: COMPLETED | OUTPATIENT
Start: 2020-10-21 | End: 2020-10-21

## 2020-10-21 RX ORDER — DEXTROSE MONOHYDRATE, SODIUM CHLORIDE, AND POTASSIUM CHLORIDE 50; 1.49; 4.5 G/1000ML; G/1000ML; G/1000ML
INJECTION, SOLUTION INTRAVENOUS CONTINUOUS
Status: DISCONTINUED | OUTPATIENT
Start: 2020-10-21 | End: 2020-10-21

## 2020-10-21 RX ORDER — MIDAZOLAM HYDROCHLORIDE 1 MG/ML
INJECTION INTRAMUSCULAR; INTRAVENOUS CODE/TRAUMA/SEDATION MEDICATION
Status: COMPLETED | OUTPATIENT
Start: 2020-10-21 | End: 2020-10-21

## 2020-10-21 RX ORDER — LIDOCAINE HYDROCHLORIDE 10 MG/ML
INJECTION INFILTRATION; PERINEURAL CODE/TRAUMA/SEDATION MEDICATION
Status: COMPLETED | OUTPATIENT
Start: 2020-10-21 | End: 2020-10-21

## 2020-10-21 RX ADMIN — FENTANYL CITRATE 25 MCG: 50 INJECTION, SOLUTION INTRAMUSCULAR; INTRAVENOUS at 12:10

## 2020-10-21 RX ADMIN — FENTANYL CITRATE 50 MCG: 50 INJECTION, SOLUTION INTRAMUSCULAR; INTRAVENOUS at 12:10

## 2020-10-21 RX ADMIN — ONDANSETRON 8 MG: 2 INJECTION INTRAMUSCULAR; INTRAVENOUS at 11:10

## 2020-10-21 RX ADMIN — FUROSEMIDE 20 MG: 10 INJECTION, SOLUTION INTRAVENOUS at 02:10

## 2020-10-21 RX ADMIN — AMPICILLIN SODIUM AND SULBACTAM SODIUM 3 G: 2; 1 INJECTION, POWDER, FOR SOLUTION INTRAMUSCULAR; INTRAVENOUS at 11:10

## 2020-10-21 RX ADMIN — IOHEXOL 60 ML: 350 INJECTION, SOLUTION INTRAVENOUS at 01:10

## 2020-10-21 RX ADMIN — ETHIODIZED OIL 10 ML: 480 INJECTION INTRA-ARTERIAL; INTRALYMPHATIC; INTRAUTERINE at 11:10

## 2020-10-21 RX ADMIN — ALLOPURINOL 300 MG: 100 TABLET ORAL at 02:10

## 2020-10-21 RX ADMIN — PROMETHAZINE HYDROCHLORIDE 12.5 MG: 25 INJECTION INTRAMUSCULAR; INTRAVENOUS at 12:10

## 2020-10-21 RX ADMIN — DIPHENHYDRAMINE HYDROCHLORIDE 50 MG: 50 INJECTION, SOLUTION INTRAMUSCULAR; INTRAVENOUS at 11:10

## 2020-10-21 RX ADMIN — FAMOTIDINE 20 MG: 20 TABLET ORAL at 08:10

## 2020-10-21 RX ADMIN — MIDAZOLAM HYDROCHLORIDE 1 MG: 1 INJECTION, SOLUTION INTRAMUSCULAR; INTRAVENOUS at 12:10

## 2020-10-21 RX ADMIN — ONDANSETRON 16 MG: 2 INJECTION INTRAMUSCULAR; INTRAVENOUS at 10:10

## 2020-10-21 RX ADMIN — SODIUM CHLORIDE 500 ML: 0.9 INJECTION, SOLUTION INTRAVENOUS at 10:10

## 2020-10-21 RX ADMIN — DEXAMETHASONE SODIUM PHOSPHATE 8 MG: 4 INJECTION, SOLUTION INTRAMUSCULAR; INTRAVENOUS at 02:10

## 2020-10-21 RX ADMIN — OCTREOTIDE ACETATE 500 MCG: 500 INJECTION, SOLUTION INTRAVENOUS; SUBCUTANEOUS at 10:10

## 2020-10-21 RX ADMIN — DEXTROSE, SODIUM CHLORIDE, AND POTASSIUM CHLORIDE: 5; .45; .15 INJECTION INTRAVENOUS at 10:10

## 2020-10-21 RX ADMIN — LIDOCAINE HYDROCHLORIDE 5 ML: 10 INJECTION, SOLUTION INFILTRATION; PERINEURAL at 12:10

## 2020-10-21 RX ADMIN — MAGNESIUM SULFATE IN WATER 2 G: 40 INJECTION, SOLUTION INTRAVENOUS at 10:10

## 2020-10-21 RX ADMIN — OXYCODONE HYDROCHLORIDE 5 MG: 5 TABLET ORAL at 08:10

## 2020-10-21 RX ADMIN — HYDROMORPHONE HYDROCHLORIDE 2 MG: 2 INJECTION INTRAMUSCULAR; INTRAVENOUS; SUBCUTANEOUS at 01:10

## 2020-10-21 RX ADMIN — DEXTROSE, SODIUM CHLORIDE, AND POTASSIUM CHLORIDE: 5; .45; .15 INJECTION INTRAVENOUS at 08:10

## 2020-10-21 RX ADMIN — OCTREOTIDE ACETATE 500 MCG/HR: 1000 INJECTION, SOLUTION INTRAVENOUS; SUBCUTANEOUS at 11:10

## 2020-10-21 RX ADMIN — AMPICILLIN SODIUM AND SULBACTAM SODIUM 3 G: 2; 1 INJECTION, POWDER, FOR SOLUTION INTRAMUSCULAR; INTRAVENOUS at 04:10

## 2020-10-21 RX ADMIN — IOHEXOL 50 MG: 350 INJECTION, SOLUTION INTRAVENOUS at 11:10

## 2020-10-21 RX ADMIN — MIDAZOLAM HYDROCHLORIDE 0.5 MG: 1 INJECTION, SOLUTION INTRAMUSCULAR; INTRAVENOUS at 12:10

## 2020-10-21 NOTE — TELEPHONE ENCOUNTER
Message referred to Dr. Harmon and staff pool regarding brain MRI and evaluation of posterior left globe and rt orbital medial rectus muscle prior to PRRT.

## 2020-10-21 NOTE — NURSING
R groin chemo embo completed. Pt tolerated well. VSS. 5 fr. exoseal deployed to R groin; manual pressure held for 5 minutes, hemostasis achieved. Guaze/tegaderm dressing in place, dressing clean, dry, and intact. No bleeding or hematoma noted. Pt ready for transport to pre post area for recovery. Report to be given to RN at bedside.

## 2020-10-21 NOTE — PROCEDURES
Interventional Radiology Post-Procedure Note     Pre Op Diagnosis: Met NET  Post Op Diagnosis: Same     Procedure: Conventional trans-arterial chemoembolization (lipiodol-based TACE)     Procedure performed by: Km     Written Informed Consent Obtained: Yes  Specimen Removed: No  Estimated Blood Loss: Less than 50 mL     Findings:   Successful superselective chemoembolization a dominant tumor in the right hepatic lobe. Expected retention of lipidol within the treatment area on post-embolization cone-beam CT. Right CFA closed with EXOSEAL.    Chemoembolic:  Agent: Doxorubicin, 50 mg in 8 mL Omnipaque-350  Emulsion: 8 mL Doxorubicin in 50 mL lipiodol (1:1.2)     Chemoembolization #1:  Catheter position: Subsegmental branch of the segment 8 hepatic artery  Volume of emulsion administered: 5 mL  Additional embolic administered: 0.15 vial 200 micron Terumo HydroPearl microspheres    Totals administered:  Doxorubicin: 13.9 mg  Lipiodol: 2.8 mL  Additional embolic: 0.15 vial 200 micron Terumo HydroPearl microspheres    No immediate complications. Patient tolerated procedure well.     Recommendations:  MRI abd in 4-6 wks to evaluate response to treatment.    Please see full dictated procedure report for additional details.      Dale Barbour MD  [facility]  Pager [phone]

## 2020-10-21 NOTE — PLAN OF CARE
Patient came into unit from IR. Transported per stretcher. With ongoing Ivfluids and althea. Positioned comfortably in bed. Vitals taken. Right groin dry and intact. On regular diet. Oriented to room set up. Call light within reach. Bed alarm on.

## 2020-10-21 NOTE — H&P
Interventional Radiology Pre-Procedure History & Physical      Chief Complaint/Reason for Referral: Liver mets    History of Present Illness:  Javy Thompson is a 50 y.o. male who presents with widely metastatic NET for which PRRT is planned. Referred to IR for selective chemoembolization to treat an enlarging segment 8 met prior to PRRT.    Past Medical History:   Diagnosis Date    Mesenteric mass 2/8/2017    Metastatic carcinoid tumor 4/18/2017    Mr. Thompson is well known to me having been diagnosed with metastatic ileal carcinoid in 2016. He was resected in 3/2017. He has had slow progression of disease in the mediastinum, liver, and retroperitoneal román basins.    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     Past Surgical History:   Procedure Laterality Date    ANTERIOR CRUCIATE LIGAMENT REPAIR Right 1986    APPENDECTOMY  3/31/2017    Procedure: APPENDECTOMY;  Surgeon: Fidelia Reyes MD;  Location: [facility] OR;  Service: General;;    HERNIA REPAIR  10/01/2019    KNEE ARTHROSCOPY Right 1986    x2    LIVER BIOPSY  12/2016, 1/2017    ORCHIECTOMY Right 09/24/2020    testicular cancer    TONSILLECTOMY         Allergies:   Review of patient's allergies indicates:   Allergen Reactions    Epinephrine Other (See Comments)     Carcinoid patient    Iodinated contrast media Hives        Home Meds:   Prior to Admission medications    Medication Sig Start Date End Date Taking? Authorizing Provider   cholestyramine (QUESTRAN) 4 gram packet Take 1 packet (4 g total) by mouth 3 (three) times daily with meals. 6/29/20 6/29/21 Yes Tayla Her MD   oxyCODONE (OXYCONTIN) 10 mg 12 hr tablet Take 1 tablet (10 mg total) by mouth every 12 (twelve) hours as needed for Pain.  Patient taking differently: Take 5 mg by mouth every 12 (twelve) hours as needed for Pain.  6/29/20  Yes Tayla Her MD   predniSONE (DELTASONE) 50 MG Tab Take 1  tablet (50 mg total) by mouth as needed (Take 1 tablet 13 hours prior to procedure, 1 tab 7 hours prior to procedure and 1 tab 1 hour prior to procedure). 10/20/20  Yes YARIEL Oliva MD   ciprofloxacin HCl (CIPRO) 500 MG tablet Take 1 tablet (500 mg total) by mouth every 12 (twelve) hours. for 5 days 10/20/20 10/25/20  YARIEL Oliva MD   HYDROcodone-acetaminophen (NORCO) 5-325 mg per tablet Take 1 tablet by mouth every 6 (six) hours as needed for Pain.    Historical Provider   lanreotide (SOMATULINE DEPOT) 120 mg/0.5 mL Syrg Inject 120 mg into the skin every 28 days.    Historical Provider   lipase-protease-amylase (ZENPEP) 25,000-79,000- 105,000 unit CpDR Take 3 caps by mouth with meals and Take 2 caps by mouth with snacks 8/21/19   Tayla Her MD   opium tincture 10 mg/mL (morphine) Tinc Take 1 mL (10 mg total) by mouth 4 (four) times daily as needed. Titrate starting with 4 drops with meals to 1-2 bowel movements a day. Can be used in combination with cholestyramine for the management of carcinoid syndrome 6/29/20   Tayla Her MD   oxyCODONE-acetaminophen (PERCOCET)  mg per tablet Take 1 tablet by mouth every 6 (six) hours as needed for Pain.    Historical Provider   senna-docusate 8.6-50 mg (PERICOLACE) 8.6-50 mg per tablet Take 1 tablet by mouth daily as needed for Constipation. 4/12/17   Nick English MD   spironolactone (ALDACTONE) 50 MG tablet Take 1 tablet (50 mg total) by mouth once daily. 4/28/17 4/28/18  Eric Mistry MD       Anticoagulation/Antiplatelet Meds: no anticoagulation    Review of Systems:   Hematological: no known coagulopathies  Respiratory: no shortness of breath  Cardiovascular: no chest pain  Gastrointestinal: no abdominal pain  Genitourinary: no dysuria  Musculoskeletal: negative  Neurological: no TIA or stroke symptoms     Physical Exam:  Temp: 96.9 °F (36.1 °C) (10/21/20 1021)  Pulse: 84 (10/21/20 1021)  Resp: 17 (10/21/20  1021)  BP: (!) 151/92 (10/21/20 1021)  SpO2: 98 % (10/21/20 1021)    General: WNWD, NAD  HEENT: Normocephalic, sclera anicteric, oropharynx clear  Neck: Supple, no palpable lymphadenopathy  Heart: RRR  Lungs: Symmetric excursions, breathing unlabored  Abd: NTND, soft  Extremities: MAEW, no CCE  Pulses: 2+ DP b/l  Neuro: Gross nonfocal    Laboratory:  Lab Results   Component Value Date    INR 1.0 10/20/2020       Lab Results   Component Value Date    WBC 5.74 10/20/2020    HGB 14.0 10/20/2020    HCT 41.8 10/20/2020    MCV 91 10/20/2020     10/20/2020      Lab Results   Component Value Date     (H) 10/20/2020     10/20/2020    K 4.0 10/20/2020     10/20/2020    CO2 29 10/20/2020    BUN 14 10/20/2020    CREATININE 1.0 10/20/2020    CALCIUM 8.9 10/20/2020    MG 2.5 04/12/2017    ALT 11 10/20/2020    AST 21 10/20/2020    ALBUMIN 4.2 10/20/2020    BILITOT 0.8 10/20/2020       Imaging:  PET/CT 9/30/20, MRI abd 9/2/20 and CT 2/7/20 reviewed.    Assessment/Plan:  50 y.o. male with metastastic NET. Will undergo selective cTACE today.    Sedation:  Sedation history: have not been any systemic reactions  ASA: 3 / Mallampati: 2  Sedation plan: Moderate (Versed, fentanyl)     Risks (including, but not limited to, pain, bleeding, infection, damage to nearby structures, liver/renal injury, treatment failure/recurrence, the need for additional procedures), benefits, and alternatives were discussed with the patient. All questions were answered to the best of my abilities. The patient wishes to proceed. Written informed consent was obtained.      Andrew Marsala MD [facility]  Pager [phone]

## 2020-10-22 ENCOUNTER — PATIENT MESSAGE (OUTPATIENT)
Dept: NEUROLOGY | Facility: HOSPITAL | Age: 50
End: 2020-10-22

## 2020-10-22 ENCOUNTER — TELEPHONE (OUTPATIENT)
Dept: NEUROLOGY | Facility: HOSPITAL | Age: 50
End: 2020-10-22

## 2020-10-22 VITALS
BODY MASS INDEX: 25.28 KG/M2 | OXYGEN SATURATION: 96 % | TEMPERATURE: 98 F | RESPIRATION RATE: 20 BRPM | WEIGHT: 214.06 LBS | SYSTOLIC BLOOD PRESSURE: 164 MMHG | HEART RATE: 81 BPM | DIASTOLIC BLOOD PRESSURE: 98 MMHG | HEIGHT: 77 IN

## 2020-10-22 PROCEDURE — 25000003 PHARM REV CODE 250: Performed by: SURGERY

## 2020-10-22 PROCEDURE — 25000003 PHARM REV CODE 250: Performed by: STUDENT IN AN ORGANIZED HEALTH CARE EDUCATION/TRAINING PROGRAM

## 2020-10-22 PROCEDURE — 63600175 PHARM REV CODE 636 W HCPCS: Performed by: SURGERY

## 2020-10-22 RX ORDER — MORPHINE SULFATE 2 MG/ML
2 INJECTION, SOLUTION INTRAMUSCULAR; INTRAVENOUS EVERY 4 HOURS PRN
Status: DISCONTINUED | OUTPATIENT
Start: 2020-10-22 | End: 2020-10-22

## 2020-10-22 RX ORDER — CIPROFLOXACIN 500 MG/1
500 TABLET ORAL EVERY 12 HOURS
Qty: 10 TABLET | Refills: 0 | Status: SHIPPED | OUTPATIENT
Start: 2020-10-22 | End: 2020-10-27

## 2020-10-22 RX ORDER — OXYCODONE HYDROCHLORIDE 5 MG/1
5 TABLET ORAL EVERY 6 HOURS PRN
Qty: 5 TABLET | Refills: 0 | Status: SHIPPED | OUTPATIENT
Start: 2020-10-22 | End: 2020-10-24

## 2020-10-22 RX ORDER — ONDANSETRON 4 MG/1
8 TABLET, ORALLY DISINTEGRATING ORAL EVERY 6 HOURS PRN
Qty: 10 TABLET | Refills: 0 | Status: SHIPPED | OUTPATIENT
Start: 2020-10-22 | End: 2020-10-25

## 2020-10-22 RX ORDER — SIMETHICONE 125 MG
125 TABLET,CHEWABLE ORAL EVERY 6 HOURS PRN
Status: DISCONTINUED | OUTPATIENT
Start: 2020-10-22 | End: 2020-10-22 | Stop reason: HOSPADM

## 2020-10-22 RX ADMIN — OXYCODONE HYDROCHLORIDE 5 MG: 5 TABLET ORAL at 05:10

## 2020-10-22 RX ADMIN — ALLOPURINOL 300 MG: 100 TABLET ORAL at 09:10

## 2020-10-22 RX ADMIN — DEXTROSE, SODIUM CHLORIDE, AND POTASSIUM CHLORIDE: 5; .45; .15 INJECTION INTRAVENOUS at 05:10

## 2020-10-22 RX ADMIN — AMPICILLIN SODIUM AND SULBACTAM SODIUM 3 G: 2; 1 INJECTION, POWDER, FOR SOLUTION INTRAMUSCULAR; INTRAVENOUS at 05:10

## 2020-10-22 RX ADMIN — FAMOTIDINE 20 MG: 20 TABLET ORAL at 09:10

## 2020-10-22 RX ADMIN — SIMETHICONE 125 MG: 125 TABLET, CHEWABLE ORAL at 02:10

## 2020-10-22 NOTE — TELEPHONE ENCOUNTER
Contacted Dr. Harmon's nurse Tere in follow up to previous message.  Dr. Harmon in procedures till this afternoon, but will be able to evaluated MRI of brain specifically orbital mets and rectus muscle mets of eyes:  to determine if physical exam needed or virtual visit sufficient to make recommendations in relationship to PRRT.     Attempted to contact patient via cell to inform of above and that CBC/CMP has been scheduled for Monday. Amber Islas Onc Navigator also notified of plan to inform pt in discharge planning.

## 2020-10-22 NOTE — PLAN OF CARE
TN met with patient and wife Damaris at bedside to complete discharge assessment. Patient states that he is independent with ADL's. No Dme or HH noted. Kathiesheryl to help patient as needed upon discharge.       TN updated whiteboard with contact information. Discharge brochure given to patient.  TN will continue to follow throughout transitions of care and will assist with discharge needs.          10/22/20 1023   Discharge Assessment   Assessment Type Discharge Planning Assessment   Confirmed/corrected address and phone number on facesheet? Yes   Assessment information obtained from? Patient;Medical Record   Expected Length of Stay (days) 1   Prior to hospitilization cognitive status: Alert/Oriented   Prior to hospitalization functional status: Independent   Current cognitive status: Alert/Oriented   Current Functional Status: Independent   Lives With spouse  (Damaris [phone])   Able to Return to Prior Arrangements yes   Is patient able to care for self after discharge? Yes   Readmission Within the Last 30 Days no previous admission in last 30 days   Patient currently being followed by outpatient case management? No   Patient currently receives any other outside agency services? No   Equipment Currently Used at Home none   Do you have any problems affording any of your prescribed medications? No   Is the patient taking medications as prescribed? yes   Does the patient have transportation home? Yes   Transportation Anticipated family or friend will provide;other (see comments)   Does the patient receive services at the [facility]? No   Discharge Plan A Home with family   DME Needed Upon Discharge  none   Patient/Family in Agreement with Plan yes

## 2020-10-22 NOTE — DISCHARGE SUMMARY
[facility]  Discharge Summary    Admit Date:   10/21/2020    Discharge Date:   10/22/2020    Attending Physician:   Tayla Her MD    Consults:   IR    Procedures Performed:   Conventional trans-arterial chemoembolization (lipiodol-based TACE) 10/21    Admission HPI:   Javy Thompson is a 50 y.o. male who presents with widely metastatic NET for which PRRT is planned. Referred to IR for selective chemoembolization to treat an enlarging segment 8 met prior to PRRT.    Hospital Course:   Patient was admitted for Conventional trans-arterial chemoembolization (lipiodol-based TACE) which was performed on 10/21/20. The patient is doing well after the procedure. He will be discharged home and follow up as scheduled.    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     Physical exam:  Gen: NAD  Cardio: RR  Pulm: normal effort  Abd: S, NTND  MSK: moving extremities  Neuro: alert    Discharged Condition:   stable    Disposition:    Home or Self Care    Discharge Instructions:   Discharge Procedure Orders   Diet Adult Regular     Notify your health care provider if you experience any of the following:  temperature >100.4     Notify your health care provider if you experience any of the following:  persistent nausea and vomiting or diarrhea     Notify your health care provider if you experience any of the following:  severe uncontrolled pain     No dressing needed     Activity as tolerated     Follow-up Information     Tayla Her MD In 2 weeks.    Specialty: Surgical Oncology  Contact information:  [address]  [address]  [facility] [place] [address]  [phone]                 Current Discharge Medication List      START taking these medications    Details   oxyCODONE (ROXICODONE) 5 MG immediate release tablet Take 1 tablet (5 mg  total) by mouth every 6 (six) hours as needed for Pain.  Qty: 5 tablet, Refills: 0    Comments: Quantity prescribed more than 7 day supply? No         CONTINUE these medications which have NOT CHANGED    Details   cholestyramine (QUESTRAN) 4 gram packet Take 1 packet (4 g total) by mouth 3 (three) times daily with meals.  Qty: 90 packet, Refills: 11    Comments: Start with 1/2 packet in the morning with breakfast.      oxyCODONE (OXYCONTIN) 10 mg 12 hr tablet Take 1 tablet (10 mg total) by mouth every 12 (twelve) hours as needed for Pain.  Qty: 14 tablet, Refills: 0    Comments: Quantity prescribed more than 7 day supply? no      predniSONE (DELTASONE) 50 MG Tab Take 1 tablet (50 mg total) by mouth as needed (Take 1 tablet 13 hours prior to procedure, 1 tab 7 hours prior to procedure and 1 tab 1 hour prior to procedure).  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      lanreotide (SOMATULINE DEPOT) 120 mg/0.5 mL Syrg Inject 120 mg into the skin every 28 days.      lipase-protease-amylase (ZENPEP) 25,000-79,000- 105,000 unit CpDR Take 3 caps by mouth with meals and Take 2 caps by mouth with snacks  Qty: 360 capsule, Refills: 11    Associated Diagnoses: Pancreatic insufficiency      opium tincture 10 mg/mL (morphine) Tinc Take 1 mL (10 mg total) by mouth 4 (four) times daily as needed. Titrate starting with 4 drops with meals to 1-2 bowel movements a day. Can be used in combination with cholestyramine for the management  of carcinoid syndrome  Qty: 120 mL, Refills: 0    Comments: Quantity prescribed more than 7 day supply? Yes. This is a standard of care for the treatment of carcinoid syndrome and functional short gut.      oxyCODONE-acetaminophen (PERCOCET)  mg per tablet Take 1 tablet by mouth every 6 (six) hours as needed for Pain.      senna-docusate 8.6-50 mg (PERICOLACE) 8.6-50 mg per tablet Take 1 tablet by mouth daily as needed for Constipation.      spironolactone (ALDACTONE) 50 MG tablet Take 1 tablet (50 mg total) by mouth once daily.  Qty: 30 tablet, Refills: 1               Allan Fink MD  PGY-2, [facility] General [facility]  Neuroendocrine Surgery

## 2020-10-22 NOTE — PLAN OF CARE
10/22/20 1144   Final Note   Assessment Type Final Discharge Note   Anticipated Discharge Disposition Home   What phone number can be called within the next 1-3 days to see how you are doing after discharge? [phone]   Hospital Follow Up  Appt(s) scheduled?   (Dr. Her's office will conatct patient for f/u appts)   Discharge plans and expectations educations in teach back method with documentation complete? Yes      Problem: Falls - Risk of:  Goal: Will remain free from falls  Description  Will remain free from falls  Outcome: Ongoing  Note:   Patient is alert and oriented and has demonstrated the use of using the call light for assistance before getting up. Education has been provided to defer the use of the bed alarm and/or restraint free alarm as the patient has shown competency in calling for assistance and verbalizing the risk. Patient standby assist.   Goal: Absence of physical injury  Description  Absence of physical injury  Outcome: Ongoing  Note:   No falls this shift. Problem: Pain:  Description  Pain management should include both nonpharmacologic and pharmacologic interventions. Goal: Pain level will decrease  Description  Pain level will decrease  Outcome: Ongoing  Note:   Pt is able to rate pain using a 0-10 scale. Medication, ice and repositioning reduce the pain if needed and pt is aware that it is available. Will continue to monitor. Goal: Control of acute pain  Description  Control of acute pain  Outcome: Ongoing     Problem: Discharge Planning:  Goal: Discharged to appropriate level of care  Description  Discharged to appropriate level of care  Outcome: Ongoing  Note:   Plan is discharge to home when able. Problem: Fluid Volume - Imbalance:  Goal: Will remain free of signs and symptoms of dehydration  Description  Will remain free of signs and symptoms of dehydration  Outcome: Ongoing  Note:   IV fluids infusing as ordered. Patient NPO. Goal: Absence of imbalanced fluid volume signs and symptoms  Description  Absence of imbalanced fluid volume signs and symptoms  Outcome: Ongoing  Note:   Vital signs stable. Problem: Serum Glucose Level - Abnormal:  Goal: Ability to maintain appropriate glucose levels will improve  Description  Ability to maintain appropriate glucose levels will improve  Outcome: Ongoing  Note:   Blood sugars checked every hour. Insulin drip infusing.       Problem: Injury - Acid Base Imbalance:  Goal: Acid-base balance  Description  Acid-base balance  Outcome: Ongoing  Note:   Labs drawn q4h.

## 2020-10-22 NOTE — PROGRESS NOTES
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 [K52.9, E34.0]    Surgery/Procedure:    10/21/2020 Procedure(s):  EMBOLIZATION, BLOOD VESSEL    HPI:     Assessment  Diet: Regular                Oral Supplement: None   Appetite - good                     Nausea - No                Vomiting- No  BM- last reported - pre admit  Urine-  voiding without difficulty  Abdomen- soft and [id number]}  Pain-none    IV Access- Peripheral       Vital Signs (Most Recent):  Temp: 98.2 °F (36.8 °C) (10/22/20 0750)  Pulse: 81 (10/22/20 0750)  Resp: 20 (10/22/20 0750)  BP: (!) 164/98 (10/22/20 0750)  SpO2: 96 % (10/21/20 1700) Vital Signs Range (Last 24H):  Temp:  [96.9 °F (36.1 °C)-99.2 °F (37.3 °C)]   Pulse:  [48-90]   Resp:  [12-20]   BP: (118-192)/(76-98)   SpO2:  [95 %-100 %]            Labs:   Recent Labs   Lab 10/20/20  1100   WBC 5.74   HGB 14.0   HCT 41.8      MCV 91   RDW 11.7      K 4.0      CO2 29   BUN 14   CREATININE 1.0   *   PROT 7.4   ALBUMIN 4.2   BILITOT 0.8   AST 21   ALKPHOS 78   ALT 11      No results for input(s): PREALBUMIN, CRP, TRIG in the last 168 hours.  Recent Labs   Lab 10/20/20  1100   CALCIUM 8.9     Cultures: none to date this admission    Scheduled Meds   famotidine  20 mg Oral Q12H       Continuous Infusion   dextrose 5 % and 0.45 % NaCl with KCl 20 mEq 125 mL/hr at 10/22/20 0527    octreotide infusion (50  mcg/mL) Stopped (10/21/20 1345)    octreotide infusion (50 mcg/mL) 125 mcg/hr (10/21/20 1445)       PRN Meds  ibuprofen, ondansetron, oxyCODONE, simethicone     Assessment/Plan:  -TACE 10/21/20  -no complaints  -plan for discharge today  -flight today at 1pm  -scheduled PRRT next week, we need to clarify the recommendation to push back treatment.         Discharge Planning         ---     Anticipated Date of D/C:   10/22/20       Appointments: follow up with scheduled plan of PRRT next week unless otherwise informed.    CBC, CMP in 10/14 day or prior to PRRT next week    Dispo / Needs: Home    Nutrition: regular diet    Home support system- wife    Barriers to Care- none  ___________________________________________  YURI AguirreN, RN, ONN-  Nurse Navigator [facility]    Tracie Weil Cavalier, RDN, LDN, CNSC  Registered Dietitian [facility]      Face to Face Time: 15 minutes

## 2020-10-26 ENCOUNTER — PATIENT MESSAGE (OUTPATIENT)
Dept: NEUROLOGY | Facility: HOSPITAL | Age: 50
End: 2020-10-26

## 2020-10-26 LAB
EXT 24 HR UR METANEPHRINE: ABNORMAL
EXT 24 HR UR NORMETANEPHRINE: ABNORMAL
EXT 24 HR UR NORMETANEPHRINE: ABNORMAL
EXT 25 HYDROXY VIT D2: ABNORMAL
EXT 25 HYDROXY VIT D3: ABNORMAL
EXT 5 HIAA 24 HR URINE: ABNORMAL
EXT 5 HIAA BLOOD: ABNORMAL
EXT ACTH: ABNORMAL
EXT AFP: ABNORMAL
EXT ALBUMIN: 4 G/DL (ref 3.8–4.9)
EXT ALKALINE PHOSPHATASE: 131 IU/L (ref 39–117)
EXT ALT: 51 IU/L (ref 0–44)
EXT AMYLASE: ABNORMAL
EXT ANTI ISLET CELL AB: ABNORMAL
EXT ANTI PARIETAL CELL AB: ABNORMAL
EXT ANTI THYROID AB: ABNORMAL
EXT AST: 41 IU/L (ref 0–40)
EXT BILIRUBIN DIRECT: ABNORMAL
EXT BILIRUBIN TOTAL: 1.9 MG/DL (ref 0–1.2)
EXT BK VIRUS DNA QN PCR: ABNORMAL
EXT BUN: 15 MG/DL (ref 6–24)
EXT C PEPTIDE: ABNORMAL
EXT CA 125: ABNORMAL
EXT CA 19-9: ABNORMAL
EXT CA 27-29: ABNORMAL
EXT CALCITONIN: ABNORMAL
EXT CALCIUM: 9.3 MG/DL (ref 8.7–10.2)
EXT CEA: ABNORMAL
EXT CHLORIDE: 93 MMOL/L (ref 96–106)
EXT CHOLESTEROL: ABNORMAL
EXT CHROMOGRANIN A: ABNORMAL
EXT CO2: 26 MMOL/L (ref 20–29)
EXT CREATININE UA: ABNORMAL
EXT CREATININE: 1.2 MG/DL (ref 0.76–1.27)
EXT CYCLOSPORONE LEVEL: ABNORMAL
EXT DOPAMINE: ABNORMAL
EXT EBV DNA BY PCR: ABNORMAL
EXT EPINEPHRINE: ABNORMAL
EXT FOLATE: ABNORMAL
EXT FREE T3: ABNORMAL
EXT FREE T4: ABNORMAL
EXT FSH: ABNORMAL
EXT GASTRIN RELEASING PEPTIDE: ABNORMAL
EXT GASTRIN RELEASING PEPTIDE: ABNORMAL
EXT GASTRIN: ABNORMAL
EXT GGT: ABNORMAL
EXT GHRELIN: ABNORMAL
EXT GLUCAGON: ABNORMAL
EXT GLUCOSE: 300 MG/DL (ref 65–99)
EXT GROWTH HORMONE: ABNORMAL
EXT HCV RNA QUANT PCR: ABNORMAL
EXT HDL: ABNORMAL
EXT HEMATOCRIT: 40.6 % (ref 37.5–51)
EXT HEMOGLOBIN A1C: ABNORMAL
EXT HEMOGLOBIN: 14 G/DL (ref 13–17.7)
EXT HISTAMINE 24 HR URINE: ABNORMAL
EXT HISTAMINE: ABNORMAL
EXT IGF-1: ABNORMAL
EXT IMMUNKNOW (NON-STIMULATED): ABNORMAL
EXT IMMUNKNOW (STIMULATED): ABNORMAL
EXT INR: ABNORMAL
EXT INSULIN: ABNORMAL
EXT LANREOTIDE LEVEL: ABNORMAL
EXT LDH, TOTAL: ABNORMAL
EXT LDL CHOLESTEROL: ABNORMAL
EXT LIPASE: ABNORMAL
EXT MAGNESIUM: ABNORMAL
EXT METANEPHRINE FREE PLASMA: ABNORMAL
EXT MOTILIN: ABNORMAL
EXT NEUROKININ A CAMB: ABNORMAL
EXT NEUROKININ A ISI: ABNORMAL
EXT NEUROTENSIN: ABNORMAL
EXT NOREPINEPHRINE: ABNORMAL
EXT NORMETANEPHRINE: ABNORMAL
EXT NSE: ABNORMAL
EXT OCTREOTIDE LEVEL: ABNORMAL
EXT PANCREASTATIN CAMB: ABNORMAL
EXT PANCREASTATIN ISI: ABNORMAL
EXT PANCREATIC POLYPEPTIDE: ABNORMAL
EXT PHOSPHORUS: ABNORMAL
EXT PLATELETS: 162 X10E3/UL (ref 150–450)
EXT POTASSIUM: 3.7 MMOL/L (ref 3.5–5.2)
EXT PROGRAF LEVEL: ABNORMAL
EXT PROLACTIN: ABNORMAL
EXT PROTEIN TOTAL: 6.8 G/DL (ref 6–8.5)
EXT PROTEIN UA: ABNORMAL
EXT PT: ABNORMAL
EXT PTH, INTACT: ABNORMAL
EXT PTT: ABNORMAL
EXT RAPAMUNE LEVEL: ABNORMAL
EXT SEROTONIN: ABNORMAL
EXT SODIUM: 136 MMOL/L (ref 134–144)
EXT SOMATOSTATIN: ABNORMAL
EXT SUBSTANCE P: ABNORMAL
EXT TRIGLYCERIDES: ABNORMAL
EXT TRYPTASE: ABNORMAL
EXT TSH: ABNORMAL
EXT URIC ACID: ABNORMAL
EXT URINE AMYLASE U/HR: ABNORMAL
EXT URINE AMYLASE U/L: ABNORMAL
EXT VASOACTIVE INTESTINAL POLYPEPTIDE: ABNORMAL
EXT VITAMIN B12: ABNORMAL
EXT VMA 24 HR URINE: ABNORMAL
EXT WBC: 7.8 (ref 3.4–10.8)
GLOBULIN: 2.8 G/DL (ref 1.5–4.5)
NEURON SPECIFIC ENOLASE: ABNORMAL

## 2020-10-27 ENCOUNTER — PATIENT MESSAGE (OUTPATIENT)
Dept: NEUROLOGY | Facility: HOSPITAL | Age: 50
End: 2020-10-27

## 2020-10-27 ENCOUNTER — TELEPHONE (OUTPATIENT)
Dept: NEUROLOGY | Facility: HOSPITAL | Age: 50
End: 2020-10-27

## 2020-10-27 ENCOUNTER — DOCUMENTATION ONLY (OUTPATIENT)
Dept: NEUROLOGY | Facility: HOSPITAL | Age: 50
End: 2020-10-27

## 2020-10-27 ENCOUNTER — OFFICE VISIT (OUTPATIENT)
Dept: ENDOCRINOLOGY | Facility: CLINIC | Age: 50
End: 2020-10-27
Payer: COMMERCIAL

## 2020-10-27 VITALS
HEIGHT: 77 IN | DIASTOLIC BLOOD PRESSURE: 88 MMHG | OXYGEN SATURATION: 98 % | WEIGHT: 210.69 LBS | TEMPERATURE: 97 F | BODY MASS INDEX: 24.88 KG/M2 | SYSTOLIC BLOOD PRESSURE: 129 MMHG | HEART RATE: 85 BPM

## 2020-10-27 DIAGNOSIS — T50.905A DRUG-INDUCED HYPERGLYCEMIA: ICD-10-CM

## 2020-10-27 DIAGNOSIS — R73.9 DRUG-INDUCED HYPERGLYCEMIA: Primary | ICD-10-CM

## 2020-10-27 DIAGNOSIS — R73.9 DRUG-INDUCED HYPERGLYCEMIA: ICD-10-CM

## 2020-10-27 DIAGNOSIS — T50.905A DRUG-INDUCED HYPERGLYCEMIA: Primary | ICD-10-CM

## 2020-10-27 PROCEDURE — 99999 PR PBB SHADOW E&M-EST. PATIENT-LVL IV: ICD-10-PCS | Mod: PBBFAC,,, | Performed by: INTERNAL MEDICINE

## 2020-10-27 PROCEDURE — 99203 PR OFFICE/OUTPT VISIT, NEW, LEVL III, 30-44 MIN: ICD-10-PCS | Mod: S$GLB,,, | Performed by: INTERNAL MEDICINE

## 2020-10-27 PROCEDURE — 99999 PR PBB SHADOW E&M-EST. PATIENT-LVL IV: CPT | Mod: PBBFAC,,, | Performed by: INTERNAL MEDICINE

## 2020-10-27 PROCEDURE — 99203 OFFICE O/P NEW LOW 30 MIN: CPT | Mod: S$GLB,,, | Performed by: INTERNAL MEDICINE

## 2020-10-27 NOTE — PROGRESS NOTES
Subjective:      Patient ID: Javy Thompson is a 50 y.o. male.    Chief Complaint:  Hyperglycemia      History of Present Illness  50 year old male with metastatic midgut carcinoid s/p chemoembolization last week, scheduled for  PRRT  Referred her for evaluation of hyperglycemia/screening of DM. Patient is currently on  lanreotide    [facility] and is currently a resident of [place]. He is here for short visit and  planning to go back after his procedure tomorrow or day after    No recent A1c available. Review of past BMP notable for glucose of 300  Currently not checking sugars nor has A1c available.  Also not on any diabetic meds     Patient had steroids last week before his chemoembolization    Diabetes Education in the past -No       Meals:2-3 meals       Breakfast- orange juice sometimes    Lunch-  Fast food,sandwich/subway+chips    Dinner- steak and asparagus/potato/fast food    Snacks- banana, peanut butter    Drinks- water, diet drinks     Activity- minimal     Diabetes Management Status    Statin: Not taking  ACE/ARB: Not taking    Screening or Prevention Patient's value Goal Complete/Controlled?   HgA1C Testing and Control   Lab Results   Component Value Date    HGBA1C 7.2 (A) 05/13/2020      Annually/Less than 8% Yes   Lipid profile Most Recent Lipid Panel Health Maintenance Topic Completion: Not Found Annually No   LDL control No results found for: LDLCALC Annually/Less than 100 mg/dl  No   Nephropathy screening No results found for: LABMICR  Lab Results   Component Value Date    PROTEINUA Trace (A) 04/10/2017    Annually No   Blood pressure BP Readings from Last 1 Encounters:   10/28/20 122/76    Less than 140/90 Yes   Dilated retinal exam Most Recent Eye Exam Date: Not Found Annually No   Foot exam   Most Recent Foot Exam Date: Not Found Annually No         Review of Systems     Review of 7 systems negative except as documented above    Objective:     /88 (BP  "Location: Right arm, Patient Position: Sitting, BP Method: Medium (Automatic))   Pulse 85   Temp 97 °F (36.1 °C) (Temporal)   Ht 6' 5" (1.956 m)   Wt 95.6 kg (210 lb 11.2 oz)   SpO2 98%   BMI 24.99 kg/m²   BP Readings from Last 3 Encounters:   10/28/20 122/76   10/27/20 129/88   10/22/20 (!) 164/98     Wt Readings from Last 1 Encounters:   10/27/20 1455 95.6 kg (210 lb 11.2 oz)     Body mass index is 24.99 kg/m².      Physical Exam  Vitals signs reviewed.   Constitutional:       Appearance: Normal appearance. He is well-developed.   Neck:      Thyroid: No thyromegaly.   Cardiovascular:      Rate and Rhythm: Normal rate.   Pulmonary:      Effort: Pulmonary effort is normal.      Breath sounds: Normal breath sounds.   Neurological:      Mental Status: He is alert and oriented to person, place, and time.   Psychiatric:         Judgment: Judgment normal.                Lab Review:   Lab Results   Component Value Date    HGBA1C 7.2 (A) 05/13/2020     No results found for: CHOL, HDL, LDLCALC, TRIG, CHOLHDL  Lab Results   Component Value Date     10/20/2020    K 4.0 10/20/2020     10/20/2020    CO2 29 10/20/2020     (H) 10/20/2020    BUN 14 10/20/2020    CREATININE 1.0 10/20/2020    CALCIUM 8.9 10/20/2020    PROT 7.4 10/20/2020    ALBUMIN 4.2 10/20/2020    BILITOT 0.8 10/20/2020    ALKPHOS 78 10/20/2020    AST 21 10/20/2020    ALT 11 10/20/2020    ANIONGAP 9 10/20/2020    ESTGFRAFRICA >60 10/20/2020    EGFRNONAA >60 10/20/2020     No results found for: [id number]    Assessment and Plan     Drug-induced hyperglycemia    50 year old with metastatic carcinoid scheduled for  PRRT tomorrow here for evaluation of elevated sugars.    Patient has recently received steroids last week and also is on lanreotide which which are likely causes of hyperglycemia.  New glucometer was provided to patient today and he was recommended to start checking his sugars begore each meal and bedtime.    Also will order A1c " today.  Discussed in detail about the lifestyle changes-with emphasis on low glycemic foods and portion control.   Patient is heading back to his home at [place] tomorrow or day after and wishes to continue his care there.  Recommended to schedule Endocrinology and diabetic educator visit.    Also recommended that he need to schedule Diabetes eye screening exam if his A1c is >6.5     Blood pressure - well controlled     Advised foot care.    Patient verbalized understanding of the above documented plan.

## 2020-10-27 NOTE — TELEPHONE ENCOUNTER
Called patient to inform him of appt with Dr. Engel for elevated blood sugars. Appointment today at 3:00. Information given to wife.

## 2020-10-27 NOTE — TELEPHONE ENCOUNTER
Patient saw Dr. 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  Also received recommendations from Dr. Harmon regarding PRRT risk to blindness. Dr. Her discussed with both, decision made to proceed with PRRT on 10/28/20. Pt was also referred to Dr. Engel in consultation for elevated blood sugar of 300.     Offer to arrange for patient to discuss with Dr. Her again.

## 2020-10-27 NOTE — LETTER
October 29, 2020      Tayla Her MD  [address]  [address]  [place] [address]           [place] [facility]in[facility]  [address] [facility]  [place] [address]  Phone: [phone]  Fax: [phone]          Patient: Javy Thompson   MR Number: [id number]   YOB: 1970   Date of Visit: 10/27/2020       Dear Dr. Tayla Her:    Thank you for referring Javy Thompson to me for evaluation. Attached you will find relevant portions of my assessment and plan of care.    If you have questions, please do not hesitate to call me. I look forward to following Javy Thompson along with you.    Sincerely,    Sudeep Engel MD    Enclosure  CC:  No Recipients    If you would like to receive this communication electronically, please contact [email or web address] or [phone] to request more information on [employer] access.    For providers and/or their staff who would like to refer a patient to [facility], please contact us through our one-stop-shop provider referral line, [facility], at [phone].    If you feel you have received this communication in error or would no longer like to receive these types of communications, please e-mail [email or web address]

## 2020-10-28 ENCOUNTER — TELEPHONE (OUTPATIENT)
Dept: NEUROLOGY | Facility: HOSPITAL | Age: 50
End: 2020-10-28

## 2020-10-28 ENCOUNTER — INFUSION (OUTPATIENT)
Dept: INFUSION THERAPY | Facility: HOSPITAL | Age: 50
End: 2020-10-28
Attending: SURGERY
Payer: COMMERCIAL

## 2020-10-28 ENCOUNTER — HOSPITAL ENCOUNTER (OUTPATIENT)
Dept: RADIOLOGY | Facility: HOSPITAL | Age: 50
Discharge: HOME | End: 2020-10-28
Attending: SURGERY
Payer: COMMERCIAL

## 2020-10-28 VITALS
HEART RATE: 74 BPM | SYSTOLIC BLOOD PRESSURE: 122 MMHG | OXYGEN SATURATION: 98 % | DIASTOLIC BLOOD PRESSURE: 76 MMHG | RESPIRATION RATE: 16 BRPM | TEMPERATURE: 99 F

## 2020-10-28 DIAGNOSIS — K52.9 DIARRHEA CONCURRENT WITH AND DUE TO CARCINOID SYNDROME: ICD-10-CM

## 2020-10-28 DIAGNOSIS — C7A.012 MALIGNANT CARCINOID TUMOR OF ILEUM: ICD-10-CM

## 2020-10-28 DIAGNOSIS — C7B.02 METASTATIC MALIGNANT CARCINOID TUMOR TO LIVER: Primary | ICD-10-CM

## 2020-10-28 DIAGNOSIS — E34.0 DIARRHEA CONCURRENT WITH AND DUE TO CARCINOID SYNDROME: ICD-10-CM

## 2020-10-28 DIAGNOSIS — C7A.012 MALIGNANT CARCINOID TUMOR OF ILEUM: Primary | ICD-10-CM

## 2020-10-28 LAB — SARS-COV-2 RDRP RESP QL NAA+PROBE: NEGATIVE

## 2020-10-28 PROCEDURE — 79101 NUCLEAR RX IV ADMIN: CPT | Mod: 26,,, | Performed by: RADIOLOGY

## 2020-10-28 PROCEDURE — A9513 LUTETIUM LU 177 DOTATAT THER: HCPCS | Mod: TB

## 2020-10-28 PROCEDURE — 96365 THER/PROPH/DIAG IV INF INIT: CPT | Mod: 59

## 2020-10-28 PROCEDURE — 63600175 PHARM REV CODE 636 W HCPCS: Performed by: SURGERY

## 2020-10-28 PROCEDURE — 96366 THER/PROPH/DIAG IV INF ADDON: CPT

## 2020-10-28 PROCEDURE — 25000003 PHARM REV CODE 250: Performed by: SURGERY

## 2020-10-28 PROCEDURE — U0002 COVID-19 LAB TEST NON-CDC: HCPCS

## 2020-10-28 PROCEDURE — 79101 NM THERAPY BY IV ADMINISTRATION LU177: ICD-10-PCS | Mod: 26,,, | Performed by: RADIOLOGY

## 2020-10-28 PROCEDURE — 96367 TX/PROPH/DG ADDL SEQ IV INF: CPT

## 2020-10-28 RX ORDER — ARGININE/LYSINE/0.9 % SOD CHL 25-25MG/ML
1000 PLASTIC BAG, INJECTION (ML) INTRAVENOUS
Status: CANCELLED | OUTPATIENT
Start: 2020-12-23

## 2020-10-28 RX ORDER — ACETAMINOPHEN 325 MG/1
650 TABLET ORAL
Status: CANCELLED | OUTPATIENT
Start: 2020-12-23

## 2020-10-28 RX ORDER — LANREOTIDE ACETATE 120 MG/.5ML
120 INJECTION SUBCUTANEOUS
Status: DISCONTINUED | OUTPATIENT
Start: 2020-10-29 | End: 2020-10-30 | Stop reason: RX

## 2020-10-28 RX ORDER — DIPHENHYDRAMINE HYDROCHLORIDE 50 MG/ML
50 INJECTION INTRAMUSCULAR; INTRAVENOUS
Status: CANCELLED | OUTPATIENT
Start: 2020-12-23

## 2020-10-28 RX ORDER — DIPHENHYDRAMINE HYDROCHLORIDE 50 MG/ML
50 INJECTION INTRAMUSCULAR; INTRAVENOUS
Status: DISCONTINUED | OUTPATIENT
Start: 2020-10-28 | End: 2020-10-28 | Stop reason: HOSPADM

## 2020-10-28 RX ORDER — SODIUM CHLORIDE 9 MG/ML
INJECTION, SOLUTION INTRAVENOUS ONCE
Status: CANCELLED | OUTPATIENT
Start: 2020-12-23

## 2020-10-28 RX ORDER — LANREOTIDE ACETATE 120 MG/.5ML
120 INJECTION SUBCUTANEOUS
Status: DISCONTINUED | OUTPATIENT
Start: 2020-10-29 | End: 2020-10-28

## 2020-10-28 RX ORDER — ARGININE/LYSINE/0.9 % SOD CHL 25-25MG/ML
1000 PLASTIC BAG, INJECTION (ML) INTRAVENOUS
Status: COMPLETED | OUTPATIENT
Start: 2020-10-28 | End: 2020-10-28

## 2020-10-28 RX ORDER — SODIUM CHLORIDE 0.9 % (FLUSH) 0.9 %
10 SYRINGE (ML) INJECTION
Status: DISCONTINUED | OUTPATIENT
Start: 2020-10-28 | End: 2020-10-28 | Stop reason: HOSPADM

## 2020-10-28 RX ORDER — SODIUM CHLORIDE 0.9 % (FLUSH) 0.9 %
10 SYRINGE (ML) INJECTION
Status: CANCELLED | OUTPATIENT
Start: 2020-12-23

## 2020-10-28 RX ORDER — SODIUM CHLORIDE 9 MG/ML
INJECTION, SOLUTION INTRAVENOUS ONCE
Status: COMPLETED | OUTPATIENT
Start: 2020-10-28 | End: 2020-10-28

## 2020-10-28 RX ORDER — ACETAMINOPHEN 325 MG/1
650 TABLET ORAL
Status: DISCONTINUED | OUTPATIENT
Start: 2020-10-28 | End: 2020-10-28 | Stop reason: HOSPADM

## 2020-10-28 RX ADMIN — DEXAMETHASONE SODIUM PHOSPHATE 50 ML: 4 INJECTION, SOLUTION INTRAMUSCULAR; INTRAVENOUS at 07:10

## 2020-10-28 RX ADMIN — Medication 1000 ML: at 07:10

## 2020-10-28 RX ADMIN — SODIUM CHLORIDE: 0.9 INJECTION, SOLUTION INTRAVENOUS at 07:10
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
## 2020-10-28 NOTE — NURSING
1135-Discharge instructions reviewed,copy given with home care precautions. Pt verbalized understanding, has no further questions. Ambulated self off unit in good condition.

## 2020-10-29 ENCOUNTER — OFFICE VISIT (OUTPATIENT)
Dept: NEUROLOGY | Facility: HOSPITAL | Age: 50
End: 2020-10-29
Attending: SURGERY
Payer: COMMERCIAL

## 2020-10-29 ENCOUNTER — TELEPHONE (OUTPATIENT)
Dept: NEUROLOGY | Facility: HOSPITAL | Age: 50
End: 2020-10-29

## 2020-10-29 DIAGNOSIS — Z12.89 ENCOUNTER FOR SCREENING FOR MALIGNANT NEOPLASM OF OTHER SITES: ICD-10-CM

## 2020-10-29 DIAGNOSIS — E34.0 DIARRHEA CONCURRENT WITH AND DUE TO CARCINOID SYNDROME: ICD-10-CM

## 2020-10-29 DIAGNOSIS — C26.9 MALIGNANT NEOPLASM OF ILL-DEFINED SITES WITHIN THE DIGESTIVE SYSTEM: ICD-10-CM

## 2020-10-29 DIAGNOSIS — C7B.8 SECONDARY NEUROENDOCRINE TUMOR OF DISTANT LYMPH NODES: ICD-10-CM

## 2020-10-29 DIAGNOSIS — K52.9 DIARRHEA CONCURRENT WITH AND DUE TO CARCINOID SYNDROME: ICD-10-CM

## 2020-10-29 DIAGNOSIS — R73.9 DRUG-INDUCED HYPERGLYCEMIA: Primary | ICD-10-CM

## 2020-10-29 DIAGNOSIS — T50.905A DRUG-INDUCED HYPERGLYCEMIA: Primary | ICD-10-CM

## 2020-10-29 PROCEDURE — 99213 OFFICE O/P EST LOW 20 MIN: CPT | Performed by: SURGERY

## 2020-10-29 RX ORDER — HYDROCODONE BITARTRATE AND ACETAMINOPHEN 10; 325 MG/1; MG/1
1 TABLET ORAL EVERY 4 HOURS PRN
Qty: 42 TABLET | Refills: 0 | Status: SHIPPED | OUTPATIENT
Start: 2020-10-29 | End: 2020-11-05

## 2020-10-29 NOTE — TELEPHONE ENCOUNTER
Spoke with Wanda at length and after 5 transfers and 4 representativesZeke, verified that Sandostatin LAR has been authorized by Dr. Florian's order from 10/22/20-10/21/21 for pt to receive at [facility]. Pt takes Lanreotide [id number], has never taken long acting Ralph, and needs an order for Lanreotide which CANNOT be authorized until Dr. Gomes's office cancels and corrects. For ordering provider to move site of auth, there would have to be an existing auth for Lanreotide. Since there is an auth for Ralph, there can be no initiation of a new auth for Lanreotide. Pt will not be able to have Lanreotide until this is resolved at .

## 2020-10-29 NOTE — PROGRESS NOTES
NOLANETS:  [facility]  A collaboration between [facility] and [facility]      PATIENT: Javy Thompson  MRN: [id number]  DATE: 10/29/2020    Subjective:      Chief Complaint: metastatic midgut carcinoid, on active treatment, symptomatic s/p chemoembolization last week, s/p PRRT yesterday, here for issues with his lanreotide injection.        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 He presented with measurable disease within the small bowel, small bowel mesentery, and liver    Interval History:     ophthamology appt Tuesday 10/27  PRRT yesterday 10/28    Vitals: There were no vitals taken for this visit. --stable    ECOG Score: 1 - Symptomatic but completely ambulatory    Oncologic History:   Oncologic History Ileal net, dx 2016, liver and román mets  Carcinoid syndrome- diarrhea   Oncologic Treatment 3/2017- surgery (Eirc)  Lanreotide  SBRT to retroperitoneal lymph node   Pathology 3/2017- small bowel- well diff net, multifocal (5), G2, Ki 67- 10.9%, 3/12 nodes pos; liver- well diff net     Past Medical History:  Past Medical History:   Diagnosis Date    Mesenteric mass 2/8/2017    Metastatic carcinoid tumor 4/18/2017    Mr. Thompson is well known to me having been diagnosed with metastatic ileal carcinoid in 2016. He was resected in 3/2017. He has had slow progression of disease in the mediastinum, liver, and retroperitoneal román basins.    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       Past Surgical History:  Past Surgical History:   Procedure Laterality Date    ANTERIOR CRUCIATE LIGAMENT REPAIR Right 1986    APPENDECTOMY  3/31/2017    Procedure: APPENDECTOMY;  Surgeon: Fidelia Reyes MD;  Location: [facility] OR;  Service: General;;    EMBOLIZATION N/A 10/21/2020    Procedure: EMBOLIZATION, BLOOD  VESSEL;  Surgeon: Medc Diagnostic Provider;  Location: [facility] OR;  Service: Radiology;  Laterality: N/A;    HERNIA REPAIR  10/01/2019    KNEE ARTHROSCOPY Right 1986    x2    LIVER BIOPSY  12/2016, 1/2017    ORCHIECTOMY Right 09/24/2020    testicular cancer    TONSILLECTOMY         Family History:  Family History   Problem Relation Age of Onset    Cancer Mother        Allergies:  Epinephrine and Iodinated contrast media    Medications:  Current Outpatient Medications   Medication Sig    cholestyramine (QUESTRAN) 4 gram packet Take 1 packet (4 g total) by mouth 3 (three) times daily with meals.    HYDROcodone-acetaminophen (NORCO)  mg per tablet Take 1 tablet by mouth every 4 (four) hours as needed for Pain (pain).    lanreotide (SOMATULINE DEPOT) 120 mg/0.5 mL Syrg Inject 120 mg into the skin every 28 days.    oxyCODONE (OXYCONTIN) 10 mg 12 hr tablet Take 1 tablet (10 mg total) by mouth every 12 (twelve) hours as needed for Pain. (Patient taking differently: Take 5 mg by mouth every 12 (twelve) hours as needed for Pain. )    oxyCODONE-acetaminophen (PERCOCET)  mg per tablet Take 1 tablet by mouth every 6 (six) hours as needed for Pain.    predniSONE (DELTASONE) 50 MG Tab Take 1 tablet (50 mg total) by mouth as needed (Take 1 tablet 13 hours prior to procedure, 1 tab 7 hours prior to procedure and 1 tab 1 hour prior to procedure).    lipase-protease-amylase (ZENPEP) 25,000-79,000- 105,000 unit CpDR Take 3 caps by mouth with meals and Take 2 caps by mouth with snacks    opium tincture 10 mg/mL (morphine) Tinc Take 1 mL (10 mg total) by mouth 4 (four) times daily as needed. Titrate starting with 4 drops with meals to 1-2 bowel movements a day. Can be used in combination with cholestyramine for the management of carcinoid syndrome    senna-docusate 8.6-50 mg (PERICOLACE) 8.6-50 mg per tablet Take 1 tablet by mouth daily as needed for Constipation.    spironolactone (ALDACTONE) 50 MG tablet Take  1 tablet (50 mg total) by mouth once daily.     No current facility-administered medications for this visit.      Facility-Administered Medications Ordered in Other Visits   Medication    lanreotide injection 120 mg        Review of Systems   Constitutional: Negative for fever and unexpected weight change.   HENT: Negative for facial swelling and hearing loss.    Eyes: Negative for photophobia and visual disturbance.   Respiratory: Negative for apnea and chest tightness.    Cardiovascular: Negative for palpitations.   Gastrointestinal: Positive for abdominal distention, abdominal pain and diarrhea.   Endocrine: Negative for cold intolerance and heat intolerance.        Hyperglycemia   Genitourinary: Negative for difficulty urinating and dysuria.   Musculoskeletal: Positive for back pain.   Skin: Negative for rash and wound.   Allergic/Immunologic: Negative for food allergies and immunocompromised state.   Neurological: Negative for tremors and weakness.   Hematological: Does not bruise/bleed easily.   Psychiatric/Behavioral: Negative.           Objective:      Physical Exam  Constitutional:       General: He is not in acute distress.     Appearance: He is well-developed.   HENT:      Head: Normocephalic.   Pulmonary:      Effort: No respiratory distress.      Breath sounds: No stridor.   Neurological:      Mental Status: He is alert and oriented to person, place, and time.   Psychiatric:         Behavior: Behavior normal.         Thought Content: Thought content normal.         Judgment: Judgment normal.          Laboratory Data:    Neuroendocrine Labs Latest Ref Rng & Units 10/26/2020   EXT GLUCOSE 65 - 99 mg/dL 300 (A)   BUN 6 - 20 mg/dL    EXT BUN 6 - 24 mg/dL 15   CREATININE 0.5 - 1.4 mg/dL    EXT CREATININE 0.76 - 1.27 mg/dL 1.20    - 145 mmol/L    EXT  - 144 mmol/L 136   K 3.5 - 5.1 mmol/L    EXT K 3.5 - 5.2 mmol/L 3.7   CHLORIDE 95 - 110 mmol/L    EXT CHLORIDE 96 - 106 mmol/L 93 (A)   CO2 23 - 29  mmol/L    EXT CO2 20 - 29 mmol/L 26   CALCIUM 8.7 - 10.5 mg/dL    EXT CALCIUM 8.7 - 10.2 mg/dl 9.3   PROTEIN, TOTAL 6.0 - 8.4 g/dL    EXT PROTEIN, TOTAL 6.0 - 8.5 g/dL 6.8   PHOSPHORUS 2.7 - 4.5 mg/dL    ALBUMIN 3.5 - 5.2 g/dL    EXT ALBUMIN 3.8 - 4.9 g/dL 4.0   TOTAL BILIRUBIN 0.1 - 1.0 mg/dL    EXT TOTAL BILIRUBIN 0.0 - 1.2 mg/dL 1.9 (A)   ALK PHOSPHATASE 55 - 135 U/L    EXT ALK PHOSPHATASE 39 - 117 IU/L 131 (A)   SGOT (AST) 10 - 40 U/L    EXT SGOT (AST) 0 - 40 IU/L 41 (A)   SGPT (ALT) 10 - 44 U/L    EXT ALT 0 - 44 IU/L 51 (A)       9/11/2020 labs sent via text from patient  Pancreastatin: 490  Serotonin: 489    Neuroendocrine Labs Latest Ref Rng & Units 5/14/2020 5/13/2020   EXT 5 HIAA BLOOD 0 - 22 ng/mL 18 18   EXT SEROTONIN 56 - 244 ng/ml  457 (A)   EXT CHROMOGRANIN A 0 - 15 ng/ml     EXT PANCREASTATIN COLE 10 - 135 pg/mL 207 (A) 207 (A)   EXT NEUROKININ A COLE 0 - 40 pg/ml     EXT LANREOTIDE LEVEL pg/mL 256 2,569       Neuroendocrine Labs Latest Ref Rng & Units 10/24/2018   EXT 5 HIAA BLOOD 0 - 22 ng/ml 11   EXT SEROTONIN 56 - 244 ng/ml 173   EXT CHROMOGRANIN A 0 - 15 ng/ml    EXT PANCREASTATIN COLE 10 - 135 pg/ml 89   EXT NEUROKININ A COLE 0 - 40 pg/ml 20   WBC 3.90 - 12.70 K/uL    EXT WBC 3.3 - 10.5 k/cumm 7.2   HGB 14.0 - 18.0 g/dL    EXT HGB 12.8 - 16.9 g/dl 15.7   HCT 40.0 - 54.0 %    EXT HCT 38.8 - 50.2 % 46.5   PLATLETS 150 - 350 K/uL    EXT PLATLETS 150 - 450 k/cumm 169   PT 9.0 - 12.5 sec    PTT 21.0 - 32.0 sec    INR 0.8 - 1.2    GLUCOSE 70 - 110 mg/dL    EXT GLUCOSE 65 - 99 mg/dl 136 (A)   BUN 6 - 20 mg/dL    EXT BUN 8 - 26 mg/dl 13   CREATININE 0.5 - 1.4 mg/dL    EXT CREATININE 0.80 - 1.50 mg/dl 0.93    - 145 mmol/L    EXT  - 145 mmol/l 138   K 3.5 - 5.1 mmol/L    EXT K 3.5 - 5.5 mmol/l 4.3   CHLORIDE 95 - 110 mmol/L    EXT CHLORIDE 98 - 110 mmol/l 99   CO2 23 - 29 mmol/L    EXT CO2 20 - 29 mmol/l 28   CALCIUM 8.7 - 10.5 mg/dL    EXT CALCIUM 8.4 - 10.5 mg/dl 9.6   PROTEIN, TOTAL 6.0 - 8.4  g/dL    EXT PROTEIN, TOTAL 6.0 - 8.3 g/dl 7.4   PHOSPHORUS 2.7 - 4.5 mg/dL    ALBUMIN 3.5 - 5.2 g/dL    EXT ALBUMIN 3.5 - 5.0 gm/dl 4.1   TOTAL BILIRUBIN 0.1 - 1.0 mg/dL    EXT TOTAL BILIRUBIN 0.1 - 1.2 mg/dl 1.1   ALK PHOSPHATASE 55 - 135 U/L    EXT ALK PHOSPHATASE 38 - 126 u/l 90   SGOT (AST) 10 - 40 U/L    EXT SGOT (AST) 17 - 59 u/l 30   SGPT (ALT) 10 - 44 U/L    EXT ALT 11 - 58 u/l 16     Scans:     INDICATION: SUBSEQUENT TREATMENT STRATEGY: Restaging. 50 year old  male with neuroendocrine tumor.  COMPARISON: PET/CT, 7/9/2019 and 7/10/2017  TECHNIQUE: PET CT of the body (skull base to thigh) was performed  without IV contrast. 6.0mCi Ga-68 DOTA-TREJO was administered IV.  Non-contrast CT was performed from skull base to thighs followed by  PET imaging of the same field. No contrast due to allergy.  FINDINGS:  HEAD/NECK:  Radiotracer avid, hyperattenuating lesion along the right medial  rectus muscle and along the posterior aspect of the left lower globe.  No significant proptosis bilaterally. These lesions are both new  compared to prior examination in 2019. Radiotracer avid right level  IB lymph node measuring up to 1.1 cm.  The thyroid is unremarkable.  CHEST:  Multiple radiotracer avid lesions visualized within the pericardium  and along the course of the interventricular septum. Level 4R and 7  are radiotracer avid lymph nodes are present in addition to right  upper lobe, right middle lobe, and right lower lobe radiotracer avid  pulmonary nodules. Multiple radiotracer avid chest soft tissue  nodules are present, new compared to prior. Pleural metastases are  present along the inferior right base.  Trace left pleural effusion. No focal consolidation.  ABDOMEN/PELVIS:  Hepatic metastatic disease is present, with the largest lesion in the  hepatic dome, measuring 6.4 x 3.6 cm with a max SUV of 29.9 (image  145), previously measuring 2.5 x 1.9 cm with a max SUV of 20.9.  Interval increase in number and size of  the erendira hepatic radiotracer  avid lymphadenopathy. Decreased size of a left adrenal nodule without  definite FDG uptake. Increased size of a right pararenal lymph node.  Interval development of mesenteric lymph nodes, and a left common  iliac chain lymph node.  BONES:  Multifocal radiotracer uptake within the appendicular and axial  osseous structures, overall increased compared to 2019. No pathologic  fractures.  IMPRESSION:  Multiple new foci of DOTATATE avid metastatic disease, including  within the posterior left globe, right orbital medial rectus muscle  and within the interventricular septum of the myocardium, consistent  with disease progression.        MRI abdomen 9/2020    [facility]PORT  EXAM: MRI Abdomen wo then w Contrast  DATE: 09/02/2020 15:11 hours  INDICATION: History of neuroendocrine tumor status post resection.  COMPARISON: CT abdomen and pelvis dated May 6, 2020  TECHNIQUE: MR imaging of the abdomen was obtained pre and post IV  contrast. Routine protocol was utilized. Images were obtained in  multiple planes.  10 ml of GADAVIST were administered  FINDINGS:  Liver: Hepatic steatosis. Multiple T2 hyperintense lesions are noted  throughout the liver. The largest is located within the right hepatic  lobe, segment 8 measuring approximately 3.5 cm, increased in size  from the comparison CT. At least 4 lesions are noted within the  liver. . Two of the smaller lesions were not apparent on the  comparison CT. The posterior peripheral right hepatic lesion  measuring 1.7 cm increased size from prior examination (previously  1.3 cm) (series 10, image 10). Postsurgical changes noted within the  right lobe of the liver.  Bile Ducts: Negative for intra or extrahepatic biliary dilatation.  Gallbladder: The gallbladder is surgically absent.  Pancreas: The pancreas is fatty and atrophied.  Spleen: The spleen is unremarkable in size and contour.  Adrenals: Small nodule  adjacent to the left adrenal gland. The nodule  measures 0.9 cm and is unchanged from the prior examination.  The adrenal gland itself is unremarkable.  Urinary Tract: Negative for hydronephrosis. Adjacent to the right  renal vein is a peripherally enhancing nodule measuring approximately  1.2 cm in the short axis, decreased in size from the prior  examination and favored to represent a necrotic lymph node.  Reproductive Organs: Not included on this field-of-view  Bowel: No overly dilated loops of small bowel. The stomach and  esophagus are unremarkable.  Appendix: The appendix is not visualized.  Lymph Nodes: Similar lymphadenopathy of the erendira hepatis.  Peritoneum: Negative for free fluid.  Vasculature: The abdominal aorta is unremarkable in size and contour.  Abdominal Wall: Midline abdominal scar.  Bones: Negative for acute osseous findings. Focal 0.6 cm T1  hypointense lesion within T12.  Visualized thorax: Normal heart size. Evaluation of the lungs is  suboptimal on MRI and a concurrent chest CT was performed. 2  pulmonary nodules in the right lower lobe appear enlarged from the  prior examination, however comparison and evaluation is better  performed by CT.  IMPRESSION:  1. Increased size and number of multiple hepatic lesions concerning  for progression of disease.  2. Question osseous metastatic disease within T12. Attention on  follow-up examinations is recommended.  3. Similar erendira hepatis and retroperitoneal lymphadenopathy.  Template Version: [id number]  Thank you for consulting our team of subspecialty body imaging  radiologists at [facility].  Healthcare providers wishing to discuss this case further can contact  the [facility] Reading Room at  [phone].  IPetar have personally reviewed the image(s) and the  prepared and signed interpretation by Samreen Romo. Based on my review,  I agree with the  findings.  Electronically Signed by: Petar Rob  Dictated on: 9/2/2020 3:14 PM    CT abd/pelvis- 2/24/20          NM PET Ga68 Dotatate Whole Body-7/9/19  Narrative: EXAMINATION:  NM PET 68GA DOTATATE WHOLE BODY    CLINICAL HISTORY:  Malignant carcinoid tumor of the ileum    TECHNIQUE:  4.67 mCi of GA68 Dotatate was administered intravenously in the right antecubital fossa.  After an approximately 60 min distribution time, PET/CT images were acquired from the skull vertex to the mid thigh. Transmission images were acquired to correct for attenuation using a whole body low-dose CT scan without contrast with the arms positioned above the head.    COMPARISON:  Gallium 68 dota-hua PET 11/26/2018.    FINDINGS:  Quality of the study: Adequate.    Multiple areas increased FDG avidity including innumerable hepatic lesions, retroperitoneal, mesenteric, and peripancreatic lymph nodes with index lesions as below:    Superior right lobe of the liver: SUV max 13.64, previously 22.83.    Paraesophageal lymph node has resolved.    Peripancreatic lymphadenopathy (largest lymph measures 1.7 cm in short axis, previously 2.0 cm; of note, this was incorrectly labeled as periaortic lymphadenopathy on examination dated 11/26/2018): SUV max 6.65, previously 37.    Stable right retrocaval lymph node measures approximately 1.6 cm (previously 1.5 cm) and SUV max 19.62, previously 20.63.    Redemonstration of increased tracer uptake associated with the myocardium, 1 in the region of the interventricular septum (SUV max 7.13, previously 8.8), and 1 in the region of the left ventricle lateral wall (SUV max 7.32, previously 6.43).  There has been interval resolution of FDG avidity within the region of the right atrial/ventricular wall.  New nonspecific FDG avid subcentimeter submandibular lymph node demonstrating SUV max of 3.10.  This may be reactive in nature.  Nonspecific increased uptake within the anterior aspect of the ethmoid  sinus.    Physiologic uptake of the tracer is seen within the pituitary, salivary, and thyroid glands, stomach, liver, spleen, kidneys, adrenal glands, prostate, and bowel.    Incidental CT findings: Heterogeneous liver.  Multiple calcified lymph nodes within the right hilar region, likely sequela prior granulomatous disease.  Subsegmental opacity within the right middle lobe which may represent subsegmental atelectasis or scarring.  Small amount of left sided dependent pleural fluid.  Prior cholecystectomy.  Punctate calcification within the spleen, likely sequela prior granulomatous disease.  Multiple rounded hyperdensities within the subcutaneous tissue of the buttocks presumably related to injection sites.  Impression: 1.  Multiple foci of uptake indicating persistent somatostatin receptor positive metastases with resolved paraesophageal node and smaller peripancreatic nodes that are no longer tracer avid and in keeping with partial response to treatment.    2.  Two out of 3 foci within the myocardium remaining that may represent cardiac metastases, though other processes may cause uptake.  Recommend correlation with cardiac history and consideration of dedicated cardiac MRI for further evaluation.      MRI Abdomen W WO Contrast-7/9/19  Narrative: EXAMINATION:  MRI ABDOMEN W WO CONTRAST    CLINICAL HISTORY:  Neoplasm: abdomen, metastatic, recurrence, suspected/known;  Neoplasm of unspecified behavior of other specified sites    TECHNIQUE:  Multisequence, multiplanar MRI of the abdomen performed per liver protocol before and after administration of 10 mL Gadavist intravenous contrast.    COMPARISON:  11/26/2018    FINDINGS:  Liver: There is diffuse loss of signal on opposed phase imaging consistent with steatosis.  There are multiple T2 hyperintense lesions which demonstrate restricted diffusion and arterial enhancement.  Segment VIII lesion measures 2.9 cm, previously 2.4 cm (series 1400, image 31).  Segment V  lesion measures 1.5 cm, previously 1.6 cm (series 1400, image 77).  Overall number of lesions appear similar to prior study.  There is apparent interval increase in enhancement.  Postsurgical changes of prior partial right hepatectomy noted.    Biliary: Gallbladder is absent.  No biliary dilatation.    Pancreas: Unremarkable.  No pancreatic ductal dilatation.    Spleen: Normal size.  No focal lesions.    Adrenal glands: Unremarkable.    Kidneys: No renal masses.  Several tiny cysts noted.  No hydronephrosis.    Miscellaneous: Bowel containing ventral abdominal hernia partially visualized.  No evidence for bowel obstruction.  Multiple prominent periportal and retroperitoneal lymph nodes are noted.  Aortocaval necrotic lymph node measures 1.6 cm short axis, previously 1.7 cm (series 1401, image 58).  Additional retrocaval necrotic lymph node measuring 1.5 cm noted, previously 1.1 cm (series 1401, image 72).  Impression: 1. Hepatic metastases and abdominal lymphadenopathy, overall similar to prior study.  2. Partially visualized bowel containing ventral abdominal hernia.  RECIST SUMMARY:    Date of prior examination for comparison: 11/26/2018    Lesion 1: Hepatic segment V.  1.5 cm. Series 1400 image 77.  Prior measurement 1.6 cm.    Lesion 2: Hepatic segment VIII.  2.9 cm. Series 1400 image 31.  Prior measurement 2.4 cm.    Lesion 3: Aortocaval lymph node.  1.6 cm. Series 1401 image 58.  Prior measurement 1.7 cm.    Lesion 4: Retrocaval lymph node.  1.5 cm. Series 1401 image 72.  Prior measurement 1.1 cm.       Echocardiogram- 7/8/19  · Normal left ventricular systolic function. The estimated ejection fraction is 65%  · Normal LV diastolic function.  · Concentric left ventricular hypertrophy.  · Mild aortic regurgitation.  · Normal right ventricular systolic function.  · Normal central venous pressure (3 mm Hg).  · The estimated PA systolic pressure is 18 mm Hg      Assessment/Impression:         Problem List Items  Addressed This Visit     None           Plan:         Had a long conversation with the patient about the features of his case that support the treatment and surveillance recommendations outlined below:  1.  He has had a number of things done since our last virtual visit.  Due to some urgency in getting treatment on board in the setting of multisite progression of disease, the patient came down here for his liver directed therapies followed by peptide base receptor treatment.  His case was presented at tumor board and a liver directed therapy 1st approach was decided on given a high likelihood of short-term response in the liver and a goal to Khoi PRRT to extrahepatic sites. He has completed both the LDT and 1st round of PRRT without significant difficulty.  2.  He had a moderate amount of post embolization symptoms after his liver directed therapy.  They slowly resolved prior to his PRRT dose.  He feels no better or worse since getting PRRT.  We will need repeat blood work done in 2 weeks to assess his liver function.  3.  The protocol for PRRT includes a dose of somatostatin receptor inhibitor treatment.  He was inadvertently ordered Sandostatin instead of lanreotide in [place] and therefore we had trouble getting approval to give him his lanreotide dose here after his treatment.  He is not having any worse symptoms of his carcinoid syndrome since recovering from the liver directed therapy, so he preferred to sort out the SSI treatment order instead of committing to a short-acting formulation of the drug while we try to sort out this therapy plan.  He is due to head to [facility] from here.  We will try our best to shift his SSI order to the [facility] for the short-term.    4.  We reviewed the significance the activity in his eyes.  He was seen by an ophthalmologist and his case reviewed by a neuro ophthalmologist.  The exam by the ophthalmologist showed no structural changes to the eye or vision  loss associated with tumor involvement in this area.  The neuro ophthalmologist felt that the risks associated with targeted radiation techniques to the eye itself were no less than the risks associated with targeted radiation given systemically.  We chose to move forward with PRRT with close follow-up for symptoms related to the eye     We had previously discussed elevations in his A1c.  We opted to move forward with treatment in the short-term and see whether not his glycemic index stabilizes over time.  We may have to consider oral hypoglycemic agents.  His glucose was elevated after his liver directed therapy.  We will re-evaluate his glucose levels along with an A1c in 2 weeks.  He will continue to try dietary and behavior modification at this juncture.      CMP, A1c, CBC in 2 weeks  Need to try to get his lanreotide therapy plan shifted to [facility] in the short term  MRI abdomen with contrast in 3 weeks (1 month from LDT)  NET BW prior to next round of PRRT    Tayla Her MD, MPH, FACS  Professor of Surgery, [facility]  Neuroendocrine Surgery, Hepatic/Pancreatic & General Surgical Oncology  [address]  [place]  [address]  ph. [phone]; [phone]  fax. [phone]

## 2020-10-29 NOTE — ASSESSMENT & PLAN NOTE
50 year old with metastatic carcinoid scheduled for  PRRT tomorrow here for evaluation of elevated sugars.    Patient has recently received steroids last week and also is on lanreotide which which are likely causes of hyperglycemia.  New glucometer was provided to patient today and he was recommended to start checking his sugars begore each meal and bedtime.    Also will order A1c today.  Discussed in detail about the lifestyle changes-with emphasis on low glycemic foods and portion control.   Patient is heading back to his home at [place] tomorrow or day after and wishes to continue his care there.  Recommended to schedule Endocrinology and diabetic educator visit.    Also recommended that he need to schedule Diabetes eye screening exam if his A1c is >6.5     Blood pressure - well controlled     Advised foot care.    Patient verbalized understanding of the above documented plan.

## 2020-10-29 NOTE — PROGRESS NOTES
Pt present in building for another appt and had some questions for Dr. eHr s/p PRRT. Moved to exam room for consultation with this RN regarding medication authorization at another facility. Pt lives in both [facility] [place], and [place], [place], and receives medical care in all 3 locations, including ours. Dr. Her called to room to address patient's additional needs/concerns.

## 2020-10-30 ENCOUNTER — PATIENT MESSAGE (OUTPATIENT)
Dept: NEUROLOGY | Facility: HOSPITAL | Age: 50
End: 2020-10-30

## 2020-10-30 ENCOUNTER — PATIENT OUTREACH (OUTPATIENT)
Dept: ADMINISTRATIVE | Facility: HOSPITAL | Age: 50
End: 2020-10-30

## 2020-10-30 ENCOUNTER — RESEARCH ENCOUNTER (OUTPATIENT)
Dept: RESEARCH | Facility: HOSPITAL | Age: 50
End: 2020-10-30

## 2020-10-30 NOTE — PROGRESS NOTES
Pt was approached in [facility] regarding participation in IRB protocol #2015.101.C, 3/3/2020 study. Pt was agreeable.     The Informed Consent Form (ICF) was reviewed with pt. The discussion included:   - participation is voluntary  - pt can change his mind about participating  - pt was informed that participation in this study would not preclude him from participating in any other research if offered  - specimens may be used by [facility] researchers, community researchers or research companies  - specimens collected include only those discussed with the patient at the time of consent and are indicated on the ICF   - specimens may be used for DNA, RNA or protein studies investigating biomarkers for diagnostic, prognostic or treatment purposes  - blood specimen will be collected from him after routine collections have been conducted  - excess  tissue will be collected from Pathology after their approval  - participation in Biobank study will not change amount of tissue removed  - all specimens released to researchers will be stripped of identifiers  - no personal medical information will be released to any parties outside of this research study  - there will be no other physical risks outside of those involved in standard of care procedure     Dr. Her approved of patient's participation in the Biobank study.  Pt did not have any questions. Pt willingly and independently signed ICF.    A copy of signed ICF was given to pt with instructions to call with any questions that may arise or if he should change his mind regarding participation in Biobank study.

## 2020-10-30 NOTE — PROGRESS NOTES
Saved 10/27/20 eye exam from Dr. Junior in . Sent notification to Dr. Her.   No results in [facility].   Sent E-fax to Dr. Hurd for colonoscopy records.

## 2020-10-30 NOTE — LETTER
AUTHORIZATION FOR RELEASE OF   CONFIDENTIAL INFORMATION    Dear Dr. Hurd,    We are seeing Javy Thompson, date of birth 1970, in the clinic at No Department Specified. Syd Gonzales MD is the patient's PCP. Javy Thompson has an outstanding lab/procedure at the time we reviewed his chart. In order to help keep his health information updated, he has authorized us to request the following medical record(s):           ( X )  COLONOSCOPY              Please fax records to us at [phone].     Attention: Fiorella Sam     If you have any questions, please contact me at [phone].          Patient Name: Javy Thompson  : 1970  Patient Phone #: [phone]

## 2020-10-31 ENCOUNTER — NURSE TRIAGE (OUTPATIENT)
Dept: ADMINISTRATIVE | Facility: CLINIC | Age: 50
End: 2020-10-31

## 2020-10-31 NOTE — TELEPHONE ENCOUNTER
Pt contacted for Sx tracker escalation - C/O non productive cough. Denies any fever or SOB that is worse than his baseline, pt able to speak in full sentences without noted SOB or cough. Covid 19 and cough protocols used and pt advised on home care. Pt instructed to call [facility] for worsening of symptoms or health questions.    Reason for Disposition   Cough with no complications    Additional Information   Negative: Severe difficulty breathing (e.g., struggling for each breath, speaks in single words)   Negative: Difficult to awaken or acting confused (e.g., disoriented, slurred speech)   Negative: Bluish (or gray) lips or face now   Negative: Shock suspected (e.g., cold/pale/clammy skin, too weak to stand, low BP, rapid pulse)   Negative: Sounds like a life-threatening emergency to the triager   Negative: [1] COVID-19 suspected (e.g., cough, fever, shortness of breath) AND [2] mild symptoms AND [3] public health department recommends testing   Negative: [1] COVID-19 exposure AND [2] no symptoms   Negative: COVID-19 and Breastfeeding, questions about   Negative: SEVERE or constant chest pain (Exception: mild central chest pain, present only when coughing)   Negative: MODERATE difficulty breathing (e.g., speaks in phrases, SOB even at rest, pulse 100-120)   Negative: Patient sounds very sick or weak to the triager   Negative: MILD difficulty breathing (e.g., minimal/no SOB at rest, SOB with walking, pulse <100)   Negative: Chest pain   Negative: Fever > 103 F (39.4 C)   Negative: [1] Fever > 101 F (38.3 C) AND [2] age > 60   Negative: [1] Fever > 100.0 F (37.8 C) AND [2] bedridden (e.g., nursing home patient, CVA, chronic illness, recovering from surgery)   Negative: HIGH RISK patient (e.g., age > 64 years, diabetes, heart or lung disease, weak immune system)   Negative: Fever present > 3 days (72 hours)   Negative: [1] Fever returns after gone for over 24 hours AND [2] symptoms worse or not  improved   Negative: [1] Continuous (nonstop) coughing interferes with work or school AND [2] no improvement using cough treatment per protocol   Negative: Cough present > 3 weeks   Negative: [1] COVID-19 infection diagnosed or suspected AND [2] mild symptoms (fever, cough) AND [3] no trouble breathing or other complications   Negative: COVID-19 Home Isolation, questions about   Negative: COVID-19 Prevention and Healthy Living, questions about   Negative: COVID-19 Testing, questions about   Negative: COVID-19, questions about   Negative: Bluish (or gray) lips or face   Negative: Severe difficulty breathing (e.g., struggling for each breath, speaks in single words)   Negative: Rapid onset of cough and has hives   Negative: Coughing started suddenly after medicine, an allergic food or bee sting   Negative: Difficulty breathing after exposure to flames, smoke, or fumes   Negative: Sounds like a life-threatening emergency to the triager   Negative: Previous asthma attacks and this feels like asthma attack   Negative: Chest pain present when not coughing   Negative: Difficulty breathing   Negative: Passed out (i.e., fainted, collapsed and was not responding)   Negative: Patient sounds very sick or weak to the triager   Negative: Coughed up > 1 tablespoon (15 ml) blood (Exception: blood-tinged sputum)   Negative: Fever > 103 F (39.4 C)   Negative: Fever > 101 F (38.3 C) and over 60 years of age   Negative: Fever > 100.0 F (37.8 C) and has diabetes mellitus or a weak immune system (e.g., HIV positive, cancer chemotherapy, organ transplant, splenectomy, chronic steroids)   Negative: Fever > 100.0 F (37.8 C) and bedridden (e.g., nursing home patient, stroke, chronic illness, recovering from surgery)   Negative: Increasing ankle swelling   Negative: Wheezing is present   Negative: SEVERE coughing spells (e.g., whooping sound after coughing, vomiting after coughing)   Negative: Coughing up  marina-colored (reddish-brown) or blood-tinged sputum   Negative: Fever present > 3 days (72 hours)   Negative: Fever returns after gone for over 24 hours and symptoms worse or not improved   Negative: Using nasal washes and pain medicine > 24 hours and sinus pain persists   Negative: Known COPD or other severe lung disease (i.e., bronchiectasis, cystic fibrosis, lung surgery) and worsening symptoms (i.e., increased sputum purulence or amount, increased breathing difficulty)   Negative: Continuous (nonstop) coughing interferes with work or school and no improvement using cough treatment per Care Advice   Negative: Patient wants to be seen   Negative: Cough has been present for > 3 weeks   Negative: Allergy symptoms are also present (e.g., itchy eyes, clear nasal discharge, postnasal drip)   Negative: Nasal discharge present > 10 days   Negative: Exposure to TB (Tuberculosis)   Negative: Taking an ACE Inhibitor medication (e.g., benazepril/LOTENSIN, captopril/CAPOTEN, enalapril/VASOTEC, lisinopril/ZESTRIL)    Protocols used: COUGH-A-OH, CORONAVIRUS (COVID-19) - DIAGNOSED OR [id number]

## 2020-11-02 ENCOUNTER — TELEPHONE (OUTPATIENT)
Dept: NEUROLOGY | Facility: HOSPITAL | Age: 50
End: 2020-11-02

## 2020-11-02 ENCOUNTER — PATIENT MESSAGE (OUTPATIENT)
Dept: NEUROLOGY | Facility: HOSPITAL | Age: 50
End: 2020-11-02

## 2020-11-02 NOTE — TELEPHONE ENCOUNTER
----- Message from Jen Eric RN sent at 11/2/2020  3:01 PM CST -----  Contact: Damaris, pt spouse, [phone]  Please advise  ----- Message -----  From: Rachelle Luo  Sent: 11/2/2020  12:34 PM CST  To: Taina Bourne Staff    Called in requesting to speak with Deirdre Rodriguez. States they're having difficulties with insurance and need help on who they should call. Please advise.

## 2020-11-02 NOTE — TELEPHONE ENCOUNTER
Spoke to Damaris/Tatiana regarding insurance issues. Requesting assistance in setting up lanreotide inj at [facility]. Dr. Her to let them know who to connect with at [facility]. Also due to changing insurance companies, patient requesting next PRRT #2/4 to be scheduled 1/13/20. Schedule change already approved with Dr. Her.     Orders for post TACE f/u: MRI abd with & with gadolinium ( focus on liver) to be done 11/30/20 at  , CBC/CMP on 11/16/20. AND labs for post PRRT f/u for 11/24/20 and 1/05/20 __ sent via patient portal.

## 2020-11-04 ENCOUNTER — TELEPHONE (OUTPATIENT)
Dept: NEUROLOGY | Facility: HOSPITAL | Age: 50
End: 2020-11-04

## 2020-11-04 NOTE — TELEPHONE ENCOUNTER
Called to follow up after PRRT and recent LDT. Patient denies any complaints. Spoke to wife with patient in the background. Request made to change PRRT scheduled due to insurance change from what was discussed previously when pt was at clinic.    New plan or schedule:  In [place] with Dr. Tomy Lew    Lanreotide 11/30/20, 1/04/21,  LONNIE    PRRT 2/3/21 & lanreotide 2/4/21- new insurance               Will be active.  [employer]   Lanreotide 3/4/20   With Dr. Jaylen York emailed patient's contact information regarding plan.

## 2020-11-05 NOTE — TELEPHONE ENCOUNTER
Email Correspondence to Dr.Jonathan York     Dear Dr. York,  The patient being referred per Dr. Her for lanreotide 120mg  injections from [place] to [place] is:   BRANDAN MORA  1970    [phone] [place]         [phone] home  He is a snowbird from [place] between [place]. The plan at this time is for him to receive lanreotide 20 & 21 in [place], followed by PRRT on 21 in [place].  If Insurance will allow we will give his post PRRT lanreotide on 21. They will be newly retiring an switching insurances at that time. Patients 1st lanreotide dose with you will be 3/4/21 in [place] unless we have difficulties.  Thank you for assisting us in the care of our patient.  Deirdre Rodriguez, YURIN, RN  Nurse Navigator  [facility]  [facility]

## 2020-11-11 ENCOUNTER — TELEPHONE (OUTPATIENT)
Dept: NEUROLOGY | Facility: HOSPITAL | Age: 50
End: 2020-11-11

## 2020-11-11 NOTE — TELEPHONE ENCOUNTER
"Called patient to confirm he was able to receive lanreotide injection per Dr. York's office at [facility]. "All is good, got my shot."  "

## 2020-11-16 ENCOUNTER — PATIENT MESSAGE (OUTPATIENT)
Dept: NEUROLOGY | Facility: HOSPITAL | Age: 50
End: 2020-11-16

## 2020-11-16 LAB
EXT 24 HR UR METANEPHRINE: ABNORMAL
EXT 24 HR UR NORMETANEPHRINE: ABNORMAL
EXT 24 HR UR NORMETANEPHRINE: ABNORMAL
EXT 25 HYDROXY VIT D2: ABNORMAL
EXT 25 HYDROXY VIT D3: ABNORMAL
EXT 5 HIAA 24 HR URINE: ABNORMAL
EXT 5 HIAA BLOOD: ABNORMAL
EXT ACTH: ABNORMAL
EXT AFP: ABNORMAL
EXT ALBUMIN: 4.5 G/DL (ref 3.8–4.9)
EXT ALKALINE PHOSPHATASE: 109 IU/L (ref 39–117)
EXT ALT: 10 IU/L (ref 0–44)
EXT AMYLASE: ABNORMAL
EXT ANTI ISLET CELL AB: ABNORMAL
EXT ANTI PARIETAL CELL AB: ABNORMAL
EXT ANTI THYROID AB: ABNORMAL
EXT AST: 22 IU/L (ref 0–40)
EXT BILIRUBIN DIRECT: ABNORMAL
EXT BILIRUBIN TOTAL: 0.7 MG/DL (ref 0–1.2)
EXT BK VIRUS DNA QN PCR: ABNORMAL
EXT BUN: 13 MG/DL (ref 6–24)
EXT C PEPTIDE: ABNORMAL
EXT CA 125: ABNORMAL
EXT CA 19-9: ABNORMAL
EXT CA 27-29: ABNORMAL
EXT CALCITONIN: ABNORMAL
EXT CALCIUM: 9.5 MG/DL (ref 8.7–10.2)
EXT CEA: ABNORMAL
EXT CHLORIDE: 99 MMOL/L (ref 96–106)
EXT CHOLESTEROL: ABNORMAL
EXT CHROMOGRANIN A: ABNORMAL
EXT CO2: 27 MMOL/L (ref 20–29)
EXT CREATININE UA: ABNORMAL
EXT CREATININE: 0.93 MG/DL (ref 0.76–1.27)
EXT CYCLOSPORONE LEVEL: ABNORMAL
EXT DOPAMINE: ABNORMAL
EXT EBV DNA BY PCR: ABNORMAL
EXT EPINEPHRINE: ABNORMAL
EXT FOLATE: ABNORMAL
EXT FREE T3: ABNORMAL
EXT FREE T4: ABNORMAL
EXT FSH: ABNORMAL
EXT GASTRIN RELEASING PEPTIDE: ABNORMAL
EXT GASTRIN RELEASING PEPTIDE: ABNORMAL
EXT GASTRIN: ABNORMAL
EXT GGT: ABNORMAL
EXT GHRELIN: ABNORMAL
EXT GLUCAGON: ABNORMAL
EXT GLUCOSE: 149 MG/DL (ref 65–99)
EXT GROWTH HORMONE: ABNORMAL
EXT HCV RNA QUANT PCR: ABNORMAL
EXT HDL: ABNORMAL
EXT HEMATOCRIT: 41.9 % (ref 37.5–51)
EXT HEMOGLOBIN A1C: ABNORMAL
EXT HEMOGLOBIN: 14 G/DL (ref 13–17.7)
EXT HISTAMINE 24 HR URINE: ABNORMAL
EXT HISTAMINE: ABNORMAL
EXT IGF-1: ABNORMAL
EXT IMMUNKNOW (NON-STIMULATED): ABNORMAL
EXT IMMUNKNOW (STIMULATED): ABNORMAL
EXT INR: ABNORMAL
EXT INSULIN: ABNORMAL
EXT LANREOTIDE LEVEL: ABNORMAL
EXT LDH, TOTAL: ABNORMAL
EXT LDL CHOLESTEROL: ABNORMAL
EXT LIPASE: ABNORMAL
EXT MAGNESIUM: ABNORMAL
EXT METANEPHRINE FREE PLASMA: ABNORMAL
EXT MOTILIN: ABNORMAL
EXT NEUROKININ A CAMB: ABNORMAL
EXT NEUROKININ A ISI: ABNORMAL
EXT NEUROTENSIN: ABNORMAL
EXT NOREPINEPHRINE: ABNORMAL
EXT NORMETANEPHRINE: ABNORMAL
EXT NSE: ABNORMAL
EXT OCTREOTIDE LEVEL: ABNORMAL
EXT PANCREASTATIN CAMB: ABNORMAL
EXT PANCREASTATIN ISI: ABNORMAL
EXT PANCREATIC POLYPEPTIDE: ABNORMAL
EXT PHOSPHORUS: ABNORMAL
EXT PLATELETS: 186 X10E3/UL (ref 150–450)
EXT POTASSIUM: 4.8 MMOL/L (ref 3.5–5.2)
EXT PROGRAF LEVEL: ABNORMAL
EXT PROLACTIN: ABNORMAL
EXT PROTEIN TOTAL: 7.1 G/DL (ref 6–8.5)
EXT PROTEIN UA: ABNORMAL
EXT PT: ABNORMAL
EXT PTH, INTACT: ABNORMAL
EXT PTT: ABNORMAL
EXT RAPAMUNE LEVEL: ABNORMAL
EXT SEROTONIN: ABNORMAL
EXT SODIUM: 139 MMOL/L (ref 134–144)
EXT SOMATOSTATIN: ABNORMAL
EXT SUBSTANCE P: ABNORMAL
EXT TRIGLYCERIDES: ABNORMAL
EXT TRYPTASE: ABNORMAL
EXT TSH: ABNORMAL
EXT URIC ACID: ABNORMAL
EXT URINE AMYLASE U/HR: ABNORMAL
EXT URINE AMYLASE U/L: ABNORMAL
EXT VASOACTIVE INTESTINAL POLYPEPTIDE: ABNORMAL
EXT VITAMIN B12: ABNORMAL
EXT VMA 24 HR URINE: ABNORMAL
EXT WBC: 5.5 X10E3/UL (ref 3.4–10.8)
NEURON SPECIFIC ENOLASE: ABNORMAL

## 2020-11-17 ENCOUNTER — TELEPHONE (OUTPATIENT)
Dept: NEUROLOGY | Facility: HOSPITAL | Age: 50
End: 2020-11-17

## 2020-11-17 NOTE — TELEPHONE ENCOUNTER
----- Message from Qing Rodriguez RN sent at 11/2/2020  5:36 PM CST -----  Regarding: Need authorization for MRI abd  Dr. Her wants Javy Thompson to have MRI abd with & without gadolinium -focus to liver - f/u after TACE from 10/21/20. He plan to schedule it at [facility] on 11/30/20.  Thanks,  Deirdre

## 2020-11-17 NOTE — TELEPHONE ENCOUNTER
No authorization required per Francisca MANCILLA with [employer] 11/17/2020 @ 11:14 am because both physician and facility are in network ref#[id number]. Sent to RN Navigator to relay to facility.

## 2020-11-20 ENCOUNTER — PATIENT MESSAGE (OUTPATIENT)
Dept: NEUROLOGY | Facility: HOSPITAL | Age: 50
End: 2020-11-20

## 2020-11-24 LAB
EXT 24 HR UR METANEPHRINE: ABNORMAL
EXT 24 HR UR NORMETANEPHRINE: ABNORMAL
EXT 24 HR UR NORMETANEPHRINE: ABNORMAL
EXT 25 HYDROXY VIT D2: ABNORMAL
EXT 25 HYDROXY VIT D3: ABNORMAL
EXT 5 HIAA 24 HR URINE: ABNORMAL
EXT 5 HIAA BLOOD: ABNORMAL
EXT ACTH: ABNORMAL
EXT AFP: ABNORMAL
EXT ALBUMIN: 4.2 G/DL (ref 3.8–4.9)
EXT ALKALINE PHOSPHATASE: 108 IU/L (ref 39–117)
EXT ALT: 8 IU/L (ref 0–44)
EXT AMYLASE: ABNORMAL
EXT ANTI ISLET CELL AB: ABNORMAL
EXT ANTI PARIETAL CELL AB: ABNORMAL
EXT ANTI THYROID AB: ABNORMAL
EXT AST: 14 IU/L (ref 0–40)
EXT BILIRUBIN DIRECT: ABNORMAL
EXT BILIRUBIN TOTAL: 0.5 MG/DL (ref 0–1.2)
EXT BK VIRUS DNA QN PCR: ABNORMAL
EXT BUN: 16 MG/DL (ref 6–24)
EXT C PEPTIDE: ABNORMAL
EXT CA 125: ABNORMAL
EXT CA 19-9: ABNORMAL
EXT CA 27-29: ABNORMAL
EXT CALCITONIN: ABNORMAL
EXT CALCIUM: 9.2 MG/DL (ref 8.7–10.2)
EXT CEA: ABNORMAL
EXT CHLORIDE: 102 MMOL/L (ref 96–106)
EXT CHOLESTEROL: ABNORMAL
EXT CHROMOGRANIN A: ABNORMAL
EXT CO2: 26 MMOL/L (ref 20–29)
EXT CREATININE UA: ABNORMAL
EXT CREATININE: 0.88 MG/DL (ref 0.76–1.27)
EXT CYCLOSPORONE LEVEL: ABNORMAL
EXT DOPAMINE: ABNORMAL
EXT EBV DNA BY PCR: ABNORMAL
EXT EPINEPHRINE: ABNORMAL
EXT FOLATE: ABNORMAL
EXT FREE T3: ABNORMAL
EXT FREE T4: ABNORMAL
EXT FSH: ABNORMAL
EXT GASTRIN RELEASING PEPTIDE: ABNORMAL
EXT GASTRIN RELEASING PEPTIDE: ABNORMAL
EXT GASTRIN: ABNORMAL
EXT GGT: ABNORMAL
EXT GHRELIN: ABNORMAL
EXT GLUCAGON: ABNORMAL
EXT GLUCOSE: 147 MG/DL (ref 65–99)
EXT GROWTH HORMONE: ABNORMAL
EXT HCV RNA QUANT PCR: ABNORMAL
EXT HDL: ABNORMAL
EXT HEMATOCRIT: 39.9 % (ref 37.5–51)
EXT HEMOGLOBIN A1C: ABNORMAL
EXT HEMOGLOBIN: 13.5 G/DL (ref 13–17.7)
EXT HISTAMINE 24 HR URINE: ABNORMAL
EXT HISTAMINE: ABNORMAL
EXT IGF-1: ABNORMAL
EXT IMMUNKNOW (NON-STIMULATED): ABNORMAL
EXT IMMUNKNOW (STIMULATED): ABNORMAL
EXT INR: ABNORMAL
EXT INSULIN: ABNORMAL
EXT LANREOTIDE LEVEL: ABNORMAL
EXT LDH, TOTAL: ABNORMAL
EXT LDL CHOLESTEROL: ABNORMAL
EXT LIPASE: ABNORMAL
EXT MAGNESIUM: ABNORMAL
EXT METANEPHRINE FREE PLASMA: ABNORMAL
EXT MOTILIN: ABNORMAL
EXT NEUROKININ A CAMB: ABNORMAL
EXT NEUROKININ A ISI: ABNORMAL
EXT NEUROTENSIN: ABNORMAL
EXT NOREPINEPHRINE: ABNORMAL
EXT NORMETANEPHRINE: ABNORMAL
EXT NSE: ABNORMAL
EXT OCTREOTIDE LEVEL: ABNORMAL
EXT PANCREASTATIN CAMB: ABNORMAL
EXT PANCREASTATIN ISI: ABNORMAL
EXT PANCREATIC POLYPEPTIDE: ABNORMAL
EXT PHOSPHORUS: ABNORMAL
EXT PLATELETS: 135 X10E3/UL (ref 150–450)
EXT POTASSIUM: 4.5 MMOL/L (ref 3.5–5.2)
EXT PROGRAF LEVEL: ABNORMAL
EXT PROLACTIN: ABNORMAL
EXT PROTEIN TOTAL: 7 G/DL (ref 6–8.5)
EXT PROTEIN UA: ABNORMAL
EXT PT: ABNORMAL
EXT PTH, INTACT: ABNORMAL
EXT PTT: ABNORMAL
EXT RAPAMUNE LEVEL: ABNORMAL
EXT SEROTONIN: ABNORMAL
EXT SODIUM: 143 MMOL/L (ref 134–144)
EXT SOMATOSTATIN: ABNORMAL
EXT SUBSTANCE P: ABNORMAL
EXT TRIGLYCERIDES: ABNORMAL
EXT TRYPTASE: ABNORMAL
EXT TSH: ABNORMAL
EXT URIC ACID: ABNORMAL
EXT URINE AMYLASE U/HR: ABNORMAL
EXT URINE AMYLASE U/L: ABNORMAL
EXT VASOACTIVE INTESTINAL POLYPEPTIDE: ABNORMAL
EXT VITAMIN B12: ABNORMAL
EXT VMA 24 HR URINE: ABNORMAL
EXT WBC: 5.2 X10E3/UL (ref 3.4–10.8)
NEURON SPECIFIC ENOLASE: ABNORMAL

## 2020-11-30 ENCOUNTER — PATIENT MESSAGE (OUTPATIENT)
Dept: NEUROLOGY | Facility: HOSPITAL | Age: 50
End: 2020-11-30

## 2020-12-07 ENCOUNTER — PATIENT MESSAGE (OUTPATIENT)
Dept: NEUROLOGY | Facility: HOSPITAL | Age: 50
End: 2020-12-07

## 2020-12-17 ENCOUNTER — TELEPHONE (OUTPATIENT)
Dept: NEUROLOGY | Facility: HOSPITAL | Age: 50
End: 2020-12-17

## 2020-12-17 NOTE — TELEPHONE ENCOUNTER
----- Message from Connie Hurd sent at 12/15/2020 12:24 PM CST -----  Contact: [phone]  Mm----[facility]  is calling in regards to Javy Thompson calling requesting to speak with the office      Please call and advise

## 2020-12-23 ENCOUNTER — PATIENT MESSAGE (OUTPATIENT)
Dept: NEUROLOGY | Facility: HOSPITAL | Age: 50
End: 2020-12-23

## 2020-12-31 ENCOUNTER — PATIENT MESSAGE (OUTPATIENT)
Dept: NEUROLOGY | Facility: HOSPITAL | Age: 50
End: 2020-12-31

## 2021-01-02 RX ORDER — CELECOXIB 200 MG/1
200 CAPSULE ORAL 2 TIMES DAILY
Qty: 60 CAPSULE | Refills: 0 | Status: SHIPPED | OUTPATIENT
Start: 2021-01-02 | End: 2021-02-02

## 2021-01-12 ENCOUNTER — PATIENT MESSAGE (OUTPATIENT)
Dept: NEUROLOGY | Facility: HOSPITAL | Age: 51
End: 2021-01-12

## 2021-01-13 ENCOUNTER — PATIENT MESSAGE (OUTPATIENT)
Dept: NEUROLOGY | Facility: HOSPITAL | Age: 51
End: 2021-01-13

## 2021-01-19 ENCOUNTER — TELEPHONE (OUTPATIENT)
Dept: NEUROLOGY | Facility: HOSPITAL | Age: 51
End: 2021-01-19

## 2021-01-19 ENCOUNTER — PATIENT MESSAGE (OUTPATIENT)
Dept: NEUROLOGY | Facility: HOSPITAL | Age: 51
End: 2021-01-19

## 2021-01-26 LAB
EXT 24 HR UR METANEPHRINE: ABNORMAL
EXT 24 HR UR NORMETANEPHRINE: ABNORMAL
EXT 24 HR UR NORMETANEPHRINE: ABNORMAL
EXT 25 HYDROXY VIT D2: ABNORMAL
EXT 25 HYDROXY VIT D3: ABNORMAL
EXT 5 HIAA 24 HR URINE: ABNORMAL
EXT 5 HIAA BLOOD: ABNORMAL
EXT ACTH: ABNORMAL
EXT AFP: ABNORMAL
EXT ALBUMIN: 4.4 G/DL (ref 3.8–4.9)
EXT ALKALINE PHOSPHATASE: 96 IU/L (ref 39–117)
EXT ALT: 9 IU/L (ref 0–44)
EXT AMYLASE: ABNORMAL
EXT ANTI ISLET CELL AB: ABNORMAL
EXT ANTI PARIETAL CELL AB: ABNORMAL
EXT ANTI THYROID AB: ABNORMAL
EXT AST: 20 IU/L (ref 0–40)
EXT BILIRUBIN DIRECT: ABNORMAL
EXT BILIRUBIN TOTAL: 0.9 MG/DL (ref 0–1.2)
EXT BK VIRUS DNA QN PCR: ABNORMAL
EXT BUN: 16 MG/DL (ref 6–24)
EXT C PEPTIDE: ABNORMAL
EXT CA 125: ABNORMAL
EXT CA 19-9: ABNORMAL
EXT CA 27-29: ABNORMAL
EXT CALCITONIN: ABNORMAL
EXT CALCIUM: 9.3 MG/DL (ref 8.7–10.2)
EXT CEA: ABNORMAL
EXT CHLORIDE: 99 MMOL/L (ref 96–106)
EXT CHOLESTEROL: ABNORMAL
EXT CHROMOGRANIN A: ABNORMAL
EXT CO2: 21 MMOL/L (ref 20–29)
EXT CREATININE UA: ABNORMAL
EXT CREATININE: 1.09 MG/DL (ref 0.76–1.27)
EXT CYCLOSPORONE LEVEL: ABNORMAL
EXT DOPAMINE: ABNORMAL
EXT EBV DNA BY PCR: ABNORMAL
EXT EPINEPHRINE: ABNORMAL
EXT FOLATE: ABNORMAL
EXT FREE T3: ABNORMAL
EXT FREE T4: ABNORMAL
EXT FSH: ABNORMAL
EXT GASTRIN RELEASING PEPTIDE: ABNORMAL
EXT GASTRIN RELEASING PEPTIDE: ABNORMAL
EXT GASTRIN: ABNORMAL
EXT GGT: ABNORMAL
EXT GHRELIN: ABNORMAL
EXT GLUCAGON: ABNORMAL
EXT GLUCOSE: 162 MG/DL (ref 65–99)
EXT GROWTH HORMONE: ABNORMAL
EXT HCV RNA QUANT PCR: ABNORMAL
EXT HDL: ABNORMAL
EXT HEMATOCRIT: 44.7 % (ref 37.5–51)
EXT HEMOGLOBIN A1C: ABNORMAL
EXT HEMOGLOBIN: 15.5 G/DL (ref 13–17.7)
EXT HISTAMINE 24 HR URINE: ABNORMAL
EXT HISTAMINE: ABNORMAL
EXT IGF-1: ABNORMAL
EXT IMMUNKNOW (NON-STIMULATED): ABNORMAL
EXT IMMUNKNOW (STIMULATED): ABNORMAL
EXT INR: ABNORMAL
EXT INSULIN: ABNORMAL
EXT LANREOTIDE LEVEL: ABNORMAL
EXT LDH, TOTAL: ABNORMAL
EXT LDL CHOLESTEROL: ABNORMAL
EXT LIPASE: ABNORMAL
EXT MAGNESIUM: ABNORMAL
EXT METANEPHRINE FREE PLASMA: ABNORMAL
EXT MOTILIN: ABNORMAL
EXT NEUROKININ A CAMB: ABNORMAL
EXT NEUROKININ A ISI: ABNORMAL
EXT NEUROTENSIN: ABNORMAL
EXT NOREPINEPHRINE: ABNORMAL
EXT NORMETANEPHRINE: ABNORMAL
EXT NSE: ABNORMAL
EXT OCTREOTIDE LEVEL: ABNORMAL
EXT PANCREASTATIN CAMB: ABNORMAL
EXT PANCREASTATIN ISI: ABNORMAL
EXT PANCREATIC POLYPEPTIDE: ABNORMAL
EXT PHOSPHORUS: ABNORMAL
EXT PLATELETS: 158 X10E3/UL (ref 150–450)
EXT POTASSIUM: 3.9 MMOL/L (ref 3.5–5.2)
EXT PROGRAF LEVEL: ABNORMAL
EXT PROLACTIN: ABNORMAL
EXT PROTEIN TOTAL: 7.4 G/DL (ref 6–8.5)
EXT PROTEIN UA: ABNORMAL
EXT PT: ABNORMAL
EXT PTH, INTACT: ABNORMAL
EXT PTT: ABNORMAL
EXT RAPAMUNE LEVEL: ABNORMAL
EXT SEROTONIN: ABNORMAL
EXT SODIUM: 140 MMOL/L (ref 134–144)
EXT SOMATOSTATIN: ABNORMAL
EXT SUBSTANCE P: ABNORMAL
EXT TRIGLYCERIDES: ABNORMAL
EXT TRYPTASE: ABNORMAL
EXT TSH: ABNORMAL
EXT URIC ACID: ABNORMAL
EXT URINE AMYLASE U/HR: ABNORMAL
EXT URINE AMYLASE U/L: ABNORMAL
EXT VASOACTIVE INTESTINAL POLYPEPTIDE: ABNORMAL
EXT VITAMIN B12: ABNORMAL
EXT VMA 24 HR URINE: ABNORMAL
EXT WBC: 4.7 X10E3/UL (ref 3.4–10.8)
GLOBULIN: 3 G/DL (ref 1.5–4.5)
NEURON SPECIFIC ENOLASE: ABNORMAL

## 2021-01-28 ENCOUNTER — PATIENT MESSAGE (OUTPATIENT)
Dept: NEUROLOGY | Facility: HOSPITAL | Age: 51
End: 2021-01-28

## 2021-02-02 ENCOUNTER — OFFICE VISIT (OUTPATIENT)
Dept: NEUROLOGY | Facility: HOSPITAL | Age: 51
End: 2021-02-02
Attending: SURGERY
Payer: COMMERCIAL

## 2021-02-02 ENCOUNTER — TELEPHONE (OUTPATIENT)
Dept: NEUROLOGY | Facility: HOSPITAL | Age: 51
End: 2021-02-02

## 2021-02-02 ENCOUNTER — TELEPHONE (OUTPATIENT)
Dept: PHARMACY | Facility: CLINIC | Age: 51
End: 2021-02-02

## 2021-02-02 VITALS
SYSTOLIC BLOOD PRESSURE: 133 MMHG | TEMPERATURE: 98 F | HEIGHT: 77 IN | BODY MASS INDEX: 25.59 KG/M2 | RESPIRATION RATE: 20 BRPM | HEART RATE: 69 BPM | WEIGHT: 216.69 LBS | DIASTOLIC BLOOD PRESSURE: 88 MMHG

## 2021-02-02 DIAGNOSIS — C7B.02 METASTATIC MALIGNANT CARCINOID TUMOR TO LIVER: ICD-10-CM

## 2021-02-02 DIAGNOSIS — C7B.8 SECONDARY NEUROENDOCRINE TUMOR OF DISTANT LYMPH NODES: ICD-10-CM

## 2021-02-02 DIAGNOSIS — Z12.89 ENCOUNTER FOR SCREENING FOR MALIGNANT NEOPLASM OF OTHER SITES: ICD-10-CM

## 2021-02-02 DIAGNOSIS — E34.0 CARCINOID SYNDROME: ICD-10-CM

## 2021-02-02 DIAGNOSIS — C7A.012 MALIGNANT CARCINOID TUMOR OF ILEUM: ICD-10-CM

## 2021-02-02 DIAGNOSIS — C26.9 MALIGNANT NEOPLASM OF ILL-DEFINED SITES WITHIN THE DIGESTIVE SYSTEM: Primary | ICD-10-CM

## 2021-02-02 PROCEDURE — 99213 OFFICE O/P EST LOW 20 MIN: CPT | Performed by: SURGERY

## 2021-02-02 RX ORDER — HYDROCODONE BITARTRATE AND ACETAMINOPHEN 10; 325 MG/1; MG/1
10-325 TABLET ORAL
COMMUNITY
End: 2021-02-02 | Stop reason: SDUPTHER

## 2021-02-02 RX ORDER — HYDROCODONE BITARTRATE AND ACETAMINOPHEN 10; 325 MG/1; MG/1
2 TABLET ORAL EVERY 6 HOURS PRN
Qty: 52 TABLET | Refills: 0 | Status: SHIPPED | OUTPATIENT
Start: 2021-02-02 | End: 2021-03-26

## 2021-02-03 ENCOUNTER — HOSPITAL ENCOUNTER (OUTPATIENT)
Dept: RADIOLOGY | Facility: HOSPITAL | Age: 51
Discharge: HOME | End: 2021-02-03
Attending: SURGERY
Payer: COMMERCIAL

## 2021-02-03 ENCOUNTER — INFUSION (OUTPATIENT)
Dept: INFUSION THERAPY | Facility: HOSPITAL | Age: 51
End: 2021-02-03
Attending: SURGERY
Payer: COMMERCIAL

## 2021-02-03 ENCOUNTER — TELEPHONE (OUTPATIENT)
Dept: NEUROLOGY | Facility: HOSPITAL | Age: 51
End: 2021-02-03

## 2021-02-03 VITALS
OXYGEN SATURATION: 95 % | HEART RATE: 86 BPM | TEMPERATURE: 99 F | RESPIRATION RATE: 18 BRPM | WEIGHT: 216.69 LBS | DIASTOLIC BLOOD PRESSURE: 77 MMHG | HEIGHT: 77 IN | SYSTOLIC BLOOD PRESSURE: 129 MMHG | BODY MASS INDEX: 25.59 KG/M2

## 2021-02-03 VITALS — HEIGHT: 77 IN | BODY MASS INDEX: 25.59 KG/M2 | WEIGHT: 216.69 LBS

## 2021-02-03 DIAGNOSIS — C7A.012 MALIGNANT CARCINOID TUMOR OF ILEUM: Primary | ICD-10-CM

## 2021-02-03 DIAGNOSIS — C7A.012 MALIGNANT CARCINOID TUMOR OF ILEUM: ICD-10-CM

## 2021-02-03 PROCEDURE — 79101 NUCLEAR RX IV ADMIN: CPT | Mod: 26,,, | Performed by: RADIOLOGY

## 2021-02-03 PROCEDURE — 96372 THER/PROPH/DIAG INJ SC/IM: CPT

## 2021-02-03 PROCEDURE — 25000003 PHARM REV CODE 250: Performed by: SURGERY

## 2021-02-03 PROCEDURE — 63600175 PHARM REV CODE 636 W HCPCS: Performed by: SURGERY

## 2021-02-03 PROCEDURE — 79101 NUCLEAR RX IV ADMIN: CPT | Mod: TC

## 2021-02-03 PROCEDURE — 96366 THER/PROPH/DIAG IV INF ADDON: CPT

## 2021-02-03 PROCEDURE — 63600175 PHARM REV CODE 636 W HCPCS: Mod: JG | Performed by: SURGERY

## 2021-02-03 PROCEDURE — 96365 THER/PROPH/DIAG IV INF INIT: CPT | Mod: 59

## 2021-02-03 PROCEDURE — 79101 NM THERAPY BY IV ADMINISTRATION LU177: ICD-10-PCS | Mod: 26,,, | Performed by: RADIOLOGY

## 2021-02-03 PROCEDURE — 96367 TX/PROPH/DG ADDL SEQ IV INF: CPT

## 2021-02-03 RX ORDER — DIPHENHYDRAMINE HYDROCHLORIDE 50 MG/ML
50 INJECTION INTRAMUSCULAR; INTRAVENOUS
Status: CANCELLED | OUTPATIENT
Start: 2021-03-31

## 2021-02-03 RX ORDER — SODIUM CHLORIDE 0.9 % (FLUSH) 0.9 %
10 SYRINGE (ML) INJECTION
Status: CANCELLED | OUTPATIENT
Start: 2021-02-17

## 2021-02-03 RX ORDER — DIPHENHYDRAMINE HYDROCHLORIDE 50 MG/ML
50 INJECTION INTRAMUSCULAR; INTRAVENOUS
Status: DISCONTINUED | OUTPATIENT
Start: 2021-02-03 | End: 2021-02-03 | Stop reason: HOSPADM

## 2021-02-03 RX ORDER — LANREOTIDE ACETATE 120 MG/.5ML
120 INJECTION SUBCUTANEOUS
Status: CANCELLED
Start: 2021-02-17

## 2021-02-03 RX ORDER — SODIUM CHLORIDE 0.9 % (FLUSH) 0.9 %
10 SYRINGE (ML) INJECTION
Status: CANCELLED | OUTPATIENT
Start: 2021-03-31

## 2021-02-03 RX ORDER — SODIUM CHLORIDE 9 MG/ML
INJECTION, SOLUTION INTRAVENOUS ONCE
Status: COMPLETED | OUTPATIENT
Start: 2021-02-03 | End: 2021-02-03

## 2021-02-03 RX ORDER — LANREOTIDE ACETATE 120 MG/.5ML
120 INJECTION SUBCUTANEOUS
Status: CANCELLED
Start: 2021-03-31

## 2021-02-03 RX ORDER — ARGININE/LYSINE/0.9 % SOD CHL 25-25MG/ML
1000 PLASTIC BAG, INJECTION (ML) INTRAVENOUS
Status: COMPLETED | OUTPATIENT
Start: 2021-02-03 | End: 2021-02-03

## 2021-02-03 RX ORDER — SODIUM CHLORIDE 9 MG/ML
INJECTION, SOLUTION INTRAVENOUS ONCE
Status: CANCELLED | OUTPATIENT
Start: 2021-03-31

## 2021-02-03 RX ORDER — ARGININE/LYSINE/0.9 % SOD CHL 25-25MG/ML
1000 PLASTIC BAG, INJECTION (ML) INTRAVENOUS
Status: CANCELLED | OUTPATIENT
Start: 2021-03-31

## 2021-02-03 RX ORDER — LANREOTIDE ACETATE 120 MG/.5ML
120 INJECTION SUBCUTANEOUS
Status: COMPLETED | OUTPATIENT
Start: 2021-02-03 | End: 2021-02-03

## 2021-02-03 RX ORDER — SODIUM CHLORIDE 9 MG/ML
INJECTION, SOLUTION INTRAVENOUS ONCE
Status: CANCELLED | OUTPATIENT
Start: 2021-02-17

## 2021-02-03 RX ORDER — SODIUM CHLORIDE 0.9 % (FLUSH) 0.9 %
10 SYRINGE (ML) INJECTION
Status: DISCONTINUED | OUTPATIENT
Start: 2021-02-03 | End: 2021-02-03 | Stop reason: HOSPADM

## 2021-02-03 RX ORDER — ACETAMINOPHEN 325 MG/1
650 TABLET ORAL
Status: CANCELLED | OUTPATIENT
Start: 2021-02-17

## 2021-02-03 RX ORDER — ARGININE/LYSINE/0.9 % SOD CHL 25-25MG/ML
1000 PLASTIC BAG, INJECTION (ML) INTRAVENOUS
Status: CANCELLED | OUTPATIENT
Start: 2021-02-17

## 2021-02-03 RX ORDER — ACETAMINOPHEN 325 MG/1
650 TABLET ORAL
Status: CANCELLED | OUTPATIENT
Start: 2021-03-31

## 2021-02-03 RX ORDER — ACETAMINOPHEN 325 MG/1
650 TABLET ORAL
Status: DISCONTINUED | OUTPATIENT
Start: 2021-02-03 | End: 2021-02-03 | Stop reason: HOSPADM

## 2021-02-03 RX ORDER — DIPHENHYDRAMINE HYDROCHLORIDE 50 MG/ML
50 INJECTION INTRAMUSCULAR; INTRAVENOUS
Status: CANCELLED | OUTPATIENT
Start: 2021-02-17

## 2021-02-03 RX ADMIN — Medication 1000 ML: at 11:02

## 2021-02-03 RX ADMIN — LANREOTIDE ACETATE 120 MG: 120 INJECTION SUBCUTANEOUS at 04:02

## 2021-02-03 RX ADMIN — DEXAMETHASONE SODIUM PHOSPHATE 50 ML: 10 INJECTION, SOLUTION INTRAMUSCULAR; INTRAVENOUS at 10:02

## 2021-02-03 RX ADMIN — SODIUM CHLORIDE: 0.9 INJECTION, SOLUTION INTRAVENOUS at 10:02

## 2021-02-10 ENCOUNTER — PATIENT MESSAGE (OUTPATIENT)
Dept: NEUROLOGY | Facility: HOSPITAL | Age: 51
End: 2021-02-10

## 2021-02-15 ENCOUNTER — PATIENT MESSAGE (OUTPATIENT)
Dept: NEUROLOGY | Facility: HOSPITAL | Age: 51
End: 2021-02-15

## 2021-02-17 LAB
EXT 24 HR UR METANEPHRINE: ABNORMAL
EXT 24 HR UR NORMETANEPHRINE: ABNORMAL
EXT 24 HR UR NORMETANEPHRINE: ABNORMAL
EXT 25 HYDROXY VIT D2: ABNORMAL
EXT 25 HYDROXY VIT D3: ABNORMAL
EXT 5 HIAA 24 HR URINE: ABNORMAL
EXT 5 HIAA BLOOD: ABNORMAL
EXT ACTH: ABNORMAL
EXT AFP: ABNORMAL
EXT ALBUMIN: 4.5 G/DL (ref 3.8–4.9)
EXT ALKALINE PHOSPHATASE: 80 IU/L (ref 39–117)
EXT ALT: 10 IU/L (ref 0–44)
EXT AMYLASE: ABNORMAL
EXT ANTI ISLET CELL AB: ABNORMAL
EXT ANTI PARIETAL CELL AB: ABNORMAL
EXT ANTI THYROID AB: ABNORMAL
EXT AST: 25 IU/L (ref 0–40)
EXT BILIRUBIN DIRECT: ABNORMAL
EXT BILIRUBIN TOTAL: 1.1 MG/DL (ref 0–1.2)
EXT BK VIRUS DNA QN PCR: ABNORMAL
EXT BUN: 21 MG/DL (ref 6–24)
EXT C PEPTIDE: ABNORMAL
EXT CA 125: ABNORMAL
EXT CA 19-9: ABNORMAL
EXT CA 27-29: ABNORMAL
EXT CALCITONIN: ABNORMAL
EXT CALCIUM: 9.2 MG/DL (ref 8.7–10.2)
EXT CEA: ABNORMAL
EXT CHLORIDE: 100 MMOL/L (ref 96–106)
EXT CHOLESTEROL: ABNORMAL
EXT CHROMOGRANIN A: ABNORMAL
EXT CO2: 22 MMOL/L (ref 20–29)
EXT CREATININE UA: ABNORMAL
EXT CREATININE: 0.88 MG/DL (ref 0.76–1.27)
EXT CYCLOSPORONE LEVEL: ABNORMAL
EXT DOPAMINE: ABNORMAL
EXT EBV DNA BY PCR: ABNORMAL
EXT EPINEPHRINE: ABNORMAL
EXT FOLATE: ABNORMAL
EXT FREE T3: ABNORMAL
EXT FREE T4: ABNORMAL
EXT FSH: ABNORMAL
EXT GASTRIN RELEASING PEPTIDE: ABNORMAL
EXT GASTRIN RELEASING PEPTIDE: ABNORMAL
EXT GASTRIN: ABNORMAL
EXT GGT: ABNORMAL
EXT GHRELIN: ABNORMAL
EXT GLUCAGON: ABNORMAL
EXT GLUCOSE: 156 MG/DL (ref 65–99)
EXT GROWTH HORMONE: ABNORMAL
EXT HCV RNA QUANT PCR: ABNORMAL
EXT HDL: ABNORMAL
EXT HEMATOCRIT: 44.3 % (ref 37.5–51)
EXT HEMOGLOBIN A1C: 6.8 % (ref 4.8–5.6)
EXT HEMOGLOBIN: 15.2 G/DL (ref 13–17.7)
EXT HISTAMINE 24 HR URINE: ABNORMAL
EXT HISTAMINE: ABNORMAL
EXT IGF-1: ABNORMAL
EXT IMMUNKNOW (NON-STIMULATED): ABNORMAL
EXT IMMUNKNOW (STIMULATED): ABNORMAL
EXT INR: ABNORMAL
EXT INSULIN: ABNORMAL
EXT LANREOTIDE LEVEL: ABNORMAL
EXT LDH, TOTAL: ABNORMAL
EXT LDL CHOLESTEROL: ABNORMAL
EXT LIPASE: ABNORMAL
EXT MAGNESIUM: ABNORMAL
EXT METANEPHRINE FREE PLASMA: ABNORMAL
EXT MOTILIN: ABNORMAL
EXT NEUROKININ A CAMB: ABNORMAL
EXT NEUROKININ A ISI: ABNORMAL
EXT NEUROTENSIN: ABNORMAL
EXT NOREPINEPHRINE: ABNORMAL
EXT NORMETANEPHRINE: ABNORMAL
EXT NSE: ABNORMAL
EXT OCTREOTIDE LEVEL: ABNORMAL
EXT PANCREASTATIN CAMB: ABNORMAL
EXT PANCREASTATIN ISI: ABNORMAL
EXT PANCREATIC POLYPEPTIDE: ABNORMAL
EXT PHOSPHORUS: ABNORMAL
EXT PLATELETS: 140 X10E3/UL (ref 150–450)
EXT POTASSIUM: 4.2 MMOL/L (ref 3.5–5.2)
EXT PROGRAF LEVEL: ABNORMAL
EXT PROLACTIN: ABNORMAL
EXT PROTEIN TOTAL: 6.9 G/DL (ref 6–8.5)
EXT PROTEIN UA: ABNORMAL
EXT PT: ABNORMAL
EXT PTH, INTACT: ABNORMAL
EXT PTT: ABNORMAL
EXT RAPAMUNE LEVEL: ABNORMAL
EXT SEROTONIN: ABNORMAL
EXT SODIUM: 136 MMOL/L (ref 134–144)
EXT SOMATOSTATIN: ABNORMAL
EXT SUBSTANCE P: ABNORMAL
EXT TRIGLYCERIDES: ABNORMAL
EXT TRYPTASE: ABNORMAL
EXT TSH: ABNORMAL
EXT URIC ACID: ABNORMAL
EXT URINE AMYLASE U/HR: ABNORMAL
EXT URINE AMYLASE U/L: ABNORMAL
EXT VASOACTIVE INTESTINAL POLYPEPTIDE: ABNORMAL
EXT VITAMIN B12: ABNORMAL
EXT VMA 24 HR URINE: ABNORMAL
EXT WBC: 4.5 X10E3/UL (ref 3.4–10.8)
GLOBULIN: 2.4 G/DL (ref 1.5–4.5)
NEURON SPECIFIC ENOLASE: ABNORMAL

## 2021-03-03 LAB
EXT 24 HR UR METANEPHRINE: ABNORMAL
EXT 24 HR UR NORMETANEPHRINE: ABNORMAL
EXT 24 HR UR NORMETANEPHRINE: ABNORMAL
EXT 25 HYDROXY VIT D2: ABNORMAL
EXT 25 HYDROXY VIT D3: ABNORMAL
EXT 5 HIAA 24 HR URINE: ABNORMAL
EXT 5 HIAA BLOOD: ABNORMAL
EXT ACTH: ABNORMAL
EXT AFP: ABNORMAL
EXT ALBUMIN: 4.5 G/DL (ref 3.8–4.9)
EXT ALKALINE PHOSPHATASE: 81 IU/L (ref 39–117)
EXT ALT: 10 IU/L (ref 0–44)
EXT AMYLASE: ABNORMAL
EXT ANTI ISLET CELL AB: ABNORMAL
EXT ANTI PARIETAL CELL AB: ABNORMAL
EXT ANTI THYROID AB: ABNORMAL
EXT AST: 26 IU/L (ref 0–40)
EXT BILIRUBIN DIRECT: ABNORMAL
EXT BILIRUBIN TOTAL: 1 MG/DL (ref 0–1.2)
EXT BK VIRUS DNA QN PCR: ABNORMAL
EXT BUN: 16 MG/DL (ref 6–24)
EXT C PEPTIDE: ABNORMAL
EXT CA 125: ABNORMAL
EXT CA 19-9: ABNORMAL
EXT CA 27-29: ABNORMAL
EXT CALCITONIN: ABNORMAL
EXT CALCIUM: 9 MG/DL (ref 8.7–10.2)
EXT CEA: ABNORMAL
EXT CHLORIDE: 96 MMOL/L (ref 96–106)
EXT CHOLESTEROL: ABNORMAL
EXT CHROMOGRANIN A: ABNORMAL
EXT CO2: 27 MMOL/L (ref 20–29)
EXT CREATININE UA: ABNORMAL
EXT CREATININE: 1.01 MG/DL (ref 0.76–1.27)
EXT CYCLOSPORONE LEVEL: ABNORMAL
EXT DOPAMINE: ABNORMAL
EXT EBV DNA BY PCR: ABNORMAL
EXT EPINEPHRINE: ABNORMAL
EXT FOLATE: ABNORMAL
EXT FREE T3: ABNORMAL
EXT FREE T4: ABNORMAL
EXT FSH: ABNORMAL
EXT GASTRIN RELEASING PEPTIDE: ABNORMAL
EXT GASTRIN RELEASING PEPTIDE: ABNORMAL
EXT GASTRIN: ABNORMAL
EXT GGT: ABNORMAL
EXT GHRELIN: ABNORMAL
EXT GLUCAGON: ABNORMAL
EXT GLUCOSE: 146 MG/DL (ref 65–99)
EXT GROWTH HORMONE: ABNORMAL
EXT HCV RNA QUANT PCR: ABNORMAL
EXT HDL: ABNORMAL
EXT HEMATOCRIT: 44.9 % (ref 37.5–51)
EXT HEMOGLOBIN A1C: ABNORMAL
EXT HEMOGLOBIN: 15 G/DL (ref 13–17.7)
EXT HISTAMINE 24 HR URINE: ABNORMAL
EXT HISTAMINE: ABNORMAL
EXT IGF-1: ABNORMAL
EXT IMMUNKNOW (NON-STIMULATED): ABNORMAL
EXT IMMUNKNOW (STIMULATED): ABNORMAL
EXT INR: ABNORMAL
EXT INSULIN: ABNORMAL
EXT LANREOTIDE LEVEL: ABNORMAL
EXT LDH, TOTAL: ABNORMAL
EXT LDL CHOLESTEROL: ABNORMAL
EXT LIPASE: ABNORMAL
EXT MAGNESIUM: ABNORMAL
EXT METANEPHRINE FREE PLASMA: ABNORMAL
EXT MOTILIN: ABNORMAL
EXT NEUROKININ A CAMB: ABNORMAL
EXT NEUROKININ A ISI: ABNORMAL
EXT NEUROTENSIN: ABNORMAL
EXT NOREPINEPHRINE: ABNORMAL
EXT NORMETANEPHRINE: ABNORMAL
EXT NSE: ABNORMAL
EXT OCTREOTIDE LEVEL: ABNORMAL
EXT PANCREASTATIN CAMB: ABNORMAL
EXT PANCREASTATIN ISI: ABNORMAL
EXT PANCREATIC POLYPEPTIDE: ABNORMAL
EXT PHOSPHORUS: ABNORMAL
EXT PLATELETS: 123 X10E3/UL (ref 150–450)
EXT POTASSIUM: 4.1 MMOL/L (ref 3.5–5.2)
EXT PROGRAF LEVEL: ABNORMAL
EXT PROLACTIN: ABNORMAL
EXT PROTEIN TOTAL: 7.2 G/DL (ref 6–8.5)
EXT PROTEIN UA: ABNORMAL
EXT PT: ABNORMAL
EXT PTH, INTACT: ABNORMAL
EXT PTT: ABNORMAL
EXT RAPAMUNE LEVEL: ABNORMAL
EXT SEROTONIN: ABNORMAL
EXT SODIUM: 140 MMOL/L (ref 134–144)
EXT SOMATOSTATIN: ABNORMAL
EXT SUBSTANCE P: ABNORMAL
EXT TRIGLYCERIDES: ABNORMAL
EXT TRYPTASE: ABNORMAL
EXT TSH: ABNORMAL
EXT URIC ACID: ABNORMAL
EXT URINE AMYLASE U/HR: ABNORMAL
EXT URINE AMYLASE U/L: ABNORMAL
EXT VASOACTIVE INTESTINAL POLYPEPTIDE: ABNORMAL
EXT VITAMIN B12: ABNORMAL
EXT VMA 24 HR URINE: ABNORMAL
EXT WBC: 3.9 X10E3/UL (ref 3.4–10.8)
GLOBULIN: 2.7 G/DL (ref 1.5–4.5)
NEURON SPECIFIC ENOLASE: ABNORMAL

## 2021-03-11 ENCOUNTER — PATIENT MESSAGE (OUTPATIENT)
Dept: NEUROLOGY | Facility: HOSPITAL | Age: 51
End: 2021-03-11

## 2021-03-11 ENCOUNTER — TELEPHONE (OUTPATIENT)
Dept: NEUROLOGY | Facility: HOSPITAL | Age: 51
End: 2021-03-11

## 2021-03-11 DIAGNOSIS — C7A.012 MALIGNANT CARCINOID TUMOR OF ILEUM: Primary | ICD-10-CM

## 2021-03-22 ENCOUNTER — PATIENT MESSAGE (OUTPATIENT)
Dept: NEUROLOGY | Facility: HOSPITAL | Age: 51
End: 2021-03-22

## 2021-03-23 ENCOUNTER — PATIENT MESSAGE (OUTPATIENT)
Dept: NEUROLOGY | Facility: HOSPITAL | Age: 51
End: 2021-03-23

## 2021-03-23 LAB
EXT 24 HR UR METANEPHRINE: ABNORMAL
EXT 24 HR UR NORMETANEPHRINE: ABNORMAL
EXT 24 HR UR NORMETANEPHRINE: ABNORMAL
EXT 25 HYDROXY VIT D2: ABNORMAL
EXT 25 HYDROXY VIT D3: ABNORMAL
EXT 5 HIAA 24 HR URINE: ABNORMAL
EXT 5 HIAA BLOOD: ABNORMAL
EXT ACTH: ABNORMAL
EXT AFP: ABNORMAL
EXT ALBUMIN: 4.8 G/DL (ref 3.8–4.9)
EXT ALKALINE PHOSPHATASE: 89 IU/L (ref 39–117)
EXT ALT: 10 IU/L (ref 0–44)
EXT AMYLASE: ABNORMAL
EXT ANTI ISLET CELL AB: ABNORMAL
EXT ANTI PARIETAL CELL AB: ABNORMAL
EXT ANTI THYROID AB: ABNORMAL
EXT AST: 17 IU/L (ref 0–40)
EXT BILIRUBIN DIRECT: ABNORMAL
EXT BILIRUBIN TOTAL: 1.2 MG/DL (ref 0–1.2)
EXT BK VIRUS DNA QN PCR: ABNORMAL
EXT BUN: 17 MG/DL (ref 6–24)
EXT C PEPTIDE: ABNORMAL
EXT CA 125: ABNORMAL
EXT CA 19-9: ABNORMAL
EXT CA 27-29: ABNORMAL
EXT CALCITONIN: ABNORMAL
EXT CALCIUM: 9.5 MG/DL (ref 8.7–10.2)
EXT CEA: ABNORMAL
EXT CHLORIDE: 99 MMOL/L (ref 96–106)
EXT CHOLESTEROL: ABNORMAL
EXT CHROMOGRANIN A: ABNORMAL
EXT CO2: 21 MMOL/L (ref 20–29)
EXT CREATININE UA: ABNORMAL
EXT CREATININE: 1.08 MG/DL (ref 0.76–1.27)
EXT CYCLOSPORONE LEVEL: ABNORMAL
EXT DOPAMINE: ABNORMAL
EXT EBV DNA BY PCR: ABNORMAL
EXT EPINEPHRINE: ABNORMAL
EXT FOLATE: ABNORMAL
EXT FREE T3: ABNORMAL
EXT FREE T4: ABNORMAL
EXT FSH: ABNORMAL
EXT GASTRIN RELEASING PEPTIDE: ABNORMAL
EXT GASTRIN RELEASING PEPTIDE: ABNORMAL
EXT GASTRIN: ABNORMAL
EXT GGT: ABNORMAL
EXT GHRELIN: ABNORMAL
EXT GLUCAGON: ABNORMAL
EXT GLUCOSE: 179 MG/DL (ref 65–99)
EXT GROWTH HORMONE: ABNORMAL
EXT HCV RNA QUANT PCR: ABNORMAL
EXT HDL: ABNORMAL
EXT HEMATOCRIT: 44.9 % (ref 37.5–51)
EXT HEMOGLOBIN A1C: ABNORMAL
EXT HEMOGLOBIN: 15.8 G/DL (ref 13–17.7)
EXT HISTAMINE 24 HR URINE: ABNORMAL
EXT HISTAMINE: ABNORMAL
EXT IGF-1: ABNORMAL
EXT IMMUNKNOW (NON-STIMULATED): ABNORMAL
EXT IMMUNKNOW (STIMULATED): ABNORMAL
EXT INR: ABNORMAL
EXT INSULIN: ABNORMAL
EXT LANREOTIDE LEVEL: ABNORMAL
EXT LDH, TOTAL: ABNORMAL
EXT LDL CHOLESTEROL: ABNORMAL
EXT LIPASE: ABNORMAL
EXT MAGNESIUM: ABNORMAL
EXT METANEPHRINE FREE PLASMA: ABNORMAL
EXT MOTILIN: ABNORMAL
EXT NEUROKININ A CAMB: ABNORMAL
EXT NEUROKININ A ISI: ABNORMAL
EXT NEUROTENSIN: ABNORMAL
EXT NOREPINEPHRINE: ABNORMAL
EXT NORMETANEPHRINE: ABNORMAL
EXT NSE: ABNORMAL
EXT OCTREOTIDE LEVEL: ABNORMAL
EXT PANCREASTATIN CAMB: ABNORMAL
EXT PANCREASTATIN ISI: ABNORMAL
EXT PANCREATIC POLYPEPTIDE: ABNORMAL
EXT PHOSPHORUS: ABNORMAL
EXT PLATELETS: 182 X10E3/UL (ref 150–450)
EXT POTASSIUM: 4 MMOL/L (ref 3.5–5.2)
EXT PROGRAF LEVEL: ABNORMAL
EXT PROLACTIN: ABNORMAL
EXT PROTEIN TOTAL: 7.4 G/DL (ref 6–8.5)
EXT PROTEIN UA: ABNORMAL
EXT PT: ABNORMAL
EXT PTH, INTACT: ABNORMAL
EXT PTT: ABNORMAL
EXT RAPAMUNE LEVEL: ABNORMAL
EXT SEROTONIN: ABNORMAL
EXT SODIUM: 139 MMOL/L (ref 134–144)
EXT SOMATOSTATIN: ABNORMAL
EXT SUBSTANCE P: ABNORMAL
EXT TRIGLYCERIDES: ABNORMAL
EXT TRYPTASE: ABNORMAL
EXT TSH: ABNORMAL
EXT URIC ACID: ABNORMAL
EXT URINE AMYLASE U/HR: ABNORMAL
EXT URINE AMYLASE U/L: ABNORMAL
EXT VASOACTIVE INTESTINAL POLYPEPTIDE: ABNORMAL
EXT VITAMIN B12: ABNORMAL
EXT VMA 24 HR URINE: ABNORMAL
EXT WBC: 5.6 X10E3/UL (ref 3.4–10.8)
NEURON SPECIFIC ENOLASE: ABNORMAL

## 2021-03-24 ENCOUNTER — TELEPHONE (OUTPATIENT)
Dept: NEUROLOGY | Facility: HOSPITAL | Age: 51
End: 2021-03-24

## 2021-03-24 DIAGNOSIS — Z13.9 SCREENING FOR UNSPECIFIED CONDITION: Primary | ICD-10-CM

## 2021-03-26 ENCOUNTER — TELEPHONE (OUTPATIENT)
Dept: NEUROLOGY | Facility: HOSPITAL | Age: 51
End: 2021-03-26

## 2021-03-30 ENCOUNTER — CLINICAL SUPPORT (OUTPATIENT)
Dept: NEUROLOGY | Facility: HOSPITAL | Age: 51
End: 2021-03-30
Attending: INTERNAL MEDICINE
Payer: COMMERCIAL

## 2021-03-30 ENCOUNTER — HOSPITAL ENCOUNTER (OUTPATIENT)
Dept: CARDIOLOGY | Facility: HOSPITAL | Age: 51
Discharge: HOME | End: 2021-03-30
Attending: SURGERY
Payer: COMMERCIAL

## 2021-03-30 ENCOUNTER — OFFICE VISIT (OUTPATIENT)
Dept: NEUROLOGY | Facility: HOSPITAL | Age: 51
End: 2021-03-30
Attending: SURGERY
Payer: COMMERCIAL

## 2021-03-30 VITALS
DIASTOLIC BLOOD PRESSURE: 93 MMHG | SYSTOLIC BLOOD PRESSURE: 167 MMHG | TEMPERATURE: 99 F | WEIGHT: 214.5 LBS | HEART RATE: 74 BPM | HEIGHT: 77 IN | BODY MASS INDEX: 25.33 KG/M2 | RESPIRATION RATE: 20 BRPM

## 2021-03-30 VITALS — BODY MASS INDEX: 25.27 KG/M2 | WEIGHT: 214 LBS | HEIGHT: 77 IN

## 2021-03-30 DIAGNOSIS — E34.0 CARCINOID SYNDROME: ICD-10-CM

## 2021-03-30 DIAGNOSIS — C7B.8 NEUROENDOCRINE CARCINOMA METASTATIC TO MULTIPLE SITES: Primary | ICD-10-CM

## 2021-03-30 DIAGNOSIS — C7A.8 NEUROENDOCRINE CARCINOMA METASTATIC TO MULTIPLE SITES: Primary | ICD-10-CM

## 2021-03-30 DIAGNOSIS — C7A.012 MALIGNANT CARCINOID TUMOR OF ILEUM: Primary | ICD-10-CM

## 2021-03-30 DIAGNOSIS — Z13.9 SCREENING FOR UNSPECIFIED CONDITION: ICD-10-CM

## 2021-03-30 DIAGNOSIS — C7B.02 METASTATIC MALIGNANT CARCINOID TUMOR TO LIVER: ICD-10-CM

## 2021-03-30 DIAGNOSIS — C7B.8 SECONDARY NEUROENDOCRINE TUMOR OF DISTANT LYMPH NODES: ICD-10-CM

## 2021-03-30 DIAGNOSIS — Z01.818 PRE-OP TESTING: ICD-10-CM

## 2021-03-30 LAB
AORTIC ROOT ANNULUS: 3.66 CM
AORTIC VALVE CUSP SEPERATION: 2.39 CM
AV INDEX (PROSTH): 0.78
AV MEAN GRADIENT: 3 MMHG
AV PEAK GRADIENT: 5 MMHG
AV VALVE AREA: 2.66 CM2
AV VELOCITY RATIO: 0.76
BSA FOR ECHO PROCEDURE: 2.3 M2
CV ECHO LV RWT: 0.74 CM
DOP CALC AO PEAK VEL: 1.07 M/S
DOP CALC AO VTI: 22.5 CM
DOP CALC LVOT AREA: 3.4 CM2
DOP CALC LVOT DIAMETER: 2.08 CM
DOP CALC LVOT PEAK VEL: 0.81 M/S
DOP CALC LVOT STROKE VOLUME: 59.88 CM3
DOP CALCLVOT PEAK VEL VTI: 17.63 CM
E WAVE DECELERATION TIME: 220.1 MSEC
E/A RATIO: 1.52
E/E' RATIO: 9.33 M/S
ECHO LV POSTERIOR WALL: 1.57 CM (ref 0.6–1.1)
FRACTIONAL SHORTENING: 30 % (ref 28–44)
INTERVENTRICULAR SEPTUM: 0.99 CM (ref 0.6–1.1)
IVRT: 68.51 MSEC
LA MAJOR: 5.39 CM
LA MINOR: 4.76 CM
LA WIDTH: 4.75 CM
LEFT ATRIUM SIZE: 3.81 CM
LEFT ATRIUM VOLUME INDEX MOD: 23.3 ML/M2
LEFT ATRIUM VOLUME INDEX: 33.8 ML/M2
LEFT ATRIUM VOLUME MOD: 53.55 CM3
LEFT ATRIUM VOLUME: 77.77 CM3
LEFT INTERNAL DIMENSION IN SYSTOLE: 2.99 CM (ref 2.1–4)
LEFT VENTRICLE DIASTOLIC VOLUME INDEX: 35.1 ML/M2
LEFT VENTRICLE DIASTOLIC VOLUME: 80.72 ML
LEFT VENTRICLE MASS INDEX: 87 G/M2
LEFT VENTRICLE SYSTOLIC VOLUME INDEX: 15 ML/M2
LEFT VENTRICLE SYSTOLIC VOLUME: 34.61 ML
LEFT VENTRICULAR INTERNAL DIMENSION IN DIASTOLE: 4.25 CM (ref 3.5–6)
LEFT VENTRICULAR MASS: 199.49 G
LV LATERAL E/E' RATIO: 7.78 M/S
LV SEPTAL E/E' RATIO: 11.67 M/S
MV A" WAVE DURATION": 12.94 MSEC
MV MEAN GRADIENT: 0 MMHG
MV PEAK A VEL: 0.46 M/S
MV PEAK E VEL: 0.7 M/S
MV PEAK GRADIENT: 2 MMHG
MV STENOSIS PRESSURE HALF TIME: 63.83 MS
MV VALVE AREA P 1/2 METHOD: 3.45 CM2
PISA MRMAX VEL: 0.05 M/S
PULM VEIN S/D RATIO: 1.11
PV PEAK D VEL: 0.46 M/S
PV PEAK S VEL: 0.51 M/S
PV PEAK VELOCITY: 0.9 CM/S
RA MAJOR: 5.23 CM
RA PRESSURE: 8 MMHG
RA WIDTH: 3.29 CM
RIGHT VENTRICULAR END-DIASTOLIC DIMENSION: 2.81 CM
SARS-COV-2 RDRP RESP QL NAA+PROBE: NEGATIVE
TDI LATERAL: 0.09 M/S
TDI SEPTAL: 0.06 M/S
TDI: 0.08 M/S

## 2021-03-30 PROCEDURE — 93306 ECHO (CUPID ONLY): ICD-10-PCS | Mod: 26,,, | Performed by: INTERNAL MEDICINE

## 2021-03-30 PROCEDURE — 93356 ECHO (CUPID ONLY): ICD-10-PCS | Mod: ,,, | Performed by: INTERNAL MEDICINE

## 2021-03-30 PROCEDURE — 93306 TTE W/DOPPLER COMPLETE: CPT

## 2021-03-30 PROCEDURE — U0002 COVID-19 LAB TEST NON-CDC: HCPCS | Performed by: SURGERY

## 2021-03-30 PROCEDURE — 99212 OFFICE O/P EST SF 10 MIN: CPT | Mod: 25,27

## 2021-03-30 PROCEDURE — 99214 OFFICE O/P EST MOD 30 MIN: CPT | Performed by: SURGERY

## 2021-03-30 PROCEDURE — 93306 TTE W/DOPPLER COMPLETE: CPT | Mod: 26,,, | Performed by: INTERNAL MEDICINE

## 2021-03-30 PROCEDURE — 93356 MYOCRD STRAIN IMG SPCKL TRCK: CPT | Mod: ,,, | Performed by: INTERNAL MEDICINE

## 2021-03-30 RX ORDER — CELECOXIB 50 MG/1
50 CAPSULE ORAL 2 TIMES DAILY
COMMUNITY
Start: 2021-02-11 | End: 2021-03-30 | Stop reason: SDUPTHER

## 2021-03-30 RX ORDER — OXYCODONE HCL 10 MG/1
10 TABLET, FILM COATED, EXTENDED RELEASE ORAL EVERY 12 HOURS PRN
Qty: 60 TABLET | Refills: 0 | Status: SHIPPED | OUTPATIENT
Start: 2021-03-30 | End: 2021-04-29

## 2021-03-30 RX ORDER — CELECOXIB 200 MG/1
200 CAPSULE ORAL 2 TIMES DAILY
Qty: 60 CAPSULE | Refills: 2 | Status: SHIPPED | OUTPATIENT
Start: 2021-03-30 | End: 2021-04-29

## 2021-03-31 ENCOUNTER — TELEPHONE (OUTPATIENT)
Dept: NEUROLOGY | Facility: HOSPITAL | Age: 51
End: 2021-03-31

## 2021-03-31 ENCOUNTER — HOSPITAL ENCOUNTER (OUTPATIENT)
Dept: RADIOLOGY | Facility: HOSPITAL | Age: 51
Discharge: HOME | End: 2021-03-31
Attending: SURGERY
Payer: COMMERCIAL

## 2021-03-31 ENCOUNTER — INFUSION (OUTPATIENT)
Dept: INFUSION THERAPY | Facility: HOSPITAL | Age: 51
End: 2021-03-31
Attending: SURGERY
Payer: COMMERCIAL

## 2021-03-31 VITALS
BODY MASS INDEX: 25.27 KG/M2 | RESPIRATION RATE: 16 BRPM | OXYGEN SATURATION: 99 % | DIASTOLIC BLOOD PRESSURE: 79 MMHG | SYSTOLIC BLOOD PRESSURE: 141 MMHG | WEIGHT: 214 LBS | HEART RATE: 72 BPM | TEMPERATURE: 98 F | HEIGHT: 77 IN

## 2021-03-31 DIAGNOSIS — C7A.012 MALIGNANT CARCINOID TUMOR OF ILEUM: ICD-10-CM

## 2021-03-31 DIAGNOSIS — C7A.012 MALIGNANT CARCINOID TUMOR OF ILEUM: Primary | ICD-10-CM

## 2021-03-31 PROCEDURE — 96367 TX/PROPH/DG ADDL SEQ IV INF: CPT

## 2021-03-31 PROCEDURE — 79101 NUCLEAR RX IV ADMIN: CPT | Mod: 26,,, | Performed by: RADIOLOGY

## 2021-03-31 PROCEDURE — 79101 NM THERAPY BY IV ADMINISTRATION LU177: ICD-10-PCS | Mod: 26,,, | Performed by: RADIOLOGY

## 2021-03-31 PROCEDURE — 79101 NUCLEAR RX IV ADMIN: CPT | Mod: TC

## 2021-03-31 PROCEDURE — 96365 THER/PROPH/DIAG IV INF INIT: CPT | Mod: 59

## 2021-03-31 PROCEDURE — 25000003 PHARM REV CODE 250: Performed by: SURGERY

## 2021-03-31 PROCEDURE — 63600175 PHARM REV CODE 636 W HCPCS: Performed by: SURGERY

## 2021-03-31 PROCEDURE — 96366 THER/PROPH/DIAG IV INF ADDON: CPT

## 2021-03-31 RX ORDER — SODIUM CHLORIDE 9 MG/ML
INJECTION, SOLUTION INTRAVENOUS ONCE
Status: CANCELLED | OUTPATIENT
Start: 2021-05-26

## 2021-03-31 RX ORDER — DIPHENHYDRAMINE HYDROCHLORIDE 50 MG/ML
50 INJECTION INTRAMUSCULAR; INTRAVENOUS
Status: DISCONTINUED | OUTPATIENT
Start: 2021-03-31 | End: 2021-03-31 | Stop reason: HOSPADM

## 2021-03-31 RX ORDER — SODIUM CHLORIDE 0.9 % (FLUSH) 0.9 %
10 SYRINGE (ML) INJECTION
Status: CANCELLED | OUTPATIENT
Start: 2021-05-26

## 2021-03-31 RX ORDER — ACETAMINOPHEN 325 MG/1
650 TABLET ORAL
Status: DISCONTINUED | OUTPATIENT
Start: 2021-03-31 | End: 2021-03-31 | Stop reason: HOSPADM

## 2021-03-31 RX ORDER — ARGININE/LYSINE/0.9 % SOD CHL 25-25MG/ML
1000 PLASTIC BAG, INJECTION (ML) INTRAVENOUS
Status: CANCELLED | OUTPATIENT
Start: 2021-05-26

## 2021-03-31 RX ORDER — ARGININE/LYSINE/0.9 % SOD CHL 25-25MG/ML
1000 PLASTIC BAG, INJECTION (ML) INTRAVENOUS
Status: COMPLETED | OUTPATIENT
Start: 2021-03-31 | End: 2021-03-31

## 2021-03-31 RX ORDER — DIPHENHYDRAMINE HYDROCHLORIDE 50 MG/ML
50 INJECTION INTRAMUSCULAR; INTRAVENOUS
Status: CANCELLED | OUTPATIENT
Start: 2021-05-26

## 2021-03-31 RX ORDER — ACETAMINOPHEN 325 MG/1
650 TABLET ORAL
Status: CANCELLED | OUTPATIENT
Start: 2021-05-26

## 2021-03-31 RX ORDER — SODIUM CHLORIDE 9 MG/ML
INJECTION, SOLUTION INTRAVENOUS ONCE
Status: COMPLETED | OUTPATIENT
Start: 2021-03-31 | End: 2021-03-31

## 2021-03-31 RX ORDER — SODIUM CHLORIDE 0.9 % (FLUSH) 0.9 %
10 SYRINGE (ML) INJECTION
Status: DISCONTINUED | OUTPATIENT
Start: 2021-03-31 | End: 2021-03-31 | Stop reason: HOSPADM

## 2021-03-31 RX ADMIN — SODIUM CHLORIDE: 0.9 INJECTION, SOLUTION INTRAVENOUS at 07:03

## 2021-03-31 RX ADMIN — Medication 1000 ML: at 07:03

## 2021-03-31 RX ADMIN — DEXAMETHASONE SODIUM PHOSPHATE 50 ML: 10 INJECTION, SOLUTION INTRAMUSCULAR; INTRAVENOUS at 07:03

## 2021-04-01 ENCOUNTER — PATIENT MESSAGE (OUTPATIENT)
Dept: NEUROLOGY | Facility: HOSPITAL | Age: 51
End: 2021-04-01

## 2021-04-05 ENCOUNTER — TELEPHONE (OUTPATIENT)
Dept: PHARMACY | Facility: CLINIC | Age: 51
End: 2021-04-05

## 2021-04-06 ENCOUNTER — PATIENT MESSAGE (OUTPATIENT)
Dept: NEUROLOGY | Facility: HOSPITAL | Age: 51
End: 2021-04-06

## 2021-04-22 ENCOUNTER — PATIENT MESSAGE (OUTPATIENT)
Dept: NEUROLOGY | Facility: HOSPITAL | Age: 51
End: 2021-04-22

## 2021-04-28 LAB
EXT 24 HR UR METANEPHRINE: ABNORMAL
EXT 24 HR UR NORMETANEPHRINE: ABNORMAL
EXT 24 HR UR NORMETANEPHRINE: ABNORMAL
EXT 25 HYDROXY VIT D2: ABNORMAL
EXT 25 HYDROXY VIT D3: ABNORMAL
EXT 5 HIAA 24 HR URINE: ABNORMAL
EXT 5 HIAA BLOOD: ABNORMAL
EXT ACTH: ABNORMAL
EXT AFP: ABNORMAL
EXT ALBUMIN: 4.6 G/DL (ref 3.8–4.9)
EXT ALKALINE PHOSPHATASE: 82 IU/L (ref 39–117)
EXT ALT: 11 IU/L (ref 0–44)
EXT AMYLASE: ABNORMAL
EXT ANTI ISLET CELL AB: ABNORMAL
EXT ANTI PARIETAL CELL AB: ABNORMAL
EXT ANTI THYROID AB: ABNORMAL
EXT AST: 26 IU/L (ref 0–40)
EXT BILIRUBIN DIRECT: ABNORMAL
EXT BILIRUBIN TOTAL: 1.1 MG/DL (ref 0–1.2)
EXT BK VIRUS DNA QN PCR: ABNORMAL
EXT BUN: 17 MG/DL (ref 6–24)
EXT C PEPTIDE: ABNORMAL
EXT CA 125: ABNORMAL
EXT CA 19-9: ABNORMAL
EXT CA 27-29: ABNORMAL
EXT CALCITONIN: ABNORMAL
EXT CALCIUM: 9.6 MG/DL (ref 8.7–10.2)
EXT CEA: ABNORMAL
EXT CHLORIDE: 96 MMOL/L (ref 96–106)
EXT CHOLESTEROL: ABNORMAL
EXT CHROMOGRANIN A: ABNORMAL
EXT CO2: 19 MMOL/L (ref 20–29)
EXT CREATININE UA: ABNORMAL
EXT CREATININE: 0.97 MG/DL (ref 0.76–1.27)
EXT CYCLOSPORONE LEVEL: ABNORMAL
EXT DOPAMINE: ABNORMAL
EXT EBV DNA BY PCR: ABNORMAL
EXT EPINEPHRINE: ABNORMAL
EXT FOLATE: ABNORMAL
EXT FREE T3: ABNORMAL
EXT FREE T4: ABNORMAL
EXT FSH: ABNORMAL
EXT GASTRIN RELEASING PEPTIDE: ABNORMAL
EXT GASTRIN RELEASING PEPTIDE: ABNORMAL
EXT GASTRIN: ABNORMAL
EXT GGT: ABNORMAL
EXT GHRELIN: ABNORMAL
EXT GLUCAGON: ABNORMAL
EXT GLUCOSE: 173 MG/DL (ref 65–99)
EXT GROWTH HORMONE: ABNORMAL
EXT HCV RNA QUANT PCR: ABNORMAL
EXT HDL: ABNORMAL
EXT HEMATOCRIT: 42.2 % (ref 37.5–51)
EXT HEMOGLOBIN A1C: ABNORMAL
EXT HEMOGLOBIN: 14.7 G/DL (ref 13–17.7)
EXT HISTAMINE 24 HR URINE: ABNORMAL
EXT HISTAMINE: ABNORMAL
EXT IGF-1: ABNORMAL
EXT IMMUNKNOW (NON-STIMULATED): ABNORMAL
EXT IMMUNKNOW (STIMULATED): ABNORMAL
EXT INR: ABNORMAL
EXT INSULIN: ABNORMAL
EXT LANREOTIDE LEVEL: ABNORMAL
EXT LDH, TOTAL: ABNORMAL
EXT LDL CHOLESTEROL: ABNORMAL
EXT LIPASE: ABNORMAL
EXT MAGNESIUM: ABNORMAL
EXT METANEPHRINE FREE PLASMA: ABNORMAL
EXT MOTILIN: ABNORMAL
EXT NEUROKININ A CAMB: ABNORMAL
EXT NEUROKININ A ISI: ABNORMAL
EXT NEUROTENSIN: ABNORMAL
EXT NOREPINEPHRINE: ABNORMAL
EXT NORMETANEPHRINE: ABNORMAL
EXT NSE: ABNORMAL
EXT OCTREOTIDE LEVEL: ABNORMAL
EXT PANCREASTATIN CAMB: ABNORMAL
EXT PANCREASTATIN ISI: ABNORMAL
EXT PANCREATIC POLYPEPTIDE: ABNORMAL
EXT PHOSPHORUS: ABNORMAL
EXT PLATELETS: 158 X10E3/UL (ref 150–450)
EXT POTASSIUM: 4 MMOL/L (ref 3.5–5.2)
EXT PROGRAF LEVEL: ABNORMAL
EXT PROLACTIN: ABNORMAL
EXT PROTEIN TOTAL: 7.2 G/DL (ref 6–8.5)
EXT PROTEIN UA: ABNORMAL
EXT PT: ABNORMAL
EXT PTH, INTACT: ABNORMAL
EXT PTT: ABNORMAL
EXT RAPAMUNE LEVEL: ABNORMAL
EXT SEROTONIN: ABNORMAL
EXT SODIUM: 139 MMOL/L (ref 134–144)
EXT SOMATOSTATIN: ABNORMAL
EXT SUBSTANCE P: ABNORMAL
EXT TRIGLYCERIDES: ABNORMAL
EXT TRYPTASE: ABNORMAL
EXT TSH: ABNORMAL
EXT URIC ACID: ABNORMAL
EXT URINE AMYLASE U/HR: ABNORMAL
EXT URINE AMYLASE U/L: ABNORMAL
EXT VASOACTIVE INTESTINAL POLYPEPTIDE: ABNORMAL
EXT VITAMIN B12: ABNORMAL
EXT VMA 24 HR URINE: ABNORMAL
EXT WBC: 4.7 X10E3/UL (ref 3.4–10.8)
GLOBULIN: 2.6 G/DL (ref 1.5–4.5)
NEURON SPECIFIC ENOLASE: ABNORMAL

## 2021-05-03 ENCOUNTER — PATIENT MESSAGE (OUTPATIENT)
Dept: NEUROLOGY | Facility: HOSPITAL | Age: 51
End: 2021-05-03

## 2021-05-21 ENCOUNTER — TELEPHONE (OUTPATIENT)
Dept: NEUROLOGY | Facility: HOSPITAL | Age: 51
End: 2021-05-21

## 2021-05-21 ENCOUNTER — PATIENT MESSAGE (OUTPATIENT)
Dept: NEUROLOGY | Facility: HOSPITAL | Age: 51
End: 2021-05-21

## 2021-05-24 ENCOUNTER — PATIENT MESSAGE (OUTPATIENT)
Dept: NEUROLOGY | Facility: HOSPITAL | Age: 51
End: 2021-05-24

## 2021-05-25 ENCOUNTER — PATIENT MESSAGE (OUTPATIENT)
Dept: NEUROLOGY | Facility: HOSPITAL | Age: 51
End: 2021-05-25

## 2021-06-01 ENCOUNTER — OFFICE VISIT (OUTPATIENT)
Dept: NEUROLOGY | Facility: HOSPITAL | Age: 51
End: 2021-06-01
Attending: SURGERY
Payer: COMMERCIAL

## 2021-06-01 DIAGNOSIS — K76.0 FATTY LIVER DISEASE, NONALCOHOLIC: ICD-10-CM

## 2021-06-01 DIAGNOSIS — C7B.8 SECONDARY NEUROENDOCRINE TUMOR OF DISTANT LYMPH NODES: ICD-10-CM

## 2021-06-01 DIAGNOSIS — E34.0 DIARRHEA CONCURRENT WITH AND DUE TO CARCINOID SYNDROME: ICD-10-CM

## 2021-06-01 DIAGNOSIS — R73.9 DRUG-INDUCED HYPERGLYCEMIA: ICD-10-CM

## 2021-06-01 DIAGNOSIS — K52.9 DIARRHEA CONCURRENT WITH AND DUE TO CARCINOID SYNDROME: ICD-10-CM

## 2021-06-01 DIAGNOSIS — C7B.02 METASTATIC MALIGNANT CARCINOID TUMOR TO LIVER: Primary | ICD-10-CM

## 2021-06-01 DIAGNOSIS — T50.905A DRUG-INDUCED HYPERGLYCEMIA: ICD-10-CM

## 2021-06-02 ENCOUNTER — PATIENT MESSAGE (OUTPATIENT)
Dept: NEUROLOGY | Facility: HOSPITAL | Age: 51
End: 2021-06-02

## 2021-06-02 LAB
EXT 24 HR UR METANEPHRINE: ABNORMAL
EXT 24 HR UR NORMETANEPHRINE: ABNORMAL
EXT 24 HR UR NORMETANEPHRINE: ABNORMAL
EXT 25 HYDROXY VIT D2: ABNORMAL
EXT 25 HYDROXY VIT D3: ABNORMAL
EXT 5 HIAA 24 HR URINE: ABNORMAL
EXT 5 HIAA BLOOD: ABNORMAL
EXT ACTH: ABNORMAL
EXT AFP: ABNORMAL
EXT ALBUMIN: 4.4 G/DL (ref 3.8–4.9)
EXT ALKALINE PHOSPHATASE: 98 IU/L (ref 48–121)
EXT ALT: 11 IU/L (ref 0–44)
EXT AMYLASE: ABNORMAL
EXT ANTI ISLET CELL AB: ABNORMAL
EXT ANTI PARIETAL CELL AB: ABNORMAL
EXT ANTI THYROID AB: ABNORMAL
EXT AST: 22 IU/L (ref 0–40)
EXT BILIRUBIN DIRECT: ABNORMAL
EXT BILIRUBIN TOTAL: 0.7 MG/DL (ref 0–1.2)
EXT BK VIRUS DNA QN PCR: ABNORMAL
EXT BUN: 16 MG/DL (ref 6–24)
EXT C PEPTIDE: ABNORMAL
EXT CA 125: ABNORMAL
EXT CA 19-9: ABNORMAL
EXT CA 27-29: ABNORMAL
EXT CALCITONIN: ABNORMAL
EXT CALCIUM: 9.7 MG/DL (ref 8.7–10.2)
EXT CEA: ABNORMAL
EXT CHLORIDE: 100 MMOL/L (ref 96–106)
EXT CHOLESTEROL: ABNORMAL
EXT CHROMOGRANIN A: ABNORMAL
EXT CO2: 28 MMOL/L (ref 20–29)
EXT CREATININE UA: ABNORMAL
EXT CREATININE: 0.91 MG/DL (ref 0.76–1.27)
EXT CYCLOSPORONE LEVEL: ABNORMAL
EXT DOPAMINE: ABNORMAL
EXT EBV DNA BY PCR: ABNORMAL
EXT EPINEPHRINE: ABNORMAL
EXT FOLATE: ABNORMAL
EXT FREE T3: ABNORMAL
EXT FREE T4: ABNORMAL
EXT FSH: ABNORMAL
EXT GASTRIN RELEASING PEPTIDE: ABNORMAL
EXT GASTRIN RELEASING PEPTIDE: ABNORMAL
EXT GASTRIN: ABNORMAL
EXT GGT: ABNORMAL
EXT GHRELIN: ABNORMAL
EXT GLUCAGON: ABNORMAL
EXT GLUCOSE: 138 MG/DL (ref 65–99)
EXT GROWTH HORMONE: ABNORMAL
EXT HCV RNA QUANT PCR: ABNORMAL
EXT HDL: ABNORMAL
EXT HEMATOCRIT: 42.5 % (ref 37.5–51)
EXT HEMOGLOBIN A1C: ABNORMAL
EXT HEMOGLOBIN: 14.45 G/DL (ref 13–17.7)
EXT HISTAMINE 24 HR URINE: ABNORMAL
EXT HISTAMINE: ABNORMAL
EXT IGF-1: ABNORMAL
EXT IMMUNKNOW (NON-STIMULATED): ABNORMAL
EXT IMMUNKNOW (STIMULATED): ABNORMAL
EXT INR: ABNORMAL
EXT INSULIN: ABNORMAL
EXT LANREOTIDE LEVEL: ABNORMAL
EXT LDH, TOTAL: ABNORMAL
EXT LDL CHOLESTEROL: ABNORMAL
EXT LIPASE: ABNORMAL
EXT MAGNESIUM: ABNORMAL
EXT METANEPHRINE FREE PLASMA: ABNORMAL
EXT MOTILIN: ABNORMAL
EXT NEUROKININ A CAMB: ABNORMAL
EXT NEUROKININ A ISI: ABNORMAL
EXT NEUROTENSIN: ABNORMAL
EXT NOREPINEPHRINE: ABNORMAL
EXT NORMETANEPHRINE: ABNORMAL
EXT NSE: ABNORMAL
EXT OCTREOTIDE LEVEL: ABNORMAL
EXT PANCREASTATIN CAMB: ABNORMAL
EXT PANCREASTATIN ISI: ABNORMAL
EXT PANCREATIC POLYPEPTIDE: ABNORMAL
EXT PHOSPHORUS: ABNORMAL
EXT PLATELETS: 131 (ref 150–450)
EXT POTASSIUM: 4.2 MMOL/L (ref 3.5–5.2)
EXT PROGRAF LEVEL: ABNORMAL
EXT PROLACTIN: ABNORMAL
EXT PROTEIN TOTAL: 7.4 G/DL (ref 6–8.5)
EXT PROTEIN UA: ABNORMAL
EXT PT: ABNORMAL
EXT PTH, INTACT: ABNORMAL
EXT PTT: ABNORMAL
EXT RAPAMUNE LEVEL: ABNORMAL
EXT SEROTONIN: ABNORMAL
EXT SODIUM: 139 MMOL/L (ref 134–144)
EXT SOMATOSTATIN: ABNORMAL
EXT SUBSTANCE P: ABNORMAL
EXT TRIGLYCERIDES: ABNORMAL
EXT TRYPTASE: ABNORMAL
EXT TSH: ABNORMAL
EXT URIC ACID: ABNORMAL
EXT URINE AMYLASE U/HR: ABNORMAL
EXT URINE AMYLASE U/L: ABNORMAL
EXT VASOACTIVE INTESTINAL POLYPEPTIDE: ABNORMAL
EXT VITAMIN B12: ABNORMAL
EXT VMA 24 HR URINE: ABNORMAL
EXT WBC: 4 X10E3/UL (ref 3.4–10.8)
GLOBULIN: 3 G/DL (ref 1.5–4.5)
NEURON SPECIFIC ENOLASE: ABNORMAL

## 2021-06-09 ENCOUNTER — INFUSION (OUTPATIENT)
Dept: INFUSION THERAPY | Facility: HOSPITAL | Age: 51
End: 2021-06-09
Attending: SURGERY
Payer: COMMERCIAL

## 2021-06-09 ENCOUNTER — HOSPITAL ENCOUNTER (OUTPATIENT)
Dept: RADIOLOGY | Facility: HOSPITAL | Age: 51
Discharge: HOME | End: 2021-06-09
Attending: SURGERY
Payer: COMMERCIAL

## 2021-06-09 VITALS
BODY MASS INDEX: 25.27 KG/M2 | TEMPERATURE: 98 F | SYSTOLIC BLOOD PRESSURE: 131 MMHG | OXYGEN SATURATION: 100 % | HEART RATE: 68 BPM | DIASTOLIC BLOOD PRESSURE: 82 MMHG | RESPIRATION RATE: 18 BRPM | WEIGHT: 214 LBS | HEIGHT: 77 IN

## 2021-06-09 DIAGNOSIS — C7A.012 MALIGNANT CARCINOID TUMOR OF ILEUM: Primary | ICD-10-CM

## 2021-06-09 DIAGNOSIS — C7A.012 MALIGNANT CARCINOID TUMOR OF ILEUM: ICD-10-CM

## 2021-06-09 PROCEDURE — 96366 THER/PROPH/DIAG IV INF ADDON: CPT

## 2021-06-09 PROCEDURE — 79101 NUCLEAR RX IV ADMIN: CPT | Mod: TC

## 2021-06-09 PROCEDURE — 79101 NUCLEAR RX IV ADMIN: CPT | Mod: 26,,, | Performed by: RADIOLOGY

## 2021-06-09 PROCEDURE — 25000003 PHARM REV CODE 250: Performed by: SURGERY

## 2021-06-09 PROCEDURE — 96365 THER/PROPH/DIAG IV INF INIT: CPT | Mod: 59

## 2021-06-09 PROCEDURE — 79101 NM THERAPY BY IV ADMINISTRATION LU177: ICD-10-PCS | Mod: 26,,, | Performed by: RADIOLOGY

## 2021-06-09 PROCEDURE — 63600175 PHARM REV CODE 636 W HCPCS: Performed by: SURGERY

## 2021-06-09 PROCEDURE — 96367 TX/PROPH/DG ADDL SEQ IV INF: CPT

## 2021-06-09 RX ORDER — ACETAMINOPHEN 325 MG/1
650 TABLET ORAL
Status: CANCELLED | OUTPATIENT
Start: 2021-07-21

## 2021-06-09 RX ORDER — SODIUM CHLORIDE 9 MG/ML
INJECTION, SOLUTION INTRAVENOUS ONCE
Status: CANCELLED | OUTPATIENT
Start: 2021-07-21

## 2021-06-09 RX ORDER — DIPHENHYDRAMINE HYDROCHLORIDE 50 MG/ML
50 INJECTION INTRAMUSCULAR; INTRAVENOUS
Status: DISCONTINUED | OUTPATIENT
Start: 2021-06-09 | End: 2021-06-09 | Stop reason: HOSPADM

## 2021-06-09 RX ORDER — SODIUM CHLORIDE 0.9 % (FLUSH) 0.9 %
10 SYRINGE (ML) INJECTION
Status: DISCONTINUED | OUTPATIENT
Start: 2021-06-09 | End: 2021-06-09 | Stop reason: HOSPADM

## 2021-06-09 RX ORDER — ACETAMINOPHEN 325 MG/1
650 TABLET ORAL
Status: DISCONTINUED | OUTPATIENT
Start: 2021-06-09 | End: 2021-06-09 | Stop reason: HOSPADM

## 2021-06-09 RX ORDER — DIPHENHYDRAMINE HYDROCHLORIDE 50 MG/ML
50 INJECTION INTRAMUSCULAR; INTRAVENOUS
Status: CANCELLED | OUTPATIENT
Start: 2021-07-21

## 2021-06-09 RX ORDER — ARGININE/LYSINE/0.9 % SOD CHL 25-25MG/ML
1000 PLASTIC BAG, INJECTION (ML) INTRAVENOUS
Status: COMPLETED | OUTPATIENT
Start: 2021-06-09 | End: 2021-06-09

## 2021-06-09 RX ORDER — ARGININE/LYSINE/0.9 % SOD CHL 25-25MG/ML
1000 PLASTIC BAG, INJECTION (ML) INTRAVENOUS
Status: CANCELLED | OUTPATIENT
Start: 2021-07-21

## 2021-06-09 RX ORDER — SODIUM CHLORIDE 0.9 % (FLUSH) 0.9 %
10 SYRINGE (ML) INJECTION
Status: CANCELLED | OUTPATIENT
Start: 2021-07-21

## 2021-06-09 RX ORDER — SODIUM CHLORIDE 9 MG/ML
INJECTION, SOLUTION INTRAVENOUS ONCE
Status: COMPLETED | OUTPATIENT
Start: 2021-06-09 | End: 2021-06-09

## 2021-06-09 RX ADMIN — DEXAMETHASONE SODIUM PHOSPHATE 50 ML: 4 INJECTION, SOLUTION INTRAMUSCULAR; INTRAVENOUS at 06:06

## 2021-06-09 RX ADMIN — SODIUM CHLORIDE: 0.9 INJECTION, SOLUTION INTRAVENOUS at 06:06

## 2021-06-09 RX ADMIN — Medication 1000 ML: at 07:06

## 2021-06-23 ENCOUNTER — PATIENT MESSAGE (OUTPATIENT)
Dept: NEUROLOGY | Facility: HOSPITAL | Age: 51
End: 2021-06-23

## 2021-06-28 ENCOUNTER — PATIENT MESSAGE (OUTPATIENT)
Dept: NEUROLOGY | Facility: HOSPITAL | Age: 51
End: 2021-06-28

## 2021-06-28 DIAGNOSIS — C7B.02 METASTATIC MALIGNANT CARCINOID TUMOR TO LIVER: Primary | ICD-10-CM

## 2021-06-28 DIAGNOSIS — C7B.8 SECONDARY NEUROENDOCRINE TUMOR OF DISTANT LYMPH NODES: ICD-10-CM

## 2021-06-28 DIAGNOSIS — R52 PAIN: ICD-10-CM

## 2021-06-28 RX ORDER — CELECOXIB 200 MG/1
200 CAPSULE ORAL DAILY
Qty: 30 CAPSULE | Refills: 11 | Status: CANCELLED | OUTPATIENT
Start: 2021-06-28

## 2021-06-28 RX ORDER — CELECOXIB 200 MG/1
200 CAPSULE ORAL
COMMUNITY
Start: 2021-03-30 | End: 2021-06-28 | Stop reason: SDUPTHER

## 2021-06-29 RX ORDER — CELECOXIB 200 MG/1
200 CAPSULE ORAL DAILY
Qty: 30 CAPSULE | Refills: 11 | Status: SHIPPED | OUTPATIENT
Start: 2021-06-29

## 2021-07-14 ENCOUNTER — PATIENT MESSAGE (OUTPATIENT)
Dept: NEUROLOGY | Facility: HOSPITAL | Age: 51
End: 2021-07-14

## 2021-07-22 LAB
EXT 24 HR UR METANEPHRINE: ABNORMAL
EXT 24 HR UR NORMETANEPHRINE: ABNORMAL
EXT 24 HR UR NORMETANEPHRINE: ABNORMAL
EXT 25 HYDROXY VIT D2: ABNORMAL
EXT 25 HYDROXY VIT D3: ABNORMAL
EXT 5 HIAA 24 HR URINE: ABNORMAL
EXT 5 HIAA BLOOD: ABNORMAL
EXT ACTH: ABNORMAL
EXT AFP: ABNORMAL
EXT ALBUMIN: 4.7 G/DL (ref 3.8–4.9)
EXT ALKALINE PHOSPHATASE: 88 IU/L (ref 48–121)
EXT ALT: 10 IU/L (ref 0–44)
EXT AMYLASE: ABNORMAL
EXT ANTI ISLET CELL AB: ABNORMAL
EXT ANTI PARIETAL CELL AB: ABNORMAL
EXT ANTI THYROID AB: ABNORMAL
EXT AST: 21 IU/L (ref 0–40)
EXT BILIRUBIN DIRECT: ABNORMAL
EXT BILIRUBIN TOTAL: 0.9 MG/DL (ref 0–1.2)
EXT BK VIRUS DNA QN PCR: ABNORMAL
EXT BUN: 14 MG/DL (ref 6–24)
EXT C PEPTIDE: ABNORMAL
EXT CA 125: ABNORMAL
EXT CA 19-9: ABNORMAL
EXT CA 27-29: ABNORMAL
EXT CALCITONIN: ABNORMAL
EXT CALCIUM: 9.3 MG/DL (ref 8.7–10.2)
EXT CEA: ABNORMAL
EXT CHLORIDE: 100 MMOL/L (ref 96–106)
EXT CHOLESTEROL: ABNORMAL
EXT CHROMOGRANIN A: ABNORMAL
EXT CO2: 27 MMOL/L (ref 20–29)
EXT CREATININE UA: ABNORMAL
EXT CREATININE: 0.93 MG/DL (ref 0.76–1.27)
EXT CYCLOSPORONE LEVEL: ABNORMAL
EXT DOPAMINE: ABNORMAL
EXT EBV DNA BY PCR: ABNORMAL
EXT EPINEPHRINE: ABNORMAL
EXT FOLATE: ABNORMAL
EXT FREE T3: ABNORMAL
EXT FREE T4: ABNORMAL
EXT FSH: ABNORMAL
EXT GASTRIN RELEASING PEPTIDE: ABNORMAL
EXT GASTRIN RELEASING PEPTIDE: ABNORMAL
EXT GASTRIN: ABNORMAL
EXT GGT: ABNORMAL
EXT GHRELIN: ABNORMAL
EXT GLUCAGON: ABNORMAL
EXT GLUCOSE: 137 MG/DL (ref 65–99)
EXT GROWTH HORMONE: ABNORMAL
EXT HCV RNA QUANT PCR: ABNORMAL
EXT HDL: ABNORMAL
EXT HEMATOCRIT: 43.4 % (ref 37.5–51)
EXT HEMOGLOBIN A1C: ABNORMAL
EXT HEMOGLOBIN: 14.4 G/DL (ref 13–17.7)
EXT HISTAMINE 24 HR URINE: ABNORMAL
EXT HISTAMINE: ABNORMAL
EXT IGF-1: ABNORMAL
EXT IMMUNKNOW (NON-STIMULATED): ABNORMAL
EXT IMMUNKNOW (STIMULATED): ABNORMAL
EXT INR: ABNORMAL
EXT INSULIN: ABNORMAL
EXT LANREOTIDE LEVEL: ABNORMAL
EXT LDH, TOTAL: ABNORMAL
EXT LDL CHOLESTEROL: ABNORMAL
EXT LIPASE: ABNORMAL
EXT MAGNESIUM: ABNORMAL
EXT METANEPHRINE FREE PLASMA: ABNORMAL
EXT MOTILIN: ABNORMAL
EXT NEUROKININ A CAMB: ABNORMAL
EXT NEUROKININ A ISI: ABNORMAL
EXT NEUROTENSIN: ABNORMAL
EXT NOREPINEPHRINE: ABNORMAL
EXT NORMETANEPHRINE: ABNORMAL
EXT NSE: ABNORMAL
EXT OCTREOTIDE LEVEL: ABNORMAL
EXT PANCREASTATIN CAMB: ABNORMAL
EXT PANCREASTATIN ISI: ABNORMAL
EXT PANCREATIC POLYPEPTIDE: ABNORMAL
EXT PHOSPHORUS: ABNORMAL
EXT POTASSIUM: 4.7 MMOL/L (ref 3.5–5.2)
EXT PROGRAF LEVEL: ABNORMAL
EXT PROLACTIN: ABNORMAL
EXT PROTEIN TOTAL: 7.5 G/DL (ref 6–8.5)
EXT PROTEIN UA: ABNORMAL
EXT PT: ABNORMAL
EXT PTH, INTACT: ABNORMAL
EXT PTT: ABNORMAL
EXT RAPAMUNE LEVEL: ABNORMAL
EXT SEROTONIN: ABNORMAL
EXT SODIUM: 139 MMOL/L (ref 134–144)
EXT SOMATOSTATIN: ABNORMAL
EXT SUBSTANCE P: ABNORMAL
EXT TRIGLYCERIDES: ABNORMAL
EXT TRYPTASE: ABNORMAL
EXT TSH: ABNORMAL
EXT URIC ACID: ABNORMAL
EXT URINE AMYLASE U/HR: ABNORMAL
EXT URINE AMYLASE U/L: ABNORMAL
EXT VASOACTIVE INTESTINAL POLYPEPTIDE: ABNORMAL
EXT VITAMIN B12: ABNORMAL
EXT VMA 24 HR URINE: ABNORMAL
EXT WBC: 4.3 X10E3/UL (ref 3.4–10.8)
NEURON SPECIFIC ENOLASE: ABNORMAL
PLATELETS: 120 X10E3/UL (ref 150–450)

## 2021-08-05 ENCOUNTER — PATIENT MESSAGE (OUTPATIENT)
Dept: NEUROLOGY | Facility: HOSPITAL | Age: 51
End: 2021-08-05

## 2021-08-06 ENCOUNTER — TELEPHONE (OUTPATIENT)
Dept: NEUROLOGY | Facility: HOSPITAL | Age: 51
End: 2021-08-06

## 2021-08-17 ENCOUNTER — PATIENT MESSAGE (OUTPATIENT)
Dept: NEUROLOGY | Facility: HOSPITAL | Age: 51
End: 2021-08-17

## 2021-09-09 ENCOUNTER — PATIENT MESSAGE (OUTPATIENT)
Dept: NEUROLOGY | Facility: HOSPITAL | Age: 51
End: 2021-09-09

## 2021-09-18 ENCOUNTER — PATIENT MESSAGE (OUTPATIENT)
Dept: NEUROLOGY | Facility: HOSPITAL | Age: 51
End: 2021-09-18

## 2021-09-23 LAB
EXT 24 HR UR METANEPHRINE: ABNORMAL
EXT 24 HR UR NORMETANEPHRINE: ABNORMAL
EXT 24 HR UR NORMETANEPHRINE: ABNORMAL
EXT 25 HYDROXY VIT D2: ABNORMAL
EXT 25 HYDROXY VIT D3: ABNORMAL
EXT 5 HIAA 24 HR URINE: ABNORMAL
EXT 5 HIAA BLOOD: ABNORMAL
EXT ACTH: ABNORMAL
EXT AFP: ABNORMAL
EXT ALBUMIN: 4.1 G/DL (ref 3.8–4.9)
EXT ALKALINE PHOSPHATASE: 105 IU/L (ref 44–121)
EXT ALT: 12 IU/L (ref 0–44)
EXT AMYLASE: ABNORMAL
EXT ANTI ISLET CELL AB: ABNORMAL
EXT ANTI PARIETAL CELL AB: ABNORMAL
EXT ANTI THYROID AB: ABNORMAL
EXT AST: 20 IU/L (ref 0–40)
EXT BILIRUBIN DIRECT: ABNORMAL
EXT BILIRUBIN TOTAL: 0.6 MG/DL (ref 0–1.2)
EXT BK VIRUS DNA QN PCR: ABNORMAL
EXT BUN: 18 MG/DL (ref 6–24)
EXT C PEPTIDE: ABNORMAL
EXT CA 125: ABNORMAL
EXT CA 19-9: ABNORMAL
EXT CA 27-29: ABNORMAL
EXT CALCITONIN: ABNORMAL
EXT CALCIUM: 9.4 MG/DL (ref 8.7–10.2)
EXT CEA: ABNORMAL
EXT CHLORIDE: 103 MMOL/L (ref 96–106)
EXT CHOLESTEROL: ABNORMAL
EXT CHROMOGRANIN A: ABNORMAL
EXT CO2: 26 MMOL/L (ref 20–29)
EXT CREATININE UA: ABNORMAL
EXT CREATININE: 1.01 MG/DL (ref 0.76–1.27)
EXT CYCLOSPORONE LEVEL: ABNORMAL
EXT DOPAMINE: ABNORMAL
EXT EBV DNA BY PCR: ABNORMAL
EXT EPINEPHRINE: ABNORMAL
EXT FOLATE: ABNORMAL
EXT FREE T3: ABNORMAL
EXT FREE T4: ABNORMAL
EXT FSH: ABNORMAL
EXT GASTRIN RELEASING PEPTIDE: ABNORMAL
EXT GASTRIN RELEASING PEPTIDE: ABNORMAL
EXT GASTRIN: ABNORMAL
EXT GGT: ABNORMAL
EXT GHRELIN: ABNORMAL
EXT GLUCAGON: ABNORMAL
EXT GLUCOSE: 187 MG/DL (ref 65–99)
EXT GROWTH HORMONE: ABNORMAL
EXT HCV RNA QUANT PCR: ABNORMAL
EXT HDL: ABNORMAL
EXT HEMATOCRIT: 41.3 % (ref 37.5–51)
EXT HEMOGLOBIN A1C: ABNORMAL
EXT HEMOGLOBIN: 13.8 G/DL (ref 13–17.7)
EXT HISTAMINE 24 HR URINE: ABNORMAL
EXT HISTAMINE: ABNORMAL
EXT IGF-1: ABNORMAL
EXT IMMUNKNOW (NON-STIMULATED): ABNORMAL
EXT IMMUNKNOW (STIMULATED): ABNORMAL
EXT INR: ABNORMAL
EXT INSULIN: ABNORMAL
EXT LANREOTIDE LEVEL: ABNORMAL
EXT LDH, TOTAL: ABNORMAL
EXT LDL CHOLESTEROL: ABNORMAL
EXT LIPASE: ABNORMAL
EXT MAGNESIUM: ABNORMAL
EXT METANEPHRINE FREE PLASMA: ABNORMAL
EXT MOTILIN: ABNORMAL
EXT NEUROKININ A CAMB: ABNORMAL
EXT NEUROKININ A ISI: ABNORMAL
EXT NEUROTENSIN: ABNORMAL
EXT NOREPINEPHRINE: ABNORMAL
EXT NORMETANEPHRINE: ABNORMAL
EXT NSE: ABNORMAL
EXT OCTREOTIDE LEVEL: ABNORMAL
EXT PANCREASTATIN CAMB: ABNORMAL
EXT PANCREASTATIN ISI: 254 NG/ML (ref 21–321)
EXT PANCREATIC POLYPEPTIDE: ABNORMAL
EXT PHOSPHORUS: ABNORMAL
EXT PLATELETS: 124 X10E3/UL (ref 150–450)
EXT POTASSIUM: 4.2 MMOL/L (ref 3.5–5.2)
EXT PROGRAF LEVEL: ABNORMAL
EXT PROLACTIN: ABNORMAL
EXT PROTEIN TOTAL: 6.8 G/DL (ref 6–8.5)
EXT PROTEIN UA: ABNORMAL
EXT PT: ABNORMAL
EXT PTH, INTACT: ABNORMAL
EXT PTT: ABNORMAL
EXT RAPAMUNE LEVEL: ABNORMAL
EXT SEROTONIN: 254 NG/ML (ref 21–321)
EXT SODIUM: 141 MMOL/L (ref 134–144)
EXT SOMATOSTATIN: ABNORMAL
EXT SUBSTANCE P: ABNORMAL
EXT TRIGLYCERIDES: ABNORMAL
EXT TRYPTASE: ABNORMAL
EXT TSH: ABNORMAL
EXT URIC ACID: ABNORMAL
EXT URINE AMYLASE U/HR: ABNORMAL
EXT URINE AMYLASE U/L: ABNORMAL
EXT VASOACTIVE INTESTINAL POLYPEPTIDE: ABNORMAL
EXT VITAMIN B12: ABNORMAL
EXT VMA 24 HR URINE: ABNORMAL
EXT WBC: 6.2 X10E3/UL (ref 3.4–10.8)
NEURON SPECIFIC ENOLASE: ABNORMAL

## 2021-09-27 ENCOUNTER — OFFICE VISIT (OUTPATIENT)
Dept: NEUROLOGY | Facility: HOSPITAL | Age: 51
End: 2021-09-27
Attending: SURGERY
Payer: COMMERCIAL

## 2021-09-27 DIAGNOSIS — C7B.02 METASTATIC MALIGNANT CARCINOID TUMOR TO LIVER: ICD-10-CM

## 2021-09-27 DIAGNOSIS — C7B.8 SECONDARY NEUROENDOCRINE TUMOR OF DISTANT LYMPH NODES: Primary | ICD-10-CM

## 2021-09-27 DIAGNOSIS — G89.3 CANCER-RELATED PAIN: ICD-10-CM

## 2021-09-27 DIAGNOSIS — C79.51 SECONDARY MALIGNANT NEOPLASM OF BONE: ICD-10-CM

## 2021-09-27 DIAGNOSIS — R73.9 DRUG-INDUCED HYPERGLYCEMIA: ICD-10-CM

## 2021-09-27 DIAGNOSIS — K76.0 FATTY LIVER DISEASE, NONALCOHOLIC: ICD-10-CM

## 2021-09-27 DIAGNOSIS — C7A.012 MALIGNANT CARCINOID TUMOR OF ILEUM: ICD-10-CM

## 2021-09-27 DIAGNOSIS — T50.905A DRUG-INDUCED HYPERGLYCEMIA: ICD-10-CM

## 2021-09-27 RX ORDER — OXYCODONE AND ACETAMINOPHEN 10; 325 MG/1; MG/1
1 TABLET ORAL EVERY 6 HOURS PRN
Qty: 28 TABLET | Refills: 0 | Status: SHIPPED | OUTPATIENT
Start: 2021-09-27 | End: 2022-03-28 | Stop reason: SDUPTHER

## 2021-09-29 RX ORDER — CELECOXIB 200 MG/1
200 CAPSULE ORAL 2 TIMES DAILY
Qty: 60 CAPSULE | Refills: 0 | Status: SHIPPED | OUTPATIENT
Start: 2021-09-29 | End: 2021-10-29

## 2021-09-29 RX ORDER — OXYCODONE AND ACETAMINOPHEN 10; 325 MG/1; MG/1
1 TABLET ORAL 3 TIMES DAILY PRN
Qty: 90 TABLET | Refills: 0 | Status: SHIPPED | OUTPATIENT
Start: 2021-09-29 | End: 2021-10-29

## 2021-09-30 ENCOUNTER — TELEPHONE (OUTPATIENT)
Dept: NEUROLOGY | Facility: HOSPITAL | Age: 51
End: 2021-09-30

## 2022-02-16 ENCOUNTER — PATIENT MESSAGE (OUTPATIENT)
Dept: NEUROLOGY | Facility: HOSPITAL | Age: 52
End: 2022-02-16
Payer: COMMERCIAL

## 2022-03-03 ENCOUNTER — PATIENT MESSAGE (OUTPATIENT)
Dept: NEUROLOGY | Facility: HOSPITAL | Age: 52
End: 2022-03-03
Payer: COMMERCIAL

## 2022-03-08 ENCOUNTER — TELEPHONE (OUTPATIENT)
Dept: NEUROLOGY | Facility: HOSPITAL | Age: 52
End: 2022-03-08
Payer: COMMERCIAL

## 2022-03-08 DIAGNOSIS — C7B.02 METASTATIC MALIGNANT CARCINOID TUMOR TO LIVER: Primary | ICD-10-CM

## 2022-03-08 DIAGNOSIS — C7A.012 MALIGNANT CARCINOID TUMOR OF ILEUM: ICD-10-CM

## 2022-03-08 DIAGNOSIS — C7B.8 SECONDARY NEUROENDOCRINE TUMOR OF DISTANT LYMPH NODES: ICD-10-CM

## 2022-03-15 ENCOUNTER — OFFICE VISIT (OUTPATIENT)
Dept: OPHTHALMOLOGY | Facility: CLINIC | Age: 52
End: 2022-03-15
Payer: COMMERCIAL

## 2022-03-15 ENCOUNTER — OFFICE VISIT (OUTPATIENT)
Dept: NEUROLOGY | Facility: HOSPITAL | Age: 52
End: 2022-03-15
Attending: SURGERY
Payer: COMMERCIAL

## 2022-03-15 ENCOUNTER — HOSPITAL ENCOUNTER (OUTPATIENT)
Dept: CARDIOLOGY | Facility: HOSPITAL | Age: 52
Discharge: HOME | End: 2022-03-15
Attending: SURGERY
Payer: COMMERCIAL

## 2022-03-15 ENCOUNTER — CLINICAL SUPPORT (OUTPATIENT)
Dept: OPHTHALMOLOGY | Facility: CLINIC | Age: 52
End: 2022-03-15
Payer: COMMERCIAL

## 2022-03-15 ENCOUNTER — HOSPITAL ENCOUNTER (OUTPATIENT)
Dept: RADIOLOGY | Facility: HOSPITAL | Age: 52
Discharge: HOME | End: 2022-03-15
Attending: SURGERY
Payer: COMMERCIAL

## 2022-03-15 VITALS
DIASTOLIC BLOOD PRESSURE: 92 MMHG | TEMPERATURE: 98 F | HEART RATE: 84 BPM | BODY MASS INDEX: 25.68 KG/M2 | HEIGHT: 77 IN | SYSTOLIC BLOOD PRESSURE: 162 MMHG | RESPIRATION RATE: 18 BRPM | WEIGHT: 217.5 LBS

## 2022-03-15 VITALS — HEIGHT: 77 IN | BODY MASS INDEX: 25.62 KG/M2 | WEIGHT: 217 LBS

## 2022-03-15 DIAGNOSIS — C7B.8 SECONDARY NEUROENDOCRINE TUMOR OF DISTANT LYMPH NODES: ICD-10-CM

## 2022-03-15 DIAGNOSIS — C7B.02 METASTATIC MALIGNANT CARCINOID TUMOR TO LIVER: Primary | ICD-10-CM

## 2022-03-15 DIAGNOSIS — C7A.012 MALIGNANT CARCINOID TUMOR OF ILEUM: ICD-10-CM

## 2022-03-15 DIAGNOSIS — C79.49 ORBITAL METASTASIS: Primary | ICD-10-CM

## 2022-03-15 DIAGNOSIS — C7A.095 MALIGNANT CARCINOID TUMOR OF MIDGUT: ICD-10-CM

## 2022-03-15 DIAGNOSIS — C7B.02 METASTATIC MALIGNANT CARCINOID TUMOR TO LIVER: ICD-10-CM

## 2022-03-15 LAB
AV INDEX (PROSTH): 0.62
AV MEAN GRADIENT: 3 MMHG
AV PEAK GRADIENT: 5 MMHG
AV VELOCITY RATIO: 0.57
BSA FOR ECHO PROCEDURE: 2.31 M2
CV ECHO LV RWT: 0.38 CM
DOP CALC AO PEAK VEL: 1.15 M/S
DOP CALC AO VTI: 24.95 CM
DOP CALC LVOT PEAK VEL: 0.66 M/S
DOP CALC MV VTI: 17.92 CM
DOP CALCLVOT PEAK VEL VTI: 15.39 CM
E WAVE DECELERATION TIME: 240.49 MSEC
E/A RATIO: 1.09
E/E' RATIO: 8.38 M/S
ECHO LV POSTERIOR WALL: 1.04 CM (ref 0.6–1.1)
EJECTION FRACTION: 60 %
FRACTIONAL SHORTENING: 38 % (ref 28–44)
INTERVENTRICULAR SEPTUM: 0.8 CM (ref 0.6–1.1)
LA MAJOR: 4.61 CM
LA MINOR: 4.98 CM
LA WIDTH: 4.22 CM
LEFT ATRIUM SIZE: 3.3 CM
LEFT ATRIUM VOLUME INDEX MOD: 19.6 ML/M2
LEFT ATRIUM VOLUME INDEX: 24.4 ML/M2
LEFT ATRIUM VOLUME MOD: 45.38 CM3
LEFT ATRIUM VOLUME: 56.67 CM3
LEFT INTERNAL DIMENSION IN SYSTOLE: 3.35 CM (ref 2.1–4)
LEFT VENTRICLE DIASTOLIC VOLUME INDEX: 61.94 ML/M2
LEFT VENTRICLE DIASTOLIC VOLUME: 143.69 ML
LEFT VENTRICLE MASS INDEX: 81 G/M2
LEFT VENTRICLE SYSTOLIC VOLUME INDEX: 19.7 ML/M2
LEFT VENTRICLE SYSTOLIC VOLUME: 45.63 ML
LEFT VENTRICULAR INTERNAL DIMENSION IN DIASTOLE: 5.44 CM (ref 3.5–6)
LEFT VENTRICULAR MASS: 187.67 G
LV LATERAL E/E' RATIO: 7.33 M/S
LV SEPTAL E/E' RATIO: 9.78 M/S
MV MEAN GRADIENT: 0 MMHG
MV PEAK A VEL: 0.81 M/S
MV PEAK E VEL: 0.88 M/S
MV PEAK GRADIENT: 2 MMHG
PISA MRMAX VEL: 0.05 M/S
PISA TR MAX VEL: 2.35 M/S
PULM VEIN S/D RATIO: 1.22
PV PEAK D VEL: 0.55 M/S
PV PEAK S VEL: 0.67 M/S
PV PEAK VELOCITY: 0.92 CM/S
RA MAJOR: 4.63 CM
RA PRESSURE: 3 MMHG
RA WIDTH: 3.83 CM
RV TISSUE DOPPLER FREE WALL SYSTOLIC VELOCITY 1 (APICAL 4 CHAMBER VIEW): 9.75 CM/S
TDI LATERAL: 0.12 M/S
TDI SEPTAL: 0.09 M/S
TDI: 0.11 M/S
TR MAX PG: 22 MMHG
TRICUSPID ANNULAR PLANE SYSTOLIC EXCURSION: 1.88 CM
TV REST PULMONARY ARTERY PRESSURE: 25 MMHG

## 2022-03-15 PROCEDURE — 25500020 PHARM REV CODE 255: Performed by: SURGERY

## 2022-03-15 PROCEDURE — 1159F MED LIST DOCD IN RCRD: CPT | Mod: CPTII,S$GLB,, | Performed by: OPHTHALMOLOGY

## 2022-03-15 PROCEDURE — 1160F PR REVIEW ALL MEDS BY PRESCRIBER/CLIN PHARMACIST DOCUMENTED: ICD-10-PCS | Mod: CPTII,S$GLB,, | Performed by: OPHTHALMOLOGY

## 2022-03-15 PROCEDURE — 78815 NM PET 68GA DOTATATE WHOLE BODY: ICD-10-PCS | Mod: 26,PS,, | Performed by: RADIOLOGY

## 2022-03-15 PROCEDURE — 99999 PR PBB SHADOW E&M-EST. PATIENT-LVL II: CPT | Mod: PBBFAC,,, | Performed by: OPHTHALMOLOGY

## 2022-03-15 PROCEDURE — 92083 HUMPHREY VISUAL FIELD - OU - BOTH EYES: ICD-10-PCS | Mod: S$GLB,,, | Performed by: OPHTHALMOLOGY

## 2022-03-15 PROCEDURE — 93306 TTE W/DOPPLER COMPLETE: CPT | Mod: 26,,, | Performed by: INTERNAL MEDICINE

## 2022-03-15 PROCEDURE — A9698 NON-RAD CONTRAST MATERIALNOC: HCPCS | Performed by: SURGERY

## 2022-03-15 PROCEDURE — 99213 OFFICE O/P EST LOW 20 MIN: CPT | Mod: 25 | Performed by: SURGERY

## 2022-03-15 PROCEDURE — 92083 EXTENDED VISUAL FIELD XM: CPT | Mod: S$GLB,,, | Performed by: OPHTHALMOLOGY

## 2022-03-15 PROCEDURE — 78815 PET IMAGE W/CT SKULL-THIGH: CPT | Mod: 26,PS,, | Performed by: RADIOLOGY

## 2022-03-15 PROCEDURE — 93356 ECHO (CUPID ONLY): ICD-10-PCS | Mod: ,,, | Performed by: INTERNAL MEDICINE

## 2022-03-15 PROCEDURE — 99999 PR PBB SHADOW E&M-EST. PATIENT-LVL II: ICD-10-PCS | Mod: PBBFAC,,, | Performed by: OPHTHALMOLOGY

## 2022-03-15 PROCEDURE — 1160F RVW MEDS BY RX/DR IN RCRD: CPT | Mod: CPTII,S$GLB,, | Performed by: OPHTHALMOLOGY

## 2022-03-15 PROCEDURE — 93356 MYOCRD STRAIN IMG SPCKL TRCK: CPT

## 2022-03-15 PROCEDURE — 99205 PR OFFICE/OUTPT VISIT, NEW, LEVL V, 60-74 MIN: ICD-10-PCS | Mod: S$GLB,,, | Performed by: OPHTHALMOLOGY

## 2022-03-15 PROCEDURE — 93306 ECHO (CUPID ONLY): ICD-10-PCS | Mod: 26,,, | Performed by: INTERNAL MEDICINE

## 2022-03-15 PROCEDURE — 78815 PET IMAGE W/CT SKULL-THIGH: CPT | Mod: TC

## 2022-03-15 PROCEDURE — 93356 MYOCRD STRAIN IMG SPCKL TRCK: CPT | Mod: ,,, | Performed by: INTERNAL MEDICINE

## 2022-03-15 PROCEDURE — 1159F PR MEDICATION LIST DOCUMENTED IN MEDICAL RECORD: ICD-10-PCS | Mod: CPTII,S$GLB,, | Performed by: OPHTHALMOLOGY

## 2022-03-15 PROCEDURE — 99205 OFFICE O/P NEW HI 60 MIN: CPT | Mod: S$GLB,,, | Performed by: OPHTHALMOLOGY

## 2022-03-15 RX ADMIN — BARIUM SULFATE 900 ML: 20 SUSPENSION ORAL at 08:03

## 2022-03-15 NOTE — LETTER
[facility] - [address]  [address]  [facility] [address]  Phone: [phone]  Fax: [phone]   March 15, 2022    Tayla Her MD  [address]  [address]  [place] [address]    Patient: Javy Thompson   MR Number: [id number]   YOB: 1970   Date of Visit: 3/15/2022       Dear Dr. Her:    Thank you for referring Javy Thompson to me for evaluation. Here is my assessment and plan of care:    Assessment/Plan     For exam results, see Encounter Report.    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 His visual function is intact.He will return to me as requested.          Below you will find my full exam findings. If you have questions, please do not hesitate to call me. I look forward to following Mr. Javy Thompson along with you.    Sincerely,          Dale Pool MD       CC  No Recipients             Base Eye Exam     Visual Acuity (Snellen - Linear)       Right Left    Dist sc 20/20 20/25 -1          Tonometry (Applanation, 11:53 AM)       Right Left    Pressure 19 18          Pupils       Dark Light Shape React APD    Right 4 2 Round Brisk None    Left 4 2 Round Brisk None          Visual Fields    See HVF re[prt           Extraocular Movement       Right Left     Full, Ortho Full, Ortho          Neuro/Psych     Oriented x3: Yes    Mood/Affect: Normal          Dilation     Both eyes: 1% Mydriacyl, 2.5% Phenylephrine @ 11:55 AM            Slit Lamp and Fundus Exam     External Exam       Right Left    External Normal Normal          Slit Lamp Exam       Right Left    Lids/Lashes Normal Normal    Conjunctiva/Sclera White and quiet White and quiet    Cornea Clear Clear    Anterior Chamber Deep and quiet Deep and quiet     Iris Round and reactive Round and reactive    Lens Clear Clear    Vitreous Normal Normal          Fundus Exam       Right Left    Disc Normal Normal    C/D Ratio 0.3 0.3    Macula Normal Normal    Vessels Normal Normal    Periphery Normal Normal

## 2022-03-15 NOTE — PROGRESS NOTES
hvf done ou./rel/fix/coop. good ou./chart checked for latex allergy./2.00 + .75 x 145/od 1.75 + .75 x 15/os-h

## 2022-03-15 NOTE — PROGRESS NOTES
" NOLANETS:  [facility]  A collaboration between [facility] and [facility]      PATIENT: Javy Thompson  MRN: [id number]  DATE: 3/15/2022    Subjective:      Chief Complaint: metastatic midgut carcinoid, on active treatment, symptomatic s/p chemoembolization last week, s/p PRRT yesterday, here for issues with his lanreotide injection.       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 He presented with measurable disease within the small bowel, small bowel mesentery, and liver    Interval History:   Completed PRRT  Ga68 PET  NET BW pending  Cancer associated pain    Oncology visit summary  Assessment/Plan:    1. Metastatic ileal carcinoid tumor: In the midst of 4 planned Lutathera treatments. Lanreotide 120mg to be given on 6/10 or 6/11, will continue to get shots here while in [place] (he also lives part time in [place] and has an oncologist at [facility]). Next imaging due in about August, will return to see me after that has been completed or sooner as needed. Will plan for 24h urine collection at that time as well.    2. Carcinoid syndrome: Generally well-controlled. Not sure if diarrhea related, is mild nonetheless. May consider Xermelo at some point, but only if 5-HIAA remaining elevated. Suspect this will continue to improve after Lutathera.    3. 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 Back pain: Somewhat unclear etiology. Doing well on celecoxib. Continue to monitor.       Vitals: Blood pressure (!) 162/92, pulse 84, temperature 98.2 °F (36.8 °C), temperature source Oral, resp. rate 18, height 6' 5" (1.956 m), weight 98.6 kg (217 lb 7.7 oz). --stable    ECOG Score: 1 - Symptomatic but completely ambulatory    Oncologic History:   Oncologic History Ileal net, dx 2016, liver and román mets  Carcinoid syndrome- diarrhea   Oncologic Treatment 3/2017- " surgery (Eric)  Lanreotide  SBRT to retroperitoneal lymph node  PRRT [id number]   Pathology 3/2017- small bowel- well diff net, multifocal (5), G2, Ki 67- 10.9%, 3/12 nodes pos; liver- well diff net     Past Medical History:  Past Medical History:   Diagnosis Date    Mesenteric mass 2/8/2017    Metastatic carcinoid tumor 4/18/2017    Mr. Thompson is well known to me having been diagnosed with metastatic ileal carcinoid in 2016. He was resected in 3/2017. He has had slow progression of disease in the mediastinum, liver, and retroperitoneal román basins.    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       Past Surgical History:  Past Surgical History:   Procedure Laterality Date    ANTERIOR CRUCIATE LIGAMENT REPAIR Right 1986    APPENDECTOMY  3/31/2017    Procedure: APPENDECTOMY;  Surgeon: Fidelia Reyes MD;  Location: [facility] OR;  Service: General;;    EMBOLIZATION N/A 10/21/2020    Procedure: EMBOLIZATION, BLOOD VESSEL;  Surgeon: [facility] Diagnostic Provider;  Location: [facility] OR;  Service: Radiology;  Laterality: N/A;    HERNIA REPAIR  10/01/2019    KNEE ARTHROSCOPY Right 1986    x2    LIVER BIOPSY  12/2016, 1/2017    ORCHIECTOMY Right 09/24/2020    testicular cancer    TONSILLECTOMY         Family History:  Family History   Problem Relation Age of Onset    Cancer Mother        Allergies:  Epinephrine and Iodinated contrast media    Medications:  Current Outpatient Medications   Medication Sig    celecoxib (CELEBREX) 200 MG capsule Take 1 capsule (200 mg total) by mouth once daily.    lanreotide (SOMATULINE DEPOT) 120 mg/0.5 mL Syrg Inject 120 mg into the skin every 28 days.     No current facility-administered medications for this visit.        Review of Systems   Constitutional: Negative for fever and unexpected weight change.   HENT: Negative for facial swelling and hearing loss.    Eyes: Negative for photophobia and  visual disturbance.   Respiratory: Negative for apnea and chest tightness.    Cardiovascular: Negative for palpitations.   Gastrointestinal: Positive for abdominal distention, abdominal pain and diarrhea.   Endocrine: Negative for cold intolerance and heat intolerance.        Hyperglycemia   Genitourinary: Negative for difficulty urinating and dysuria.   Musculoskeletal: Positive for back pain.   Skin: Negative for rash and wound.   Allergic/Immunologic: Negative for food allergies and immunocompromised state.   Neurological: Negative for tremors and weakness.   Hematological: Does not bruise/bleed easily.   Psychiatric/Behavioral: Negative.           Objective:      Physical Exam  Constitutional:       General: He is not in acute distress.     Appearance: Normal appearance. He is well-developed and normal weight. He is not ill-appearing, toxic-appearing or diaphoretic.   HENT:      Head: Normocephalic.   Eyes:      Pupils: Pupils are equal, round, and reactive to light.      Comments: Dilated pupils after an ohpthomology exam   Cardiovascular:      Rate and Rhythm: Normal rate and regular rhythm.   Pulmonary:      Effort: Pulmonary effort is normal. No respiratory distress.      Breath sounds: No stridor.   Musculoskeletal:         General: No swelling.   Skin:     General: Skin is warm and dry.      Coloration: Skin is not jaundiced.   Neurological:      Mental Status: He is alert and oriented to person, place, and time.   Psychiatric:         Mood and Affect: Mood normal.         Behavior: Behavior normal.         Thought Content: Thought content normal.         Judgment: Judgment normal.          Laboratory Data:   Neuroendocrine Labs Latest Ref Rng & Units 7/22/2021   EXT WBC 3.4 - 10.8 x10E3/uL 4.3   RBC 4.60 - 6.20 M/uL    HGB 14.0 - 18.0 g/dL    EXT HGB 13.0 - 17.7 g/dL 14.4   HCT 40.0 - 54.0 %    EXT HCT 37.5 - 51.0 % 43.4     Neuroendocrine Labs Latest Ref Rng & Units 7/22/2021   EXT GLUCOSE 65 - 99 mg/dL  137 (A)   BUN 6 - 20 mg/dL    EXT BUN 6 - 24 mg/dL 14   CREATININE 0.5 - 1.4 mg/dL    EXT CREATININE 0.76 - 1.27 mg/dL 0.93    - 145 mmol/L    EXT  - 144 mmol/L 139   K 3.5 - 5.1 mmol/L    EXT K 3.5 - 5.2 mmol/L 4.7   CHLORIDE 95 - 110 mmol/L    EXT CHLORIDE 96 - 106 mmol/L 100   CO2 23 - 29 mmol/L    EXT CO2 20 - 29 mmol/L 27   CALCIUM 8.7 - 10.5 mg/dL    EXT CALCIUM 8.7 - 10.2 mg/dL 9.3   PROTEIN, TOTAL 6.0 - 8.4 g/dL    EXT PROTEIN, TOTAL 6.0 - 8.5 g/dL 7.5   PHOSPHORUS 2.7 - 4.5 mg/dL    ALBUMIN 3.5 - 5.2 g/dL    EXT ALBUMIN 3.8 - 4.9 g/dL 4.7   TOTAL BILIRUBIN 0.1 - 1.0 mg/dL    EXT TOTAL BILIRUBIN 0.0 - 1.2 mg/dL 0.9   ALK PHOSPHATASE 55 - 135 U/L    EXT ALK PHOSPHATASE 48 - 121 IU/L 88   SGOT (AST) 10 - 40 U/L    EXT SGOT (AST) 0 - 40 IU/L 21   SGPT (ALT) 10 - 44 U/L    EXT ALT 0 - 44 IU/L 10     Neuroendocrine Labs Latest Ref Rng & Units 4/28/2021   EXT WBC 3.4 - 10.8 x10E3/uL 4.7   RBC 4.60 - 6.20 M/uL    HGB 14.0 - 18.0 g/dL    EXT HGB 13.0 - 17.7 g/dL 14.7   HCT 40.0 - 54.0 %    EXT HCT 37.5 - 51.0 % 42.2   MCV 82.0 - 98.0 fL    MCH 27.0 - 31.0 pg    MCHC 32.0 - 36.0 g/dL    RDW 11.5 - 14.5 %    PLATLETS 150 - 350 K/uL    EXT PLATLETS 150 - 450 x10E3/uL 158     Neuroendocrine Labs Latest Ref Rng & Units 4/28/2021   EXT GLUCOSE 65 - 99 mg/dL 173 (A)   BUN 6 - 20 mg/dL    EXT BUN 6 - 24 mg/dL 17   CREATININE 0.5 - 1.4 mg/dL    EXT CREATININE 0.76 - 1.27 mg/dL 0.97    - 145 mmol/L    EXT  - 144 mmol/l 139   K 3.5 - 5.1 mmol/L    EXT K 3.5 - 5.2 mmol/L 4.0   CHLORIDE 95 - 110 mmol/L    EXT CHLORIDE 96 - 106 mmol/l 96   CO2 23 - 29 mmol/L    EXT CO2 20 - 29 mmol/L 19 (A)   CALCIUM 8.7 - 10.5 mg/dL    EXT CALCIUM 8.7 - 10.2 mg/dL 9.6   PROTEIN, TOTAL 6.0 - 8.4 g/dL    EXT PROTEIN, TOTAL 6.0 - 8.5 g/dL 7.2   PHOSPHORUS 2.7 - 4.5 mg/dL    ALBUMIN 3.5 - 5.2 g/dL    EXT ALBUMIN 3.8 - 4.9 g/dL 4.6   TOTAL BILIRUBIN 0.1 - 1.0 mg/dL    EXT TOTAL BILIRUBIN 0.0 - 1.2 mg/dL 1.1   ALK PHOSPHATASE  55 - 135 U/L    EXT ALK PHOSPHATASE 39 - 117 IU/L 82   SGOT (AST) 10 - 40 U/L    EXT SGOT (AST) 0 - 40 IU/L 26   SGPT (ALT) 10 - 44 U/L    EXT ALT 0 - 44 iU/L 11     Neuroendocrine Labs Latest Ref Rng & Units 3/23/2021   EXT WBC 3.4 - 10.8 x10E3/uL 5.6   RBC 4.60 - 6.20 M/uL    HGB 14.0 - 18.0 g/dL    EXT HGB 13.0 - 17.7 g/dL 15.8   HCT 40 - 54 %    EXT HCT 37.5 - 51.0 % 44.9   MCV 82.0 - 98.0 fL    MCH 27.0 - 31.0 pg    MCHC 32.0 - 36.0 g/dL    RDW 11.5 - 14.5 %    PLATLETS 150 - 350 K/uL    EXT PLATLETS 150 - 450 x10E3/uL 182     Neuroendocrine Labs Latest Ref Rng & Units 3/23/2021   EXT GLUCOSE 65 - 99 mg/dL 179 (A)   BUN 6 - 20 mg/dL    EXT BUN 6 - 24 mg/dL 17   CREATININE 0.5 - 1.4 mg/dL    EXT CREATININE 0.76 - 1.27 mg/dL 1.08    - 145 mmol/L    EXT  - 144 mmol/L 139   K 3.5 - 5.1 mmol/L    EXT K 3.5 - 5.2 mmol/L 4.0   CHLORIDE 95 - 110 mmol/L    EXT CHLORIDE 96 - 106 mmol/L 99   CO2 23 - 29 mmol/L    EXT CO2 20 - 29 mmol/L 21   CALCIUM 8.7 - 10.5 mg/dL    EXT CALCIUM 8.7 - 10.2 mg/dL 9.5   PROTEIN, TOTAL 6.0 - 8.4 g/dL    EXT PROTEIN, TOTAL 6.0 - 8.5 g/dL 7.4   PHOSPHORUS 2.7 - 4.5 mg/dL    ALBUMIN 3.5 - 5.2 g/dL    EXT ALBUMIN 3.8 - 4.9 g/dL 4.8   TOTAL BILIRUBIN 0.1 - 1.0 mg/dL    EXT TOTAL BILIRUBIN 0.0 - 1.2 mg/dL 1.2   ALK PHOSPHATASE 55 - 135 U/L    EXT ALK PHOSPHATASE 39 - 117 IU/L 89   SGOT (AST) 10 - 40 U/L    EXT SGOT (AST) 0 - 40 IU/L 17   SGPT (ALT) 10 - 44 U/L    EXT ALT 0 - 44 IU/L 10     Neuroendocrine Labs Latest Ref Rng & Units 1/26/2021   EXT WBC 3.4 - 10.8 x10E3/uL 4.7   RBC 4.60 - 6.20 M/uL    HGB 14.0 - 18.0 g/dL    EXT HGB 13.0 - 17.7 g/dL 15.5   HCT 40.0 - 54.0 %    EXT HCT 37.5 - 51.0 % 44.7   EXT PLATLETS 150 - 450 x10E3/uL 158      Neuroendocrine Labs Latest Ref Rng & Units 1/26/2021   EXT GLUCOSE 65 - 99 mg/dL 162 (A)   BUN 6 - 20 mg/dL    EXT BUN 6 - 24 mg/dL 16   CREATININE 0.5 - 1.4 mg/dL    EXT CREATININE 0.76 - 1.27 mg/dL 1.09    - 145 mmol/L    EXT  -  144 mmol/L 140   K 3.5 - 5.1 mmol/L    EXT K 3.5 - 5.2 mmol/L 3.9   CHLORIDE 95 - 110 mmol/L    EXT CHLORIDE 96 - 106 mmol/L 99   CO2 23 - 29 mmol/L    EXT CO2 20 - 29 mmol/L 21   CALCIUM 8.7 - 10.5 mg/dL    EXT CALCIUM 8.7 - 10.2 mg/dL 9.3   PROTEIN, TOTAL 6.0 - 8.4 g/dL    EXT PROTEIN, TOTAL 6.0 - 8.5 g/dL 7.4   PHOSPHORUS 2.7 - 4.5 mg/dL    ALBUMIN 3.5 - 5.2 g/dL    EXT ALBUMIN 3.8 - 4.9 g/dL 4.4   TOTAL BILIRUBIN 0.1 - 1.0 mg/dL    EXT TOTAL BILIRUBIN 0.0 - 1.2 mg/dL 0.9   ALK PHOSPHATASE 55 - 135 U/L    EXT ALK PHOSPHATASE 39 - 117 IU/L 96   SGOT (AST) 10 - 40 U/L    EXT SGOT (AST) 0 - 40 IU/L 20   SGPT (ALT) 10 - 44 U/L    EXT ALT 0 - 44 IU/L 9       Neuroendocrine Labs Latest Ref Rng & Units 10/26/2020   EXT GLUCOSE 65 - 99 mg/dL 300 (A)   BUN 6 - 20 mg/dL    EXT BUN 6 - 24 mg/dL 15   CREATININE 0.5 - 1.4 mg/dL    EXT CREATININE 0.76 - 1.27 mg/dL 1.20    - 145 mmol/L    EXT  - 144 mmol/L 136   K 3.5 - 5.1 mmol/L    EXT K 3.5 - 5.2 mmol/L 3.7   CHLORIDE 95 - 110 mmol/L    EXT CHLORIDE 96 - 106 mmol/L 93 (A)   CO2 23 - 29 mmol/L    EXT CO2 20 - 29 mmol/L 26   CALCIUM 8.7 - 10.5 mg/dL    EXT CALCIUM 8.7 - 10.2 mg/dl 9.3   PROTEIN, TOTAL 6.0 - 8.4 g/dL    EXT PROTEIN, TOTAL 6.0 - 8.5 g/dL 6.8   PHOSPHORUS 2.7 - 4.5 mg/dL    ALBUMIN 3.5 - 5.2 g/dL    EXT ALBUMIN 3.8 - 4.9 g/dL 4.0   TOTAL BILIRUBIN 0.1 - 1.0 mg/dL    EXT TOTAL BILIRUBIN 0.0 - 1.2 mg/dL 1.9 (A)   ALK PHOSPHATASE 55 - 135 U/L    EXT ALK PHOSPHATASE 39 - 117 IU/L 131 (A)   SGOT (AST) 10 - 40 U/L    EXT SGOT (AST) 0 - 40 IU/L 41 (A)   SGPT (ALT) 10 - 44 U/L    EXT ALT 0 - 44 IU/L 51 (A)       9/11/2020 labs sent via text from patient  Pancreastatin: 490  Serotonin: 489    Neuroendocrine Labs Latest Ref Rng & Units 5/14/2020 5/13/2020   EXT 5 HIAA BLOOD 0 - 22 ng/mL 18 18   EXT SEROTONIN 56 - 244 ng/ml  457 (A)   EXT CHROMOGRANIN A 0 - 15 ng/ml     EXT PANCREASTATIN COLE 10 - 135 pg/mL 207 (A) 207 (A)   EXT NEUROKININ A COLE 0 - 40 pg/ml      EXT LANREOTIDE LEVEL pg/mL 256 2,569       Neuroendocrine Labs Latest Ref Rng & Units 10/24/2018   EXT 5 HIAA BLOOD 0 - 22 ng/ml 11   EXT SEROTONIN 56 - 244 ng/ml 173   EXT CHROMOGRANIN A 0 - 15 ng/ml    EXT PANCREASTATIN COLE 10 - 135 pg/ml 89   EXT NEUROKININ A COLE 0 - 40 pg/ml 20   WBC 3.90 - 12.70 K/uL    EXT WBC 3.3 - 10.5 k/cumm 7.2   HGB 14.0 - 18.0 g/dL    EXT HGB 12.8 - 16.9 g/dl 15.7   HCT 40.0 - 54.0 %    EXT HCT 38.8 - 50.2 % 46.5   PLATLETS 150 - 350 K/uL    EXT PLATLETS 150 - 450 k/cumm 169   PT 9.0 - 12.5 sec    PTT 21.0 - 32.0 sec    INR 0.8 - 1.2    GLUCOSE 70 - 110 mg/dL    EXT GLUCOSE 65 - 99 mg/dl 136 (A)   BUN 6 - 20 mg/dL    EXT BUN 8 - 26 mg/dl 13   CREATININE 0.5 - 1.4 mg/dL    EXT CREATININE 0.80 - 1.50 mg/dl 0.93    - 145 mmol/L    EXT  - 145 mmol/l 138   K 3.5 - 5.1 mmol/L    EXT K 3.5 - 5.5 mmol/l 4.3   CHLORIDE 95 - 110 mmol/L    EXT CHLORIDE 98 - 110 mmol/l 99   CO2 23 - 29 mmol/L    EXT CO2 20 - 29 mmol/l 28   CALCIUM 8.7 - 10.5 mg/dL    EXT CALCIUM 8.4 - 10.5 mg/dl 9.6   PROTEIN, TOTAL 6.0 - 8.4 g/dL    EXT PROTEIN, TOTAL 6.0 - 8.3 g/dl 7.4   PHOSPHORUS 2.7 - 4.5 mg/dL    ALBUMIN 3.5 - 5.2 g/dL    EXT ALBUMIN 3.5 - 5.0 gm/dl 4.1   TOTAL BILIRUBIN 0.1 - 1.0 mg/dL    EXT TOTAL BILIRUBIN 0.1 - 1.2 mg/dl 1.1   ALK PHOSPHATASE 55 - 135 U/L    EXT ALK PHOSPHATASE 38 - 126 u/l 90   SGOT (AST) 10 - 40 U/L    EXT SGOT (AST) 17 - 59 u/l 30   SGPT (ALT) 10 - 44 U/L    EXT ALT 11 - 58 u/l 16     Scans:     Echo Saline Bubble? No  · The left ventricle is normal in size with normal systolic function.  · The estimated ejection fraction is 60%.  · The left ventricular global longitudinal strain is -15.7%.  · Normal left ventricular diastolic function.  · Normal right ventricular size with normal right ventricular systolic   function.  · The estimated PA systolic pressure is 25 mmHg.  · Normal central venous pressure (3 mmHg).  · Mild pulmonic regurgitation.  · Mild mitral  regurgitation.  · Mild aortic regurgitation.     Griffin Visual Field - OU - Extended - Both Eyes  Right Eye  Reliability was good. Findings include normal observations. Foveal   threshold was normal.     Left Eye  Reliability was good. Findings include normal observations. Foveal   threshold was normal.   NM PET 68Ga DOTATATE, Vertex to Thigh  Narrative: EXAMINATION:  NM PET 68GA DOTATATE, VERTEX TO THIGH    CLINICAL HISTORY:  Secondary carcinoid tumors of liver    TECHNIQUE:  5.22 mCi of Ga-68 DOTA-TREJO was administered intravenously in the right antecubital fossa. After an approximately 60 min distribution time, PET/CT images were acquired from the skull vertex to the mid thigh. Transmission images were acquired to correct for attenuation using a whole body low-dose CT scan with oral contrast and without IV contrast with the arms positioned above the head.    COMPARISON:  Gallium 68 Dotatate PET 07/09/2019, 11/26/2018    FINDINGS:  Quality of the study: Adequate.    In the head and neck, there is physiologic distribution in the pituitary, salivary, and thyroid glands.  There are more prominent foci in the orbits including likely the right medial rectus muscle with a maximum SUV of 14.7 on image 37, previously 3.5, and possibly the left inferior rectus with an SUV of 5.8, previously 2.1.  Prominent right submandibular lymph node with SUV max 10.  Prominent right supraclavicular lymph node with SUV max 3.6.    In the chest, prominent left axillary lymph node with SUV max 1.8.  Redemonstration of increased tracer uptake in the myocardium, with the interventricular septum focus SUV max measuring 6.9 (previously 7.13), and left ventricle SUV max measuring 5.3 (previously 7.32).  Multiple prominent subcentimeter pre and subcarinal lymph nodes are again visualized, most prominent node has a SUV max 14.  New right hilar lymph node with SUV max 8.5.  New 1.4 centimeter right middle lobe nodule with SUV max 11.    In the  abdomen and pelvis, there is physiologic uptake in the pancreatic uncinate process and body, liver, spleen, adrenal glands and  tract.    Redemonstration of retrocaval lymph node measuring 0.7 cm in short axis with SUV max 17 (previously 1.0 centimeter with SUV max 19.6).  Multiple prominent peripancreatic lymph nodes are again visualized, largest of which measures 0.5 centimeters in short axis with SUV max 16 (previously 1.7 centimeters with SUV max 6.7).    In the bones, there are several new or worsening tracer avid lesions suspicious for malignancy such as in the right scapula, L5  with an SUV of 12.2 and proximal right femur with an SUV of 3.9, previously 1.4.    In the extremities, there are no hypermetabolic lesions worrisome for malignancy.  Impression: New and worsening somatostatin receptor rich foci throughout the body concerning for metastasis including new right hilar lymphadenopathy, right middle lobe pulmonary nodule, and new osseous metastases as well as worsening extraocular muscle metastases.  If management would change, recommend MRI of the orbits for further evaluation of the extraocular muscles.    I, Yunior White MD, attest that I reviewed and interpreted the images.    Electronically signed by resident: Dale Lopez  Date:    03/15/2022  Time:    08:39    Electronically signed by: Yunior White  Date:    03/15/2022  Time:    11:36          Echo Saline Bubble? No  · The left ventricle is normal in size with normal systolic function.  · The estimated ejection fraction is 60%.  · The left ventricular global longitudinal strain is -15.7%.  · Normal left ventricular diastolic function.  · Normal right ventricular size with normal right ventricular systolic   function.  · The estimated PA systolic pressure is 25 mmHg.  · Normal central venous pressure (3 mmHg).  · Mild pulmonic regurgitation.  · Mild mitral regurgitation.  · Mild aortic regurgitation.     Griffin Visual Field - OU - Extended -  Both Eyes  Right Eye  Reliability was good. Findings include normal observations. Foveal   threshold was normal.     Left Eye  Reliability was good. Findings include normal observations. Foveal   threshold was normal.   NM PET 68Ga DOTATATE, Vertex to Thigh  Narrative: EXAMINATION:  NM PET 68GA DOTATATE, VERTEX TO THIGH    CLINICAL HISTORY:  Secondary carcinoid tumors of liver    TECHNIQUE:  5.22 mCi of Ga-68 DOTA-TREJO was administered intravenously in the right antecubital fossa. After an approximately 60 min distribution time, PET/CT images were acquired from the skull vertex to the mid thigh. Transmission images were acquired to correct for attenuation using a whole body low-dose CT scan with oral contrast and without IV contrast with the arms positioned above the head.    COMPARISON:  Gallium 68 Dotatate PET 07/09/2019, 11/26/2018    FINDINGS:  Quality of the study: Adequate.    In the head and neck, there is physiologic distribution in the pituitary, salivary, and thyroid glands.  There are more prominent foci in the orbits including likely the right medial rectus muscle with a maximum SUV of 14.7 on image 37, previously 3.5, and possibly the left inferior rectus with an SUV of 5.8, previously 2.1.  Prominent right submandibular lymph node with SUV max 10.  Prominent right supraclavicular lymph node with SUV max 3.6.    In the chest, prominent left axillary lymph node with SUV max 1.8.  Redemonstration of increased tracer uptake in the myocardium, with the interventricular septum focus SUV max measuring 6.9 (previously 7.13), and left ventricle SUV max measuring 5.3 (previously 7.32).  Multiple prominent subcentimeter pre and subcarinal lymph nodes are again visualized, most prominent node has a SUV max 14.  New right hilar lymph node with SUV max 8.5.  New 1.4 centimeter right middle lobe nodule with SUV max 11.    In the abdomen and pelvis, there is physiologic uptake in the pancreatic uncinate process and  body, liver, spleen, adrenal glands and  tract.    Redemonstration of retrocaval lymph node measuring 0.7 cm in short axis with SUV max 17 (previously 1.0 centimeter with SUV max 19.6).  Multiple prominent peripancreatic lymph nodes are again visualized, largest of which measures 0.5 centimeters in short axis with SUV max 16 (previously 1.7 centimeters with SUV max 6.7).    In the bones, there are several new or worsening tracer avid lesions suspicious for malignancy such as in the right scapula, L5  with an SUV of 12.2 and proximal right femur with an SUV of 3.9, previously 1.4.    In the extremities, there are no hypermetabolic lesions worrisome for malignancy.  Impression: New and worsening somatostatin receptor rich foci throughout the body concerning for metastasis including new right hilar lymphadenopathy, right middle lobe pulmonary nodule, and new osseous metastases as well as worsening extraocular muscle metastases.  If management would change, recommend MRI of the orbits for further evaluation of the extraocular muscles.    I, Yunior White MD, attest that I reviewed and interpreted the images.    Electronically signed by resident: Dale Lopez  Date:    03/15/2022  Time:    08:39    Electronically signed by: Yunior White  Date:    03/15/2022  Time:    11:36               INDICATION: SUBSEQUENT TREATMENT STRATEGY: Restaging. 50 year old  male with neuroendocrine tumor.  COMPARISON: PET/CT, 7/9/2019 and 7/10/2017  TECHNIQUE: PET CT of the body (skull base to thigh) was performed  without IV contrast. 6.0mCi Ga-68 DOTA-TREJO was administered IV.  Non-contrast CT was performed from skull base to thighs followed by  PET imaging of the same field. No contrast due to allergy.  FINDINGS:  HEAD/NECK:  Radiotracer avid, hyperattenuating lesion along the right medial  rectus muscle and along the posterior aspect of the left lower globe.  No significant proptosis bilaterally. These lesions are both new  compared  to prior examination in 2019. Radiotracer avid right level  IB lymph node measuring up to 1.1 cm.  The thyroid is unremarkable.  CHEST:  Multiple radiotracer avid lesions visualized within the pericardium  and along the course of the interventricular septum. Level 4R and 7  are radiotracer avid lymph nodes are present in addition to right  upper lobe, right middle lobe, and right lower lobe radiotracer avid  pulmonary nodules. Multiple radiotracer avid chest soft tissue  nodules are present, new compared to prior. Pleural metastases are  present along the inferior right base.  Trace left pleural effusion. No focal consolidation.  ABDOMEN/PELVIS:  Hepatic metastatic disease is present, with the largest lesion in the  hepatic dome, measuring 6.4 x 3.6 cm with a max SUV of 29.9 (image  145), previously measuring 2.5 x 1.9 cm with a max SUV of 20.9.  Interval increase in number and size of the erendira hepatic radiotracer  avid lymphadenopathy. Decreased size of a left adrenal nodule without  definite FDG uptake. Increased size of a right pararenal lymph node.  Interval development of mesenteric lymph nodes, and a left common  iliac chain lymph node.  BONES:  Multifocal radiotracer uptake within the appendicular and axial  osseous structures, overall increased compared to 2019. No pathologic  fractures.  IMPRESSION:  Multiple new foci of DOTATATE avid metastatic disease, including  within the posterior left globe, right orbital medial rectus muscle  and within the interventricular septum of the myocardium, consistent  with disease progression.        MRI abdomen 9/2020    [facility] REPORT  EXAM: MRI Abdomen wo then w Contrast  DATE: 09/02/2020 15:11 hours  INDICATION: History of neuroendocrine tumor status post resection.  COMPARISON: CT abdomen and pelvis dated May 6, 2020  TECHNIQUE: MR imaging of the abdomen was obtained pre and post IV  contrast. Routine protocol was utilized.  Images were obtained in  multiple planes.  10 ml of GADAVIST were administered  FINDINGS:  Liver: Hepatic steatosis. Multiple T2 hyperintense lesions are noted  throughout the liver. The largest is located within the right hepatic  lobe, segment 8 measuring approximately 3.5 cm, increased in size  from the comparison CT. At least 4 lesions are noted within the  liver. . Two of the smaller lesions were not apparent on the  comparison CT. The posterior peripheral right hepatic lesion  measuring 1.7 cm increased size from prior examination (previously  1.3 cm) (series 10, image 10). Postsurgical changes noted within the  right lobe of the liver.  Bile Ducts: Negative for intra or extrahepatic biliary dilatation.  Gallbladder: The gallbladder is surgically absent.  Pancreas: The pancreas is fatty and atrophied.  Spleen: The spleen is unremarkable in size and contour.  Adrenals: Small nodule adjacent to the left adrenal gland. The nodule  measures 0.9 cm and is unchanged from the prior examination.  The adrenal gland itself is unremarkable.  Urinary Tract: Negative for hydronephrosis. Adjacent to the right  renal vein is a peripherally enhancing nodule measuring approximately  1.2 cm in the short axis, decreased in size from the prior  examination and favored to represent a necrotic lymph node.  Reproductive Organs: Not included on this field-of-view  Bowel: No overly dilated loops of small bowel. The stomach and  esophagus are unremarkable.  Appendix: The appendix is not visualized.  Lymph Nodes: Similar lymphadenopathy of the erendira hepatis.  Peritoneum: Negative for free fluid.  Vasculature: The abdominal aorta is unremarkable in size and contour.  Abdominal Wall: Midline abdominal scar.  Bones: Negative for acute osseous findings. Focal 0.6 cm T1  hypointense lesion within T12.  Visualized thorax: Normal heart size. Evaluation of the lungs is  suboptimal on MRI and a concurrent chest CT was performed. 2  pulmonary  nodules in the right lower lobe appear enlarged from the  prior examination, however comparison and evaluation is better  performed by CT.  IMPRESSION:  1. Increased size and number of multiple hepatic lesions concerning  for progression of disease.  2. Question osseous metastatic disease within T12. Attention on  follow-up examinations is recommended.  3. Similar erendira hepatis and retroperitoneal lymphadenopathy.  Template Version: [email or web address]  Thank you for consulting our team of subspecialty body imaging  radiologists at [facility].  Healthcare providers wishing to discuss this case further can contact  the [facility] Reading Room at  [phone].  Petar JEFF have personally reviewed the image(s) and the  prepared and signed interpretation by Samreen Romo. Based on my review,  I agree with the findings.  Electronically Signed by: Petar Rob  Dictated on: 9/2/2020 3:14 PM    CT abd/pelvis- 2/24/20          NM PET Ga68 Dotatate Whole Body-7/9/19  Narrative: EXAMINATION:  NM PET 68GA DOTATATE WHOLE BODY    CLINICAL HISTORY:  Malignant carcinoid tumor of the ileum    TECHNIQUE:  4.67 mCi of GA68 Dotatate was administered intravenously in the right antecubital fossa.  After an approximately 60 min distribution time, PET/CT images were acquired from the skull vertex to the mid thigh. Transmission images were acquired to correct for attenuation using a whole body low-dose CT scan without contrast with the arms positioned above the head.    COMPARISON:  Gallium 68 dota-hua PET 11/26/2018.    FINDINGS:  Quality of the study: Adequate.    Multiple areas increased FDG avidity including innumerable hepatic lesions, retroperitoneal, mesenteric, and peripancreatic lymph nodes with index lesions as below:    Superior right lobe of the liver: SUV max 13.64, previously 22.83.    Paraesophageal lymph node has resolved.    Peripancreatic lymphadenopathy  (largest lymph measures 1.7 cm in short axis, previously 2.0 cm; of note, this was incorrectly labeled as periaortic lymphadenopathy on examination dated 11/26/2018): SUV max 6.65, previously 37.    Stable right retrocaval lymph node measures approximately 1.6 cm (previously 1.5 cm) and SUV max 19.62, previously 20.63.    Redemonstration of increased tracer uptake associated with the myocardium, 1 in the region of the interventricular septum (SUV max 7.13, previously 8.8), and 1 in the region of the left ventricle lateral wall (SUV max 7.32, previously 6.43).  There has been interval resolution of FDG avidity within the region of the right atrial/ventricular wall.  New nonspecific FDG avid subcentimeter submandibular lymph node demonstrating SUV max of 3.10.  This may be reactive in nature.  Nonspecific increased uptake within the anterior aspect of the ethmoid sinus.    Physiologic uptake of the tracer is seen within the pituitary, salivary, and thyroid glands, stomach, liver, spleen, kidneys, adrenal glands, prostate, and bowel.    Incidental CT findings: Heterogeneous liver.  Multiple calcified lymph nodes within the right hilar region, likely sequela prior granulomatous disease.  Subsegmental opacity within the right middle lobe which may represent subsegmental atelectasis or scarring.  Small amount of left sided dependent pleural fluid.  Prior cholecystectomy.  Punctate calcification within the spleen, likely sequela prior granulomatous disease.  Multiple rounded hyperdensities within the subcutaneous tissue of the buttocks presumably related to injection sites.  Impression: 1.  Multiple foci of uptake indicating persistent somatostatin receptor positive metastases with resolved paraesophageal node and smaller peripancreatic nodes that are no longer tracer avid and in keeping with partial response to treatment.    2.  Two out of 3 foci within the myocardium remaining that may represent cardiac metastases,  though other processes may cause uptake.  Recommend correlation with cardiac history and consideration of dedicated cardiac MRI for further evaluation.      MRI Abdomen W WO Contrast-7/9/19  Narrative: EXAMINATION:  MRI ABDOMEN W WO CONTRAST    CLINICAL HISTORY:  Neoplasm: abdomen, metastatic, recurrence, suspected/known;  Neoplasm of unspecified behavior of other specified sites    TECHNIQUE:  Multisequence, multiplanar MRI of the abdomen performed per liver protocol before and after administration of 10 mL Gadavist intravenous contrast.    COMPARISON:  11/26/2018    FINDINGS:  Liver: There is diffuse loss of signal on opposed phase imaging consistent with steatosis.  There are multiple T2 hyperintense lesions which demonstrate restricted diffusion and arterial enhancement.  Segment VIII lesion measures 2.9 cm, previously 2.4 cm (series 1400, image 31).  Segment V lesion measures 1.5 cm, previously 1.6 cm (series 1400, image 77).  Overall number of lesions appear similar to prior study.  There is apparent interval increase in enhancement.  Postsurgical changes of prior partial right hepatectomy noted.    Biliary: Gallbladder is absent.  No biliary dilatation.    Pancreas: Unremarkable.  No pancreatic ductal dilatation.    Spleen: Normal size.  No focal lesions.    Adrenal glands: Unremarkable.    Kidneys: No renal masses.  Several tiny cysts noted.  No hydronephrosis.    Miscellaneous: Bowel containing ventral abdominal hernia partially visualized.  No evidence for bowel obstruction.  Multiple prominent periportal and retroperitoneal lymph nodes are noted.  Aortocaval necrotic lymph node measures 1.6 cm short axis, previously 1.7 cm (series 1401, image 58).  Additional retrocaval necrotic lymph node measuring 1.5 cm noted, previously 1.1 cm (series 1401, image 72).  Impression: 1. Hepatic metastases and abdominal lymphadenopathy, overall similar to prior study.  2. Partially visualized bowel containing ventral  abdominal hernia.  RECIST SUMMARY:    Date of prior examination for comparison: 11/26/2018    Lesion 1: Hepatic segment V.  1.5 cm. Series 1400 image 77.  Prior measurement 1.6 cm.    Lesion 2: Hepatic segment VIII.  2.9 cm. Series 1400 image 31.  Prior measurement 2.4 cm.    Lesion 3: Aortocaval lymph node.  1.6 cm. Series 1401 image 58.  Prior measurement 1.7 cm.    Lesion 4: Retrocaval lymph node.  1.5 cm. Series 1401 image 72.  Prior measurement 1.1 cm.      Echocardiogram 3/2021  · The left ventricle is normal in size with concentric remodeling and normal systolic function. The estimated ejection fraction is 55%  · The left ventricular global longitudinal strain is -16.1%.  · Normal left ventricular diastolic function.  · Normal right ventricular size with normal right ventricular systolic function.  · Mild aortic regurgitation.  Intermediate central venous pressure (8 mmHg).     Echocardiogram- 7/8/19  · Normal left ventricular systolic function. The estimated ejection fraction is 65%  · Normal LV diastolic function.  · Concentric left ventricular hypertrophy.  · Mild aortic regurgitation.  · Normal right ventricular systolic function.  · Normal central venous pressure (3 mm Hg).  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  He had his initial PRRT in November 2020 in received all 4 doses without difficulty. His blood work from July looks quite good.  He and I have previously discussed the expectations of PRRT including stability of disease and delay of progression of existing sites of  disease.  He had a somatostatin receptor PET prior to this visit.  I went over the images with him and his wife.  The overall pattern of distribution looked similar.  However the final report from the radiologist raised concerns for new sites of disease within the retroperitoneal lymph nodes and the bone most consistent with progression.  I will have to have them review his prior imaging dating back to pre PRRT to get a sense of whether not there was some component of this disease prior to treatment.  Each of the areas that they are calling new are somatostatin receptor avid opening up the possibility of offering him additional doses of PRRT.  If available, I would prefer Pb212 over additional doses of Lu177.  I believe this is available on trial in [place].  We will have to reach out to [facility] to confirm availability.  He is willing to go there and even willing to go to [place] if there are additional therapies that are reasonable alternatives.  2. Physically he is doing better.  He does say that he has episodes of palpitations that have become more frequent.  Since he does have pericardial involvement, I would have to check with cardiothoracic to determine whether not disease in that site would be triggering those symptoms.  It seems like a reasonable explanation.  I do not know enough about pericardial involvement to suggest any potential treatment.  He had an echocardiogram done today that shows normal cardiac function and only mild valve regurgitation.  3. He had an ophthalmology exam that also showed no worrisome findings to suggest clinically relevant progression in the site.  4. We discussed treatment options focusing on initiation of cytotoxic chemotherapy versus additional doses of PRRT.  I believe both would be reasonable at this juncture.  I will reach out to his, medical oncologist Tomy Lew,.  He is due back in [place] within the next week or so and can arrange a follow-up appointment with  him.     Plan:  TB.  The read from his most recent PET suggest progression of disease although the pattern of distribution seems very similar to the somatostatin receptor PET done prior to PRRT.  Would we consider additional doses of Lu177  or perhaps Pb212 at [employer].  He is also willing to go to [place] if there are potential therapies they are currently unavailable in the [place]  Would we consider cytotoxic chemotherapy?  Somatostatin receptor PET in 6 months  NET BW to assess serotonin and pancreastatin in the context of symptoms  Continue Celebrex.  I would increase to 200 mg b.i.d.  Oxycodone short-acting instead of OxyContin for cancer associated pain  If he follows up in [place], I would coordinate the echocardiogram as well as the ophthalmology appointment as we did for this visit          Tayla Her MD, MPH, FACS  Professor of Surgery and Surgical Oncology  Chief, [facility] Division of Surgery Oncology  Medical Director, [facility]  Office: [phone]  Fax: [phone]  Phone: [phone]  Phone: [phone]  Email: [email or web address]

## 2022-03-15 NOTE — PROGRESS NOTES
HPI     Referred by Dr.Mary ANNETTE Her MD   Hx of Neuroendocrine tumors x 2 years.  Patient states OU vision stable, but notice double vision when he look up   to left.  Top and bottom. If he close an eye no double.    Review HVF    I have personally interviewed the patient, reviewed the history and   examined the patient and agree with the technician's exam.     He has a history of bilateral orbital tumors that were found at the time   his double vision started.     Last edited by Dale Pool MD on 3/15/2022 11:44 AM. (History)            Assessment /Plan     For exam results, see Encounter Report.    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 His visual function is intact.He will return to me as requested.

## 2022-03-21 ENCOUNTER — CONFERENCE (OUTPATIENT)
Dept: NEUROLOGY | Facility: HOSPITAL | Age: 52
End: 2022-03-21
Payer: COMMERCIAL

## 2022-03-22 NOTE — TELEPHONE ENCOUNTER
[facility]      NEUROENDOCRINE TUMOR MULTIDISCIPLINARY TUMOR BOARD  _____________________________________________________________________    PRESENTER:   SISSY Her MD    REASON FOR PRESENTATION:  Treatment Plan and Scan Review   Recent PET suggest progression although patter of distribution seems similar to PET done  prior to PRRT/ Would we consider additional does of  or  at [facility]? He is willing to go to [place] if there are potential therapies not in the [place]/would we consider cytotoxic chemo?    ATTENDEES:   Surgery:              MD NADEEN Umaña MD M. Maluccio, MD  Interventional Radiology - Dale Barbour MD  Nuclear Medicine - Yunior White MD  Pathology - Kaylie Witt MD & Yamilex Sterling DO  Oncology - Roshan Lowe MD  Gastroenterology - Not Present   Palliative Care - Not Present  Nutritional Support- Tracie Weil-Cavalier, RDN  Research  Nursing    PATIENT STATUS:  Established Patient    PATIENT SUMMARY:  Past Medical History:   Diagnosis Date    Mesenteric mass 2/8/2017    Metastatic carcinoid tumor 4/18/2017    Mr. Thompson is well known to me having been diagnosed with metastatic ileal carcinoid in 2016. He was resected in 3/2017. He has had slow progression of disease in the mediastinum, liver, and retroperitoneal román basins.    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       Past Surgical History:   Procedure Laterality Date    ANTERIOR CRUCIATE LIGAMENT REPAIR Right 1986    APPENDECTOMY  3/31/2017    Procedure: APPENDECTOMY;  Surgeon: Fidelia Reyes MD;  Location: [facility] OR;  Service: General;;    EMBOLIZATION N/A 10/21/2020    Procedure: EMBOLIZATION, BLOOD VESSEL;  Surgeon: [facility] Diagnostic Provider;  Location: [facility] OR;  Service: Radiology;  Laterality: N/A;    HERNIA REPAIR  10/01/2019    KNEE ARTHROSCOPY Right 1986    x2    LIVER BIOPSY   12/2016, 1/2017    ORCHIECTOMY Right 09/24/2020    testicular cancer    TONSILLECTOMY       ________________________________________________________________    DISCUSSION:  New and worsening met disease including new right hilar nodes, right middle lobe pulmonary nodule, new osseous metastases, and worsening extraocular muscle metastases.      BOARD RECOMMENDATIONS:   Start on CAP/TEM  Screen for Lead Trial

## 2022-03-23 ENCOUNTER — PATIENT MESSAGE (OUTPATIENT)
Dept: NEUROLOGY | Facility: HOSPITAL | Age: 52
End: 2022-03-23
Payer: COMMERCIAL

## 2022-03-24 NOTE — TELEPHONE ENCOUNTER
Tumor Board recommendations sent to patient from Dr. Her. Patient replied requesting a virtual visit be scheduled to discuss recommendations further. Patient scheduled, and acknowledged his understanding of date & time of appointment.

## 2022-03-27 NOTE — PROGRESS NOTES
[place]AN[place]:  [facility]    PATIENT: Javy Thompson  MRN: [id number]  DATE: 3/26/2022    Subjective:      Chief Complaint: metastatic midgut carcinoid, on active treatment, symptomatic s/p chemoembolization last week, s/p PRRT yesterday, here for issues with his lanreotide injection.     The patient location is: home ([place])  The chief complaint leading to consultation is: discussion about treatment plan, chemotherapy and possible referral to [place]    Visit type: audiovisual    Face to Face time with patient: 30  40 minutes of total time spent on the encounter, which includes face to face time and non-face to face time preparing to see the patient (eg, review of tests), Obtaining and/or reviewing separately obtained history, Documenting clinical information in the electronic or other health record, Independently interpreting results (not separately reported) and communicating results to the patient/family/caregiver, or Care coordination (not separately reported).         Each patient to whom he or she provides medical services by telemedicine is:  (1) informed of the relationship between the physician and patient and the respective role of any other health care provider with respect to management of the patient; and (2) notified that he or she may decline to receive medical services by telemedicine and may withdraw from such care at any time.    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 He presented with measurable disease within the small bowel, small bowel mesentery, and liver    Interval History:   Completed PRRT  Ga68 PET  NET BW pending  Cancer associated pain    DISCUSSION:  New and worsening met disease including new right hilar nodes, right middle lobe pulmonary nodule, new osseous metastases, and worsening extraocular muscle metastases.        BOARD RECOMMENDATIONS:   Start on CAP/TEM  Screen for Lead  Trial    Oncology visit summary  Assessment/Plan:    1. Metastatic ileal carcinoid tumor: In the midst of 4 planned Lutathera treatments. Lanreotide 120mg to be given on 6/10 or 6/11, will continue to get shots here while in [place] (he also lives part time in [place] and has an oncologist at [facility]). Next imaging due in about August, will return to see me after that has been completed or sooner as needed. Will plan for 24h urine collection at that time as well.    2. Carcinoid syndrome: Generally well-controlled. Not sure if diarrhea related, is mild nonetheless. May consider Xermelo at some point, but only if 5-HIAA remaining elevated. Suspect this will continue to improve after Lutathera.    3. 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 Back pain: Somewhat unclear etiology. Doing well on celecoxib. Continue to monitor.       Vitals: There were no vitals taken for this visit. --stable    ECOG Score: 1 - Symptomatic but completely ambulatory    Oncologic History:   Oncologic History Ileal net, dx 2016, liver and román mets  Carcinoid syndrome- diarrhea   Oncologic Treatment 3/2017- surgery (Eric)  Lanreotide  SBRT to retroperitoneal lymph node  PRRT [id number]   Pathology 3/2017- small bowel- well diff net, multifocal (5), G2, Ki 67- 10.9%, 3/12 nodes pos; liver- well diff net     Past Medical History:  Past Medical History:   Diagnosis Date    Mesenteric mass 2/8/2017    Metastatic carcinoid tumor 4/18/2017    Mr. Thompson is well known to me having been diagnosed with metastatic ileal carcinoid in 2016. He was resected in 3/2017. He has had slow progression of disease in the mediastinum, liver, and retroperitoneal román basins.    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       Past Surgical History:  Past Surgical History:   Procedure Laterality Date    ANTERIOR CRUCIATE LIGAMENT REPAIR  Right 1986    APPENDECTOMY  3/31/2017    Procedure: APPENDECTOMY;  Surgeon: Fidelia Reyes MD;  Location: [facility] OR;  Service: General;;    EMBOLIZATION N/A 10/21/2020    Procedure: EMBOLIZATION, BLOOD VESSEL;  Surgeon: Jose E Diagnostic Provider;  Location: [facility] OR;  Service: Radiology;  Laterality: N/A;    HERNIA REPAIR  10/01/2019    KNEE ARTHROSCOPY Right 1986    x2    LIVER BIOPSY  12/2016, 1/2017    ORCHIECTOMY Right 09/24/2020    testicular cancer    TONSILLECTOMY         Family History:  Family History   Problem Relation Age of Onset    Cancer Mother        Allergies:  Epinephrine and Iodinated contrast media    Medications:  Current Outpatient Medications   Medication Sig    celecoxib (CELEBREX) 200 MG capsule Take 1 capsule (200 mg total) by mouth once daily.    lanreotide (SOMATULINE DEPOT) 120 mg/0.5 mL Syrg Inject 120 mg into the skin every 28 days.     No current facility-administered medications for this visit.        Review of Systems   Constitutional: Negative for fever and unexpected weight change.   HENT: Negative for facial swelling and hearing loss.    Eyes: Negative for photophobia and visual disturbance.   Respiratory: Negative for apnea and chest tightness.    Cardiovascular: Negative for palpitations.   Gastrointestinal: Positive for abdominal distention, abdominal pain and diarrhea.   Endocrine: Negative for cold intolerance and heat intolerance.        Hyperglycemia   Genitourinary: Negative for difficulty urinating and dysuria.   Musculoskeletal: Positive for back pain.   Skin: Negative for rash and wound.   Allergic/Immunologic: Negative for food allergies and immunocompromised state.   Neurological: Negative for tremors and weakness.   Hematological: Does not bruise/bleed easily.   Psychiatric/Behavioral: Negative.           Objective:      Physical Exam  Constitutional:       General: He is not in acute distress.     Appearance: Normal appearance. He is well-developed and  normal weight. He is not ill-appearing, toxic-appearing or diaphoretic.   HENT:      Head: Normocephalic.   Eyes:      Pupils: Pupils are equal, round, and reactive to light.      Comments: Dilated pupils after an ohpthomology exam   Cardiovascular:      Rate and Rhythm: Normal rate and regular rhythm.   Pulmonary:      Effort: Pulmonary effort is normal. No respiratory distress.      Breath sounds: No stridor.   Musculoskeletal:         General: No swelling.   Skin:     General: Skin is warm and dry.      Coloration: Skin is not jaundiced.   Neurological:      Mental Status: He is alert and oriented to person, place, and time.   Psychiatric:         Mood and Affect: Mood normal.         Behavior: Behavior normal.         Thought Content: Thought content normal.         Judgment: Judgment normal.          Laboratory Data:   Neuroendocrine Labs Latest Ref Rng & Units 7/22/2021   EXT WBC 3.4 - 10.8 x10E3/uL 4.3   RBC 4.60 - 6.20 M/uL    HGB 14.0 - 18.0 g/dL    EXT HGB 13.0 - 17.7 g/dL 14.4   HCT 40.0 - 54.0 %    EXT HCT 37.5 - 51.0 % 43.4     Neuroendocrine Labs Latest Ref Rng & Units 7/22/2021   EXT GLUCOSE 65 - 99 mg/dL 137 (A)   BUN 6 - 20 mg/dL    EXT BUN 6 - 24 mg/dL 14   CREATININE 0.5 - 1.4 mg/dL    EXT CREATININE 0.76 - 1.27 mg/dL 0.93    - 145 mmol/L    EXT  - 144 mmol/L 139   K 3.5 - 5.1 mmol/L    EXT K 3.5 - 5.2 mmol/L 4.7   CHLORIDE 95 - 110 mmol/L    EXT CHLORIDE 96 - 106 mmol/L 100   CO2 23 - 29 mmol/L    EXT CO2 20 - 29 mmol/L 27   CALCIUM 8.7 - 10.5 mg/dL    EXT CALCIUM 8.7 - 10.2 mg/dL 9.3   PROTEIN, TOTAL 6.0 - 8.4 g/dL    EXT PROTEIN, TOTAL 6.0 - 8.5 g/dL 7.5   PHOSPHORUS 2.7 - 4.5 mg/dL    ALBUMIN 3.5 - 5.2 g/dL    EXT ALBUMIN 3.8 - 4.9 g/dL 4.7   TOTAL BILIRUBIN 0.1 - 1.0 mg/dL    EXT TOTAL BILIRUBIN 0.0 - 1.2 mg/dL 0.9   ALK PHOSPHATASE 55 - 135 U/L    EXT ALK PHOSPHATASE 48 - 121 IU/L 88   SGOT (AST) 10 - 40 U/L    EXT SGOT (AST) 0 - 40 IU/L 21   SGPT (ALT) 10 - 44 U/L    EXT ALT 0 -  44 IU/L 10     Neuroendocrine Labs Latest Ref Rng & Units 4/28/2021   EXT WBC 3.4 - 10.8 x10E3/uL 4.7   RBC 4.60 - 6.20 M/uL    HGB 14.0 - 18.0 g/dL    EXT HGB 13.0 - 17.7 g/dL 14.7   HCT 40.0 - 54.0 %    EXT HCT 37.5 - 51.0 % 42.2   MCV 82.0 - 98.0 fL    MCH 27.0 - 31.0 pg    MCHC 32.0 - 36.0 g/dL    RDW 11.5 - 14.5 %    PLATLETS 150 - 350 K/uL    EXT PLATLETS 150 - 450 x10E3/uL 158     Neuroendocrine Labs Latest Ref Rng & Units 4/28/2021   EXT GLUCOSE 65 - 99 mg/dL 173 (A)   BUN 6 - 20 mg/dL    EXT BUN 6 - 24 mg/dL 17   CREATININE 0.5 - 1.4 mg/dL    EXT CREATININE 0.76 - 1.27 mg/dL 0.97    - 145 mmol/L    EXT  - 144 mmol/l 139   K 3.5 - 5.1 mmol/L    EXT K 3.5 - 5.2 mmol/L 4.0   CHLORIDE 95 - 110 mmol/L    EXT CHLORIDE 96 - 106 mmol/l 96   CO2 23 - 29 mmol/L    EXT CO2 20 - 29 mmol/L 19 (A)   CALCIUM 8.7 - 10.5 mg/dL    EXT CALCIUM 8.7 - 10.2 mg/dL 9.6   PROTEIN, TOTAL 6.0 - 8.4 g/dL    EXT PROTEIN, TOTAL 6.0 - 8.5 g/dL 7.2   PHOSPHORUS 2.7 - 4.5 mg/dL    ALBUMIN 3.5 - 5.2 g/dL    EXT ALBUMIN 3.8 - 4.9 g/dL 4.6   TOTAL BILIRUBIN 0.1 - 1.0 mg/dL    EXT TOTAL BILIRUBIN 0.0 - 1.2 mg/dL 1.1   ALK PHOSPHATASE 55 - 135 U/L    EXT ALK PHOSPHATASE 39 - 117 IU/L 82   SGOT (AST) 10 - 40 U/L    EXT SGOT (AST) 0 - 40 IU/L 26   SGPT (ALT) 10 - 44 U/L    EXT ALT 0 - 44 iU/L 11     Neuroendocrine Labs Latest Ref Rng & Units 3/23/2021   EXT WBC 3.4 - 10.8 x10E3/uL 5.6   RBC 4.60 - 6.20 M/uL    HGB 14.0 - 18.0 g/dL    EXT HGB 13.0 - 17.7 g/dL 15.8   HCT 40 - 54 %    EXT HCT 37.5 - 51.0 % 44.9   MCV 82.0 - 98.0 fL    MCH 27.0 - 31.0 pg    MCHC 32.0 - 36.0 g/dL    RDW 11.5 - 14.5 %    PLATLETS 150 - 350 K/uL    EXT PLATLETS 150 - 450 x10E3/uL 182     Neuroendocrine Labs Latest Ref Rng & Units 3/23/2021   EXT GLUCOSE 65 - 99 mg/dL 179 (A)   BUN 6 - 20 mg/dL    EXT BUN 6 - 24 mg/dL 17   CREATININE 0.5 - 1.4 mg/dL    EXT CREATININE 0.76 - 1.27 mg/dL 1.08    - 145 mmol/L    EXT  - 144 mmol/L 139   K 3.5 - 5.1  mmol/L    EXT K 3.5 - 5.2 mmol/L 4.0   CHLORIDE 95 - 110 mmol/L    EXT CHLORIDE 96 - 106 mmol/L 99   CO2 23 - 29 mmol/L    EXT CO2 20 - 29 mmol/L 21   CALCIUM 8.7 - 10.5 mg/dL    EXT CALCIUM 8.7 - 10.2 mg/dL 9.5   PROTEIN, TOTAL 6.0 - 8.4 g/dL    EXT PROTEIN, TOTAL 6.0 - 8.5 g/dL 7.4   PHOSPHORUS 2.7 - 4.5 mg/dL    ALBUMIN 3.5 - 5.2 g/dL    EXT ALBUMIN 3.8 - 4.9 g/dL 4.8   TOTAL BILIRUBIN 0.1 - 1.0 mg/dL    EXT TOTAL BILIRUBIN 0.0 - 1.2 mg/dL 1.2   ALK PHOSPHATASE 55 - 135 U/L    EXT ALK PHOSPHATASE 39 - 117 IU/L 89   SGOT (AST) 10 - 40 U/L    EXT SGOT (AST) 0 - 40 IU/L 17   SGPT (ALT) 10 - 44 U/L    EXT ALT 0 - 44 IU/L 10     Neuroendocrine Labs Latest Ref Rng & Units 1/26/2021   EXT WBC 3.4 - 10.8 x10E3/uL 4.7   RBC 4.60 - 6.20 M/uL    HGB 14.0 - 18.0 g/dL    EXT HGB 13.0 - 17.7 g/dL 15.5   HCT 40.0 - 54.0 %    EXT HCT 37.5 - 51.0 % 44.7   EXT PLATLETS 150 - 450 x10E3/uL 158      Neuroendocrine Labs Latest Ref Rng & Units 1/26/2021   EXT GLUCOSE 65 - 99 mg/dL 162 (A)   BUN 6 - 20 mg/dL    EXT BUN 6 - 24 mg/dL 16   CREATININE 0.5 - 1.4 mg/dL    EXT CREATININE 0.76 - 1.27 mg/dL 1.09    - 145 mmol/L    EXT  - 144 mmol/L 140   K 3.5 - 5.1 mmol/L    EXT K 3.5 - 5.2 mmol/L 3.9   CHLORIDE 95 - 110 mmol/L    EXT CHLORIDE 96 - 106 mmol/L 99   CO2 23 - 29 mmol/L    EXT CO2 20 - 29 mmol/L 21   CALCIUM 8.7 - 10.5 mg/dL    EXT CALCIUM 8.7 - 10.2 mg/dL 9.3   PROTEIN, TOTAL 6.0 - 8.4 g/dL    EXT PROTEIN, TOTAL 6.0 - 8.5 g/dL 7.4   PHOSPHORUS 2.7 - 4.5 mg/dL    ALBUMIN 3.5 - 5.2 g/dL    EXT ALBUMIN 3.8 - 4.9 g/dL 4.4   TOTAL BILIRUBIN 0.1 - 1.0 mg/dL    EXT TOTAL BILIRUBIN 0.0 - 1.2 mg/dL 0.9   ALK PHOSPHATASE 55 - 135 U/L    EXT ALK PHOSPHATASE 39 - 117 IU/L 96   SGOT (AST) 10 - 40 U/L    EXT SGOT (AST) 0 - 40 IU/L 20   SGPT (ALT) 10 - 44 U/L    EXT ALT 0 - 44 IU/L 9       Neuroendocrine Labs Latest Ref Rng & Units 10/26/2020   EXT GLUCOSE 65 - 99 mg/dL 300 (A)   BUN 6 - 20 mg/dL    EXT BUN 6 - 24 mg/dL 15    CREATININE 0.5 - 1.4 mg/dL    EXT CREATININE 0.76 - 1.27 mg/dL 1.20    - 145 mmol/L    EXT  - 144 mmol/L 136   K 3.5 - 5.1 mmol/L    EXT K 3.5 - 5.2 mmol/L 3.7   CHLORIDE 95 - 110 mmol/L    EXT CHLORIDE 96 - 106 mmol/L 93 (A)   CO2 23 - 29 mmol/L    EXT CO2 20 - 29 mmol/L 26   CALCIUM 8.7 - 10.5 mg/dL    EXT CALCIUM 8.7 - 10.2 mg/dl 9.3   PROTEIN, TOTAL 6.0 - 8.4 g/dL    EXT PROTEIN, TOTAL 6.0 - 8.5 g/dL 6.8   PHOSPHORUS 2.7 - 4.5 mg/dL    ALBUMIN 3.5 - 5.2 g/dL    EXT ALBUMIN 3.8 - 4.9 g/dL 4.0   TOTAL BILIRUBIN 0.1 - 1.0 mg/dL    EXT TOTAL BILIRUBIN 0.0 - 1.2 mg/dL 1.9 (A)   ALK PHOSPHATASE 55 - 135 U/L    EXT ALK PHOSPHATASE 39 - 117 IU/L 131 (A)   SGOT (AST) 10 - 40 U/L    EXT SGOT (AST) 0 - 40 IU/L 41 (A)   SGPT (ALT) 10 - 44 U/L    EXT ALT 0 - 44 IU/L 51 (A)       9/11/2020 labs sent via text from patient  Pancreastatin: 490  Serotonin: 489    Neuroendocrine Labs Latest Ref Rng & Units 5/14/2020 5/13/2020   EXT 5 HIAA BLOOD 0 - 22 ng/mL 18 18   EXT SEROTONIN 56 - 244 ng/ml  457 (A)   EXT CHROMOGRANIN A 0 - 15 ng/ml     EXT PANCREASTATIN COLE 10 - 135 pg/mL 207 (A) 207 (A)   EXT NEUROKININ A COLE 0 - 40 pg/ml     EXT LANREOTIDE LEVEL pg/mL 256 2,569       Neuroendocrine Labs Latest Ref Rng & Units 10/24/2018   EXT 5 HIAA BLOOD 0 - 22 ng/ml 11   EXT SEROTONIN 56 - 244 ng/ml 173   EXT CHROMOGRANIN A 0 - 15 ng/ml    EXT PANCREASTATIN COLE 10 - 135 pg/ml 89   EXT NEUROKININ A COLE 0 - 40 pg/ml 20   WBC 3.90 - 12.70 K/uL    EXT WBC 3.3 - 10.5 k/cumm 7.2   HGB 14.0 - 18.0 g/dL    EXT HGB 12.8 - 16.9 g/dl 15.7   HCT 40.0 - 54.0 %    EXT HCT 38.8 - 50.2 % 46.5   PLATLETS 150 - 350 K/uL    EXT PLATLETS 150 - 450 k/cumm 169   PT 9.0 - 12.5 sec    PTT 21.0 - 32.0 sec    INR 0.8 - 1.2    GLUCOSE 70 - 110 mg/dL    EXT GLUCOSE 65 - 99 mg/dl 136 (A)   BUN 6 - 20 mg/dL    EXT BUN 8 - 26 mg/dl 13   CREATININE 0.5 - 1.4 mg/dL    EXT CREATININE 0.80 - 1.50 mg/dl 0.93    - 145 mmol/L    EXT  - 145 mmol/l 138    K 3.5 - 5.1 mmol/L    EXT K 3.5 - 5.5 mmol/l 4.3   CHLORIDE 95 - 110 mmol/L    EXT CHLORIDE 98 - 110 mmol/l 99   CO2 23 - 29 mmol/L    EXT CO2 20 - 29 mmol/l 28   CALCIUM 8.7 - 10.5 mg/dL    EXT CALCIUM 8.4 - 10.5 mg/dl 9.6   PROTEIN, TOTAL 6.0 - 8.4 g/dL    EXT PROTEIN, TOTAL 6.0 - 8.3 g/dl 7.4   PHOSPHORUS 2.7 - 4.5 mg/dL    ALBUMIN 3.5 - 5.2 g/dL    EXT ALBUMIN 3.5 - 5.0 gm/dl 4.1   TOTAL BILIRUBIN 0.1 - 1.0 mg/dL    EXT TOTAL BILIRUBIN 0.1 - 1.2 mg/dl 1.1   ALK PHOSPHATASE 55 - 135 U/L    EXT ALK PHOSPHATASE 38 - 126 u/l 90   SGOT (AST) 10 - 40 U/L    EXT SGOT (AST) 17 - 59 u/l 30   SGPT (ALT) 10 - 44 U/L    EXT ALT 11 - 58 u/l 16     Scans:     Echo Saline Bubble? No  · The left ventricle is normal in size with normal systolic function.  · The estimated ejection fraction is 60%.  · The left ventricular global longitudinal strain is -15.7%.  · Normal left ventricular diastolic function.  · Normal right ventricular size with normal right ventricular systolic   function.  · The estimated PA systolic pressure is 25 mmHg.  · Normal central venous pressure (3 mmHg).  · Mild pulmonic regurgitation.  · Mild mitral regurgitation.  · Mild aortic regurgitation.     Griffin Visual Field - OU - Extended - Both Eyes  Right Eye  Reliability was good. Findings include normal observations. Foveal   threshold was normal.     Left Eye  Reliability was good. Findings include normal observations. Foveal   threshold was normal.   NM PET 68Ga DOTATATE, Vertex to Thigh  Narrative: EXAMINATION:  NM PET 68GA DOTATATE, VERTEX TO THIGH    CLINICAL HISTORY:  Secondary carcinoid tumors of liver    TECHNIQUE:  5.22 mCi of Ga-68 DOTA-TREJO was administered intravenously in the right antecubital fossa. After an approximately 60 min distribution time, PET/CT images were acquired from the skull vertex to the mid thigh. Transmission images were acquired to correct for attenuation using a whole body low-dose CT scan with oral contrast and  without IV contrast with the arms positioned above the head.    COMPARISON:  Gallium 68 Dotatate PET 07/09/2019, 11/26/2018    FINDINGS:  Quality of the study: Adequate.    In the head and neck, there is physiologic distribution in the pituitary, salivary, and thyroid glands.  There are more prominent foci in the orbits including likely the right medial rectus muscle with a maximum SUV of 14.7 on image 37, previously 3.5, and possibly the left inferior rectus with an SUV of 5.8, previously 2.1.  Prominent right submandibular lymph node with SUV max 10.  Prominent right supraclavicular lymph node with SUV max 3.6.    In the chest, prominent left axillary lymph node with SUV max 1.8.  Redemonstration of increased tracer uptake in the myocardium, with the interventricular septum focus SUV max measuring 6.9 (previously 7.13), and left ventricle SUV max measuring 5.3 (previously 7.32).  Multiple prominent subcentimeter pre and subcarinal lymph nodes are again visualized, most prominent node has a SUV max 14.  New right hilar lymph node with SUV max 8.5.  New 1.4 centimeter right middle lobe nodule with SUV max 11.    In the abdomen and pelvis, there is physiologic uptake in the pancreatic uncinate process and body, liver, spleen, adrenal glands and  tract.    Redemonstration of retrocaval lymph node measuring 0.7 cm in short axis with SUV max 17 (previously 1.0 centimeter with SUV max 19.6).  Multiple prominent peripancreatic lymph nodes are again visualized, largest of which measures 0.5 centimeters in short axis with SUV max 16 (previously 1.7 centimeters with SUV max 6.7).    In the bones, there are several new or worsening tracer avid lesions suspicious for malignancy such as in the right scapula, L5  with an SUV of 12.2 and proximal right femur with an SUV of 3.9, previously 1.4.    In the extremities, there are no hypermetabolic lesions worrisome for malignancy.  Impression: New and worsening somatostatin  receptor rich foci throughout the body concerning for metastasis including new right hilar lymphadenopathy, right middle lobe pulmonary nodule, and new osseous metastases as well as worsening extraocular muscle metastases.  If management would change, recommend MRI of the orbits for further evaluation of the extraocular muscles.    I, Yunior White MD, attest that I reviewed and interpreted the images.    Electronically signed by resident: Dale Lopez  Date:    03/15/2022  Time:    08:39    Electronically signed by: Yunior White  Date:    03/15/2022  Time:    11:36          Echo Saline Bubble? No  · The left ventricle is normal in size with normal systolic function.  · The estimated ejection fraction is 60%.  · The left ventricular global longitudinal strain is -15.7%.  · Normal left ventricular diastolic function.  · Normal right ventricular size with normal right ventricular systolic   function.  · The estimated PA systolic pressure is 25 mmHg.  · Normal central venous pressure (3 mmHg).  · Mild pulmonic regurgitation.  · Mild mitral regurgitation.  · Mild aortic regurgitation.     Griffin Visual Field - OU - Extended - Both Eyes  Right Eye  Reliability was good. Findings include normal observations. Foveal   threshold was normal.     Left Eye  Reliability was good. Findings include normal observations. Foveal   threshold was normal.   NM PET 68Ga DOTATATE, Vertex to Thigh  Narrative: EXAMINATION:  NM PET 68GA DOTATATE, VERTEX TO THIGH    CLINICAL HISTORY:  Secondary carcinoid tumors of liver    TECHNIQUE:  5.22 mCi of Ga-68 DOTA-TREJO was administered intravenously in the right antecubital fossa. After an approximately 60 min distribution time, PET/CT images were acquired from the skull vertex to the mid thigh. Transmission images were acquired to correct for attenuation using a whole body low-dose CT scan with oral contrast and without IV contrast with the arms positioned above the  head.    COMPARISON:  Gallium 68 Dotatate PET 07/09/2019, 11/26/2018    FINDINGS:  Quality of the study: Adequate.    In the head and neck, there is physiologic distribution in the pituitary, salivary, and thyroid glands.  There are more prominent foci in the orbits including likely the right medial rectus muscle with a maximum SUV of 14.7 on image 37, previously 3.5, and possibly the left inferior rectus with an SUV of 5.8, previously 2.1.  Prominent right submandibular lymph node with SUV max 10.  Prominent right supraclavicular lymph node with SUV max 3.6.    In the chest, prominent left axillary lymph node with SUV max 1.8.  Redemonstration of increased tracer uptake in the myocardium, with the interventricular septum focus SUV max measuring 6.9 (previously 7.13), and left ventricle SUV max measuring 5.3 (previously 7.32).  Multiple prominent subcentimeter pre and subcarinal lymph nodes are again visualized, most prominent node has a SUV max 14.  New right hilar lymph node with SUV max 8.5.  New 1.4 centimeter right middle lobe nodule with SUV max 11.    In the abdomen and pelvis, there is physiologic uptake in the pancreatic uncinate process and body, liver, spleen, adrenal glands and  tract.    Redemonstration of retrocaval lymph node measuring 0.7 cm in short axis with SUV max 17 (previously 1.0 centimeter with SUV max 19.6).  Multiple prominent peripancreatic lymph nodes are again visualized, largest of which measures 0.5 centimeters in short axis with SUV max 16 (previously 1.7 centimeters with SUV max 6.7).    In the bones, there are several new or worsening tracer avid lesions suspicious for malignancy such as in the right scapula, L5  with an SUV of 12.2 and proximal right femur with an SUV of 3.9, previously 1.4.    In the extremities, there are no hypermetabolic lesions worrisome for malignancy.  Impression: New and worsening somatostatin receptor rich foci throughout the body concerning for  metastasis including new right hilar lymphadenopathy, right middle lobe pulmonary nodule, and new osseous metastases as well as worsening extraocular muscle metastases.  If management would change, recommend MRI of the orbits for further evaluation of the extraocular muscles.    I, Yunior White MD, attest that I reviewed and interpreted the images.    Electronically signed by resident: Dale Lopez  Date:    03/15/2022  Time:    08:39    Electronically signed by: Yunior White  Date:    03/15/2022  Time:    11:36               INDICATION: SUBSEQUENT TREATMENT STRATEGY: Restaging. 50 year old  male with neuroendocrine tumor.  COMPARISON: PET/CT, 7/9/2019 and 7/10/2017  TECHNIQUE: PET CT of the body (skull base to thigh) was performed  without IV contrast. 6.0mCi Ga-68 DOTA-TREJO was administered IV.  Non-contrast CT was performed from skull base to thighs followed by  PET imaging of the same field. No contrast due to allergy.  FINDINGS:  HEAD/NECK:  Radiotracer avid, hyperattenuating lesion along the right medial  rectus muscle and along the posterior aspect of the left lower globe.  No significant proptosis bilaterally. These lesions are both new  compared to prior examination in 2019. Radiotracer avid right level  IB lymph node measuring up to 1.1 cm.  The thyroid is unremarkable.  CHEST:  Multiple radiotracer avid lesions visualized within the pericardium  and along the course of the interventricular septum. Level 4R and 7  are radiotracer avid lymph nodes are present in addition to right  upper lobe, right middle lobe, and right lower lobe radiotracer avid  pulmonary nodules. Multiple radiotracer avid chest soft tissue  nodules are present, new compared to prior. Pleural metastases are  present along the inferior right base.  Trace left pleural effusion. No focal consolidation.  ABDOMEN/PELVIS:  Hepatic metastatic disease is present, with the largest lesion in the  hepatic dome, measuring 6.4 x 3.6 cm with a max  SUV of 29.9 (image  145), previously measuring 2.5 x 1.9 cm with a max SUV of 20.9.  Interval increase in number and size of the erendira hepatic radiotracer  avid lymphadenopathy. Decreased size of a left adrenal nodule without  definite FDG uptake. Increased size of a right pararenal lymph node.  Interval development of mesenteric lymph nodes, and a left common  iliac chain lymph node.  BONES:  Multifocal radiotracer uptake within the appendicular and axial  osseous structures, overall increased compared to 2019. No pathologic  fractures.  IMPRESSION:  Multiple new foci of DOTATATE avid metastatic disease, including  within the posterior left globe, right orbital medial rectus muscle  and within the interventricular septum of the myocardium, consistent  with disease progression.        MRI abdomen 9/2020    [facility]  EXAM: MRI Abdomen wo then w Contrast  DATE: 09/02/2020 15:11 hours  INDICATION: History of neuroendocrine tumor status post resection.  COMPARISON: CT abdomen and pelvis dated May 6, 2020  TECHNIQUE: MR imaging of the abdomen was obtained pre and post IV  contrast. Routine protocol was utilized. Images were obtained in  multiple planes.  10 ml of GADAVIST were administered  FINDINGS:  Liver: Hepatic steatosis. Multiple T2 hyperintense lesions are noted  throughout the liver. The largest is located within the right hepatic  lobe, segment 8 measuring approximately 3.5 cm, increased in size  from the comparison CT. At least 4 lesions are noted within the  liver. . Two of the smaller lesions were not apparent on the  comparison CT. The posterior peripheral right hepatic lesion  measuring 1.7 cm increased size from prior examination (previously  1.3 cm) (series 10, image 10). Postsurgical changes noted within the  right lobe of the liver.  Bile Ducts: Negative for intra or extrahepatic biliary dilatation.  Gallbladder: The gallbladder is surgically  absent.  Pancreas: The pancreas is fatty and atrophied.  Spleen: The spleen is unremarkable in size and contour.  Adrenals: Small nodule adjacent to the left adrenal gland. The nodule  measures 0.9 cm and is unchanged from the prior examination.  The adrenal gland itself is unremarkable.  Urinary Tract: Negative for hydronephrosis. Adjacent to the right  renal vein is a peripherally enhancing nodule measuring approximately  1.2 cm in the short axis, decreased in size from the prior  examination and favored to represent a necrotic lymph node.  Reproductive Organs: Not included on this field-of-view  Bowel: No overly dilated loops of small bowel. The stomach and  esophagus are unremarkable.  Appendix: The appendix is not visualized.  Lymph Nodes: Similar lymphadenopathy of the erendira hepatis.  Peritoneum: Negative for free fluid.  Vasculature: The abdominal aorta is unremarkable in size and contour.  Abdominal Wall: Midline abdominal scar.  Bones: Negative for acute osseous findings. Focal 0.6 cm T1  hypointense lesion within T12.  Visualized thorax: Normal heart size. Evaluation of the lungs is  suboptimal on MRI and a concurrent chest CT was performed. 2  pulmonary nodules in the right lower lobe appear enlarged from the  prior examination, however comparison and evaluation is better  performed by CT.  IMPRESSION:  1. Increased size and number of multiple hepatic lesions concerning  for progression of disease.  2. Question osseous metastatic disease within T12. Attention on  follow-up examinations is recommended.  3. Similar erendira hepatis and retroperitoneal lymphadenopathy.  Template Version: [email or web address]  Thank you for consulting our team of subspecialty body imaging  radiologists at [facility].  Healthcare providers wishing to discuss this case further can contact  the [facility] Reading Room at  [phone].  Petar JEFF have personally  reviewed the image(s) and the  prepared and signed interpretation by Samreen Romo. Based on my review,  I agree with the findings.  Electronically Signed by: Petar Rob  Dictated on: 9/2/2020 3:14 PM    CT abd/pelvis- 2/24/20          NM PET Ga68 Dotatate Whole Body-7/9/19  Narrative: EXAMINATION:  NM PET 68GA DOTATATE WHOLE BODY    CLINICAL HISTORY:  Malignant carcinoid tumor of the ileum    TECHNIQUE:  4.67 mCi of GA68 Dotatate was administered intravenously in the right antecubital fossa.  After an approximately 60 min distribution time, PET/CT images were acquired from the skull vertex to the mid thigh. Transmission images were acquired to correct for attenuation using a whole body low-dose CT scan without contrast with the arms positioned above the head.    COMPARISON:  Gallium 68 dota-hua PET 11/26/2018.    FINDINGS:  Quality of the study: Adequate.    Multiple areas increased FDG avidity including innumerable hepatic lesions, retroperitoneal, mesenteric, and peripancreatic lymph nodes with index lesions as below:    Superior right lobe of the liver: SUV max 13.64, previously 22.83.    Paraesophageal lymph node has resolved.    Peripancreatic lymphadenopathy (largest lymph measures 1.7 cm in short axis, previously 2.0 cm; of note, this was incorrectly labeled as periaortic lymphadenopathy on examination dated 11/26/2018): SUV max 6.65, previously 37.    Stable right retrocaval lymph node measures approximately 1.6 cm (previously 1.5 cm) and SUV max 19.62, previously 20.63.    Redemonstration of increased tracer uptake associated with the myocardium, 1 in the region of the interventricular septum (SUV max 7.13, previously 8.8), and 1 in the region of the left ventricle lateral wall (SUV max 7.32, previously 6.43).  There has been interval resolution of FDG avidity within the region of the right atrial/ventricular wall.  New nonspecific FDG avid subcentimeter submandibular lymph node demonstrating SUV max of  3.10.  This may be reactive in nature.  Nonspecific increased uptake within the anterior aspect of the ethmoid sinus.    Physiologic uptake of the tracer is seen within the pituitary, salivary, and thyroid glands, stomach, liver, spleen, kidneys, adrenal glands, prostate, and bowel.    Incidental CT findings: Heterogeneous liver.  Multiple calcified lymph nodes within the right hilar region, likely sequela prior granulomatous disease.  Subsegmental opacity within the right middle lobe which may represent subsegmental atelectasis or scarring.  Small amount of left sided dependent pleural fluid.  Prior cholecystectomy.  Punctate calcification within the spleen, likely sequela prior granulomatous disease.  Multiple rounded hyperdensities within the subcutaneous tissue of the buttocks presumably related to injection sites.  Impression: 1.  Multiple foci of uptake indicating persistent somatostatin receptor positive metastases with resolved paraesophageal node and smaller peripancreatic nodes that are no longer tracer avid and in keeping with partial response to treatment.    2.  Two out of 3 foci within the myocardium remaining that may represent cardiac metastases, though other processes may cause uptake.  Recommend correlation with cardiac history and consideration of dedicated cardiac MRI for further evaluation.      MRI Abdomen W WO Contrast-7/9/19  Narrative: EXAMINATION:  MRI ABDOMEN W WO CONTRAST    CLINICAL HISTORY:  Neoplasm: abdomen, metastatic, recurrence, suspected/known;  Neoplasm of unspecified behavior of other specified sites    TECHNIQUE:  Multisequence, multiplanar MRI of the abdomen performed per liver protocol before and after administration of 10 mL Gadavist intravenous contrast.    COMPARISON:  11/26/2018    FINDINGS:  Liver: There is diffuse loss of signal on opposed phase imaging consistent with steatosis.  There are multiple T2 hyperintense lesions which demonstrate restricted diffusion and  arterial enhancement.  Segment VIII lesion measures 2.9 cm, previously 2.4 cm (series 1400, image 31).  Segment V lesion measures 1.5 cm, previously 1.6 cm (series 1400, image 77).  Overall number of lesions appear similar to prior study.  There is apparent interval increase in enhancement.  Postsurgical changes of prior partial right hepatectomy noted.    Biliary: Gallbladder is absent.  No biliary dilatation.    Pancreas: Unremarkable.  No pancreatic ductal dilatation.    Spleen: Normal size.  No focal lesions.    Adrenal glands: Unremarkable.    Kidneys: No renal masses.  Several tiny cysts noted.  No hydronephrosis.    Miscellaneous: Bowel containing ventral abdominal hernia partially visualized.  No evidence for bowel obstruction.  Multiple prominent periportal and retroperitoneal lymph nodes are noted.  Aortocaval necrotic lymph node measures 1.6 cm short axis, previously 1.7 cm (series 1401, image 58).  Additional retrocaval necrotic lymph node measuring 1.5 cm noted, previously 1.1 cm (series 1401, image 72).  Impression: 1. Hepatic metastases and abdominal lymphadenopathy, overall similar to prior study.  2. Partially visualized bowel containing ventral abdominal hernia.  RECIST SUMMARY:    Date of prior examination for comparison: 11/26/2018    Lesion 1: Hepatic segment V.  1.5 cm. Series 1400 image 77.  Prior measurement 1.6 cm.    Lesion 2: Hepatic segment VIII.  2.9 cm. Series 1400 image 31.  Prior measurement 2.4 cm.    Lesion 3: Aortocaval lymph node.  1.6 cm. Series 1401 image 58.  Prior measurement 1.7 cm.    Lesion 4: Retrocaval lymph node.  1.5 cm. Series 1401 image 72.  Prior measurement 1.1 cm.      Echocardiogram 3/2021  · The left ventricle is normal in size with concentric remodeling and normal systolic function. The estimated ejection fraction is 55%  · The left ventricular global longitudinal strain is -16.1%.  · Normal left ventricular diastolic function.  · Normal right ventricular size  with normal right ventricular systolic function.  · Mild aortic regurgitation.  Intermediate central venous pressure (8 mmHg).     Echocardiogram- 7/8/19  · Normal left ventricular systolic function. The estimated ejection fraction is 65%  · Normal LV diastolic function.  · Concentric left ventricular hypertrophy.  · Mild aortic regurgitation.  · Normal right ventricular systolic function.  · Normal central venous pressure (3 mm Hg).  · The estimated PA systolic pressure is 18 mm Hg      Assessment/Impression:         Problem List Items Addressed This Visit    None          Plan:         Had a long conversation with the patient about the features of his case that support the treatment and surveillance recommendations outlined below:  1.  He had his initial PRRT in November 2020 in received all 4 doses without difficulty. His blood work from July looks quite good.  He and I have previously discussed the expectations of PRRT including stability of disease and delay of progression of existing sites of disease.  He had a somatostatin receptor PET prior to this visit.  I went over the images with him and his wife.  The overall pattern of distribution looked similar.  However the final report from the radiologist raised concerns for new sites of disease within the retroperitoneal lymph nodes and the bone most consistent with progression.  I will have to have them review his prior imaging dating back to pre PRRT to get a sense of whether not there was some component of this disease prior to treatment.  Each of the areas that they are calling new are somatostatin receptor avid opening up the possibility of offering him additional doses of PRRT.  If available, I would prefer Pb212 over additional doses of Lu177.  I believe this is available on trial in [place].  We will have to reach out to [facility] to confirm availability.  He is willing to go there and even willing to go to [place] if there are additional therapies  that are reasonable alternatives.  2. Physically he is doing better.  He does say that he has episodes of palpitations that have become more frequent.  Since he does have pericardial involvement, I would have to check with cardiothoracic to determine whether not disease in that site would be triggering those symptoms.  It seems like a reasonable explanation.  I do not know enough about pericardial involvement to suggest any potential treatment.  He had an echocardiogram done today that shows normal cardiac function and only mild valve regurgitation.  3. He had an ophthalmology exam that also showed no worrisome findings to suggest clinically relevant progression in the site.  4. We discussed treatment options focusing on initiation of cytotoxic chemotherapy versus additional doses of PRRT.  I believe both would be reasonable at this juncture.  I reached out to his medical oncologist, Tomy Lew. He was not enthusiastic about chemotherapy and would refer additional doses of PRRT if possible. He liked the thought of Pb212. I presented his case at tumor board and the consensus was to offer chemotherapy at this juncture and look for radioisotope treatment options at [facility] ([place]) or in [place].     Plan:  Start CAP TEM. I will let Dr. Lew know.  Consider protocols in [place] that may include novel radioisotopes  reimage in 3 months  Continue to follow up with ophthamology  Continue Celebrex.  I would increase to 200 mg b.i.d.  Oxycodone short-acting instead of OxyContin for cancer associated pain  If he follows up in [place], I would coordinate the echocardiogram as well as the ophthalmology appointment as we did for this visit          Tayla Her MD, MPH, FACS  Professor of Surgery and Surgical Oncology  Chief, \[facility] [place] [facility]\"" Division of Surgery Oncology  Medical Director, [facility]  Office: [phone]  Fax: [phone]  Phone: [phone]  Phone: [phone]  Email:  [email or web address]

## 2022-03-28 ENCOUNTER — OFFICE VISIT (OUTPATIENT)
Dept: NEUROLOGY | Facility: HOSPITAL | Age: 52
End: 2022-03-28
Attending: SURGERY
Payer: COMMERCIAL

## 2022-03-28 DIAGNOSIS — C7A.095 MALIGNANT CARCINOID TUMOR OF MIDGUT: ICD-10-CM

## 2022-03-28 DIAGNOSIS — C7B.8 SECONDARY NEUROENDOCRINE TUMOR OF DISTANT LYMPH NODES: ICD-10-CM

## 2022-03-28 DIAGNOSIS — C7B.02 METASTATIC MALIGNANT CARCINOID TUMOR TO LIVER: Primary | ICD-10-CM

## 2022-03-28 RX ORDER — OXYCODONE AND ACETAMINOPHEN 10; 325 MG/1; MG/1
1 TABLET ORAL EVERY 6 HOURS PRN
Qty: 90 TABLET | Refills: 0 | Status: SHIPPED | OUTPATIENT
Start: 2022-03-28 | End: 2022-03-29 | Stop reason: SDUPTHER

## 2022-03-29 DIAGNOSIS — C7A.095 MALIGNANT CARCINOID TUMOR OF MIDGUT: Primary | ICD-10-CM

## 2022-03-30 ENCOUNTER — PATIENT MESSAGE (OUTPATIENT)
Dept: NEUROLOGY | Facility: HOSPITAL | Age: 52
End: 2022-03-30
Payer: COMMERCIAL

## 2022-03-30 RX ORDER — OXYCODONE AND ACETAMINOPHEN 10; 325 MG/1; MG/1
1 TABLET ORAL EVERY 6 HOURS PRN
Qty: 90 TABLET | Refills: 0 | Status: SHIPPED | OUTPATIENT
Start: 2022-03-30 | End: 2022-04-29

## 2022-04-08 NOTE — PATIENT INSTRUCTIONS
Notes From Physician:  - Start CAP TEM. I will let Dr. Lew know.  - reimage after 3 months of CAP/TEM  - Consider protocols in [place] that may include novel radioisotopes  - Continue to follow up with ophthamology  - provide patient with information to Dr. Rivera in [place] (portal message sent on 4/8/22)  - If he follows up in [place], I would coordinate the echocardiogram as well as the ophthalmology appointment as we did for this visit    Medications:   - Continue Celebrex.  I would increase to 200 mg b.i.d.  - Oxycodone short-acting instead of OxyContin for cancer associated pain

## 2022-04-17 RX ORDER — CAPECITABINE 500 MG/1
1500 TABLET, FILM COATED ORAL 2 TIMES DAILY
Qty: 84 TABLET | Refills: 11 | Status: SHIPPED | OUTPATIENT
Start: 2022-04-17 | End: 2022-04-18 | Stop reason: SDUPTHER

## 2022-04-17 RX ORDER — TEMOZOLOMIDE 100 MG/1
400 CAPSULE ORAL DAILY
Qty: 20 CAPSULE | Refills: 11 | Status: SHIPPED | OUTPATIENT
Start: 2022-04-17 | End: 2022-04-18 | Stop reason: SDUPTHER

## 2022-04-18 ENCOUNTER — PATIENT MESSAGE (OUTPATIENT)
Dept: NEUROLOGY | Facility: HOSPITAL | Age: 52
End: 2022-04-18
Payer: COMMERCIAL

## 2022-04-18 DIAGNOSIS — C79.51 SECONDARY MALIGNANT NEOPLASM OF BONE: ICD-10-CM

## 2022-04-18 DIAGNOSIS — C7B.02 METASTATIC MALIGNANT CARCINOID TUMOR TO LIVER: ICD-10-CM

## 2022-04-18 DIAGNOSIS — C7A.012 MALIGNANT CARCINOID TUMOR OF ILEUM: Primary | ICD-10-CM

## 2022-04-18 DIAGNOSIS — C7B.8 SECONDARY NEUROENDOCRINE TUMOR OF DISTANT LYMPH NODES: ICD-10-CM

## 2022-04-18 NOTE — TELEPHONE ENCOUNTER
Patient sent a portal message stating that he is not able to get the CAP/TEM filled at his local [facility]. Educated patient that it needs to be filled via a speciality pharmacy, being that it is a chemo drug. CAP/TEM scripts placed to be sent to [facility], and routed to Dr. Her to sign. Patient notified.

## 2022-04-20 RX ORDER — TEMOZOLOMIDE 100 MG/1
400 CAPSULE ORAL DAILY
Qty: 20 CAPSULE | Refills: 11 | Status: SHIPPED | OUTPATIENT
Start: 2022-04-20 | End: 2022-04-25

## 2022-04-20 RX ORDER — CAPECITABINE 500 MG/1
1500 TABLET, FILM COATED ORAL 2 TIMES DAILY
Qty: 84 TABLET | Refills: 11 | Status: SHIPPED | OUTPATIENT
Start: 2022-04-20 | End: 2022-05-04

## 2022-05-03 ENCOUNTER — TELEPHONE (OUTPATIENT)
Dept: NEUROLOGY | Facility: HOSPITAL | Age: 52
End: 2022-05-03
Payer: COMMERCIAL

## 2022-05-03 ENCOUNTER — PATIENT MESSAGE (OUTPATIENT)
Dept: NEUROLOGY | Facility: HOSPITAL | Age: 52
End: 2022-05-03
Payer: COMMERCIAL

## 2022-05-03 NOTE — TELEPHONE ENCOUNTER
----- Message from Jenny Killian, Patient Care Assistant sent at 5/2/2022 12:11 PM CDT -----  Type:  Pharmacy Calling to Clarify an RX    Name of Caller: Jeet  Pharmacy Name: [facility]  Prescription Name: capecitabine (XELODA) 500 MG Tab and temozolomide (TEMODAR) 100 MG capsule  What do they need to clarify?: pharmacy would like to know the cycle dates for patient to be on and  off medication  Best Call Back Number: [phone]  Additional Information:

## 2022-05-05 NOTE — PLAN OF CARE
Discharge orders noted. Additional clinical references attached. Patient's discharge instructions given by bedside RN and reviewed via this VN.  Education provided on new and previous medications, diagnosis, and follow-up appointments.  New medications will be delivered by pharmacy. Patient verbalized understanding. Patient's ride/transportation home at bedside. All questions answered.  Floor nurse notified.     Yes

## 2022-05-31 ENCOUNTER — TELEPHONE (OUTPATIENT)
Dept: NEUROLOGY | Facility: HOSPITAL | Age: 52
End: 2022-05-31
Payer: COMMERCIAL

## 2022-05-31 NOTE — TELEPHONE ENCOUNTER
----- Message from Debbie Layne sent at 5/31/2022  9:23 AM CDT -----  Contact: [phone]/parish  Who Called: parish   Regarding: check status of medical records request   Would the patient rather a call back or a response via [facility]? Call back  Best Call Back Number: [phone]  Additional Information: n/a

## 2023-01-11 NOTE — ADDENDUM NOTE
Addended by: DONN PEREZ on: 10/20/2020 01:47 PM     Modules accepted: Orders, SmartSet     Spiral Flap Text: The defect edges were debeveled with a #15 scalpel blade.  Given the location of the defect, shape of the defect and the proximity to free margins a spiral flap was deemed most appropriate.  Using a sterile surgical marker, an appropriate rotation flap was drawn incorporating the defect and placing the expected incisions within the relaxed skin tension lines where possible. The area thus outlined was incised deep to adipose tissue with a #15 scalpel blade.  The skin margins were undermined to an appropriate distance in all directions utilizing iris scissors.

## 2024-08-09 NOTE — PLAN OF CARE
Problem: Occupational Therapy Goal  Goal: Occupational Therapy Goal  Goals to be met by: 5/2/17     Patient will increase functional independence with ADLs by performing:    LE Dressing with Modified [place].  Grooming while standing with Modified [place].  Toileting from toilet with Modified [place] for hygiene and clothing management.   Supine to sit with Modified [place].  Stand pivot transfers with Modified [place].  Toilet transfer to toilet with Modified [place].  Upper extremity exercise program x10 reps per handout, with independence.   Outcome: Ongoing (interventions implemented as appropriate)  Patient improving with ax tolerance and mobility. Successful BM today. Will benefit from continued OT during acute hospital stay.       owen K

## 2025-01-09 NOTE — ADDENDUM NOTE
Addended by: JOHN PAUL BARLOW on: 2/8/2017 01:20 PM     Modules accepted: Orders, SmartSet     09-Jan-2025

## (undated) DEVICE — GUIDEWIRE UROLOGIC .038 150CM

## (undated) DEVICE — SUT 0 27IN CHROMIC GUT BP

## (undated) DEVICE — KIT SURGIFLO EVITHROM

## (undated) DEVICE — CATH ALL PUR URTHL 20FR

## (undated) DEVICE — SUT PROLENE 2-0 36IN MH BLU

## (undated) DEVICE — GLOVE SURG BIOGEL LATEX SZ 7.5

## (undated) DEVICE — TIPS FLEXIBLE 45CM

## (undated) DEVICE — HEMOSTAT VITAGEL RT 4.5

## (undated) DEVICE — DEVICE COLLECT FOR COLLAGEN

## (undated) DEVICE — SYR 30CC LUER LOCK

## (undated) DEVICE — SUT 4/0 27IN PDS II VIO MO

## (undated) DEVICE — RELOAD PROXIMATE CUT BLUE 75MM

## (undated) DEVICE — SUT SILK 0 STRANDS 30IN BLK

## (undated) DEVICE — SEALER LIGASURE IMPACT 18CM

## (undated) DEVICE — Device

## (undated) DEVICE — SUT 6/0 4-24IN PROLENE

## (undated) DEVICE — VITAGEL SPRAY SET 5/PK

## (undated) DEVICE — CONTAINER PATHOLOGY W/LID 32OZ

## (undated) DEVICE — CUTTER PROXIMATE BLUE 75MM

## (undated) DEVICE — CLIP LIGATION MEDIUM CLIPS 1

## (undated) DEVICE — ADHESIVE MASTISOL VIAL 48/BX

## (undated) DEVICE — SUT SILK 3-0 STRANDS 30IN

## (undated) DEVICE — SPONGE LAP 18X18 PREWASHED

## (undated) DEVICE — GUIDEWIRE NITINOL HYBRID 150CM

## (undated) DEVICE — DRAPE FLUID WARMER ORS 44X44IN

## (undated) DEVICE — APPLIER LIGACLIP SM 9.38IN

## (undated) DEVICE — ADHESIVE DERMABOND ADVANCED

## (undated) DEVICE — DRAPE INCISE IOBAN 2 23X23IN

## (undated) DEVICE — SUT PROLENE 5-0 36IN C-1

## (undated) DEVICE — DRESSING AQUACEL SACRAL 9 X 9

## (undated) DEVICE — SUT 2 60IN PROLENE BL MONO

## (undated) DEVICE — HEMOSTAT SURGICEL 4X8IN

## (undated) DEVICE — SUT ETHILON 4-0 BLK MONO

## (undated) DEVICE — SUPPORT ULNA NERVE PROTECTOR

## (undated) DEVICE — SUT MONOCRYL 3-0 PS-1

## (undated) DEVICE — CATH URETHRAL 16FR RED

## (undated) DEVICE — SUT PROLENE 3-0 30SH

## (undated) DEVICE — MANIFOLD 4 PORT

## (undated) DEVICE — DRAPE STERI INSTRUMENT 1018

## (undated) DEVICE — CONTAINER SPECIMEN STR 3 0Z

## (undated) DEVICE — SUT 4/0 27IN PDS II VIO MON

## (undated) DEVICE — SUT 4-0 12-30IN SILK

## (undated) DEVICE — SEE MEDLINE ITEM 156955

## (undated) DEVICE — UNDERGLOVES BIOGEL PI SIZE 8

## (undated) DEVICE — KIT POWDER ABSORBABLE GELATIN

## (undated) DEVICE — APPLIER LIGACLIP LG 13IN

## (undated) DEVICE — SEE MEDLINE ITEM 157125

## (undated) DEVICE — ELECTRODE REM PLYHSV RETURN 9

## (undated) DEVICE — SUT SILK 2-0 STRANDS 30IN

## (undated) DEVICE — SUT PROLENE 2-0 SH 36IN BLU

## (undated) DEVICE — SUT ETHIBOND EXCEL 1 CTX 18